# Patient Record
Sex: MALE | Race: BLACK OR AFRICAN AMERICAN | Employment: OTHER | URBAN - METROPOLITAN AREA
[De-identification: names, ages, dates, MRNs, and addresses within clinical notes are randomized per-mention and may not be internally consistent; named-entity substitution may affect disease eponyms.]

---

## 2017-01-03 RX ORDER — TRIAMTERENE AND HYDROCHLOROTHIAZIDE 37.5; 25 MG/1; MG/1
TABLET ORAL
Qty: 90 TABLET | Refills: 1 | Status: SHIPPED | OUTPATIENT
Start: 2017-01-03 | End: 2017-07-08 | Stop reason: SDUPTHER

## 2017-01-09 RX ORDER — ENOXAPARIN SODIUM 300 MG/3ML
1 INJECTION INTRAVENOUS; SUBCUTANEOUS 2 TIMES DAILY
Status: SHIPPED | OUTPATIENT
Start: 2017-01-09 | End: 2017-01-15

## 2017-01-10 ENCOUNTER — TELEPHONE (OUTPATIENT)
Dept: PHARMACY | Facility: CLINIC | Age: 77
End: 2017-01-10

## 2017-01-10 ENCOUNTER — ANTI-COAG VISIT (OUTPATIENT)
Dept: PHARMACY | Facility: CLINIC | Age: 77
End: 2017-01-10

## 2017-01-10 DIAGNOSIS — I48.20 CHRONIC ATRIAL FIBRILLATION (HCC): ICD-10-CM

## 2017-01-10 LAB — INTERNATIONAL NORMALIZATION RATIO, POC: 2.3

## 2017-01-16 ENCOUNTER — TELEPHONE (OUTPATIENT)
Dept: CARDIOLOGY CLINIC | Age: 77
End: 2017-01-16

## 2017-02-09 ENCOUNTER — OFFICE VISIT (OUTPATIENT)
Dept: INTERNAL MEDICINE | Age: 77
End: 2017-02-09

## 2017-02-09 ENCOUNTER — ANTI-COAG VISIT (OUTPATIENT)
Dept: PHARMACY | Facility: CLINIC | Age: 77
End: 2017-02-09

## 2017-02-09 VITALS
SYSTOLIC BLOOD PRESSURE: 158 MMHG | WEIGHT: 226 LBS | BODY MASS INDEX: 33.37 KG/M2 | HEART RATE: 79 BPM | DIASTOLIC BLOOD PRESSURE: 88 MMHG | OXYGEN SATURATION: 97 %

## 2017-02-09 DIAGNOSIS — I48.19 PERSISTENT ATRIAL FIBRILLATION (HCC): Chronic | ICD-10-CM

## 2017-02-09 DIAGNOSIS — Z00.00 ROUTINE GENERAL MEDICAL EXAMINATION AT A HEALTH CARE FACILITY: Primary | ICD-10-CM

## 2017-02-09 DIAGNOSIS — I48.20 CHRONIC ATRIAL FIBRILLATION (HCC): ICD-10-CM

## 2017-02-09 DIAGNOSIS — Z12.5 SPECIAL SCREENING FOR MALIGNANT NEOPLASM OF PROSTATE: ICD-10-CM

## 2017-02-09 DIAGNOSIS — D64.9 ANEMIA, UNSPECIFIED TYPE: Chronic | ICD-10-CM

## 2017-02-09 DIAGNOSIS — E55.9 VITAMIN D DEFICIENCY: ICD-10-CM

## 2017-02-09 DIAGNOSIS — I10 ESSENTIAL HYPERTENSION: Chronic | ICD-10-CM

## 2017-02-09 DIAGNOSIS — Z12.11 SPECIAL SCREENING FOR MALIGNANT NEOPLASMS, COLON: ICD-10-CM

## 2017-02-09 DIAGNOSIS — N40.1 BENIGN NON-NODULAR PROSTATIC HYPERPLASIA WITH LOWER URINARY TRACT SYMPTOMS: Chronic | ICD-10-CM

## 2017-02-09 DIAGNOSIS — K21.9 GASTROESOPHAGEAL REFLUX DISEASE WITHOUT ESOPHAGITIS: Chronic | ICD-10-CM

## 2017-02-09 DIAGNOSIS — R97.20 ELEVATED PSA: Chronic | ICD-10-CM

## 2017-02-09 DIAGNOSIS — E78.5 DYSLIPIDEMIA: Chronic | ICD-10-CM

## 2017-02-09 LAB
ALBUMIN SERPL-MCNC: 4.1 G/DL (ref 3.4–5)
ALP BLD-CCNC: 90 U/L (ref 40–129)
ALT SERPL-CCNC: 18 U/L (ref 10–40)
ANION GAP SERPL CALCULATED.3IONS-SCNC: 15 MMOL/L (ref 3–16)
AST SERPL-CCNC: 18 U/L (ref 15–37)
BASOPHILS ABSOLUTE: 0 K/UL (ref 0–0.2)
BASOPHILS RELATIVE PERCENT: 0.3 %
BILIRUB SERPL-MCNC: 0.7 MG/DL (ref 0–1)
BILIRUBIN DIRECT: <0.2 MG/DL (ref 0–0.3)
BILIRUBIN URINE: NEGATIVE
BILIRUBIN, INDIRECT: NORMAL MG/DL (ref 0–1)
BLOOD, URINE: NEGATIVE
BUN BLDV-MCNC: 14 MG/DL (ref 7–20)
CALCIUM SERPL-MCNC: 9.9 MG/DL (ref 8.3–10.6)
CHLORIDE BLD-SCNC: 99 MMOL/L (ref 99–110)
CHOLESTEROL, TOTAL: 155 MG/DL (ref 0–199)
CLARITY: CLEAR
CO2: 26 MMOL/L (ref 21–32)
COLOR: YELLOW
CREAT SERPL-MCNC: 0.9 MG/DL (ref 0.8–1.3)
EOSINOPHILS ABSOLUTE: 0 K/UL (ref 0–0.6)
EOSINOPHILS RELATIVE PERCENT: 0.7 %
GFR AFRICAN AMERICAN: >60
GFR NON-AFRICAN AMERICAN: >60
GLUCOSE BLD-MCNC: 90 MG/DL (ref 70–99)
GLUCOSE URINE: NEGATIVE MG/DL
HCT VFR BLD CALC: 43.5 % (ref 40.5–52.5)
HDLC SERPL-MCNC: 44 MG/DL (ref 40–60)
HEMOGLOBIN: 14.7 G/DL (ref 13.5–17.5)
INTERNATIONAL NORMALIZATION RATIO, POC: 3.2
KETONES, URINE: 15 MG/DL
LDL CHOLESTEROL CALCULATED: 101 MG/DL
LEUKOCYTE ESTERASE, URINE: NEGATIVE
LYMPHOCYTES ABSOLUTE: 1.9 K/UL (ref 1–5.1)
LYMPHOCYTES RELATIVE PERCENT: 31.7 %
MCH RBC QN AUTO: 35.5 PG (ref 26–34)
MCHC RBC AUTO-ENTMCNC: 33.8 G/DL (ref 31–36)
MCV RBC AUTO: 105 FL (ref 80–100)
MICROSCOPIC EXAMINATION: ABNORMAL
MONOCYTES ABSOLUTE: 0.5 K/UL (ref 0–1.3)
MONOCYTES RELATIVE PERCENT: 7.9 %
NEUTROPHILS ABSOLUTE: 3.5 K/UL (ref 1.7–7.7)
NEUTROPHILS RELATIVE PERCENT: 59.4 %
NITRITE, URINE: NEGATIVE
PDW BLD-RTO: 13 % (ref 12.4–15.4)
PH UA: 6.5
PHOSPHORUS: 2.7 MG/DL (ref 2.5–4.9)
PLATELET # BLD: 210 K/UL (ref 135–450)
PMV BLD AUTO: 8.5 FL (ref 5–10.5)
POTASSIUM SERPL-SCNC: 4.2 MMOL/L (ref 3.5–5.1)
PROSTATE SPECIFIC ANTIGEN: 19.05 NG/ML (ref 0–4)
PROTEIN UA: NEGATIVE MG/DL
RBC # BLD: 4.15 M/UL (ref 4.2–5.9)
SODIUM BLD-SCNC: 140 MMOL/L (ref 136–145)
SPECIFIC GRAVITY UA: 1.02
TOTAL PROTEIN: 7.1 G/DL (ref 6.4–8.2)
TRIGL SERPL-MCNC: 50 MG/DL (ref 0–150)
TSH SERPL DL<=0.05 MIU/L-ACNC: 1.48 UIU/ML (ref 0.27–4.2)
URINE TYPE: ABNORMAL
UROBILINOGEN, URINE: 0.2 E.U./DL
VITAMIN D 25-HYDROXY: 39.7 NG/ML
VLDLC SERPL CALC-MCNC: 10 MG/DL
WBC # BLD: 5.9 K/UL (ref 4–11)

## 2017-02-09 PROCEDURE — 1036F TOBACCO NON-USER: CPT | Performed by: INTERNAL MEDICINE

## 2017-02-09 PROCEDURE — 4040F PNEUMOC VAC/ADMIN/RCVD: CPT | Performed by: INTERNAL MEDICINE

## 2017-02-09 PROCEDURE — G8484 FLU IMMUNIZE NO ADMIN: HCPCS | Performed by: INTERNAL MEDICINE

## 2017-02-09 PROCEDURE — 1123F ACP DISCUSS/DSCN MKR DOCD: CPT | Performed by: INTERNAL MEDICINE

## 2017-02-09 PROCEDURE — G8427 DOCREV CUR MEDS BY ELIG CLIN: HCPCS | Performed by: INTERNAL MEDICINE

## 2017-02-09 PROCEDURE — G8417 CALC BMI ABV UP PARAM F/U: HCPCS | Performed by: INTERNAL MEDICINE

## 2017-02-09 PROCEDURE — G0439 PPPS, SUBSEQ VISIT: HCPCS | Performed by: INTERNAL MEDICINE

## 2017-02-09 PROCEDURE — 99214 OFFICE O/P EST MOD 30 MIN: CPT | Performed by: INTERNAL MEDICINE

## 2017-02-09 RX ORDER — WARFARIN SODIUM 5 MG/1
TABLET ORAL
Status: ON HOLD | COMMUNITY
Start: 2017-02-09 | End: 2017-08-16 | Stop reason: CLARIF

## 2017-02-09 ASSESSMENT — LIFESTYLE VARIABLES
HOW MANY STANDARD DRINKS CONTAINING ALCOHOL DO YOU HAVE ON A TYPICAL DAY: 0
HOW OFTEN DURING THE LAST YEAR HAVE YOU HAD A FEELING OF GUILT OR REMORSE AFTER DRINKING: 0
HOW OFTEN DO YOU HAVE SIX OR MORE DRINKS ON ONE OCCASION: 0
HOW OFTEN DURING THE LAST YEAR HAVE YOU FAILED TO DO WHAT WAS NORMALLY EXPECTED FROM YOU BECAUSE OF DRINKING: 0
HAS A RELATIVE, FRIEND, DOCTOR, OR ANOTHER HEALTH PROFESSIONAL EXPRESSED CONCERN ABOUT YOUR DRINKING OR SUGGESTED YOU CUT DOWN: 0
AUDIT TOTAL SCORE: 3
HOW OFTEN DO YOU HAVE A DRINK CONTAINING ALCOHOL: 3
HOW OFTEN DURING THE LAST YEAR HAVE YOU FOUND THAT YOU WERE NOT ABLE TO STOP DRINKING ONCE YOU HAD STARTED: 0
AUDIT-C TOTAL SCORE: 3
HOW OFTEN DURING THE LAST YEAR HAVE YOU NEEDED AN ALCOHOLIC DRINK FIRST THING IN THE MORNING TO GET YOURSELF GOING AFTER A NIGHT OF HEAVY DRINKING: 0
HAVE YOU OR SOMEONE ELSE BEEN INJURED AS A RESULT OF YOUR DRINKING: 0
HOW OFTEN DURING THE LAST YEAR HAVE YOU BEEN UNABLE TO REMEMBER WHAT HAPPENED THE NIGHT BEFORE BECAUSE YOU HAD BEEN DRINKING: 0

## 2017-02-09 ASSESSMENT — ENCOUNTER SYMPTOMS
EYES NEGATIVE: 1
GASTROINTESTINAL NEGATIVE: 1
RESPIRATORY NEGATIVE: 1

## 2017-02-09 ASSESSMENT — ANXIETY QUESTIONNAIRES: GAD7 TOTAL SCORE: 0

## 2017-02-09 ASSESSMENT — PATIENT HEALTH QUESTIONNAIRE - PHQ9: SUM OF ALL RESPONSES TO PHQ QUESTIONS 1-9: 1

## 2017-02-17 ENCOUNTER — HOSPITAL ENCOUNTER (OUTPATIENT)
Dept: ENDOSCOPY | Age: 77
Discharge: HOME | End: 2017-02-17
Attending: INTERNAL MEDICINE | Admitting: INTERNAL MEDICINE

## 2017-02-17 VITALS
SYSTOLIC BLOOD PRESSURE: 137 MMHG | DIASTOLIC BLOOD PRESSURE: 108 MMHG | HEIGHT: 69 IN | WEIGHT: 220 LBS | HEART RATE: 84 BPM | RESPIRATION RATE: 10 BRPM | TEMPERATURE: 97.5 F | BODY MASS INDEX: 32.58 KG/M2 | OXYGEN SATURATION: 100 %

## 2017-02-17 RX ORDER — PROMETHAZINE HYDROCHLORIDE 25 MG/ML
6.25 INJECTION, SOLUTION INTRAMUSCULAR; INTRAVENOUS
Status: ACTIVE | OUTPATIENT
Start: 2017-02-17 | End: 2017-02-17

## 2017-02-17 RX ORDER — FENTANYL CITRATE 50 UG/ML
25 INJECTION, SOLUTION INTRAMUSCULAR; INTRAVENOUS EVERY 5 MIN PRN
Status: DISCONTINUED | OUTPATIENT
Start: 2017-02-17 | End: 2017-02-18 | Stop reason: HOSPADM

## 2017-02-17 RX ORDER — LABETALOL HYDROCHLORIDE 5 MG/ML
5 INJECTION, SOLUTION INTRAVENOUS EVERY 10 MIN PRN
Status: DISCONTINUED | OUTPATIENT
Start: 2017-02-17 | End: 2017-02-18 | Stop reason: HOSPADM

## 2017-02-17 RX ORDER — DIPHENHYDRAMINE HYDROCHLORIDE 50 MG/ML
12.5 INJECTION INTRAMUSCULAR; INTRAVENOUS
Status: ACTIVE | OUTPATIENT
Start: 2017-02-17 | End: 2017-02-17

## 2017-02-17 RX ORDER — ONDANSETRON 2 MG/ML
4 INJECTION INTRAMUSCULAR; INTRAVENOUS
Status: ACTIVE | OUTPATIENT
Start: 2017-02-17 | End: 2017-02-17

## 2017-02-17 RX ORDER — HYDRALAZINE HYDROCHLORIDE 20 MG/ML
5 INJECTION INTRAMUSCULAR; INTRAVENOUS EVERY 10 MIN PRN
Status: DISCONTINUED | OUTPATIENT
Start: 2017-02-17 | End: 2017-02-18 | Stop reason: HOSPADM

## 2017-02-17 RX ORDER — MEPERIDINE HYDROCHLORIDE 25 MG/ML
12.5 INJECTION INTRAMUSCULAR; INTRAVENOUS; SUBCUTANEOUS EVERY 5 MIN PRN
Status: DISCONTINUED | OUTPATIENT
Start: 2017-02-17 | End: 2017-02-18 | Stop reason: HOSPADM

## 2017-02-24 ENCOUNTER — ANTI-COAG VISIT (OUTPATIENT)
Dept: PHARMACY | Facility: CLINIC | Age: 77
End: 2017-02-24

## 2017-02-24 DIAGNOSIS — I48.20 CHRONIC ATRIAL FIBRILLATION (HCC): ICD-10-CM

## 2017-02-24 LAB — INR BLD: 1.9

## 2017-03-10 ENCOUNTER — OFFICE VISIT (OUTPATIENT)
Dept: CARDIOLOGY CLINIC | Age: 77
End: 2017-03-10

## 2017-03-10 VITALS
DIASTOLIC BLOOD PRESSURE: 80 MMHG | SYSTOLIC BLOOD PRESSURE: 130 MMHG | WEIGHT: 227 LBS | BODY MASS INDEX: 33.52 KG/M2 | HEART RATE: 72 BPM

## 2017-03-10 DIAGNOSIS — I10 ESSENTIAL HYPERTENSION: ICD-10-CM

## 2017-03-10 DIAGNOSIS — I48.20 CHRONIC A-FIB (HCC): Primary | ICD-10-CM

## 2017-03-10 PROCEDURE — 99214 OFFICE O/P EST MOD 30 MIN: CPT | Performed by: INTERNAL MEDICINE

## 2017-03-10 PROCEDURE — 1123F ACP DISCUSS/DSCN MKR DOCD: CPT | Performed by: INTERNAL MEDICINE

## 2017-03-10 PROCEDURE — 1036F TOBACCO NON-USER: CPT | Performed by: INTERNAL MEDICINE

## 2017-03-10 PROCEDURE — G8417 CALC BMI ABV UP PARAM F/U: HCPCS | Performed by: INTERNAL MEDICINE

## 2017-03-10 PROCEDURE — G8427 DOCREV CUR MEDS BY ELIG CLIN: HCPCS | Performed by: INTERNAL MEDICINE

## 2017-03-10 PROCEDURE — 4040F PNEUMOC VAC/ADMIN/RCVD: CPT | Performed by: INTERNAL MEDICINE

## 2017-03-10 PROCEDURE — G8484 FLU IMMUNIZE NO ADMIN: HCPCS | Performed by: INTERNAL MEDICINE

## 2017-03-17 ENCOUNTER — ANTI-COAG VISIT (OUTPATIENT)
Dept: PHARMACY | Facility: CLINIC | Age: 77
End: 2017-03-17

## 2017-03-17 DIAGNOSIS — I48.20 CHRONIC ATRIAL FIBRILLATION (HCC): ICD-10-CM

## 2017-03-17 LAB — INR BLD: 1.9

## 2017-03-31 ENCOUNTER — ANTI-COAG VISIT (OUTPATIENT)
Dept: PHARMACY | Facility: CLINIC | Age: 77
End: 2017-03-31

## 2017-03-31 DIAGNOSIS — I48.20 CHRONIC ATRIAL FIBRILLATION (HCC): ICD-10-CM

## 2017-03-31 LAB — INR BLD: 2.2

## 2017-04-21 ENCOUNTER — ANTI-COAG VISIT (OUTPATIENT)
Dept: PHARMACY | Facility: CLINIC | Age: 77
End: 2017-04-21

## 2017-04-21 DIAGNOSIS — I48.20 CHRONIC ATRIAL FIBRILLATION (HCC): ICD-10-CM

## 2017-04-21 LAB — INR BLD: 2.6

## 2017-05-19 ENCOUNTER — ANTI-COAG VISIT (OUTPATIENT)
Dept: PHARMACY | Facility: CLINIC | Age: 77
End: 2017-05-19

## 2017-05-19 DIAGNOSIS — I48.20 CHRONIC ATRIAL FIBRILLATION (HCC): ICD-10-CM

## 2017-05-19 LAB — INTERNATIONAL NORMALIZATION RATIO, POC: 2.8

## 2017-06-09 ENCOUNTER — OFFICE VISIT (OUTPATIENT)
Dept: INTERNAL MEDICINE | Age: 77
End: 2017-06-09

## 2017-06-09 VITALS
HEIGHT: 69 IN | OXYGEN SATURATION: 98 % | DIASTOLIC BLOOD PRESSURE: 80 MMHG | HEART RATE: 76 BPM | WEIGHT: 228 LBS | BODY MASS INDEX: 33.77 KG/M2 | SYSTOLIC BLOOD PRESSURE: 138 MMHG

## 2017-06-09 DIAGNOSIS — I48.20 CHRONIC ATRIAL FIBRILLATION (HCC): Chronic | ICD-10-CM

## 2017-06-09 DIAGNOSIS — I10 ESSENTIAL HYPERTENSION: Primary | Chronic | ICD-10-CM

## 2017-06-09 DIAGNOSIS — R80.9 PROTEINURIA: Chronic | ICD-10-CM

## 2017-06-09 DIAGNOSIS — E55.9 VITAMIN D DEFICIENCY: ICD-10-CM

## 2017-06-09 DIAGNOSIS — E78.5 DYSLIPIDEMIA: Chronic | ICD-10-CM

## 2017-06-09 PROCEDURE — G8427 DOCREV CUR MEDS BY ELIG CLIN: HCPCS | Performed by: INTERNAL MEDICINE

## 2017-06-09 PROCEDURE — G8417 CALC BMI ABV UP PARAM F/U: HCPCS | Performed by: INTERNAL MEDICINE

## 2017-06-09 PROCEDURE — 4040F PNEUMOC VAC/ADMIN/RCVD: CPT | Performed by: INTERNAL MEDICINE

## 2017-06-09 PROCEDURE — 1036F TOBACCO NON-USER: CPT | Performed by: INTERNAL MEDICINE

## 2017-06-09 PROCEDURE — 1123F ACP DISCUSS/DSCN MKR DOCD: CPT | Performed by: INTERNAL MEDICINE

## 2017-06-09 PROCEDURE — 99214 OFFICE O/P EST MOD 30 MIN: CPT | Performed by: INTERNAL MEDICINE

## 2017-06-09 RX ORDER — MULTIVIT-MIN/IRON/FOLIC ACID/K 18-600-40
1 CAPSULE ORAL 2 TIMES DAILY
COMMUNITY
Start: 2017-06-09

## 2017-06-09 ASSESSMENT — ENCOUNTER SYMPTOMS
ORTHOPNEA: 0
EYES NEGATIVE: 1
GASTROINTESTINAL NEGATIVE: 1
SHORTNESS OF BREATH: 0
RESPIRATORY NEGATIVE: 1
BLURRED VISION: 0

## 2017-06-16 ENCOUNTER — ANTI-COAG VISIT (OUTPATIENT)
Dept: PHARMACY | Facility: CLINIC | Age: 77
End: 2017-06-16

## 2017-06-16 DIAGNOSIS — I48.20 CHRONIC ATRIAL FIBRILLATION (HCC): ICD-10-CM

## 2017-06-16 LAB — INTERNATIONAL NORMALIZATION RATIO, POC: 4.4

## 2017-06-23 ENCOUNTER — ANTI-COAG VISIT (OUTPATIENT)
Dept: PHARMACY | Facility: CLINIC | Age: 77
End: 2017-06-23

## 2017-06-23 LAB — INTERNATIONAL NORMALIZATION RATIO, POC: 2

## 2017-06-30 ENCOUNTER — ANTI-COAG VISIT (OUTPATIENT)
Dept: PHARMACY | Facility: CLINIC | Age: 77
End: 2017-06-30

## 2017-06-30 LAB — INTERNATIONAL NORMALIZATION RATIO, POC: 2.4

## 2017-08-01 ENCOUNTER — TELEPHONE (OUTPATIENT)
Dept: INTERNAL MEDICINE | Age: 77
End: 2017-08-01

## 2017-08-01 ENCOUNTER — ANTI-COAG VISIT (OUTPATIENT)
Dept: PHARMACY | Facility: CLINIC | Age: 77
End: 2017-08-01

## 2017-08-01 ENCOUNTER — TELEPHONE (OUTPATIENT)
Dept: PHARMACY | Facility: CLINIC | Age: 77
End: 2017-08-01

## 2017-08-01 DIAGNOSIS — I48.91 ATRIAL FIBRILLATION, UNSPECIFIED TYPE (HCC): ICD-10-CM

## 2017-08-01 LAB — INTERNATIONAL NORMALIZATION RATIO, POC: 2.7

## 2017-08-04 ENCOUNTER — OFFICE VISIT (OUTPATIENT)
Dept: INTERNAL MEDICINE | Age: 77
End: 2017-08-04

## 2017-08-04 ENCOUNTER — TELEPHONE (OUTPATIENT)
Dept: CARDIOLOGY CLINIC | Age: 77
End: 2017-08-04

## 2017-08-04 VITALS
WEIGHT: 227 LBS | HEART RATE: 88 BPM | BODY MASS INDEX: 33.62 KG/M2 | SYSTOLIC BLOOD PRESSURE: 132 MMHG | DIASTOLIC BLOOD PRESSURE: 86 MMHG | HEIGHT: 69 IN

## 2017-08-04 DIAGNOSIS — R31.9 HEMATURIA: ICD-10-CM

## 2017-08-04 DIAGNOSIS — I48.20 CHRONIC ATRIAL FIBRILLATION (HCC): ICD-10-CM

## 2017-08-04 DIAGNOSIS — I10 ESSENTIAL HYPERTENSION: ICD-10-CM

## 2017-08-04 DIAGNOSIS — Z01.818 PRE-OP EVALUATION: ICD-10-CM

## 2017-08-04 DIAGNOSIS — Z01.818 PRE-OP EVALUATION: Primary | ICD-10-CM

## 2017-08-04 LAB
ALBUMIN SERPL-MCNC: 4.2 G/DL (ref 3.4–5)
ANION GAP SERPL CALCULATED.3IONS-SCNC: 12 MMOL/L (ref 3–16)
BASOPHILS ABSOLUTE: 0 K/UL (ref 0–0.2)
BASOPHILS RELATIVE PERCENT: 0.3 %
BUN BLDV-MCNC: 19 MG/DL (ref 7–20)
CALCIUM SERPL-MCNC: 9.6 MG/DL (ref 8.3–10.6)
CHLORIDE BLD-SCNC: 101 MMOL/L (ref 99–110)
CO2: 29 MMOL/L (ref 21–32)
CREAT SERPL-MCNC: 0.9 MG/DL (ref 0.8–1.3)
EOSINOPHILS ABSOLUTE: 0.1 K/UL (ref 0–0.6)
EOSINOPHILS RELATIVE PERCENT: 1 %
GFR AFRICAN AMERICAN: >60
GFR NON-AFRICAN AMERICAN: >60
GLUCOSE BLD-MCNC: 99 MG/DL (ref 70–99)
HCT VFR BLD CALC: 42.6 % (ref 40.5–52.5)
HEMOGLOBIN: 14.5 G/DL (ref 13.5–17.5)
LYMPHOCYTES ABSOLUTE: 1.9 K/UL (ref 1–5.1)
LYMPHOCYTES RELATIVE PERCENT: 29.1 %
MCH RBC QN AUTO: 36.4 PG (ref 26–34)
MCHC RBC AUTO-ENTMCNC: 34.2 G/DL (ref 31–36)
MCV RBC AUTO: 106.4 FL (ref 80–100)
MONOCYTES ABSOLUTE: 0.5 K/UL (ref 0–1.3)
MONOCYTES RELATIVE PERCENT: 7.4 %
NEUTROPHILS ABSOLUTE: 4 K/UL (ref 1.7–7.7)
NEUTROPHILS RELATIVE PERCENT: 62.2 %
PDW BLD-RTO: 13.3 % (ref 12.4–15.4)
PHOSPHORUS: 2 MG/DL (ref 2.5–4.9)
PLATELET # BLD: 188 K/UL (ref 135–450)
PMV BLD AUTO: 8.3 FL (ref 5–10.5)
POTASSIUM SERPL-SCNC: 4.1 MMOL/L (ref 3.5–5.1)
RBC # BLD: 4 M/UL (ref 4.2–5.9)
SODIUM BLD-SCNC: 142 MMOL/L (ref 136–145)
WBC # BLD: 6.5 K/UL (ref 4–11)

## 2017-08-04 PROCEDURE — 99214 OFFICE O/P EST MOD 30 MIN: CPT | Performed by: INTERNAL MEDICINE

## 2017-08-04 PROCEDURE — G8417 CALC BMI ABV UP PARAM F/U: HCPCS | Performed by: INTERNAL MEDICINE

## 2017-08-04 PROCEDURE — 1036F TOBACCO NON-USER: CPT | Performed by: INTERNAL MEDICINE

## 2017-08-04 PROCEDURE — 93000 ELECTROCARDIOGRAM COMPLETE: CPT | Performed by: INTERNAL MEDICINE

## 2017-08-04 PROCEDURE — 4040F PNEUMOC VAC/ADMIN/RCVD: CPT | Performed by: INTERNAL MEDICINE

## 2017-08-04 PROCEDURE — G8427 DOCREV CUR MEDS BY ELIG CLIN: HCPCS | Performed by: INTERNAL MEDICINE

## 2017-08-10 ENCOUNTER — TELEPHONE (OUTPATIENT)
Dept: INTERNAL MEDICINE | Age: 77
End: 2017-08-10

## 2017-08-10 DIAGNOSIS — I48.20 CHRONIC ATRIAL FIBRILLATION (HCC): Primary | ICD-10-CM

## 2017-08-10 NOTE — TELEPHONE ENCOUNTER
I spoe with Mr Surinder Lamas. He will stop his Coumadin 5 days before the procedure, He will do INR test 1 days before. Pt says that from Dr. Kathie Bence office he was told to be on Lovenox. I left a message to Dr Santhosh Kumar to discuss that as the patient will be on Lovenox only for 2 days.

## 2017-08-11 ENCOUNTER — TELEPHONE (OUTPATIENT)
Dept: INTERNAL MEDICINE | Age: 77
End: 2017-08-11

## 2017-08-15 ENCOUNTER — ANTI-COAG VISIT (OUTPATIENT)
Dept: PHARMACY | Facility: CLINIC | Age: 77
End: 2017-08-15

## 2017-08-15 ENCOUNTER — TELEPHONE (OUTPATIENT)
Dept: CARDIOLOGY CLINIC | Age: 77
End: 2017-08-15

## 2017-08-15 DIAGNOSIS — I48.20 CHRONIC ATRIAL FIBRILLATION (HCC): ICD-10-CM

## 2017-08-15 LAB — INTERNATIONAL NORMALIZATION RATIO, POC: 1.2

## 2017-08-18 ENCOUNTER — TELEPHONE (OUTPATIENT)
Dept: PHARMACY | Facility: CLINIC | Age: 77
End: 2017-08-18

## 2017-08-28 ENCOUNTER — HOSPITAL ENCOUNTER (OUTPATIENT)
Dept: CT IMAGING | Age: 77
Discharge: HOME | End: 2017-08-28
Attending: INTERNAL MEDICINE | Admitting: INTERNAL MEDICINE

## 2017-08-28 DIAGNOSIS — Z08 ENCOUNTER FOR FOLLOW-UP SURVEILLANCE OF COLON CANCER: ICD-10-CM

## 2017-08-28 DIAGNOSIS — Z85.038 ENCOUNTER FOR FOLLOW-UP SURVEILLANCE OF COLON CANCER: ICD-10-CM

## 2017-08-28 DIAGNOSIS — C18.7 MALIGNANT NEOPLASM OF SIGMOID COLON (HCC): ICD-10-CM

## 2017-08-31 ENCOUNTER — ANTI-COAG VISIT (OUTPATIENT)
Dept: PHARMACY | Facility: CLINIC | Age: 77
End: 2017-08-31

## 2017-08-31 DIAGNOSIS — I48.20 CHRONIC ATRIAL FIBRILLATION (HCC): ICD-10-CM

## 2017-08-31 LAB — INTERNATIONAL NORMALIZATION RATIO, POC: 1

## 2017-09-05 ENCOUNTER — ANTI-COAG VISIT (OUTPATIENT)
Dept: PHARMACY | Facility: CLINIC | Age: 77
End: 2017-09-05

## 2017-09-05 DIAGNOSIS — I48.20 CHRONIC ATRIAL FIBRILLATION (HCC): ICD-10-CM

## 2017-09-05 LAB — INTERNATIONAL NORMALIZATION RATIO, POC: 1

## 2017-09-10 DIAGNOSIS — E78.5 DYSLIPIDEMIA: Chronic | ICD-10-CM

## 2017-09-11 RX ORDER — NIACIN 1000 MG/1
TABLET, EXTENDED RELEASE ORAL
Qty: 180 TABLET | Refills: 2 | Status: ON HOLD | OUTPATIENT
Start: 2017-09-11 | End: 2017-11-01 | Stop reason: HOSPADM

## 2017-09-19 ENCOUNTER — ANTI-COAG VISIT (OUTPATIENT)
Dept: PHARMACY | Facility: CLINIC | Age: 77
End: 2017-09-19

## 2017-09-19 DIAGNOSIS — I48.20 CHRONIC ATRIAL FIBRILLATION (HCC): ICD-10-CM

## 2017-09-19 LAB — INTERNATIONAL NORMALIZATION RATIO, POC: 1.4

## 2017-09-20 ENCOUNTER — TELEPHONE (OUTPATIENT)
Dept: PHARMACY | Facility: CLINIC | Age: 77
End: 2017-09-20

## 2017-09-28 ENCOUNTER — ANTI-COAG VISIT (OUTPATIENT)
Dept: PHARMACY | Facility: CLINIC | Age: 77
End: 2017-09-28

## 2017-09-28 DIAGNOSIS — I48.20 CHRONIC ATRIAL FIBRILLATION (HCC): ICD-10-CM

## 2017-09-28 LAB — INTERNATIONAL NORMALIZATION RATIO, POC: 2.2

## 2017-09-28 RX ORDER — FINASTERIDE 5 MG/1
5 TABLET, FILM COATED ORAL DAILY
COMMUNITY

## 2017-10-03 ENCOUNTER — TELEPHONE (OUTPATIENT)
Dept: INTERNAL MEDICINE | Age: 77
End: 2017-10-03

## 2017-10-03 DIAGNOSIS — M54.12 CERVICAL RADICULOPATHY: ICD-10-CM

## 2017-10-03 DIAGNOSIS — M50.90 CERVICAL DISC DISEASE: ICD-10-CM

## 2017-10-03 NOTE — TELEPHONE ENCOUNTER
Former [facility] patient. Will you do a refill for a 30day supply of his diclofenac until his appt with you on 10/17/17?  Please advise

## 2017-10-04 RX ORDER — DICLOFENAC SODIUM 75 MG/1
75 TABLET, DELAYED RELEASE ORAL 2 TIMES DAILY PRN
Qty: 60 TABLET | Refills: 0 | Status: ON HOLD | OUTPATIENT
Start: 2017-10-04 | End: 2017-11-01 | Stop reason: HOSPADM

## 2017-10-17 ENCOUNTER — ANTI-COAG VISIT (OUTPATIENT)
Dept: PHARMACY | Facility: CLINIC | Age: 77
End: 2017-10-17

## 2017-10-17 ENCOUNTER — OFFICE VISIT (OUTPATIENT)
Dept: INTERNAL MEDICINE | Age: 77
End: 2017-10-17

## 2017-10-17 VITALS
BODY MASS INDEX: 33.95 KG/M2 | HEIGHT: 68 IN | SYSTOLIC BLOOD PRESSURE: 130 MMHG | DIASTOLIC BLOOD PRESSURE: 82 MMHG | WEIGHT: 224 LBS

## 2017-10-17 DIAGNOSIS — I48.20 CHRONIC ATRIAL FIBRILLATION (HCC): ICD-10-CM

## 2017-10-17 DIAGNOSIS — Z23 NEED FOR INFLUENZA VACCINATION: Primary | ICD-10-CM

## 2017-10-17 DIAGNOSIS — I10 ESSENTIAL HYPERTENSION: Chronic | ICD-10-CM

## 2017-10-17 DIAGNOSIS — I48.20 CHRONIC ATRIAL FIBRILLATION (HCC): Chronic | ICD-10-CM

## 2017-10-17 DIAGNOSIS — E78.5 DYSLIPIDEMIA: Chronic | ICD-10-CM

## 2017-10-17 LAB — INTERNATIONAL NORMALIZATION RATIO, POC: 2.3

## 2017-10-17 PROCEDURE — 4040F PNEUMOC VAC/ADMIN/RCVD: CPT | Performed by: INTERNAL MEDICINE

## 2017-10-17 PROCEDURE — G8417 CALC BMI ABV UP PARAM F/U: HCPCS | Performed by: INTERNAL MEDICINE

## 2017-10-17 PROCEDURE — 1123F ACP DISCUSS/DSCN MKR DOCD: CPT | Performed by: INTERNAL MEDICINE

## 2017-10-17 PROCEDURE — G8484 FLU IMMUNIZE NO ADMIN: HCPCS | Performed by: INTERNAL MEDICINE

## 2017-10-17 PROCEDURE — G0008 ADMIN INFLUENZA VIRUS VAC: HCPCS | Performed by: INTERNAL MEDICINE

## 2017-10-17 PROCEDURE — 99214 OFFICE O/P EST MOD 30 MIN: CPT | Performed by: INTERNAL MEDICINE

## 2017-10-17 PROCEDURE — 1036F TOBACCO NON-USER: CPT | Performed by: INTERNAL MEDICINE

## 2017-10-17 PROCEDURE — G8427 DOCREV CUR MEDS BY ELIG CLIN: HCPCS | Performed by: INTERNAL MEDICINE

## 2017-10-17 PROCEDURE — 90662 IIV NO PRSV INCREASED AG IM: CPT | Performed by: INTERNAL MEDICINE

## 2017-10-17 ASSESSMENT — ENCOUNTER SYMPTOMS
SHORTNESS OF BREATH: 0
VOMITING: 0
CHEST TIGHTNESS: 0
NAUSEA: 0
ABDOMINAL PAIN: 0

## 2017-10-17 NOTE — PROGRESS NOTES
Outpatient Note for established Patient - regular Visit    History Obtained From:  patient, electronic medical record    CHIEF COMPLAINT:    Chief Complaint   Patient presents with    Hypertension    Hyperlipidemia    Atrial Fibrillation       HISTORY OF PRESENT ILLNESS:   The patient is a 65 y.o. male who is here today for f/u with HTN , hypercholesterolemia. He had cystoscopy , after which he had bleeding. Pt denies CP/SOB/palpitations/abdominal pain/N/V. No urinary symptoms. No headache / vision changes. Past Medical History:        Diagnosis Date    Allergic rhinitis     Anemia     Atrial fibrillation ([facility] [address]     Atrial fibrillation (HCC)     Benign non-nodular prostatic hyperplasia with lower urinary tract symptoms 2/9/2017    Benign paroxysmal positional vertigo     BPH (benign prostatic hyperplasia)     BPH (benign prostatic hypertrophy)     Cataract 10/3/2014    Cholelithiasis     Colon cancer ([facility] [address] 4/9/2012    Diverticulosis     Dyslipidemia 7/21/2010    Elevated PSA     Erectile dysfunction     Essential hypertension 11/24/2015    Gastroesophageal reflux disease without esophagitis 11/9/2016    GERD (gastroesophageal reflux disease)     Glaucoma 4/5/2013    Hiatal hernia     Hypertension     Lumbar radiculopathy     Osteoarthritis     Peripheral neuropathy ([facility] [address] 12/7/2012    Proteinuria 7/22/2010    S/P laparoscopic-assisted sigmoidectomy 4/17/2012    Urinary frequency        Social History:   TOBACCO:   reports that he has quit smoking. His smoking use included Cigarettes. He has never used smokeless tobacco.  ETOH:   reports that he drinks alcohol. Current Medications:    Prior to Admission medications    Medication Sig Start Date End Date Taking?  Authorizing Provider   diclofenac (VOLTAREN) 75 MG EC tablet Take 1 tablet by mouth 2 times daily as needed for Pain 10/4/17  Yes Deja Nova MD   finasteride (PROSCAR) 5 MG tablet Take 5 mg by mouth daily Yes Historical Provider, MD   niacin (NIASPAN) 1000 MG extended release tablet TAKE TWO TABLETS BY MOUTH ONCE NIGHTLY 9/11/17  Yes Breanne Jesus MD   warfarin (COUMADIN) 5 MG tablet Take 5 mg by mouth Indications: 5mg 4 days a week and 2.5mg for 3 days   Yes Historical Provider, MD   triamterene-hydrochlorothiazide (DYYNYJY-83) 37.5-25 MG per tablet Take 1 tablet by mouth daily 7/10/17  Yes Breanne Jesus MD   aspirin 81 MG tablet Take 81 mg by mouth daily   Yes Historical Provider, MD   Cholecalciferol (VITAMIN D) 2000 UNITS CAPS capsule Take 1 capsule by mouth 2 times daily 6/9/17  Yes Breanne Jesus MD   irbesartan (AVAPRO) 150 MG tablet Take 1 tablet by mouth daily 5/4/17  Yes Breanne Jesus MD   metoprolol succinate (TOPROL XL) 200 MG extended release tablet Take 1 tablet by mouth daily 11/9/16  Yes Breanne Jesus MD   lansoprazole (PREVACID) 30 MG delayed release capsule Take 1 capsule by mouth daily 11/9/16  Yes Breanne Jesus MD   tamsulosin (FLOMAX) 0.4 MG capsule Take 0.4 mg by mouth daily   Yes Historical Provider, MD   tadalafil (CIALIS) 20 MG tablet Take 1 tablet by mouth as needed for Erectile Dysfunction. 12/29/14  Yes Breanne Jesus MD   Multiple Vitamin (MULTIVITAMIN) capsule Take 1 capsule by mouth daily. 2/6/14  Yes Breanne Jesus MD       REVIEW OF SYSTEMS:  Review of Systems   Constitutional: Negative for chills and fever. Respiratory: Negative for chest tightness and shortness of breath. Cardiovascular: Negative for chest pain. Gastrointestinal: Negative for abdominal pain, nausea and vomiting. Genitourinary: Negative for difficulty urinating and dysuria. Neurological: Negative for weakness and headaches. Physical Exam:      Vitals: /82   Ht 5' 8\" (1.727 m)   Wt 224 lb (101.6 kg)   BMI 34.06 kg/m²     Body mass index is 34.06 kg/m².   Wt Readings from Last 3 Encounters:   10/17/17 224 lb (101.6 kg)   08/23/17 226 lb 9.6 oz (102.8 kg)   08/16/17 227 lb (103 kg)     Physical Exam   Constitutional: He is oriented to person, place, and time. He appears well-developed and well-nourished. No distress. HENT:   Head: Normocephalic and atraumatic. Mouth/Throat: Oropharynx is clear and moist. No oropharyngeal exudate. Eyes: Conjunctivae and EOM are normal. Right eye exhibits no discharge. Left eye exhibits no discharge. No scleral icterus. Neck: Normal range of motion. Neck supple. Cardiovascular: Normal rate and normal heart sounds. No murmur heard. Pulmonary/Chest: Effort normal and breath sounds normal. No respiratory distress. He has no wheezes. He has no rales. Abdominal: Soft. Lymphadenopathy:     He has no cervical adenopathy. Neurological: He is alert and oriented to person, place, and time. He has normal reflexes. He exhibits normal muscle tone. Skin: No rash noted. He is not diaphoretic. Assessment/Plan:   Danny Gallegos was seen today for hypertension, hyperlipidemia and atrial fibrillation. Diagnoses and all orders for this visit:    Need for influenza vaccination  -     INFLUENZA, HIGH DOSE, 65 YRS +, IM, PF, PREFILL SYR, 0.5ML (FLUZONE HD)    Essential hypertension  Well controlled no change in meds    Dyslipidemia  Minimal- no meds    Chronic atrial fibrillation (HCC)  Keep Coumadin , good INR result       Patient was encouraged to call the office or be seen with MD for any worsening or lack of improvement.      Katerina Hall M.D.   10/17/2017, 9:01 AM

## 2017-10-17 NOTE — PROGRESS NOTES
Vaccine Information Sheet, \"Influenza - Inactivated\"  given to Patricia Evans, or parent/legal guardian of  Patricia Evans and verbalized understanding. Patient responses:    Have you ever had a reaction to a flu vaccine? No  Are you able to eat eggs without adverse effects? Yes  Do you have any current illness? No  Have you ever had Guillian [place] Syndrome? No    Flu vaccine given per order. Please see immunization tab.

## 2017-10-17 NOTE — PROGRESS NOTES
ANTICOAGULATION SERVICE    Yannick Lu" is a 65 y.o. male with PMHx significant for chronic AF ([id number] of 3), HTN who presents to clinic 10/17/2017 for anticoagulation monitoring and adjustment.     Anticoagulation Indication(s):  Afib    Referring Physician:  Dr. Padmini Vidales    Goal INR Range:  2-3  Duration of Anticoagulation Therapy:  Indefinite  Time of day dose taken:  PM  Product patient has at home:  warfarin 5 mg (PEACH color)    INR Summary                           Warfarin regimen (mg)  Date INR   A/P   Sun Mon Tue Wed Thu Fri Sat Mg/wk  10/17 2.3 At goal, no change 5 5 2.5 5 2.5 5 2.5 27.5  9/28 2.2 At goal, no change 5 5 2.5 5 2.5 5 2.5 27.5  9/19 1.4 Below goal, 5 mg x 1 5 5 5/2.5 5 2.5 5 2.5 27.5  9/5 1.0 Below goal, resume 5 5 2.5 5 2.5 5 2.5 27.5  8/31 1.0 Below goal, 5 mg/d 5 5 INR  5 5 5   8/15 1.2 Resume 8/16  5 5 INR 5 5 5 5  8/1 2.7 At goal, no change 5 5 2.5 5 2.5 5 2.5 27.5  6/30 2.4 At goal, no change 5 5 2.5 5 2.5 5 2.5 27.5  6/23 2.0 At goal, continue 5 5 2.5 5 2.5 5 2.5 27.5  6/16 4.4 Above goal, hold x 2 5 5 2.5 5 2.5 5 2.5 27.5  5/19 2.8 At goal, continue 5 5 2.5 5 2.5 5 2.5 27.5  4/21 2.6 At goal, continue 5 5 2.5 5 2.5 5 2.5 27.5  3/31 2.2 At goal, continue 5 5 2.5 5 2.5 5 2.5 27.5  3/17 1.9 Below goal, 5x1 5 5 2.5 5 2.5 5 5/2.5 27.5  2/24 1.9 Below goal, continue 5 5 2.5 5 2.5 5 2.5 27.5  2/9 3.2 Above goal, continue 5 5 2.5 5 2.5 5 2.5 27.5  1/10 2.3 At goal, no change 5 5 2.5 5 2.5 5 2.5 27.5  12/13 2.7 At goal, no change 5 5 2.5 5 2.5 5 2.5 27.5  11/15 2.6 At goal, no change 5 5 2.5 5 2.5 5 2.5 27.5   10/18 2.5 At goal, no change 5 5 2.5 5 2.5 5 2.5 27.5    Patient History:  Recent hospitalizations/HC visits -10/17: OV PCP Dr Colin Lala  -8/28: CT chest/abd/pelvis showed prostate enlargement and bladder wall thickening, but no evidence of metastatic disease  -8/16: admit [facility] for post-op gross hematuria requiring CBI  -8/16: cystoscopy and TURBT at [facility]   Recent medication changes -started finasteride 5 mg daily per urologist (added to med list)  -ASA 81 mg and diclofenac held for urologic procedures  -plans to d/c Prevacid   Medications taken regularly that may interact with warfarin or alter INR MVI (may contain vit K)  Diclofenac 75 mg - takes only on occasion when absolutely necessary. Pt educated on increased bleeding associated with NSAIDs and instructed to use as minimally as possible. Tylenol is preferred for pain, but this does not help his pain. Warfarin dose taken as prescribed Held warfarin 3 days for planned colonoscopy, but was r/s. Has been on regular dose since 10/4, no missed doses  Uses pillbox   Signs/symptoms of bleeding Hematuria improved - none reported today. H/o epistaxis, occasional hemorrhoids, anemia. Vitamin K intake Normally has broccoli, asparagus, kale/gay salads almost daily   Recent vomiting/diarrhea/fever, changes in weight or activity level None reported   Tobacco or alcohol use Patient denies tobacco use  Will have 1 glass of wine per day max   Upcoming surgeries or procedures Colonoscopy r/s for 10/30: patient holding warfarin x 5 days PTP (no Lovenox bridge)     Assessment/Plan:  Patient's INR was therapeutic today (2.3). Patient was instructed to continue warfarin 2.5 mg on Tue/Thu/Sat and 5 mg all other days. Patient will be holding warfarin 10/25-10/29 for colonoscopy on 10/30. Repeat INR ~1 week after procedure. Patient was reminded to maintain consistent vitamin K intake and call with any bleeding, medication changes, or fever/vomiting/diarrhea. He was advised to seek emergency medical attention with stephanie or persistent bleeding. Next Clinic Appointment:  11/7    Please call [facility] at [phone] with any questions. Thanks!   Jalen Fowler, PharmD, BCACP  10/17/2017 10:35 AM

## 2017-10-18 ENCOUNTER — ANTI-COAG VISIT (OUTPATIENT)
Dept: INTERNAL MEDICINE | Age: 77
End: 2017-10-18

## 2017-10-18 DIAGNOSIS — I48.20 CHRONIC ATRIAL FIBRILLATION (HCC): Chronic | ICD-10-CM

## 2017-10-18 DIAGNOSIS — I48.20 CHRONIC ATRIAL FIBRILLATION (HCC): ICD-10-CM

## 2017-10-18 DIAGNOSIS — E78.5 DYSLIPIDEMIA: Chronic | ICD-10-CM

## 2017-10-18 DIAGNOSIS — I48.19 PERSISTENT ATRIAL FIBRILLATION (HCC): Chronic | ICD-10-CM

## 2017-10-18 LAB
CHOLESTEROL, TOTAL: 156 MG/DL (ref 0–199)
HDLC SERPL-MCNC: 46 MG/DL (ref 40–60)
INR BLD: 2.94 (ref 0.85–1.15)
LDL CHOLESTEROL CALCULATED: 98 MG/DL
PROTHROMBIN TIME: 33.2 SEC (ref 9.6–13)
TRIGL SERPL-MCNC: 58 MG/DL (ref 0–150)
VLDLC SERPL CALC-MCNC: 12 MG/DL

## 2017-10-18 RX ORDER — WARFARIN SODIUM 5 MG/1
TABLET ORAL
Qty: 90 TABLET | Refills: 3 | Status: ON HOLD | OUTPATIENT
Start: 2017-10-18 | End: 2017-11-01 | Stop reason: DRUGHIGH

## 2017-10-30 ENCOUNTER — HOSPITAL ENCOUNTER (OUTPATIENT)
Dept: ENDOSCOPY | Age: 77
Discharge: HOME | End: 2017-10-30
Attending: THORACIC SURGERY (CARDIOTHORACIC VASCULAR SURGERY) | Admitting: THORACIC SURGERY (CARDIOTHORACIC VASCULAR SURGERY)

## 2017-10-30 PROBLEM — R77.8 ELEVATED TROPONIN: Status: ACTIVE | Noted: 2017-10-30

## 2017-10-30 PROBLEM — R77.8 ELEVATED TROPONIN: Status: RESOLVED | Noted: 2017-10-30 | Resolved: 2017-10-30

## 2017-10-30 PROBLEM — I21.4 NON-ST ELEVATED MYOCARDIAL INFARCTION (HCC): Status: ACTIVE | Noted: 2017-10-30

## 2017-10-30 PROBLEM — R79.89 ELEVATED TROPONIN: Status: ACTIVE | Noted: 2017-10-30

## 2017-10-30 PROBLEM — R79.89 ELEVATED TROPONIN: Status: RESOLVED | Noted: 2017-10-30 | Resolved: 2017-10-30

## 2017-10-30 LAB
EKG ATRIAL RATE: 72 BPM
EKG DIAGNOSIS: NORMAL
EKG Q-T INTERVAL: 360 MS
EKG QRS DURATION: 88 MS
EKG QTC CALCULATION (BAZETT): 412 MS
EKG R AXIS: 29 DEGREES
EKG T AXIS: -7 DEGREES
EKG VENTRICULAR RATE: 79 BPM

## 2017-10-30 PROCEDURE — 93010 ELECTROCARDIOGRAM REPORT: CPT | Performed by: INTERNAL MEDICINE

## 2017-11-01 ENCOUNTER — TELEPHONE (OUTPATIENT)
Dept: PHARMACY | Facility: CLINIC | Age: 77
End: 2017-11-01

## 2017-11-01 ENCOUNTER — HOSPITAL ENCOUNTER (OUTPATIENT)
Dept: OTHER | Age: 77
Discharge: HOME | End: 2017-11-30
Attending: INTERNAL MEDICINE | Admitting: INTERNAL MEDICINE

## 2017-11-01 PROBLEM — I50.9: Status: ACTIVE | Noted: 2017-11-01

## 2017-11-01 NOTE — TELEPHONE ENCOUNTER
Left message with patient asking to return call to ensure he understands dose adjustment made. He is already scheduled for an INR on 11/7, which is fine considering his warfarin dose was empirically reduced and his INR was subtherapeutic today. Thanks!     Ladi Cross, PharmD, [facility]  Medication Management Clinic   [facility] Ph: [phone]  [facility] Ph: [phone]  11/1/2017 1:11 PM

## 2017-11-01 NOTE — TELEPHONE ENCOUNTER
Pt called back to verbalize understanding of warfarin dose change- explained to patient that this was due to drug interaction with Bactrim. He will keep his appointment on 11/7 for an INR check. Pt also mentioned that his colonoscopy was r/s for 11/22, but will likely be postponed for another 4-5 months, as he should not hold plavix + ASA following stent placement. Also discussed patient's increased risk for bleeding while on Plavix+ ASA in addition to warfarin. Patient instructed to monitor for s/sxs bleeding.      Denisa Wood, PharmD, [facility]  Medication Management Clinic   [facility] [place] [address] Ph: [phone]  [facility] Ph: [phone]  11/1/2017 5:25 PM

## 2017-11-02 ENCOUNTER — CARE COORDINATION (OUTPATIENT)
Dept: CASE MANAGEMENT | Age: 77
End: 2017-11-02

## 2017-11-02 ENCOUNTER — TELEPHONE (OUTPATIENT)
Dept: PHARMACY | Facility: CLINIC | Age: 77
End: 2017-11-02

## 2017-11-02 NOTE — TELEPHONE ENCOUNTER
CLINICAL PHARMACY NOTE  Post-Discharge Transitions of Care (MANUELA)    Non-face-to-face services provided:  Assessment and support for treatment adherence and medication management-med rec completed. Subjective/Objective:  Sofie Coles is a 65 y.o. male. Patient was discharged from [address] [place] [address] on 11/1/17 with a diagnosis of NSTEMI. Patient outreach to review discharge medications and provide medication review and management. Spoke with patient, reviewed home medication list.    Allergies   Allergen Reactions    Naproxen Other (See Comments)     Patient gets nose bleeds. Patient should not take. Patient lost a lot of blood in November due to Naproxen.  Ciprofloxacin      Mouth sores and sore throad     Discharge Medications (as per current medication list/AVS):  There are NEW medications for you. START taking them after you leave the hospital:  Current Outpatient Prescriptions   Medication Sig Dispense Refill    nitroGLYCERIN (NITROSTAT) 0.4 MG SL tablet up to max of 3 total doses.  If no relief after 1 dose, call 911. 25 tablet 3    atorvastatin (LIPITOR) 40 MG tablet Take 1 tablet by mouth nightly 30 tablet 3    losartan (COZAAR) 50 MG tablet Take 1 tablet by mouth daily 30 tablet 3    clopidogrel (PLAVIX) 75 MG tablet    *has printed rx and will get today Take 1 tablet by mouth daily 30 tablet 3    sulfamethoxazole-trimethoprim (BACTRIM DS) 800-160 MG per tablet Take 1 tablet by mouth 2 times daily for 8 days 16 tablet 0    pantoprazole (PROTONIX) 40 MG tablet Take 1 tablet by mouth daily 30 tablet 3     You told us you were taking these medications at home, but the amount or how often you take this medication has CHANGED:  Current Outpatient Prescriptions   Medication Sig Dispense Refill    warfarin (COUMADIN) 5 MG tablet Take 1 tablet by mouth See Admin Instructions Warfarin 5 mg on Zbicvs-Sqktrlxuv-Tehslf 30 tablet 3    warfarin (COUMADIN) 5 MG tablet    *confirms reduced warfarin dose and

## 2017-11-02 NOTE — CARE COORDINATION
[facility] Transitions Initial Follow Up Call    Call within 2 business days of discharge: Yes    Patient: Val Carrillo Patient : 1940   MRN: [id number]  Reason for Admission: NSTEMI   Discharge Date: 17 RARS: [facility] Risk Score: 19.5     Spoke with: spouse. Pt went to  his Rx's. Facility: [facility]    Non-face-to-face services provided:  Scheduled appointment with PCP-Dr Carter Ballard  Scheduled appointment with Specialist-Cardiology and urology  Obtained and reviewed discharge summary and/or continuity of care documents  Education of patient/family/caregiver/guardian to support self-management-review care. Care Transitions 24 Hour Call    Do you have any ongoing symptoms?:  No  Do you have a copy of your discharge instructions?:  Yes  Do you have all of your prescriptions and are they filled?:  Yes  Have you been contacted by a [employer]?:  Yes  Have you scheduled your follow up appointment?:  Yes  How are you going to get to your appointment?:  Car - drive self  Were you discharged with any Home Care or Post Acute Services:  No  Do you have support at home?:  Partner/Spouse/SO  Do you feel like you have everything you need to keep you well at home?:  Yes  Are you an active caregiver in your home?:  No  Care Transitions Interventions     Care Transition f/u call to pt home. Spouse answered the phone. She said pt went to get his new Rx's from the pharmacy. Noted pharmacy did med rec. Pt scheduled for f/u INR next . Pt has f/u appts with PCP,Cardiology, and urology next week. Reviewed with spouse and she has them as noted below. She said pt is doing well. No chest pain that she is aware of. Care Transitions will f/u next week.      Follow Up  Future Appointments  Date Time Provider Isael Patrick   2017 8:45 AM [address] [facility] [address] None   2017 11:20 AM CINDY Lea   2017 8:40 AM Stanton Hernandez MD

## 2017-11-06 ENCOUNTER — CARE COORDINATION (OUTPATIENT)
Dept: CASE MANAGEMENT | Age: 77
End: 2017-11-06

## 2017-11-06 NOTE — CARE COORDINATION
[facility] Transitions Follow Up Call    2017    Patient: Patricia Evans  Patient : 1940   MRN: [id number]  Reason for Admission: NSTEMI  Discharge Date: 17 RARS: Risk Score: 19.5    Spoke with: [facility] Transitions Subsequent and Final Call    Subsequent and Final Calls  Do you have any ongoing symptoms?:  No  Have your medications changed?:  No  Do you have any questions related to your medications?:  No  Do you currently have any active services?:  No  Do you have any needs or concerns that I can assist you with?:  No  Identified Barriers:  None  Care Transitions Interventions  Other Interventions:        Care Transition f/u call to pt. He reports he is doing okay. No chest pain since home and has NTG to take if needed but hasn't needed to take it. Pt had several med changes on hospital d/c which were reviewed by pharmacy. Pt has no questions about his meds. He has 3 f/u appts this week as noted. Follow Up  Future Appointments  Date Time Provider Isael Patrick   2017 8:45 AM [facility] None   2017 11:20 AM Amy Hayes NP [place] [facility]   2017 8:40 AM MD YOHAN Prescott [address] [facility]   2018 8:40 AM MD YOHAN Prescott [address] [facility]     [address] Transition Coordinator  [phone]  [email or web address]

## 2017-11-07 ENCOUNTER — ANTI-COAG VISIT (OUTPATIENT)
Dept: PHARMACY | Facility: CLINIC | Age: 77
End: 2017-11-07

## 2017-11-07 ENCOUNTER — OFFICE VISIT (OUTPATIENT)
Dept: CARDIOLOGY CLINIC | Age: 77
End: 2017-11-07

## 2017-11-07 VITALS
HEART RATE: 68 BPM | WEIGHT: 221.2 LBS | BODY MASS INDEX: 33.63 KG/M2 | DIASTOLIC BLOOD PRESSURE: 70 MMHG | SYSTOLIC BLOOD PRESSURE: 116 MMHG

## 2017-11-07 DIAGNOSIS — I48.20 CHRONIC ATRIAL FIBRILLATION (HCC): ICD-10-CM

## 2017-11-07 DIAGNOSIS — I25.10 CORONARY ARTERY DISEASE INVOLVING NATIVE CORONARY ARTERY OF NATIVE HEART WITHOUT ANGINA PECTORIS: Primary | ICD-10-CM

## 2017-11-07 DIAGNOSIS — I10 ESSENTIAL HYPERTENSION: ICD-10-CM

## 2017-11-07 DIAGNOSIS — Z98.61 S/P CORONARY ANGIOPLASTY: ICD-10-CM

## 2017-11-07 DIAGNOSIS — I48.20 CHRONIC A-FIB (HCC): ICD-10-CM

## 2017-11-07 DIAGNOSIS — E78.00 PURE HYPERCHOLESTEROLEMIA: ICD-10-CM

## 2017-11-07 LAB — INTERNATIONAL NORMALIZATION RATIO, POC: 2.1

## 2017-11-07 PROCEDURE — 4040F PNEUMOC VAC/ADMIN/RCVD: CPT | Performed by: NURSE PRACTITIONER

## 2017-11-07 PROCEDURE — G8598 ASA/ANTIPLAT THER USED: HCPCS | Performed by: NURSE PRACTITIONER

## 2017-11-07 PROCEDURE — G8484 FLU IMMUNIZE NO ADMIN: HCPCS | Performed by: NURSE PRACTITIONER

## 2017-11-07 PROCEDURE — 99214 OFFICE O/P EST MOD 30 MIN: CPT | Performed by: NURSE PRACTITIONER

## 2017-11-07 PROCEDURE — 1036F TOBACCO NON-USER: CPT | Performed by: NURSE PRACTITIONER

## 2017-11-07 PROCEDURE — 1123F ACP DISCUSS/DSCN MKR DOCD: CPT | Performed by: NURSE PRACTITIONER

## 2017-11-07 PROCEDURE — G8417 CALC BMI ABV UP PARAM F/U: HCPCS | Performed by: NURSE PRACTITIONER

## 2017-11-07 PROCEDURE — G8427 DOCREV CUR MEDS BY ELIG CLIN: HCPCS | Performed by: NURSE PRACTITIONER

## 2017-11-07 PROCEDURE — 1111F DSCHRG MED/CURRENT MED MERGE: CPT | Performed by: NURSE PRACTITIONER

## 2017-11-07 NOTE — PROGRESS NOTES
ANTICOAGULATION SERVICE    Nanda Backers Donald Najjar" is a 65 y.o. male with PMHx significant for chronic AF ([id number] of 3), HTN who presents to clinic 11/7/2017 for anticoagulation monitoring and adjustment.     Anticoagulation Indication(s):  Afib    Referring Physician:  Dr. Treasure Gracia    Goal INR Range:  2-3  Duration of Anticoagulation Therapy:  Indefinite  Time of day dose taken:  PM  Product patient has at home:  warfarin 5 mg (PEACH color)    INR Summary                           Warfarin regimen (mg)  Date INR   A/P   Sun Mon Tue Wed Thu Fri Sat Mg/wk  11/7 2.1 At goal, resume 5 5 2.5 5 2.5 5 2.5 27.5  10/17 2.3 At goal, no change 5 5 2.5 5 2.5 5 2.5 27.5  9/28 2.2 At goal, no change 5 5 2.5 5 2.5 5 2.5 27.5  9/19 1.4 Below goal, 5 mg x 1 5 5 5/2.5 5 2.5 5 2.5 27.5  9/5 1.0 Below goal, resume 5 5 2.5 5 2.5 5 2.5 27.5  8/31 1.0 Below goal, 5 mg/d 5 5 INR  5 5 5   8/15 1.2 Resume 8/16  5 5 INR 5 5 5 5  8/1 2.7 At goal, no change 5 5 2.5 5 2.5 5 2.5 27.5  6/30 2.4 At goal, no change 5 5 2.5 5 2.5 5 2.5 27.5  6/23 2.0 At goal, continue 5 5 2.5 5 2.5 5 2.5 27.5  6/16 4.4 Above goal, hold x 2 5 5 2.5 5 2.5 5 2.5 27.5  5/19 2.8 At goal, continue 5 5 2.5 5 2.5 5 2.5 27.5  4/21 2.6 At goal, continue 5 5 2.5 5 2.5 5 2.5 27.5  3/31 2.2 At goal, continue 5 5 2.5 5 2.5 5 2.5 27.5  3/17 1.9 Below goal, 5x1 5 5 2.5 5 2.5 5 5/2.5 27.5  2/24 1.9 Below goal, continue 5 5 2.5 5 2.5 5 2.5 27.5  2/9 3.2 Above goal, continue 5 5 2.5 5 2.5 5 2.5 27.5  1/10 2.3 At goal, no change 5 5 2.5 5 2.5 5 2.5 27.5  12/13 2.7 At goal, no change 5 5 2.5 5 2.5 5 2.5 27.5  11/15 2.6 At goal, no change 5 5 2.5 5 2.5 5 2.5 27.5   10/18 2.5 At goal, no change 5 5 2.5 5 2.5 5 2.5 27.5    Patient History:  Recent hospitalizations/HC visits -10/29-11/1/17: [facility] for STEMI, s/p [facility] 10/30 with primary stenting to proximal RCA.   -10/17: [facility] PCP Dr Denisa Heck  -8/28: CT chest/abd/pelvis showed prostate enlargement and bladder wall thickening, but no evidence of

## 2017-11-07 NOTE — PROGRESS NOTES
Dawna Morales SIGMOIDECTOMY  April 13, 2012    Dr. Rosemary Pearce sigmoid colectomy           TUNNELED VENOUS PORT PLACEMENT      TURP  April 5, 2004    Dr. Zelalem Brooks     Family History   Problem Relation Age of Onset    Mult Sclerosis Son     Asthma Daughter     Arthritis Brother     High Blood Pressure Brother     Hypertension Brother     Cancer Father      ? ?? colon cancer    Heart Disease Mother     High Blood Pressure Mother     Coronary Art Dis Mother     Hypertension Mother     Cancer Brother      pancreatic cancer    High Blood Pressure Brother     Heart Disease Brother     Kidney Disease Brother     Hypertension Brother     Heart Surgery Brother     Coronary Art Dis Brother     Cancer Brother      small intestinal cancer    High Blood Pressure Brother     Hypertension Brother     Cancer Brother      colon cancer    High Blood Pressure Brother     Colon Cancer Brother     Hypertension Brother     High Blood Pressure Sister     Hypertension Sister     Hypertension Son      Social History   Substance Use Topics    Smoking status: Former Smoker     Types: Cigarettes    Smokeless tobacco: Never Used      Comment: quit in the 1960s -- former cigar smoker    Alcohol use Yes      Comment: social     Allergies:Naproxen and Ciprofloxacin    Review of Systems  General: No changes in weight, fatigue, or night sweats. HEENT: No blurry or decreased vision. No changes in hearing, nasal discharge or sore throat. Cardiovascular:  See HPI. Respiratory: No cough, hemoptysis, or wheezing. No history of asthma. Gastrointestinal:  No abdominal pain, hematochezia, melana, constipation, diarrhea, or history of GI ulcers. Genito-Urinary: No dysuria or hematuria. No urgency or polyuria.   Musculoskeletal:  No complaints of joint pain, joint swelling or muscular weakness/soreness. Neurological:  No dizziness, headaches, numbness/tingling, speech problems or weakness. No history of a stroke or TIA. Psychological:  No anxiety or depression. Hematological and Lymphatic: No abnormal bleeding or bruising, blood clots, jaundice or swollen lymph nodes. Endocrine:   No malaise/lethargy, palpitations, polydipsia/polyuria, temperature intolerance or unexpected weight changes  Skin:  No rashes or non-healing ulcers. Physical Exam:  /70   Pulse 68   Wt 221 lb 3.2 oz (100.3 kg)   BMI 33.63 kg/m²    General:  Alert and oriented. No acute distress. Appears comfortable. HEENT:  Normocephalic. No trauma. EOMI. Neck:  Supple, no JVD  Cardiovascular: RRR   Pulses: 2+ radial   Lungs:  Clear to auscultation. No rales, wheezes, or rhonchi. Abd:  Soft, non-tender, non-distended. No peritoneal signs. Ext:  No clubbing, cyanosis, or edema. Right groin soft, no hematoma   Neuro:  CN's 2-12 grossly in tact. Gait normal.  Motor and sensory exams grossly normal.  Skin:  No rashes or skin breakdown.     CBC:   Lab Results   Component Value Date    WBC 5.8 11/01/2017    HGB 13.4 (L) 11/01/2017    HCT 38.2 (L) 11/01/2017    .3 (H) 11/01/2017     11/01/2017     BMP:  Lab Results   Component Value Date    CREATININE 0.8 11/01/2017    BUN 11 11/01/2017     11/01/2017    K 3.8 11/01/2017     11/01/2017    CO2 28 11/01/2017     Mag:   Lab Results   Component Value Date    MG 1.80 11/01/2017     LIVER PROFILE:   Lab Results   Component Value Date    ALT 18 02/09/2017    AST 18 02/09/2017    ALKPHOS 90 02/09/2017    BILITOT 0.7 02/09/2017     PT/INR:   Lab Results   Component Value Date    INR 2.1 11/07/2017    INR 1.19 (H) 11/01/2017    INR 1.15 10/29/2017    PROTIME 13.4 (H) 11/01/2017    PROTIME 13.0 10/29/2017    PROTIME 33.2 (H) 10/18/2017     BNP:  No results found for: BNP  LIPIDS:  No components found for: CHLPL  Lab Results Component Value Date    TRIG 58 10/18/2017    TRIG 50 2017    TRIG 53 2016     Lab Results   Component Value Date    HDL 46 10/18/2017    HDL 44 2017    HDL 53 2016     Lab Results   Component Value Date    LDLCALC 98 10/18/2017    LDLCALC 101 (H) 2017    LDLCALC 103 (H) 2016     Lab Results   Component Value Date    LABVLDL 12 10/18/2017    LABVLDL 10 2017    LABVLDL 11 2016     TSH:  Lab Results   Component Value Date    TSH 1.48 2017       IMAGIN Coronary angiogram  HEMODYNAMICS:  Left ventricular end-diastolic pressure equals 14.     There is no significant gradient across the aortic valve by pullback  post cineangiography.     LEFT VENTRICULOGRAM:  Left ventriculogram demonstrates hypokinesis of  the inferior wall. The estimated ejection fraction is 40%.     LEFT MAIN:  Left main is free of significant obstructive disease.     LEFT ANTERIOR DESCENDING:  The LAD courses and wraps around the apex. It gives off 2 diagonal branches. The first one is moderate in size. It does have an ostial 60% stenosis. The LAD proper has a 40 to 50%  mid stenosis, otherwise the vessel is free of significant obstructive  disease.     LEFT CIRCUMFLEX:  Circumflex consists of high-rising first marginal  versus intermediate ramus branch. A moderate-sized second marginal  then courses on the AV groove. There is mild diffuse disease, but no  significant focal obstructive lesions.     RIGHT CORONARY ARTERY:  Right coronary artery is a large dominant  vessel. It gives off a large PDA and two large posterolateral  branches. The vessel is ectatic throughout. The proximal vessel has  a 90% stenosis, just difficulty to bend. There was filling defect  after the stenosis, suggestive of thrombus.     RIGHT CORONARY ARTERY POST INTERVENTION:  After primary stenting,  there was minimal, if any, residual stenosis in the proximal right  coronary artery.   There is normal flow and no evidence of clot or  dissection, no stain noted. Medications:   Current Outpatient Prescriptions   Medication Sig Dispense Refill    nitroGLYCERIN (NITROSTAT) 0.4 MG SL tablet up to max of 3 total doses. If no relief after 1 dose, call 911. 25 tablet 3    atorvastatin (LIPITOR) 40 MG tablet Take 1 tablet by mouth nightly 30 tablet 3    losartan (COZAAR) 50 MG tablet Take 1 tablet by mouth daily 30 tablet 3    clopidogrel (PLAVIX) 75 MG tablet Take 1 tablet by mouth daily 30 tablet 3    sulfamethoxazole-trimethoprim (BACTRIM DS) 800-160 MG per tablet Take 1 tablet by mouth 2 times daily for 8 days 16 tablet 0    pantoprazole (PROTONIX) 40 MG tablet Take 1 tablet by mouth daily 30 tablet 3    warfarin (COUMADIN) 5 MG tablet Take 1 tablet by mouth See Admin Instructions Warfarin 5 mg on Jkpkza-Tajveutbr-Exygij 30 tablet 3    warfarin (COUMADIN) 5 MG tablet Take 0.5 tablets by mouth See Admin Instructions Warfarin 2.5 mg on Sun-Tues-Thurs-Sat 30 tablet 3    finasteride (PROSCAR) 5 MG tablet Take 5 mg by mouth daily      triamterene-hydrochlorothiazide (MAXZIDE-25) 37.5-25 MG per tablet Take 1 tablet by mouth daily 90 tablet 3    aspirin 81 MG tablet Take 81 mg by mouth daily      Cholecalciferol (VITAMIN D) 2000 UNITS CAPS capsule Take 1 capsule by mouth 2 times daily      metoprolol succinate (TOPROL XL) 200 MG extended release tablet Take 1 tablet by mouth daily 90 tablet 3    tamsulosin (FLOMAX) 0.4 MG capsule Take 0.4 mg by mouth daily      Multiple Vitamin (MULTIVITAMIN) capsule Take 1 capsule by mouth daily. No current facility-administered medications for this visit. Assessment:  1. Coronary artery disease involving native coronary artery of native heart without angina pectoris    2. S/P coronary angioplasty    3. Essential hypertension    4. Pure hypercholesterolemia    5.  Chronic a-fib (HCC)        Plan:  -Cont asa, plavix and warfarin x 1 month than stop plavix and cont asa and warfarin  -Follow up with Dr. Real Goff in 1 month  -Cardiac rehab after visit with Dr. Real Goff

## 2017-11-08 ENCOUNTER — OFFICE VISIT (OUTPATIENT)
Dept: INTERNAL MEDICINE | Age: 77
End: 2017-11-08

## 2017-11-08 VITALS
DIASTOLIC BLOOD PRESSURE: 72 MMHG | OXYGEN SATURATION: 98 % | HEIGHT: 69 IN | SYSTOLIC BLOOD PRESSURE: 116 MMHG | BODY MASS INDEX: 32.44 KG/M2 | WEIGHT: 219 LBS | HEART RATE: 79 BPM

## 2017-11-08 DIAGNOSIS — I48.20 CHRONIC ATRIAL FIBRILLATION (HCC): Chronic | ICD-10-CM

## 2017-11-08 DIAGNOSIS — Z95.5 S/P INSERTION OF NON-DRUG ELUTING CORONARY ARTERY STENT: ICD-10-CM

## 2017-11-08 DIAGNOSIS — I50.9 HEART FAILURE, ACC/AHA STAGE B (HCC): ICD-10-CM

## 2017-11-08 DIAGNOSIS — I10 ESSENTIAL HYPERTENSION: Primary | Chronic | ICD-10-CM

## 2017-11-08 PROBLEM — R77.8 ELEVATED TROPONIN: Status: RESOLVED | Noted: 2017-10-30 | Resolved: 2017-11-08

## 2017-11-08 PROBLEM — R79.89 ELEVATED TROPONIN: Status: RESOLVED | Noted: 2017-10-30 | Resolved: 2017-11-08

## 2017-11-08 LAB
ANION GAP SERPL CALCULATED.3IONS-SCNC: 9 MMOL/L (ref 3–16)
BUN BLDV-MCNC: 16 MG/DL (ref 7–20)
CALCIUM SERPL-MCNC: 9.5 MG/DL (ref 8.3–10.6)
CHLORIDE BLD-SCNC: 101 MMOL/L (ref 99–110)
CO2: 29 MMOL/L (ref 21–32)
CREAT SERPL-MCNC: 1.1 MG/DL (ref 0.8–1.3)
GFR AFRICAN AMERICAN: >60
GFR NON-AFRICAN AMERICAN: >60
GLUCOSE BLD-MCNC: 89 MG/DL (ref 70–99)
POTASSIUM SERPL-SCNC: 4.5 MMOL/L (ref 3.5–5.1)
SODIUM BLD-SCNC: 139 MMOL/L (ref 136–145)

## 2017-11-08 PROCEDURE — 1111F DSCHRG MED/CURRENT MED MERGE: CPT | Performed by: INTERNAL MEDICINE

## 2017-11-08 PROCEDURE — G8427 DOCREV CUR MEDS BY ELIG CLIN: HCPCS | Performed by: INTERNAL MEDICINE

## 2017-11-08 PROCEDURE — G8598 ASA/ANTIPLAT THER USED: HCPCS | Performed by: INTERNAL MEDICINE

## 2017-11-08 PROCEDURE — 1036F TOBACCO NON-USER: CPT | Performed by: INTERNAL MEDICINE

## 2017-11-08 PROCEDURE — 4040F PNEUMOC VAC/ADMIN/RCVD: CPT | Performed by: INTERNAL MEDICINE

## 2017-11-08 PROCEDURE — 1123F ACP DISCUSS/DSCN MKR DOCD: CPT | Performed by: INTERNAL MEDICINE

## 2017-11-08 PROCEDURE — G8484 FLU IMMUNIZE NO ADMIN: HCPCS | Performed by: INTERNAL MEDICINE

## 2017-11-08 PROCEDURE — G8417 CALC BMI ABV UP PARAM F/U: HCPCS | Performed by: INTERNAL MEDICINE

## 2017-11-08 PROCEDURE — 99214 OFFICE O/P EST MOD 30 MIN: CPT | Performed by: INTERNAL MEDICINE

## 2017-11-08 ASSESSMENT — ENCOUNTER SYMPTOMS
VOMITING: 0
NAUSEA: 0
ABDOMINAL PAIN: 0
SHORTNESS OF BREATH: 0
CHEST TIGHTNESS: 0

## 2017-11-08 NOTE — PROGRESS NOTES
Outpatient Note for established Patient - regular Visit    History Obtained From:  patient, electronic medical record    CHIEF COMPLAINT:    Chief Complaint   Patient presents with    Follow-Up from [address] [place] [address]:   The patient is a 65 y.o. male who is here today for :hospital follow up. He feels some tiredness which he related to wakening a few times at night to urinate. Pt denies CP/SOB/palpitations/abdominal pain/N/V. He is able to climb 2 flight of stairs without difficulties - no exertional dyspnea/ CP. Some hematuria. Some burning - he will see his urologist tomorrow. He also saw the cardiologist NP. He will satrt cardiac rehab after he will see Dr Sophie Juarez. Past Medical History:        Diagnosis Date    Allergic rhinitis     Anemia     [facility] ([facility]     Atrial fibrillation (HCC)     Benign non-nodular prostatic hyperplasia with lower urinary tract symptoms 2/9/2017    Benign paroxysmal positional vertigo     BPH (benign prostatic hyperplasia)     BPH (benign prostatic hypertrophy)     Cataract 10/3/2014    Cholelithiasis     Colon cancer ([facility] 4/9/2012    Diverticulosis     Dyslipidemia 7/21/2010    Elevated PSA     Erectile dysfunction     Essential hypertension 11/24/2015    Gastroesophageal reflux disease without esophagitis 11/9/2016    GERD (gastroesophageal reflux disease)     Glaucoma 4/5/2013    Hiatal hernia     Hypertension     Lumbar radiculopathy     Osteoarthritis     Peripheral neuropathy ([facility] 12/7/2012    Proteinuria 7/22/2010    S/P laparoscopic-assisted sigmoidectomy 4/17/2012    Urinary frequency        Social History:   TOBACCO:   reports that he has quit smoking. His smoking use included Cigarettes. He has never used smokeless tobacco.  ETOH:   reports that he drinks alcohol. Current Medications:    Prior to Admission medications    Medication Sig Start Date End Date Taking?  Authorizing Provider   nitroGLYCERIN (NITROSTAT) 0.4 MG SL tablet up to max of 3 total doses. If no relief after 1 dose, call 911. 11/1/17  Yes Fransico Breen MD   atorvastatin (LIPITOR) 40 MG tablet Take 1 tablet by mouth nightly 11/1/17  Yes Fransico Breen MD   losartan (COZAAR) 50 MG tablet Take 1 tablet by mouth daily 11/1/17  Yes Fransico Breen MD   clopidogrel (PLAVIX) 75 MG tablet Take 1 tablet by mouth daily 11/1/17  Yes Fransico Breen MD   sulfamethoxazole-trimethoprim (BACTRIM DS) 800-160 MG per tablet Take 1 tablet by mouth 2 times daily for 8 days 11/2/17 11/10/17 Yes Fransico Breen MD   pantoprazole (PROTONIX) 40 MG tablet Take 1 tablet by mouth daily 11/1/17  Yes Fransico Breen MD   warfarin (COUMADIN) 5 MG tablet Take 1 tablet by mouth See Admin Instructions Warfarin 5 mg on Fbhmsy-Zzsfdwbgc-Gatgsn 11/1/17  Yes Fransico Breen MD   warfarin (COUMADIN) 5 MG tablet Take 0.5 tablets by mouth See Admin Instructions Warfarin 2.5 mg on Sun-Tues-Thurs-Sat 11/1/17  Yes Fransico Breen MD   finasteride (PROSCAR) 5 MG tablet Take 5 mg by mouth daily   Yes Historical Provider, MD   triamterene-hydrochlorothiazide (MAXZIDE-25) 37.5-25 MG per tablet Take 1 tablet by mouth daily 7/10/17  Yes Zaida Reinoso MD   aspirin 81 MG tablet Take 81 mg by mouth daily   Yes Historical Provider, MD   Cholecalciferol (VITAMIN D) 2000 UNITS CAPS capsule Take 1 capsule by mouth 2 times daily 6/9/17  Yes Zaida Reinoso MD   metoprolol succinate (TOPROL XL) 200 MG extended release tablet Take 1 tablet by mouth daily 11/9/16  Yes Zaida Reinoso MD   tamsulosin (FLOMAX) 0.4 MG capsule Take 0.4 mg by mouth daily   Yes Historical Provider, MD   Multiple Vitamin (MULTIVITAMIN) capsule Take 1 capsule by mouth daily. 2/6/14  Yes Zaida Reinoso MD       REVIEW OF SYSTEMS:  Review of Systems   Constitutional: Negative for chills and fever. Respiratory: Negative for chest tightness and shortness of breath. Cardiovascular: Negative for chest pain.

## 2017-11-14 ENCOUNTER — ANTI-COAG VISIT (OUTPATIENT)
Dept: PHARMACY | Facility: CLINIC | Age: 77
End: 2017-11-14

## 2017-11-14 DIAGNOSIS — I48.20 CHRONIC ATRIAL FIBRILLATION (HCC): ICD-10-CM

## 2017-11-14 LAB — INTERNATIONAL NORMALIZATION RATIO, POC: 2.3

## 2017-11-30 ENCOUNTER — ANTI-COAG VISIT (OUTPATIENT)
Dept: PHARMACY | Facility: CLINIC | Age: 77
End: 2017-11-30

## 2017-11-30 DIAGNOSIS — I48.20 CHRONIC ATRIAL FIBRILLATION (HCC): ICD-10-CM

## 2017-11-30 LAB — INTERNATIONAL NORMALIZATION RATIO, POC: 2.8

## 2017-11-30 NOTE — PROGRESS NOTES
ANTICOAGULATION SERVICE    Nanda Backers Donald Najjar" is a 65 y.o. male with PMHx significant for chronic AF ([id number] of 3), HTN who presents to clinic 11/30/2017 for anticoagulation monitoring and adjustment. Anticoagulation Indication(s):  Afib    Referring Physician:  Dr. Treasure Gracia    Goal INR Range:  2-3  Duration of Anticoagulation Therapy:  Indefinite  Time of day dose taken:  PM  Product patient has at home:  warfarin 5 mg (PEACH color)    INR Summary                           Warfarin regimen (mg)  Date INR   A/P   Sun Mon Tue Wed Thu Fri Sat Mg/wk  11/30 2.8 At goal, no change 5 5 2.5 5 2.5 5 2.5 27.5  11/14 2.3 At goal, no change 5 5 2.5 5 2.5 5 2.5 27.5  11/7 2.1 At goal, resume 5 5 2.5 5 2.5 5 2.5 27.5  10/17 2.3 At goal, no change 5 5 2.5 5 2.5 5 2.5 27.5  9/28 2.2 At goal, no change 5 5 2.5 5 2.5 5 2.5 27.5  9/19 1.4 Below goal, 5 mg x 1 5 5 5/2.5 5 2.5 5 2.5 27.5  9/5 1.0 Below goal, resume 5 5 2.5 5 2.5 5 2.5 27.5  8/31 1.0 Below goal, 5 mg/d 5 5 INR  5 5 5   8/15 1.2 Resume 8/16  5 5 INR 5 5 5 5  8/1 2.7 At goal, no change 5 5 2.5 5 2.5 5 2.5 27.5  6/30 2.4 At goal, no change 5 5 2.5 5 2.5 5 2.5 27.5  6/23 2.0 At goal, continue 5 5 2.5 5 2.5 5 2.5 27.5  6/16 4.4 Above goal, hold x 2 5 5 2.5 5 2.5 5 2.5 27.5  5/19 2.8 At goal, continue 5 5 2.5 5 2.5 5 2.5 27.5  4/21 2.6 At goal, continue 5 5 2.5 5 2.5 5 2.5 27.5  3/31 2.2 At goal, continue 5 5 2.5 5 2.5 5 2.5 27.5  3/17 1.9 Below goal, 5x1 5 5 2.5 5 2.5 5 5/2.5 27.5  2/24 1.9 Below goal, continue 5 5 2.5 5 2.5 5 2.5 27.5  2/9 3.2 Above goal, continue 5 5 2.5 5 2.5 5 2.5 27.5  1/10 2.3 At goal, no change 5 5 2.5 5 2.5 5 2.5 27.5  12/13 2.7 At goal, no change 5 5 2.5 5 2.5 5 2.5 27.5  11/15 2.6 At goal, no change 5 5 2.5 5 2.5 5 2.5 27.5     Patient History:  Recent hospitalizations/HC visits -10/29-11/1/17: [facility] for STEMI, s/p [facility] 10/30 with primary stenting to proximal RCA.    -8/28: CT chest/abd/pelvis showed prostate enlargement and bladder wall

## 2017-12-01 ENCOUNTER — HOSPITAL ENCOUNTER (OUTPATIENT)
Dept: OTHER | Age: 77
Discharge: HOME | End: 2017-12-31
Attending: INTERNAL MEDICINE | Admitting: INTERNAL MEDICINE

## 2017-12-18 DIAGNOSIS — I10 ESSENTIAL HYPERTENSION: Chronic | ICD-10-CM

## 2017-12-18 RX ORDER — METOPROLOL SUCCINATE 200 MG/1
200 TABLET, EXTENDED RELEASE ORAL DAILY
Qty: 90 TABLET | Refills: 3 | Status: SHIPPED | OUTPATIENT
Start: 2017-12-18 | End: 2018-12-27 | Stop reason: SDUPTHER

## 2017-12-21 ENCOUNTER — OFFICE VISIT (OUTPATIENT)
Dept: CARDIOLOGY CLINIC | Age: 77
End: 2017-12-21

## 2017-12-21 VITALS
SYSTOLIC BLOOD PRESSURE: 110 MMHG | HEART RATE: 70 BPM | BODY MASS INDEX: 31.9 KG/M2 | WEIGHT: 216 LBS | DIASTOLIC BLOOD PRESSURE: 76 MMHG

## 2017-12-21 DIAGNOSIS — I25.10 CAD IN NATIVE ARTERY: ICD-10-CM

## 2017-12-21 DIAGNOSIS — I48.20 CHRONIC ATRIAL FIBRILLATION (HCC): ICD-10-CM

## 2017-12-21 DIAGNOSIS — I21.4 NON-ST ELEVATED MYOCARDIAL INFARCTION (HCC): Primary | ICD-10-CM

## 2017-12-21 DIAGNOSIS — Z98.61 POST PTCA: ICD-10-CM

## 2017-12-21 DIAGNOSIS — I10 ESSENTIAL HYPERTENSION: ICD-10-CM

## 2017-12-21 PROCEDURE — G8427 DOCREV CUR MEDS BY ELIG CLIN: HCPCS | Performed by: INTERNAL MEDICINE

## 2017-12-21 PROCEDURE — G8598 ASA/ANTIPLAT THER USED: HCPCS | Performed by: INTERNAL MEDICINE

## 2017-12-21 PROCEDURE — 1123F ACP DISCUSS/DSCN MKR DOCD: CPT | Performed by: INTERNAL MEDICINE

## 2017-12-21 PROCEDURE — 4040F PNEUMOC VAC/ADMIN/RCVD: CPT | Performed by: INTERNAL MEDICINE

## 2017-12-21 PROCEDURE — G8484 FLU IMMUNIZE NO ADMIN: HCPCS | Performed by: INTERNAL MEDICINE

## 2017-12-21 PROCEDURE — 1036F TOBACCO NON-USER: CPT | Performed by: INTERNAL MEDICINE

## 2017-12-21 PROCEDURE — G8417 CALC BMI ABV UP PARAM F/U: HCPCS | Performed by: INTERNAL MEDICINE

## 2017-12-21 PROCEDURE — 99214 OFFICE O/P EST MOD 30 MIN: CPT | Performed by: INTERNAL MEDICINE

## 2017-12-21 RX ORDER — METHENAMINE, SODIUM PHOSPHATE, MONOBASIC, MONOHYDRATE, PHENYL SALICYLATE, METHYLENE BLUE, AND HYOSCYAMINE SULFATE 120; 40.8; 36; 10; .12 MG/1; MG/1; MG/1; MG/1; MG/1
1 CAPSULE ORAL 3 TIMES DAILY
Status: ON HOLD | COMMUNITY
End: 2018-04-25 | Stop reason: ALTCHOICE

## 2017-12-21 NOTE — PROGRESS NOTES
Subjective:      Patient ID: Isabela Wang is a 65 y.o. male. HPI:  Here for follow up afib/HTN/CAD/PTCA/Non st.   No further chest pain. Feeling better. No complaints. No palp/tachycardia. No syncope. No exertional chest pain/sob.     Past Medical History:   Diagnosis Date    Allergic rhinitis     Anemia     Atrial fibrillation (HCC)     Atrial fibrillation (HCC)     Benign non-nodular prostatic hyperplasia with lower urinary tract symptoms 2/9/2017    Benign paroxysmal positional vertigo     BPH (benign prostatic hyperplasia)     BPH (benign prostatic hypertrophy)     Cataract 10/3/2014    Cholelithiasis     [email or web address] 4/9/2012    Diverticulosis     Dyslipidemia 7/21/2010    Elevated PSA     Erectile dysfunction     Essential hypertension 11/24/2015    Gastroesophageal reflux disease without esophagitis 11/9/2016    GERD (gastroesophageal reflux disease)     Glaucoma 4/5/2013    Hiatal hernia     Hypertension     Lumbar radiculopathy     Osteoarthritis     Peripheral neuropathy ([email or web address] 12/7/2012    Proteinuria 7/22/2010    S/P laparoscopic-assisted sigmoidectomy 4/17/2012    Urinary frequency      Past Surgical History:   Procedure Laterality Date    CATARACT REMOVAL WITH IMPLANT Right October 7, 2014    Dr. Sandhya Strauss Left October 24, 2014    Dr. Arleth Valdes  April 4, 2012    Dr. Villegas Clymer  May 15, 2013    Dr. Villegas Clymer     COLONOSCOPY  08/09/2016    Dr. Villegas Clymer    COLONOSCOPY N/A 02/17/2017    DILATATION, ESOPHAGUS      GLAUCOMA SURGERY Right October 7, 2014    Dr. Charis Rhodes Left October 24, 2014    Dr. Luiza Obregon HISTORY  June 28, 2013    port-a-cath removal     PROSTATE BIOPSY  April 1997    PROSTATE BIOPSY  May 10, 2010    Dr. An Westfall Cesar Bains    SIGMOIDECTOMY  April 13, 2012    Dr. Sherman [place] sigmoid colectomy           TUNNELED VENOUS PORT PLACEMENT      TURP  April 5, 2004    Dr. Urszula Brewer         Allergies   Allergen Reactions    Naproxen Other (See Comments)     Patient gets nose bleeds. Patient should not take. Patient lost a lot of blood in November due to Naproxen.     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    [address] [place] [address] in [facility]     retired - 2002      as of April 5, 2013     Social History Main Topics    Smoking status: Former Smoker     Types: Cigarettes    Smokeless tobacco: Never Used      Comment: quit in the 1960s -- former cigar smoker    Alcohol use Yes      Comment: social    Drug use: No    Sexual activity: Yes     Partners: Female      Comment:  - Tiffani Leone - [id number]     Other Topics Concern    Not on file     Social History Narrative    Past Surgical History     TURP: April 5, 2004    prostate biopsy - April 1997                                                    Last updated by Traci Lilly MD on 10/29/2008                      Social History     Marital Status:  - August 1967     Spouse: Susan    Children: 3      Children's Names: Dickey Oppenheim    Employment Status: retired - 1999    Employer: [employer]    Occupation:  and [employer]    Alcohol Use: socially    Drug Use: none    Tobacco Usage: prior smoker    Cigars/Pipes - Years Smoked: 1950's & 63's                                                 Last updated by Katie Olea warfarin (COUMADIN) 5 MG tablet Take 1 tablet by mouth See Admin Instructions Warfarin 5 mg on Tnplhm-Qrguhacrw-Mcxnfy 30 tablet 3    warfarin (COUMADIN) 5 MG tablet Take 0.5 tablets by mouth See Admin Instructions Warfarin 2.5 mg on Sun-Tues-Thurs-Sat 30 tablet 3    finasteride (PROSCAR) 5 MG tablet Take 5 mg by mouth daily      triamterene-hydrochlorothiazide (MAXZIDE-25) 37.5-25 MG per tablet Take 1 tablet by mouth daily 90 tablet 3    aspirin 81 MG tablet Take 81 mg by mouth daily      Cholecalciferol (VITAMIN D) 2000 UNITS CAPS capsule Take 1 capsule by mouth 2 times daily      tamsulosin (FLOMAX) 0.4 MG capsule Take 0.4 mg by mouth daily      Multiple Vitamin (MULTIVITAMIN) capsule Take 1 capsule by mouth daily. No current facility-administered medications for this visit. Vitals  Weight:  216  Vitals:    12/21/17 0935   BP: 110/76   Pulse: 70                Review of Systems   Constitutional: Negative for activity change and fatigue. Respiratory: Negative for apnea, cough, choking, chest tightness and shortness of breath. Cardiovascular: Negative for chest pain, palpitations and leg swelling. No PND or orthopnea. No tachycardia. Gastrointestinal: Negative for abdominal distention. Musculoskeletal: Negative for myalgias. Neurological: Negative for dizziness, syncope and light-headedness. Psychiatric/Behavioral: Negative for behavioral problems, confusion and agitation. Other systems reviewed negative as done    Objective:   Physical Exam   Constitutional: He is oriented to person, place, and time. He appears well-developed and well-nourished. No distress. HENT:   Head: Normocephalic and atraumatic. Eyes: Conjunctivae and EOM are normal. Right eye exhibits no discharge. Left eye exhibits no discharge. Neck: Normal range of motion. Neck supple. No JVD present. Cardiovascular: Normal rate, S1 normal, S2 normal and normal heart sounds.   An irregularly irregular

## 2017-12-27 DIAGNOSIS — I48.20 CHRONIC ATRIAL FIBRILLATION (HCC): Primary | ICD-10-CM

## 2017-12-28 ENCOUNTER — ANTI-COAG VISIT (OUTPATIENT)
Dept: PHARMACY | Facility: CLINIC | Age: 77
End: 2017-12-28

## 2017-12-28 DIAGNOSIS — I48.20 CHRONIC ATRIAL FIBRILLATION (HCC): ICD-10-CM

## 2017-12-28 LAB — INTERNATIONAL NORMALIZATION RATIO, POC: 2.3

## 2017-12-28 RX ORDER — WARFARIN SODIUM 5 MG/1
5 TABLET ORAL
Status: ON HOLD | COMMUNITY
End: 2018-04-25 | Stop reason: HOSPADM

## 2017-12-28 NOTE — PROGRESS NOTES
ANTICOAGULATION SERVICE    Arnaldo Cox" is a 65 y.o. male with PMHx significant for chronic AF ([id number] of 3), HTN who presents to clinic 12/28/2017 for anticoagulation monitoring and adjustment. Anticoagulation Indication(s):  Afib    Referring Physician:  Dr. Holley Chaparro    Goal INR Range:  2-3  Duration of Anticoagulation Therapy:  Indefinite  Time of day dose taken:  PM  Product patient has at home:  warfarin 5 mg (PEACH color)    INR Summary                           Warfarin regimen (mg)  Date INR   A/P   Sun Mon Tue Wed Thu Fri Sat Mg/wk  12/28 2.3 At goal, no change 5 5 2.5 5 2.5 5 2.5 27.5  11/30 2.8 At goal, no change 5 5 2.5 5 2.5 5 2.5 27.5  11/14 2.3 At goal, no change 5 5 2.5 5 2.5 5 2.5 27.5  11/7 2.1 At goal, resume 5 5 2.5 5 2.5 5 2.5 27.5  10/17 2.3 At goal, no change 5 5 2.5 5 2.5 5 2.5 27.5  9/28 2.2 At goal, no change 5 5 2.5 5 2.5 5 2.5 27.5  9/19 1.4 Below goal, 5 mg x 1 5 5 5/2.5 5 2.5 5 2.5 27.5  9/5 1.0 Below goal, resume 5 5 2.5 5 2.5 5 2.5 27.5  8/31 1.0 Below goal, 5 mg/d 5 5 INR  5 5 5   8/15 1.2 Resume 8/16  5 5 INR 5 5 5 5  8/1 2.7 At goal, no change 5 5 2.5 5 2.5 5 2.5 27.5  6/30 2.4 At goal, no change 5 5 2.5 5 2.5 5 2.5 27.5  6/23 2.0 At goal, continue 5 5 2.5 5 2.5 5 2.5 27.5  6/16 4.4 Above goal, hold x 2 5 5 2.5 5 2.5 5 2.5 27.5  5/19 2.8 At goal, continue 5 5 2.5 5 2.5 5 2.5 27.5  4/21 2.6 At goal, continue 5 5 2.5 5 2.5 5 2.5 27.5  3/31 2.2 At goal, continue 5 5 2.5 5 2.5 5 2.5 27.5  3/17 1.9 Below goal, 5x1 5 5 2.5 5 2.5 5 5/2.5 27.5  2/24 1.9 Below goal, continue 5 5 2.5 5 2.5 5 2.5 27.5  2/9 3.2 Above goal, continue 5 5 2.5 5 2.5 5 2.5 27.5  1/10 2.3 At goal, no change 5 5 2.5 5 2.5 5 2.5 27.5  12/13 2.7 At goal, no change 5 5 2.5 5 2.5 5 2.5 27.5    Patient History:  Recent hospitalizations/HC visits -12/21 Dr. Babb Breed: no med changes  -10/29-11/1/17: [facility] for STEMI, s/p [facility] 10/30 with primary stenting to proximal RCA.    -8/28: CT chest/abd/pelvis showed prostate

## 2018-01-01 ENCOUNTER — HOSPITAL ENCOUNTER (OUTPATIENT)
Dept: OTHER | Age: 78
Discharge: HOME | End: 2018-01-31
Attending: INTERNAL MEDICINE | Admitting: INTERNAL MEDICINE

## 2018-01-05 ENCOUNTER — TELEPHONE (OUTPATIENT)
Dept: CARDIOLOGY CLINIC | Age: 78
End: 2018-01-05

## 2018-01-18 ENCOUNTER — OFFICE VISIT (OUTPATIENT)
Dept: INTERNAL MEDICINE | Age: 78
End: 2018-01-18

## 2018-01-18 VITALS
HEIGHT: 69 IN | BODY MASS INDEX: 32.44 KG/M2 | WEIGHT: 219 LBS | HEART RATE: 71 BPM | DIASTOLIC BLOOD PRESSURE: 76 MMHG | OXYGEN SATURATION: 93 % | SYSTOLIC BLOOD PRESSURE: 120 MMHG

## 2018-01-18 DIAGNOSIS — I21.4 NON-ST ELEVATED MYOCARDIAL INFARCTION (HCC): ICD-10-CM

## 2018-01-18 DIAGNOSIS — Z23 NEED FOR TDAP VACCINATION: ICD-10-CM

## 2018-01-18 DIAGNOSIS — I50.9 HEART FAILURE, ACC/AHA STAGE B (HCC): ICD-10-CM

## 2018-01-18 DIAGNOSIS — I48.20 CHRONIC ATRIAL FIBRILLATION (HCC): Chronic | ICD-10-CM

## 2018-01-18 DIAGNOSIS — Z00.00 MEDICARE ANNUAL WELLNESS VISIT, SUBSEQUENT: Primary | ICD-10-CM

## 2018-01-18 DIAGNOSIS — J06.9 VIRAL UPPER RESPIRATORY TRACT INFECTION: ICD-10-CM

## 2018-01-18 DIAGNOSIS — R68.89 FLU-LIKE SYMPTOMS: ICD-10-CM

## 2018-01-18 DIAGNOSIS — I10 ESSENTIAL HYPERTENSION: Chronic | ICD-10-CM

## 2018-01-18 LAB
INFLUENZA A ANTIBODY: NORMAL
INFLUENZA B ANTIBODY: NORMAL

## 2018-01-18 PROCEDURE — 90715 TDAP VACCINE 7 YRS/> IM: CPT | Performed by: INTERNAL MEDICINE

## 2018-01-18 PROCEDURE — 87804 INFLUENZA ASSAY W/OPTIC: CPT | Performed by: INTERNAL MEDICINE

## 2018-01-18 PROCEDURE — 90471 IMMUNIZATION ADMIN: CPT | Performed by: INTERNAL MEDICINE

## 2018-01-18 PROCEDURE — 99173 VISUAL ACUITY SCREEN: CPT | Performed by: INTERNAL MEDICINE

## 2018-01-18 PROCEDURE — G0439 PPPS, SUBSEQ VISIT: HCPCS | Performed by: INTERNAL MEDICINE

## 2018-01-18 PROCEDURE — G8598 ASA/ANTIPLAT THER USED: HCPCS | Performed by: INTERNAL MEDICINE

## 2018-01-18 RX ORDER — AMOXICILLIN 500 MG/1
500 CAPSULE ORAL 3 TIMES DAILY
Qty: 15 CAPSULE | Refills: 0 | Status: SHIPPED | OUTPATIENT
Start: 2018-01-18 | End: 2018-01-23

## 2018-01-18 RX ORDER — AZITHROMYCIN 250 MG/1
TABLET, FILM COATED ORAL
Qty: 1 PACKET | Refills: 0 | Status: SHIPPED | OUTPATIENT
Start: 2018-01-19 | End: 2018-01-18 | Stop reason: ALTCHOICE

## 2018-01-18 ASSESSMENT — ENCOUNTER SYMPTOMS
DIARRHEA: 0
VOMITING: 0
BLOOD IN STOOL: 0
SHORTNESS OF BREATH: 0
SORE THROAT: 0
COUGH: 0
SINUS PRESSURE: 0
ABDOMINAL PAIN: 0
CHEST TIGHTNESS: 0
NAUSEA: 0

## 2018-01-18 ASSESSMENT — LIFESTYLE VARIABLES
HOW OFTEN DURING THE LAST YEAR HAVE YOU FAILED TO DO WHAT WAS NORMALLY EXPECTED FROM YOU BECAUSE OF DRINKING: 0
HOW OFTEN DURING THE LAST YEAR HAVE YOU NEEDED AN ALCOHOLIC DRINK FIRST THING IN THE MORNING TO GET YOURSELF GOING AFTER A NIGHT OF HEAVY DRINKING: 0
HAVE YOU OR SOMEONE ELSE BEEN INJURED AS A RESULT OF YOUR DRINKING: 0
HOW OFTEN DURING THE LAST YEAR HAVE YOU HAD A FEELING OF GUILT OR REMORSE AFTER DRINKING: 0
HOW OFTEN DURING THE LAST YEAR HAVE YOU BEEN UNABLE TO REMEMBER WHAT HAPPENED THE NIGHT BEFORE BECAUSE YOU HAD BEEN DRINKING: 0
HOW OFTEN DURING THE LAST YEAR HAVE YOU FOUND THAT YOU WERE NOT ABLE TO STOP DRINKING ONCE YOU HAD STARTED: 0
HOW OFTEN DO YOU HAVE A DRINK CONTAINING ALCOHOL: 3
HOW MANY STANDARD DRINKS CONTAINING ALCOHOL DO YOU HAVE ON A TYPICAL DAY: 0
HAS A RELATIVE, FRIEND, DOCTOR, OR ANOTHER HEALTH PROFESSIONAL EXPRESSED CONCERN ABOUT YOUR DRINKING OR SUGGESTED YOU CUT DOWN: 0
HOW OFTEN DO YOU HAVE SIX OR MORE DRINKS ON ONE OCCASION: 0

## 2018-01-18 ASSESSMENT — PATIENT HEALTH QUESTIONNAIRE - PHQ9: SUM OF ALL RESPONSES TO PHQ QUESTIONS 1-9: 0

## 2018-01-18 ASSESSMENT — ANXIETY QUESTIONNAIRES: GAD7 TOTAL SCORE: 0

## 2018-01-18 NOTE — PROGRESS NOTES
Outpatient Note for established Patient - regular Visit    History Obtained From:  patient, electronic medical record  Is the patient new to me ? - No    HISTORY OF PRESENT ILLNESS:   The patient is a 77 y.o. male who is here today for :  1) nasal congestion, chest congestion , cough for the past week. mucinex- some improvement, no fever/ chills/ SOB. Pt denies CP/SOB/palpitations/abdominal pain/N/V. Appetite is good. No muscle aches. 2) s/p MSTEMI. He as not been  Getting CP. Occasional palpitations. He feels stronger with the cardiac rehab. He is on BB ACE Aspirin, Plavix high dos Lipitor. 3) he has known AF   No hematuria anymore. 4) BPH he is followed by Dr Kristyn Pires.   cancer was ruled out. He will see Dr Kristyn Pires again in April. 5) AWV:  He had colonoscopy a year ago and he was supposed it by now again for polyps. He will see Dr Russ Castle and discuss [facility] interruption 2/8/18. No hearing/ vision difficulties. He did not fall- he does have safety precautions to prevent falls. No depression/ Anxiety. Diet: he eats good does, with enough vegetables and fruits. He is doing a physical activity through the cardiac reha.     Past Medical History:        Diagnosis Date    Allergic rhinitis     Anemia     Atrial fibrillation ([address]     Atrial fibrillation (HCC)     Benign non-nodular prostatic hyperplasia with lower urinary tract symptoms 2/9/2017    Benign paroxysmal positional vertigo     BPH (benign prostatic hyperplasia)     BPH (benign prostatic hypertrophy)     Cataract 10/3/2014    Cholelithiasis     Colon cancer ([address] 4/9/2012    Diverticulosis     Dyslipidemia 7/21/2010    Elevated PSA     Erectile dysfunction     Essential hypertension 11/24/2015    Gastroesophageal reflux disease without esophagitis 11/9/2016    GERD (gastroesophageal reflux disease)     Glaucoma 4/5/2013    Hiatal hernia     Hypertension     Lumbar radiculopathy     Osteoarthritis     Peripheral tamsulosin (FLOMAX) 0.4 MG capsule Take 0.4 mg by mouth daily   Yes Historical Provider, MD   Multiple Vitamin (MULTIVITAMIN) capsule Take 1 capsule by mouth daily. 2/6/14  Yes Kitty Gonzales MD       REVIEW OF SYSTEMS:  Review of Systems   Constitutional: Negative for activity change, appetite change, chills and fever. HENT: Positive for congestion. Negative for hearing loss, sinus pressure, sore throat and tinnitus. Eyes: Negative for visual disturbance. Respiratory: Negative for cough, chest tightness and shortness of breath. No exertional dyspnea. No orthopnea    Cardiovascular: Negative for chest pain and palpitations. Gastrointestinal: Negative for abdominal pain, blood in stool, diarrhea, nausea and vomiting. Endocrine: Negative for polyuria. Genitourinary: Negative for difficulty urinating, dysuria and hematuria. Musculoskeletal: Negative for arthralgias. Skin: Negative for rash. Neurological: Negative for weakness, numbness and headaches. Psychiatric/Behavioral: Negative for dysphoric mood, self-injury and suicidal ideas. The patient is not nervous/anxious. Physical Exam:      Vitals: /76 (Site: Left Arm, Position: Sitting, Cuff Size: Medium Adult)   Pulse 71   Ht 5' 9\" (1.753 m)   Wt 219 lb (99.3 kg)   SpO2 93%   BMI 32.34 kg/m²     Body mass index is 32.34 kg/m². Wt Readings from Last 3 Encounters:   01/18/18 219 lb (99.3 kg)   12/21/17 216 lb (98 kg)   11/08/17 219 lb (99.3 kg)     Physical Exam   Constitutional: He is oriented to person, place, and time. He appears well-developed and well-nourished. No distress. HENT:   Head: Normocephalic and atraumatic. Right Ear: External ear normal.   Left Ear: External ear normal.   Mouth/Throat: Oropharynx is clear and moist. No oropharyngeal exudate. Eyes: Conjunctivae and EOM are normal. Right eye exhibits no discharge. Left eye exhibits no discharge. No scleral icterus. Neck: Normal range of motion. one occasion?: Never  During the past year, how often have you found that you were not able to stop drinking once you had started?: Never  During the past year, how often have you failed to do what was normally expected of you because of drinking?: Never  During the past year, how often have you needed a drink in the morning to get yourself going after a heavy drinking session?: Never  During the past year, how often have you had a feeling of guilt or remorse after drinking?: Never  During the past year, have you been unable to remember what happened the night before because you had been drinking?: Never  Have you or someone else been injured as a result of your drinking?: No  Has a relative or friend, doctor or health worker been concerned about your drinking or suggested you cut down?: No  Substance Abuse Interventions:  · None indicated    Health Risk Assessment:     General  In general, how would you say your health is?: Fair  In the past 7 days, have you experienced any of the following?: None of These  Do you get the social and emotional support that you need?: Yes  Do you have a Living Will?: Yes  General Health Risk Interventions:  · None indicated    Health Habits/Nutrition  Do you exercise for at least 20 minutes 2-3 times per week?: Yes  Have you lost any weight without trying in the past 3 months?: No  Do you eat fewer than 2 meals per day?: No  Have you seen a dentist within the past year?: Yes  Body mass index is 32.34 kg/m².   Health Habits/Nutrition Interventions:  · None indicated    Hearing/Vision  Do you or your family notice any trouble with your hearing?: No  Do you have difficulty driving, watching TV, or doing any of your daily activities because of your eyesight?: No  Have you had an eye exam within the past year?: Yes  Hearing/Vision Interventions:  · None indicated    Safety  Do you have working smoke detectors?: Yes  Have all throw rugs been removed or fastened?: Yes  Do you have non-slip mats in all bathtubs?: Yes  Do all of your stairways have a railing or banister?: Yes  Are your doorways, halls and stairs free of clutter?: Yes  Do you always fasten your seatbelt when you are in a car?: Yes  Safety Interventions:  · None indicated    ADLs  In the past 7 days, did you need help from others to perform any of the following everyday activities?: None  In the past 7 days, did you need help from others to take care of any of the following?: None  ADL Interventions:  · None indicated    Personalized Preventive Plan   Current Health Maintenance Status  Immunization History   Administered Date(s) Administered    Influenza Virus Vaccine 11/07/2013    Influenza, High Dose 12/07/2012, 10/03/2014, 11/24/2015, 11/09/2016, 10/17/2017    Pneumococcal 13-valent Conjugate ([id number]) 01/04/2016    Pneumococcal Polysaccharide ([id number]) 03/01/2012    Tdap (Boostrix, Adacel) 01/18/2018    Zoster 02/10/2014        Health Maintenance   Topic Date Due    Potassium monitoring  11/08/2018    Creatinine monitoring  11/08/2018    Lipid screen  10/18/2022    DTaP/Tdap/Td vaccine (2 - Td) 01/18/2028    Zostavax vaccine  Completed    Flu vaccine  Completed    Pneumococcal low/med risk  Completed       Recommendations for Preventive Services Due: see orders.   Recommended screening schedule for the next 5-10 years is provided to the patient in written form: see Patient Goldie Mckeon M.D.   1/18/2018, 9:39 AM

## 2018-01-24 DIAGNOSIS — I48.20 CHRONIC ATRIAL FIBRILLATION (HCC): Primary | ICD-10-CM

## 2018-01-25 ENCOUNTER — ANTI-COAG VISIT (OUTPATIENT)
Dept: PHARMACY | Facility: CLINIC | Age: 78
End: 2018-01-25

## 2018-01-25 DIAGNOSIS — I48.20 CHRONIC ATRIAL FIBRILLATION (HCC): ICD-10-CM

## 2018-01-25 LAB — INTERNATIONAL NORMALIZATION RATIO, POC: 2.4

## 2018-01-25 NOTE — PROGRESS NOTES
ANTICOAGULATION SERVICE    Ronnie Pearce" is a 77 y.o. male with PMHx significant for chronic AF ([id number] of 3), HTN who presents to clinic 1/25/2018 for anticoagulation monitoring and adjustment. Anticoagulation Indication(s):  Afib    Referring Physician:  Dr. Bassam Carreon    Goal INR Range:  2-3  Duration of Anticoagulation Therapy:  Indefinite  Time of day dose taken:  PM  Product patient has at home:  warfarin 5 mg (PEACH color)    INR Summary                           Warfarin regimen (mg)  Date INR   A/P   Sun Mon Tue Wed Thu Fri Sat Mg/wk  1/25 2.4 At goal, no change 5 5 2.5 5 2.5 5 2.5 27.5  12/28 2.3 At goal, no change 5 5 2.5 5 2.5 5 2.5 27.5  11/30 2.8 At goal, no change 5 5 2.5 5 2.5 5 2.5 27.5  11/14 2.3 At goal, no change 5 5 2.5 5 2.5 5 2.5 27.5  11/7 2.1 At goal, resume 5 5 2.5 5 2.5 5 2.5 27.5  10/17 2.3 At goal, no change 5 5 2.5 5 2.5 5 2.5 27.5  9/28 2.2 At goal, no change 5 5 2.5 5 2.5 5 2.5 27.5  9/19 1.4 Below goal, 5 mg x 1 5 5 5/2.5 5 2.5 5 2.5 27.5  9/5 1.0 Below goal, resume 5 5 2.5 5 2.5 5 2.5 27.5  8/31 1.0 Below goal, 5 mg/d 5 5 INR  5 5 5   8/15 1.2 Resume 8/16  5 5 INR 5 5 5 5  8/1 2.7 At goal, no change 5 5 2.5 5 2.5 5 2.5 27.5  6/30 2.4 At goal, no change 5 5 2.5 5 2.5 5 2.5 27.5  6/23 2.0 At goal, continue 5 5 2.5 5 2.5 5 2.5 27.5  6/16 4.4 Above goal, hold x 2 5 5 2.5 5 2.5 5 2.5 27.5  5/19 2.8 At goal, continue 5 5 2.5 5 2.5 5 2.5 27.5  4/21 2.6 At goal, continue 5 5 2.5 5 2.5 5 2.5 27.5  3/31 2.2 At goal, continue 5 5 2.5 5 2.5 5 2.5 27.5  3/17 1.9 Below goal, 5x1 5 5 2.5 5 2.5 5 5/2.5 27.5  2/24 1.9 Below goal, continue 5 5 2.5 5 2.5 5 2.5 27.5  2/9 3.2 Above goal, continue 5 5 2.5 5 2.5 5 2.5 27.5  1/10 2.3 At goal, no change 5 5 2.5 5 2.5 5 2.5 27.5  12/13 2.7 At goal, no change 5 5 2.5 5 2.5 5 2.5 27.5    Patient History:  Recent hospitalizations/HC visits -1/18: Dr Sharri Mcfarlane   -10/29-11/1/17: [facility] for STEMI, s/p [facility] 10/30 with primary stenting to proximal RCA.

## 2018-02-01 ENCOUNTER — HOSPITAL ENCOUNTER (OUTPATIENT)
Dept: OTHER | Age: 78
Discharge: HOME | End: 2018-02-28
Attending: INTERNAL MEDICINE | Admitting: INTERNAL MEDICINE

## 2018-02-07 ENCOUNTER — OFFICE VISIT (OUTPATIENT)
Dept: CARDIOLOGY CLINIC | Age: 78
End: 2018-02-07

## 2018-02-07 VITALS
RESPIRATION RATE: 14 BRPM | WEIGHT: 218.8 LBS | SYSTOLIC BLOOD PRESSURE: 132 MMHG | HEIGHT: 69 IN | OXYGEN SATURATION: 94 % | HEART RATE: 60 BPM | BODY MASS INDEX: 32.41 KG/M2 | DIASTOLIC BLOOD PRESSURE: 64 MMHG

## 2018-02-07 DIAGNOSIS — Z98.61 POST PTCA: ICD-10-CM

## 2018-02-07 DIAGNOSIS — I48.20 CHRONIC ATRIAL FIBRILLATION (HCC): ICD-10-CM

## 2018-02-07 DIAGNOSIS — I25.10 CAD IN NATIVE ARTERY: Primary | ICD-10-CM

## 2018-02-07 DIAGNOSIS — I10 ESSENTIAL HYPERTENSION: ICD-10-CM

## 2018-02-07 DIAGNOSIS — I21.4 NON-ST ELEVATED MYOCARDIAL INFARCTION (HCC): ICD-10-CM

## 2018-02-07 PROCEDURE — 1036F TOBACCO NON-USER: CPT | Performed by: INTERNAL MEDICINE

## 2018-02-07 PROCEDURE — 1123F ACP DISCUSS/DSCN MKR DOCD: CPT | Performed by: INTERNAL MEDICINE

## 2018-02-07 PROCEDURE — 99214 OFFICE O/P EST MOD 30 MIN: CPT | Performed by: INTERNAL MEDICINE

## 2018-02-07 PROCEDURE — 4040F PNEUMOC VAC/ADMIN/RCVD: CPT | Performed by: INTERNAL MEDICINE

## 2018-02-07 PROCEDURE — G8427 DOCREV CUR MEDS BY ELIG CLIN: HCPCS | Performed by: INTERNAL MEDICINE

## 2018-02-07 PROCEDURE — G8598 ASA/ANTIPLAT THER USED: HCPCS | Performed by: INTERNAL MEDICINE

## 2018-02-07 PROCEDURE — G8417 CALC BMI ABV UP PARAM F/U: HCPCS | Performed by: INTERNAL MEDICINE

## 2018-02-07 PROCEDURE — G8484 FLU IMMUNIZE NO ADMIN: HCPCS | Performed by: INTERNAL MEDICINE

## 2018-02-15 ENCOUNTER — TELEPHONE (OUTPATIENT)
Dept: INTERNAL MEDICINE | Age: 78
End: 2018-02-15

## 2018-02-15 NOTE — TELEPHONE ENCOUNTER
Patient pulled something in lower back while trying to pull down his garage door. Pt stated this happened Sunday 02/11/2018. Pt stated that the area is just \"twinging\". Patient said he can feel muscle tightness and a little knot, he can feel it with every step he takes. Pt would like Dr. Lacy Sheth to call an antiinflammatory to his pharmacy. Pt stated Dr. Lacy Sheth has never given him this before. Pharmacy info above correct. Please call patient back.

## 2018-02-16 RX ORDER — CYCLOBENZAPRINE HCL 5 MG
5 TABLET ORAL NIGHTLY PRN
Qty: 20 TABLET | Refills: 0 | Status: SHIPPED | OUTPATIENT
Start: 2018-02-16 | End: 2018-02-26

## 2018-02-22 ENCOUNTER — ANTI-COAG VISIT (OUTPATIENT)
Dept: PHARMACY | Facility: CLINIC | Age: 78
End: 2018-02-22

## 2018-02-22 DIAGNOSIS — I48.20 CHRONIC ATRIAL FIBRILLATION (HCC): ICD-10-CM

## 2018-02-22 LAB — INTERNATIONAL NORMALIZATION RATIO, POC: 3.6

## 2018-02-22 NOTE — PROGRESS NOTES
-10/29-11/1/17: [facility] for STEMI, s/p C 10/30 with primary stenting to proximal RCA. -8/28: CT chest/abd/pelvis showed prostate enlargement and bladder wall thickening, but no evidence of metastatic disease  -8/16: admit [facility] for post-op gross hematuria requiring CBI  -8/16: cystoscopy and TURBT at [facility]   Recent medication changes 2/22: reports taking occasional cyclobenzaprine prn muscle spasm 2/2 back injury and 0-4 tylenol per day (chronic, high doses of tylenol may elevate INR)   1/25: completed course of amoxicillin on 1/23 for sinus infection  11/1: started atorvastatin, losartan, NTG, pantoprazole, ASA, Plavix, Bactrim x 8 days (completed 11/8); d/c diclofenac, irbesartan, niacin, tadalafil   Medications taken regularly that may interact with warfarin or alter INR MVI (may contain vit K)  Plavix, ASA (stent placement 10/30/17)   Warfarin dose taken as prescribed Yes - uses pillbox   Signs/symptoms of bleeding Hematuria resolved in December - patient states he was told by his urologist that as long as he is taking warfarin, hematuria may continue. H/o epistaxis, occasional hemorrhoids, anemia. Vitamin K intake Normally has broccoli, asparagus, kale/gay salads almost daily, but reports only having 1 serving in the past week. He plans to resume his normal diet going forward. Recent vomiting/diarrhea/fever, changes in weight or activity level Started cardiac rehab on 1/5/18   Tobacco or alcohol use Patient denies tobacco use  Will have 1 glass of wine per day max   Upcoming surgeries or procedures Colonoscopy will need to r/s for May-June- patient will discuss with Dr Gulshan Denise and notify the clinic when scheduled. Pt will be holding warfarin x 5 days PTP. He will also need to hold Plavix and ASA for procedure. Assessment/Plan:  Patient's INR was supratherapeutic today (3.6) due to decreased vit K intake.  Because he plans to resume normal diet, patient was instructed to hold warfarin tonight only, then continue warfarin 2.5 mg on Tue/Thu/Sat and 5 mg all other days. Repeat INR in 2 weeks. Patient was reminded to maintain consistent vitamin K intake and call with any bleeding, medication changes, or fever/vomiting/diarrhea. Patient understands dosing directions and information discussed. Dosing schedule and follow up appointment given to patient. Progress note routed to referring physician's office. Patient acknowledges working in consult agreement with pharmacist as referred by his/her physician. Next Clinic Appointment:  3/8    Please call [facility] at [phone] with any questions. Thanks!   Aline Norman, PharmD, BCACP  2/22/2018 9:37 AM

## 2018-02-28 ENCOUNTER — TELEPHONE (OUTPATIENT)
Dept: INTERNAL MEDICINE | Age: 78
End: 2018-02-28

## 2018-02-28 RX ORDER — ATORVASTATIN CALCIUM 40 MG/1
40 TABLET, FILM COATED ORAL NIGHTLY
Qty: 90 TABLET | Refills: 3 | Status: SHIPPED | OUTPATIENT
Start: 2018-02-28 | End: 2019-02-26 | Stop reason: SDUPTHER

## 2018-02-28 RX ORDER — CLOPIDOGREL BISULFATE 75 MG/1
75 TABLET ORAL DAILY
Qty: 90 TABLET | Refills: 3 | Status: ON HOLD | OUTPATIENT
Start: 2018-02-28 | End: 2018-04-25 | Stop reason: HOSPADM

## 2018-02-28 RX ORDER — LOSARTAN POTASSIUM 50 MG/1
50 TABLET ORAL DAILY
Qty: 90 TABLET | Refills: 3 | Status: SHIPPED | OUTPATIENT
Start: 2018-02-28 | End: 2018-03-14 | Stop reason: SDUPTHER

## 2018-03-01 ENCOUNTER — HOSPITAL ENCOUNTER (OUTPATIENT)
Dept: OTHER | Age: 78
Discharge: HOME | End: 2018-03-31
Attending: INTERNAL MEDICINE | Admitting: INTERNAL MEDICINE

## 2018-03-08 ENCOUNTER — ANTI-COAG VISIT (OUTPATIENT)
Dept: PHARMACY | Facility: CLINIC | Age: 78
End: 2018-03-08

## 2018-03-08 DIAGNOSIS — I48.20 CHRONIC ATRIAL FIBRILLATION (HCC): ICD-10-CM

## 2018-03-08 LAB — INTERNATIONAL NORMALIZATION RATIO, POC: 2.9

## 2018-03-08 NOTE — PROGRESS NOTES
ANTICOAGULATION SERVICE    Shiloh Lao" is a 77 y.o. male with PMHx significant for chronic AF ([id number] of 3), HTN who presents to clinic 3/8/2018 for anticoagulation monitoring and adjustment.     Anticoagulation Indication(s):  Afib    Referring Physician:  Dr. Edis Frost    Goal INR Range:  2-3  Duration of Anticoagulation Therapy:  Indefinite  Time of day dose taken:  PM  Product patient has at home:  warfarin 5 mg (PEACH color)    INR Summary                           Warfarin regimen (mg)  Date INR   A/P   Sun Mon Tue Wed Thu Fri Sat Mg/wk  3/8 2.9 At goal, no change 5 5 2.5 5 2.5 5 2.5 27.5  2/22 3.6 Above goal, hold x 1 5 5 2.5 5 2.5 5 2.5 27.5  1/25 2.4 At goal, no change 5 5 2.5 5 2.5 5 2.5 27.5  12/28 2.3 At goal, no change 5 5 2.5 5 2.5 5 2.5 27.5  11/30 2.8 At goal, no change 5 5 2.5 5 2.5 5 2.5 27.5  11/14 2.3 At goal, no change 5 5 2.5 5 2.5 5 2.5 27.5  11/7 2.1 At goal, resume 5 5 2.5 5 2.5 5 2.5 27.5  10/17 2.3 At goal, no change 5 5 2.5 5 2.5 5 2.5 27.5  9/28 2.2 At goal, no change 5 5 2.5 5 2.5 5 2.5 27.5  9/19 1.4 Below goal, 5 mg x 1 5 5 5/2.5 5 2.5 5 2.5 27.5  9/5 1.0 Below goal, resume 5 5 2.5 5 2.5 5 2.5 27.5  8/31 1.0 Below goal, 5 mg/d 5 5 INR  5 5 5   8/15 1.2 Resume 8/16  5 5 INR 5 5 5 5  8/1 2.7 At goal, no change 5 5 2.5 5 2.5 5 2.5 27.5  6/30 2.4 At goal, no change 5 5 2.5 5 2.5 5 2.5 27.5  6/23 2.0 At goal, continue 5 5 2.5 5 2.5 5 2.5 27.5  6/16 4.4 Above goal, hold x 2 5 5 2.5 5 2.5 5 2.5 27.5  5/19 2.8 At goal, continue 5 5 2.5 5 2.5 5 2.5 27.5  4/21 2.6 At goal, continue 5 5 2.5 5 2.5 5 2.5 27.5  3/31 2.2 At goal, continue 5 5 2.5 5 2.5 5 2.5 27.5  3/17 1.9 Below goal, 5x1 5 5 2.5 5 2.5 5 5/2.5 27.5  2/24 1.9 Below goal, continue 5 5 2.5 5 2.5 5 2.5 27.5  2/9 3.2 Above goal, continue 5 5 2.5 5 2.5 5 2.5 27.5  1/10 2.3 At goal, no change 5 5 2.5 5 2.5 5 2.5 27.5  12/13 2.7 At goal, no change 5 5 2.5 5 2.5 5 2.5 27.5    Patient History:  Recent hospitalizations/HC visits -2/7: Dr Sommer Olmstead- no changes  -10/29-11/1/17: [facility] for STEMI, s/p [facility] 10/30 with primary stenting to proximal RCA. -8/28: CT chest/abd/pelvis showed prostate enlargement and bladder wall thickening, but no evidence of metastatic disease  -8/16: admit [facility] for post-op gross hematuria requiring CBI  -8/16: cystoscopy and TURBT at [facility]   Recent medication changes 3/8: started vit B 12 supplement- SL   Medications taken regularly that may interact with warfarin or alter INR MVI (may contain vit K)  Plavix, ASA (stent placement 10/30/17)   Warfarin dose taken as prescribed Yes - uses pillbox   Signs/symptoms of bleeding Hematuria resolved in December - patient states he was told by his urologist that as long as he is taking warfarin, hematuria may continue. H/o epistaxis, occasional hemorrhoids, anemia. None reported today   Vitamin K intake Normally has broccoli, asparagus, kale/gay salads almost daily   Recent vomiting/diarrhea/fever, changes in weight or activity level Started cardiac rehab on 1/5/18   Tobacco or alcohol use Patient denies tobacco use  Will have 1 glass of wine per day max   Upcoming surgeries or procedures Colonoscopy will need to r/s for May-June- patient will discuss with Dr Sommer Olmstead and notify the clinic when scheduled. Pt will be holding warfarin x 5 days PTP. He will also need to hold Plavix and ASA for procedure. Assessment/Plan:  Patient's INR was therapeutic today (2.9). Previous INR elevated due to decreased vit K intake. Patient was instructed to continue warfarin 2.5 mg on Tue/Thu/Sat and 5 mg all other days. Repeat INR in 4 weeks. Patient was reminded to maintain consistent vitamin K intake and call with any bleeding, medication changes, or fever/vomiting/diarrhea. Patient understands dosing directions and information discussed. Dosing schedule and follow up appointment given to patient. Progress note routed to referring physician's office.  Patient acknowledges

## 2018-03-14 RX ORDER — LOSARTAN POTASSIUM 50 MG/1
50 TABLET ORAL DAILY
Qty: 30 TABLET | Refills: 5 | Status: SHIPPED | OUTPATIENT
Start: 2018-03-14 | End: 2019-01-29 | Stop reason: SINTOL

## 2018-03-14 RX ORDER — PANTOPRAZOLE SODIUM 40 MG/1
40 TABLET, DELAYED RELEASE ORAL DAILY
Qty: 30 TABLET | Refills: 5 | Status: SHIPPED | OUTPATIENT
Start: 2018-03-14 | End: 2018-04-24 | Stop reason: ALTCHOICE

## 2018-04-01 ENCOUNTER — HOSPITAL ENCOUNTER (OUTPATIENT)
Dept: OTHER | Age: 78
Discharge: HOME | End: 2018-04-02
Attending: INTERNAL MEDICINE | Admitting: INTERNAL MEDICINE

## 2018-04-01 ENCOUNTER — HOSPITAL ENCOUNTER (OUTPATIENT)
Dept: OTHER | Age: 78
Discharge: HOME | End: 2018-04-30
Attending: INTERNAL MEDICINE | Admitting: INTERNAL MEDICINE

## 2018-04-05 ENCOUNTER — ANTI-COAG VISIT (OUTPATIENT)
Dept: PHARMACY | Facility: CLINIC | Age: 78
End: 2018-04-05

## 2018-04-05 DIAGNOSIS — I48.20 CHRONIC ATRIAL FIBRILLATION (HCC): ICD-10-CM

## 2018-04-05 LAB — INTERNATIONAL NORMALIZATION RATIO, POC: 3

## 2018-04-24 ENCOUNTER — OFFICE VISIT (OUTPATIENT)
Dept: INTERNAL MEDICINE | Age: 78
End: 2018-04-24

## 2018-04-24 VITALS
SYSTOLIC BLOOD PRESSURE: 142 MMHG | OXYGEN SATURATION: 95 % | BODY MASS INDEX: 32.19 KG/M2 | DIASTOLIC BLOOD PRESSURE: 80 MMHG | WEIGHT: 218 LBS | HEART RATE: 67 BPM

## 2018-04-24 DIAGNOSIS — E78.5 DYSLIPIDEMIA: Chronic | ICD-10-CM

## 2018-04-24 DIAGNOSIS — I50.9 HEART FAILURE, ACC/AHA STAGE B (HCC): ICD-10-CM

## 2018-04-24 DIAGNOSIS — I25.119 CORONARY ARTERY DISEASE INVOLVING NATIVE CORONARY ARTERY OF NATIVE HEART WITH ANGINA PECTORIS (HCC): ICD-10-CM

## 2018-04-24 DIAGNOSIS — I10 ESSENTIAL HYPERTENSION: Chronic | ICD-10-CM

## 2018-04-24 DIAGNOSIS — R97.20 ELEVATED PSA: Chronic | ICD-10-CM

## 2018-04-24 DIAGNOSIS — Z95.5 S/P INSERTION OF NON-DRUG ELUTING CORONARY ARTERY STENT: ICD-10-CM

## 2018-04-24 DIAGNOSIS — I48.20 CHRONIC ATRIAL FIBRILLATION (HCC): Chronic | ICD-10-CM

## 2018-04-24 DIAGNOSIS — I25.119 CORONARY ARTERY DISEASE INVOLVING NATIVE CORONARY ARTERY OF NATIVE HEART WITH ANGINA PECTORIS (HCC): Primary | ICD-10-CM

## 2018-04-24 PROBLEM — K92.2 GIB (GASTROINTESTINAL BLEEDING): Status: ACTIVE | Noted: 2018-04-24

## 2018-04-24 LAB
A/G RATIO: 1.4 (ref 1.1–2.2)
ALBUMIN SERPL-MCNC: 4 G/DL (ref 3.4–5)
ALP BLD-CCNC: 68 U/L (ref 40–129)
ALT SERPL-CCNC: 11 U/L (ref 10–40)
ANION GAP SERPL CALCULATED.3IONS-SCNC: 14 MMOL/L (ref 3–16)
AST SERPL-CCNC: 13 U/L (ref 15–37)
BASOPHILS ABSOLUTE: 0 K/UL (ref 0–0.2)
BASOPHILS RELATIVE PERCENT: 0.3 %
BILIRUB SERPL-MCNC: 0.5 MG/DL (ref 0–1)
BUN BLDV-MCNC: 15 MG/DL (ref 7–20)
CALCIUM SERPL-MCNC: 9.3 MG/DL (ref 8.3–10.6)
CHLORIDE BLD-SCNC: 101 MMOL/L (ref 99–110)
CHOLESTEROL, TOTAL: 96 MG/DL (ref 0–199)
CO2: 27 MMOL/L (ref 21–32)
CREAT SERPL-MCNC: 0.8 MG/DL (ref 0.8–1.3)
EOSINOPHILS ABSOLUTE: 0 K/UL (ref 0–0.6)
EOSINOPHILS RELATIVE PERCENT: 0.6 %
GFR AFRICAN AMERICAN: >60
GFR NON-AFRICAN AMERICAN: >60
GLOBULIN: 2.9 G/DL
GLUCOSE BLD-MCNC: 99 MG/DL (ref 70–99)
HCT VFR BLD CALC: 27 % (ref 40.5–52.5)
HDLC SERPL-MCNC: 40 MG/DL (ref 40–60)
HEMOGLOBIN: 8.6 G/DL (ref 13.5–17.5)
LDL CHOLESTEROL CALCULATED: 43 MG/DL
LYMPHOCYTES ABSOLUTE: 1.3 K/UL (ref 1–5.1)
LYMPHOCYTES RELATIVE PERCENT: 25.8 %
MCH RBC QN AUTO: 26.3 PG (ref 26–34)
MCHC RBC AUTO-ENTMCNC: 31.7 G/DL (ref 31–36)
MCV RBC AUTO: 83.1 FL (ref 80–100)
MONOCYTES ABSOLUTE: 0.4 K/UL (ref 0–1.3)
MONOCYTES RELATIVE PERCENT: 7.1 %
NEUTROPHILS ABSOLUTE: 3.4 K/UL (ref 1.7–7.7)
NEUTROPHILS RELATIVE PERCENT: 66.2 %
PDW BLD-RTO: 20.1 % (ref 12.4–15.4)
PLATELET # BLD: 234 K/UL (ref 135–450)
PMV BLD AUTO: 8.6 FL (ref 5–10.5)
POTASSIUM SERPL-SCNC: 4.2 MMOL/L (ref 3.5–5.1)
RBC # BLD: 3.25 M/UL (ref 4.2–5.9)
SODIUM BLD-SCNC: 142 MMOL/L (ref 136–145)
TOTAL PROTEIN: 6.9 G/DL (ref 6.4–8.2)
TRIGL SERPL-MCNC: 65 MG/DL (ref 0–150)
VLDLC SERPL CALC-MCNC: 13 MG/DL
WBC # BLD: 5.1 K/UL (ref 4–11)

## 2018-04-24 PROCEDURE — G8427 DOCREV CUR MEDS BY ELIG CLIN: HCPCS | Performed by: INTERNAL MEDICINE

## 2018-04-24 PROCEDURE — 4040F PNEUMOC VAC/ADMIN/RCVD: CPT | Performed by: INTERNAL MEDICINE

## 2018-04-24 PROCEDURE — 1123F ACP DISCUSS/DSCN MKR DOCD: CPT | Performed by: INTERNAL MEDICINE

## 2018-04-24 PROCEDURE — G8598 ASA/ANTIPLAT THER USED: HCPCS | Performed by: INTERNAL MEDICINE

## 2018-04-24 PROCEDURE — 99214 OFFICE O/P EST MOD 30 MIN: CPT | Performed by: INTERNAL MEDICINE

## 2018-04-24 PROCEDURE — G8417 CALC BMI ABV UP PARAM F/U: HCPCS | Performed by: INTERNAL MEDICINE

## 2018-04-24 PROCEDURE — 1036F TOBACCO NON-USER: CPT | Performed by: INTERNAL MEDICINE

## 2018-04-24 ASSESSMENT — ENCOUNTER SYMPTOMS
ABDOMINAL PAIN: 0
NAUSEA: 0
VOMITING: 0
SHORTNESS OF BREATH: 0
CHEST TIGHTNESS: 0

## 2018-04-25 PROBLEM — Z98.61 HISTORY OF PTCA: Status: ACTIVE | Noted: 2018-04-25

## 2018-04-26 ENCOUNTER — TELEPHONE (OUTPATIENT)
Dept: CARDIOLOGY CLINIC | Age: 78
End: 2018-04-26

## 2018-04-26 ENCOUNTER — TELEPHONE (OUTPATIENT)
Dept: PHARMACY | Facility: CLINIC | Age: 78
End: 2018-04-26

## 2018-04-26 ENCOUNTER — CARE COORDINATION (OUTPATIENT)
Dept: CASE MANAGEMENT | Age: 78
End: 2018-04-26

## 2018-04-26 DIAGNOSIS — I25.119 CORONARY ARTERY DISEASE INVOLVING NATIVE CORONARY ARTERY OF NATIVE HEART WITH ANGINA PECTORIS (HCC): Primary | ICD-10-CM

## 2018-04-26 PROCEDURE — 1111F DSCHRG MED/CURRENT MED MERGE: CPT | Performed by: HOSPITALIST

## 2018-04-27 ENCOUNTER — CARE COORDINATION (OUTPATIENT)
Dept: CASE MANAGEMENT | Age: 78
End: 2018-04-27

## 2018-05-01 ENCOUNTER — HOSPITAL ENCOUNTER (OUTPATIENT)
Dept: OTHER | Age: 78
Discharge: HOME | End: 2018-05-31
Attending: INTERNAL MEDICINE | Admitting: INTERNAL MEDICINE

## 2018-05-02 ENCOUNTER — HOSPITAL ENCOUNTER (OUTPATIENT)
Dept: ENDOSCOPY | Age: 78
Discharge: HOME | End: 2018-05-02
Attending: INTERNAL MEDICINE | Admitting: INTERNAL MEDICINE

## 2018-05-02 VITALS
RESPIRATION RATE: 16 BRPM | WEIGHT: 218 LBS | OXYGEN SATURATION: 100 % | TEMPERATURE: 97.6 F | HEIGHT: 69 IN | HEART RATE: 80 BPM | SYSTOLIC BLOOD PRESSURE: 144 MMHG | DIASTOLIC BLOOD PRESSURE: 73 MMHG | BODY MASS INDEX: 32.29 KG/M2

## 2018-05-02 ASSESSMENT — PAIN - FUNCTIONAL ASSESSMENT: PAIN_FUNCTIONAL_ASSESSMENT: 0-10

## 2018-05-07 ENCOUNTER — CARE COORDINATION (OUTPATIENT)
Dept: CASE MANAGEMENT | Age: 78
End: 2018-05-07

## 2018-05-08 ENCOUNTER — ANTI-COAG VISIT (OUTPATIENT)
Dept: PHARMACY | Facility: CLINIC | Age: 78
End: 2018-05-08

## 2018-05-08 DIAGNOSIS — I48.20 CHRONIC ATRIAL FIBRILLATION (HCC): ICD-10-CM

## 2018-05-08 LAB — INTERNATIONAL NORMALIZATION RATIO, POC: 1.5

## 2018-05-15 ENCOUNTER — OFFICE VISIT (OUTPATIENT)
Dept: CARDIOLOGY CLINIC | Age: 78
End: 2018-05-15

## 2018-05-15 VITALS
HEART RATE: 60 BPM | BODY MASS INDEX: 31.6 KG/M2 | DIASTOLIC BLOOD PRESSURE: 60 MMHG | SYSTOLIC BLOOD PRESSURE: 120 MMHG | WEIGHT: 214 LBS

## 2018-05-15 DIAGNOSIS — I48.20 CHRONIC ATRIAL FIBRILLATION (HCC): ICD-10-CM

## 2018-05-15 DIAGNOSIS — I10 ESSENTIAL HYPERTENSION: ICD-10-CM

## 2018-05-15 DIAGNOSIS — I25.10 CAD IN NATIVE ARTERY: Primary | ICD-10-CM

## 2018-05-15 DIAGNOSIS — I25.2 MI, OLD: ICD-10-CM

## 2018-05-15 DIAGNOSIS — Z98.61 HISTORY OF PTCA: ICD-10-CM

## 2018-05-15 PROCEDURE — G8417 CALC BMI ABV UP PARAM F/U: HCPCS | Performed by: INTERNAL MEDICINE

## 2018-05-15 PROCEDURE — 1036F TOBACCO NON-USER: CPT | Performed by: INTERNAL MEDICINE

## 2018-05-15 PROCEDURE — 4040F PNEUMOC VAC/ADMIN/RCVD: CPT | Performed by: INTERNAL MEDICINE

## 2018-05-15 PROCEDURE — G8427 DOCREV CUR MEDS BY ELIG CLIN: HCPCS | Performed by: INTERNAL MEDICINE

## 2018-05-15 PROCEDURE — 1123F ACP DISCUSS/DSCN MKR DOCD: CPT | Performed by: INTERNAL MEDICINE

## 2018-05-15 PROCEDURE — 1111F DSCHRG MED/CURRENT MED MERGE: CPT | Performed by: INTERNAL MEDICINE

## 2018-05-15 PROCEDURE — 99214 OFFICE O/P EST MOD 30 MIN: CPT | Performed by: INTERNAL MEDICINE

## 2018-05-15 PROCEDURE — G8598 ASA/ANTIPLAT THER USED: HCPCS | Performed by: INTERNAL MEDICINE

## 2018-05-22 ENCOUNTER — TELEPHONE (OUTPATIENT)
Dept: PHARMACY | Facility: CLINIC | Age: 78
End: 2018-05-22

## 2018-05-22 ENCOUNTER — ANTI-COAG VISIT (OUTPATIENT)
Dept: PHARMACY | Facility: CLINIC | Age: 78
End: 2018-05-22

## 2018-05-22 DIAGNOSIS — I48.20 CHRONIC ATRIAL FIBRILLATION (HCC): Primary | ICD-10-CM

## 2018-05-22 DIAGNOSIS — I48.20 CHRONIC ATRIAL FIBRILLATION (HCC): ICD-10-CM

## 2018-05-22 LAB
HCT VFR BLD CALC: 28.1 % (ref 40.5–52.5)
HEMOGLOBIN: 8.8 G/DL (ref 13.5–17.5)
INTERNATIONAL NORMALIZATION RATIO, POC: 2.4
MCH RBC QN AUTO: 26.6 PG (ref 26–34)
MCHC RBC AUTO-ENTMCNC: 31.2 G/DL (ref 31–36)
MCV RBC AUTO: 85.3 FL (ref 80–100)
PDW BLD-RTO: 18.8 % (ref 12.4–15.4)
PLATELET # BLD: 238 K/UL (ref 135–450)
PMV BLD AUTO: 8.8 FL (ref 5–10.5)
RBC # BLD: 3.29 M/UL (ref 4.2–5.9)
WBC # BLD: 5 K/UL (ref 4–11)

## 2018-05-23 RX ORDER — LANOLIN ALCOHOL/MO/W.PET/CERES
325 CREAM (GRAM) TOPICAL EVERY OTHER DAY
COMMUNITY
Start: 2018-05-23 | End: 2018-08-23

## 2018-05-23 RX ORDER — WARFARIN SODIUM 5 MG/1
TABLET ORAL
COMMUNITY
End: 2019-01-28 | Stop reason: SDUPTHER

## 2018-05-23 RX ORDER — FERROUS SULFATE 325(65) MG
325 TABLET ORAL
COMMUNITY
End: 2018-05-23 | Stop reason: CLARIF

## 2018-06-01 ENCOUNTER — HOSPITAL ENCOUNTER (OUTPATIENT)
Dept: OTHER | Age: 78
Discharge: HOME | End: 2018-06-30
Attending: INTERNAL MEDICINE | Admitting: INTERNAL MEDICINE

## 2018-06-12 ENCOUNTER — ANTI-COAG VISIT (OUTPATIENT)
Dept: PHARMACY | Facility: CLINIC | Age: 78
End: 2018-06-12

## 2018-06-12 LAB — INTERNATIONAL NORMALIZATION RATIO, POC: 2.1

## 2018-07-01 ENCOUNTER — HOSPITAL ENCOUNTER (OUTPATIENT)
Dept: OTHER | Age: 78
Discharge: HOME | End: 2018-07-01
Attending: INTERNAL MEDICINE | Admitting: INTERNAL MEDICINE

## 2018-07-12 ENCOUNTER — ANTI-COAG VISIT (OUTPATIENT)
Dept: PHARMACY | Facility: CLINIC | Age: 78
End: 2018-07-12

## 2018-07-12 DIAGNOSIS — I48.20 CHRONIC ATRIAL FIBRILLATION (HCC): ICD-10-CM

## 2018-07-12 LAB — INTERNATIONAL NORMALIZATION RATIO, POC: 4.4

## 2018-07-12 NOTE — PROGRESS NOTES
ANTICOAGULATION SERVICE    Wenceslao Banda" is a 77 y.o. male with PMHx significant for chronic AF ([id number] of 3), HTN who presents to clinic 7/12/2018 for anticoagulation monitoring and adjustment. Anticoagulation Indication(s):  Afib    Referring Physician:  Dr. Henna Magallanes    Goal INR Range:  2-3  Duration of Anticoagulation Therapy:  Indefinite  Time of day dose taken:  PM  Product patient has at home:  warfarin 5 mg (PEACH color)    INR Summary                           Warfarin regimen (mg)  Date INR   A/P   Sun Mon Tue Wed Thu Fri Sat Mg/wk  7/12 4.4 Above goal, hold x 1 5 5 2.5 5 0/2.5 5 2.5 27.5  6/12 2.1 At goal, no change 5 5 2.5 5 2.5 5 2.5 27.5  5/22 2.4 At goal, no change 5 5 2.5 5 2.5 5 2.5 27.5  5/8 1.5 Below goal, 5mg x 1 5 5 5/2.5 5 2.5 5 2.5 27.5  4/5 3.0 At goal, no change 5 5 2.5 5 2.5 5 2.5 27.5  3/8 2.9 At goal, no change 5 5 2.5 5 2.5 5 2.5 27.5  2/22 3.6 Above goal, hold x 1 5 5 2.5 5 2.5 5 2.5 27.5  1/25 2.4 At goal, no change 5 5 2.5 5 2.5 5 2.5 27.5  12/28 2.3 At goal, no change 5 5 2.5 5 2.5 5 2.5 27.5  11/30 2.8 At goal, no change 5 5 2.5 5 2.5 5 2.5 27.5  11/14 2.3 At goal, no change 5 5 2.5 5 2.5 5 2.5 27.5  11/7 2.1 At goal, resume 5 5 2.5 5 2.5 5 2.5 27.5  10/17 2.3 At goal, no change 5 5 2.5 5 2.5 5 2.5 27.5  9/28 2.2 At goal, no change 5 5 2.5 5 2.5 5 2.5 27.5    Lab Results   Component Value Date    RBC 3.29 (L) 05/22/2018    HGB 8.8 (L) 05/22/2018    HCT 28.1 (L) 05/22/2018    MCV 85.3 05/22/2018    MCH 26.6 05/22/2018    MPV 8.8 05/22/2018    RDW 18.8 (H) 05/22/2018     05/22/2018     Patient History:  Recent hospitalizations/HC visits - 5/15: [address] Henna Magallanes and also had f/u Dr Kayode Raphael last week; patient states they are still unsure of reason for Hgb drop. - 4/24 to 4/25[facility] for suspected GIB (drop in Hg to 8.5 from 13.4 in November). EGD 4/25 significant for: 2 small antral erosions, moderate hiatal hernia, slightly irregular Z-line, small distal duodenal polyp. Tu/Th/Sa and 5 mg all other days. Patient will resume usual vitamin K intake also. Repeat INR in 1 week. Patient was reminded to maintain consistent vitamin K intake and call with any bleeding, medication changes, or fever/vomiting/diarrhea. Patient understands dosing directions and information discussed. Dosing schedule and follow up appointment given to patient. Progress note routed to referring physician's office. Patient acknowledges working in consult agreement with pharmacist as referred by his/her physician. Next Clinic Appointment:  7/19    Please call [facility] at [phone] with any questions. Thanks! Andrew Garcia.  Wade Crawford, PharmD  [facility]  Ph: [phone]  7/12/2018 9:36 AM

## 2018-07-19 ENCOUNTER — ANTI-COAG VISIT (OUTPATIENT)
Dept: PHARMACY | Age: 78
End: 2018-07-19
Payer: MEDICARE

## 2018-07-19 ENCOUNTER — TELEPHONE (OUTPATIENT)
Dept: INTERNAL MEDICINE | Age: 78
End: 2018-07-19

## 2018-07-19 DIAGNOSIS — I48.20 CHRONIC ATRIAL FIBRILLATION (HCC): ICD-10-CM

## 2018-07-19 LAB — INTERNATIONAL NORMALIZATION RATIO, POC: 2.5

## 2018-07-19 PROCEDURE — 99211 OFF/OP EST MAY X REQ PHY/QHP: CPT

## 2018-07-19 PROCEDURE — 85610 PROTHROMBIN TIME: CPT

## 2018-07-19 RX ORDER — TRIAMTERENE AND HYDROCHLOROTHIAZIDE 37.5; 25 MG/1; MG/1
1 TABLET ORAL DAILY
Qty: 12 TABLET | Refills: 0 | Status: SHIPPED | OUTPATIENT
Start: 2018-07-19 | End: 2018-07-24 | Stop reason: SDUPTHER

## 2018-07-19 RX ORDER — TRIAMTERENE AND HYDROCHLOROTHIAZIDE 37.5; 25 MG/1; MG/1
1 TABLET ORAL DAILY
Qty: 90 TABLET | Refills: 3 | OUTPATIENT
Start: 2018-07-19

## 2018-07-19 NOTE — PROGRESS NOTES
ANTICOAGULATION SERVICE    Renato Nascimento" is a 77 y.o. male with PMHx significant for chronic AF ([id number]-ISVt of 3), HTN who presents to clinic 7/19/2018 for anticoagulation monitoring and adjustment. Anticoagulation Indication(s):  Afib    Referring Physician:  Dr. Azam Gómez    Goal INR Range:  2-3  Duration of Anticoagulation Therapy:  Indefinite  Time of day dose taken:  PM  Product patient has at home:  warfarin 5 mg (PEACH color)    INR Summary                           Warfarin regimen (mg)  Date INR   A/P   Sun Mon Tue Wed Thu Fri Sat Mg/wk  7/19 2.5 At goal, no change 5 5 2.5 5 2.5 5 2.5 27.5  7/12 4.4 Above goal, hold x 1 5 5 2.5 5 0/2.5 5 2.5 27.5  6/12 2.1 At goal, no change 5 5 2.5 5 2.5 5 2.5 27.5  5/22 2.4 At goal, no change 5 5 2.5 5 2.5 5 2.5 27.5  5/8 1.5 Below goal, 5mg x 1 5 5 5/2.5 5 2.5 5 2.5 27.5  4/5 3.0 At goal, no change 5 5 2.5 5 2.5 5 2.5 27.5  3/8 2.9 At goal, no change 5 5 2.5 5 2.5 5 2.5 27.5  2/22 3.6 Above goal, hold x 1 5 5 2.5 5 2.5 5 2.5 27.5  1/25 2.4 At goal, no change 5 5 2.5 5 2.5 5 2.5 27.5  12/28 2.3 At goal, no change 5 5 2.5 5 2.5 5 2.5 27.5  11/30 2.8 At goal, no change 5 5 2.5 5 2.5 5 2.5 27.5  11/14 2.3 At goal, no change 5 5 2.5 5 2.5 5 2.5 27.5  11/7 2.1 At goal, resume 5 5 2.5 5 2.5 5 2.5 27.5  10/17 2.3 At goal, no change 5 5 2.5 5 2.5 5 2.5 27.5  9/28 2.2 At goal, no change 5 5 2.5 5 2.5 5 2.5 27.5    Lab Results   Component Value Date    RBC 3.29 (L) 05/22/2018    HGB 8.8 (L) 05/22/2018    HCT 28.1 (L) 05/22/2018    MCV 85.3 05/22/2018    MCH 26.6 05/22/2018    MPV 8.8 05/22/2018    RDW 18.8 (H) 05/22/2018     05/22/2018     Patient History:  Recent hospitalizations/HC visits - 5/15: [facility] and also had f/u Dr Jenna Negrete last week; patient states they are still unsure of reason for Hgb drop. - 4/24 to 4/[facility] for suspected GIB (drop in Hg to 8.5 from 13.4 in November).  EGD 4/25 significant for: 2 small antral erosions, moderate hiatal hernia,

## 2018-07-24 ENCOUNTER — OFFICE VISIT (OUTPATIENT)
Dept: INTERNAL MEDICINE | Age: 78
End: 2018-07-24

## 2018-07-24 VITALS
OXYGEN SATURATION: 94 % | HEART RATE: 74 BPM | DIASTOLIC BLOOD PRESSURE: 82 MMHG | WEIGHT: 224 LBS | SYSTOLIC BLOOD PRESSURE: 128 MMHG | BODY MASS INDEX: 33.08 KG/M2

## 2018-07-24 DIAGNOSIS — I10 ESSENTIAL HYPERTENSION: Primary | Chronic | ICD-10-CM

## 2018-07-24 DIAGNOSIS — Z95.5 S/P INSERTION OF NON-DRUG ELUTING CORONARY ARTERY STENT: ICD-10-CM

## 2018-07-24 DIAGNOSIS — E78.5 DYSLIPIDEMIA: Chronic | ICD-10-CM

## 2018-07-24 DIAGNOSIS — D50.0 IRON DEFICIENCY ANEMIA DUE TO CHRONIC BLOOD LOSS: ICD-10-CM

## 2018-07-24 DIAGNOSIS — I21.4 NON-ST ELEVATED MYOCARDIAL INFARCTION (HCC): ICD-10-CM

## 2018-07-24 DIAGNOSIS — I50.9 HEART FAILURE, ACC/AHA STAGE B (HCC): ICD-10-CM

## 2018-07-24 DIAGNOSIS — I10 ESSENTIAL HYPERTENSION: Chronic | ICD-10-CM

## 2018-07-24 DIAGNOSIS — I48.20 CHRONIC ATRIAL FIBRILLATION (HCC): Chronic | ICD-10-CM

## 2018-07-24 LAB
A/G RATIO: 1.4 (ref 1.1–2.2)
ALBUMIN SERPL-MCNC: 3.9 G/DL (ref 3.4–5)
ALP BLD-CCNC: 83 U/L (ref 40–129)
ALT SERPL-CCNC: 14 U/L (ref 10–40)
ANION GAP SERPL CALCULATED.3IONS-SCNC: 8 MMOL/L (ref 3–16)
AST SERPL-CCNC: 16 U/L (ref 15–37)
BASOPHILS ABSOLUTE: 0 K/UL (ref 0–0.2)
BASOPHILS RELATIVE PERCENT: 0.5 %
BILIRUB SERPL-MCNC: 0.6 MG/DL (ref 0–1)
BUN BLDV-MCNC: 15 MG/DL (ref 7–20)
CALCIUM SERPL-MCNC: 9.5 MG/DL (ref 8.3–10.6)
CHLORIDE BLD-SCNC: 103 MMOL/L (ref 99–110)
CHOLESTEROL, TOTAL: 98 MG/DL (ref 0–199)
CO2: 29 MMOL/L (ref 21–32)
CREAT SERPL-MCNC: 1 MG/DL (ref 0.8–1.3)
EOSINOPHILS ABSOLUTE: 0 K/UL (ref 0–0.6)
EOSINOPHILS RELATIVE PERCENT: 0.8 %
GFR AFRICAN AMERICAN: >60
GFR NON-AFRICAN AMERICAN: >60
GLOBULIN: 2.7 G/DL
GLUCOSE BLD-MCNC: 96 MG/DL (ref 70–99)
HCT VFR BLD CALC: 34.6 % (ref 40.5–52.5)
HDLC SERPL-MCNC: 45 MG/DL (ref 40–60)
HEMOGLOBIN: 11.2 G/DL (ref 13.5–17.5)
LDL CHOLESTEROL CALCULATED: 37 MG/DL
LYMPHOCYTES ABSOLUTE: 1.2 K/UL (ref 1–5.1)
LYMPHOCYTES RELATIVE PERCENT: 23 %
MCH RBC QN AUTO: 31.4 PG (ref 26–34)
MCHC RBC AUTO-ENTMCNC: 32.4 G/DL (ref 31–36)
MCV RBC AUTO: 96.9 FL (ref 80–100)
MONOCYTES ABSOLUTE: 0.4 K/UL (ref 0–1.3)
MONOCYTES RELATIVE PERCENT: 6.8 %
NEUTROPHILS ABSOLUTE: 3.7 K/UL (ref 1.7–7.7)
NEUTROPHILS RELATIVE PERCENT: 68.9 %
PDW BLD-RTO: 19.4 % (ref 12.4–15.4)
PLATELET # BLD: 211 K/UL (ref 135–450)
PMV BLD AUTO: 8.7 FL (ref 5–10.5)
POTASSIUM SERPL-SCNC: 4.1 MMOL/L (ref 3.5–5.1)
RBC # BLD: 3.57 M/UL (ref 4.2–5.9)
SODIUM BLD-SCNC: 140 MMOL/L (ref 136–145)
TOTAL PROTEIN: 6.6 G/DL (ref 6.4–8.2)
TRIGL SERPL-MCNC: 78 MG/DL (ref 0–150)
VLDLC SERPL CALC-MCNC: 16 MG/DL
WBC # BLD: 5.4 K/UL (ref 4–11)

## 2018-07-24 PROCEDURE — G8598 ASA/ANTIPLAT THER USED: HCPCS | Performed by: INTERNAL MEDICINE

## 2018-07-24 PROCEDURE — 99214 OFFICE O/P EST MOD 30 MIN: CPT | Performed by: INTERNAL MEDICINE

## 2018-07-24 PROCEDURE — 1101F PT FALLS ASSESS-DOCD LE1/YR: CPT | Performed by: INTERNAL MEDICINE

## 2018-07-24 PROCEDURE — 4040F PNEUMOC VAC/ADMIN/RCVD: CPT | Performed by: INTERNAL MEDICINE

## 2018-07-24 PROCEDURE — 1123F ACP DISCUSS/DSCN MKR DOCD: CPT | Performed by: INTERNAL MEDICINE

## 2018-07-24 PROCEDURE — G8427 DOCREV CUR MEDS BY ELIG CLIN: HCPCS | Performed by: INTERNAL MEDICINE

## 2018-07-24 PROCEDURE — 1036F TOBACCO NON-USER: CPT | Performed by: INTERNAL MEDICINE

## 2018-07-24 PROCEDURE — G8417 CALC BMI ABV UP PARAM F/U: HCPCS | Performed by: INTERNAL MEDICINE

## 2018-07-24 RX ORDER — TRIAMTERENE AND HYDROCHLOROTHIAZIDE 37.5; 25 MG/1; MG/1
1 TABLET ORAL DAILY
Qty: 90 TABLET | Refills: 3 | Status: SHIPPED | OUTPATIENT
Start: 2018-07-24 | End: 2019-02-26 | Stop reason: SDUPTHER

## 2018-07-24 ASSESSMENT — ENCOUNTER SYMPTOMS
NAUSEA: 0
ABDOMINAL PAIN: 0
SHORTNESS OF BREATH: 0
CHEST TIGHTNESS: 0
BLOOD IN STOOL: 0
VOMITING: 0

## 2018-07-24 NOTE — PROGRESS NOTES
tobacco.    ETOH:   reports that he drinks alcohol. Current Medications:    Prior to Admission medications    Medication Sig Start Date End Date Taking? Authorizing Provider   triamterene-hydrochlorothiazide (MAXZIDE-25) 37.5-25 MG per tablet Take 1 tablet by mouth daily 7/24/18 10/22/18 Yes Daisy Fox MD   ferrous sulfate 325 (65 Fe) MG EC tablet Take 325 mg by mouth every other day 5/23/18 8/23/18 Yes Historical Provider, MD   warfarin (COUMADIN) 5 MG tablet 2.5 mg on Tu/Th/Sa; 5 mg all other days or as directed by clinic   Yes Historical Provider, MD   pantoprazole (PROTONIX) 20 MG tablet Take 2 tablets by mouth 2 times daily 4/25/18  Yes Nadir Bernal MD   losartan (COZAAR) 50 MG tablet Take 1 tablet by mouth daily 3/14/18  Yes Daisy Fox MD   Cyanocobalamin (VITAMIN B-12) 500 MCG LOZG Take 500 mcg by mouth daily   Yes Historical Provider, MD   atorvastatin (LIPITOR) 40 MG tablet Take 1 tablet by mouth nightly 2/28/18  Yes Daisy Fox MD   metoprolol succinate (TOPROL XL) 200 MG extended release tablet Take 1 tablet by mouth daily 12/18/17  Yes Daisy Fox MD   nitroGLYCERIN (NITROSTAT) 0.4 MG SL tablet up to max of 3 total doses. If no relief after 1 dose, call 911. 11/1/17  Yes Shruti Madera MD   finasteride (PROSCAR) 5 MG tablet Take 5 mg by mouth daily   Yes Historical Provider, MD   aspirin 81 MG tablet Take 81 mg by mouth daily   Yes Historical Provider, MD   Cholecalciferol (VITAMIN D) 2000 UNITS CAPS capsule Take 1 capsule by mouth 2 times daily 6/9/17  Yes Treasure Wu MD   tamsulosin (FLOMAX) 0.4 MG capsule Take 0.4 mg by mouth daily   Yes Historical Provider, MD   Multiple Vitamin (MULTIVITAMIN) capsule Take 1 capsule by mouth daily. 2/6/14  Yes Treasure Wu MD       REVIEW OF SYSTEMS:  Review of Systems   Constitutional: Negative for activity change, appetite change, chills, fever and unexpected weight change. HENT: Negative for hearing loss. Eyes: Negative for visual disturbance. Respiratory: Negative for chest tightness and shortness of breath. Cardiovascular: Negative for chest pain. Gastrointestinal: Negative for abdominal pain, blood in stool, nausea and vomiting. Genitourinary: Negative for hematuria. Musculoskeletal: Positive for arthralgias (L knee pain ). Skin: Negative for rash. Allergic/Immunologic: Negative for immunocompromised state. Neurological: Negative for dizziness and headaches. Psychiatric/Behavioral: Negative for dysphoric mood and suicidal ideas. Physical Exam:      Vitals: /82 (Site: Left Arm, Position: Sitting, Cuff Size: Large Adult)   Pulse 74   Wt 224 lb (101.6 kg)   SpO2 94%   BMI 33.08 kg/m²     Body mass index is 33.08 kg/m². Wt Readings from Last 3 Encounters:   07/24/18 224 lb (101.6 kg)   05/15/18 214 lb (97.1 kg)   05/02/18 218 lb (98.9 kg)     Physical Exam   Constitutional: He is oriented to person, place, and time. He appears well-developed and well-nourished. No distress. HENT:   Head: Normocephalic and atraumatic. Mouth/Throat: Oropharynx is clear and moist. No oropharyngeal exudate. Eyes: Conjunctivae and EOM are normal. Right eye exhibits no discharge. Left eye exhibits no discharge. No scleral icterus. Neck: Normal range of motion. Neck supple. No thyromegaly present. Cardiovascular: Normal rate and normal heart sounds. No murmur heard. Pulmonary/Chest: Effort normal and breath sounds normal. No respiratory distress. He has no wheezes. He has no rales. Abdominal: Soft. He exhibits no distension. There is no tenderness. There is no rebound. Musculoskeletal: He exhibits edema (+2 b/l. per pt- much better ). He exhibits no deformity. Lymphadenopathy:        Head (right side): No submental and no submandibular adenopathy present. Head (left side): No submental and no submandibular adenopathy present. He has no cervical adenopathy.         Right automatic or manual pressure monitor. Automatic blood pressure monitors  · Press the on/off button on the automatic monitor and wait until the ready-to-measure \"heart\" symbol appears next to zero in the display window. · Press the start button. The cuff will inflate and deflate by itself. · Your blood pressure numbers will appear on the screen. · Write your numbers in your log book, along with the date and time. Manual blood pressure monitors  · Place the earpieces of a stethoscope in your ears, and place the bell of the stethoscope over the artery, just below the cuff. · Close the valve on the rubber inflating bulb. · Squeeze the bulb rapidly with your opposite hand to inflate the cuff until the dial or column of mercury reads about 30 mm Hg higher than your usual systolic pressure. If you do not know your usual pressure, inflate the cuff to 210 mm Hg or until the pulse at your wrist disappears. · Open the pressure valve just slightly by twisting or pressing the valve on the bulb. · As you watch the pressure slowly fall, note the level on the dial at which you first start to hear a pulsing or tapping sound through the stethoscope. This is your systolic blood pressure. · Continue letting the air out slowly. The sounds will become muffled and will finally disappear. Note the pressure when the sounds completely disappear. This is your diastolic blood pressure. Let out all the remaining air. · Write your numbers in your log book, along with the date and time. What else should you know about the test?  Here are the categories of blood pressure for adults:  Ideal blood pressure. Systolic is less than 690, and diastolic is less than 80. Elevated blood pressure. Systolic is 795 to 636, and diastolic is less than 80. High blood pressure (hypertension). Systolic is 816 or above. Diastolic is 80 or above. One or both numbers may be high.   It is more accurate to take the average of several readings made

## 2018-07-25 ENCOUNTER — TELEPHONE (OUTPATIENT)
Dept: INTERNAL MEDICINE | Age: 78
End: 2018-07-25

## 2018-08-02 ENCOUNTER — ANTI-COAG VISIT (OUTPATIENT)
Dept: PHARMACY | Age: 78
End: 2018-08-02
Payer: MEDICARE

## 2018-08-02 DIAGNOSIS — I48.20 CHRONIC ATRIAL FIBRILLATION (HCC): ICD-10-CM

## 2018-08-02 LAB — INTERNATIONAL NORMALIZATION RATIO, POC: 2.3

## 2018-08-02 PROCEDURE — 85610 PROTHROMBIN TIME: CPT

## 2018-08-02 PROCEDURE — 99211 OFF/OP EST MAY X REQ PHY/QHP: CPT

## 2018-08-02 NOTE — PROGRESS NOTES
drop.  - 4/24 to 4/25[facility] for suspected GIB (drop in Hg to 8.5 from 13.4 in November). EGD 4/25 significant for: 2 small antral erosions, moderate hiatal hernia, slightly irregular Z-line, small distal duodenal polyp. Patient was discharged off of plavix and warfarin in preparation for outpt EGD with biopsies and colonoscopy. EGD/Colonoscopy was done on 5/2; no bleeding identified, per patient. He was instructed to resume plavix and warfarin on 5/3.  -10/29-11/1/17: [facility] for STEMI, s/p C 10/30 with primary stenting to proximal RCA. -8/28: CT chest/abd/pelvis showed prostate enlargement and bladder wall thickening, but no evidence of metastatic disease  -8/16: admit [facility] for post-op gross hematuria requiring CBI  -8/16: cystoscopy and TURBT at [facility]   Recent medication changes -Ferrous sulfate QOD x 3 months  D/c Plavix but continues asa per Dr Sierra Adams   Medications taken regularly that may interact with warfarin or alter INR MVI (may contain vit K)  Plavix, ASA (stent placement 10/30/17)   Warfarin dose taken as prescribed Yes - uses pillbox   Signs/symptoms of bleeding Hematuria resolved in December - patient states he was told by his urologist that as long as he is taking warfarin, hematuria may continue. H/o epistaxis, occasional hemorrhoids, anemia. None reported today- patient denies any s/sxs of anemia or blood loss   Vitamin K intake Normally has broccoli, asparagus, kale/gay salads almost daily. Recent vomiting/diarrhea/fever, changes in weight or activity level Trying to improve diet/exercise and lose weight gradually. Tobacco or alcohol use Patient denies tobacco use  Will have 1 glass of wine per day max   Upcoming surgeries or procedures None reported     Assessment/Plan:  Patient's INR was therapeutic today (2.3). Patient denies any extra warfarin doses or medication/diet changes. Patient denies any s/sxs of bleeding or anemia.   As INR has been historically quite stable on

## 2018-08-07 ENCOUNTER — TELEPHONE (OUTPATIENT)
Dept: INTERNAL MEDICINE | Age: 78
End: 2018-08-07

## 2018-08-22 ENCOUNTER — ANTI-COAG VISIT (OUTPATIENT)
Dept: PHARMACY | Age: 78
End: 2018-08-22
Payer: MEDICARE

## 2018-08-22 ENCOUNTER — OFFICE VISIT (OUTPATIENT)
Dept: CARDIOLOGY CLINIC | Age: 78
End: 2018-08-22

## 2018-08-22 VITALS
SYSTOLIC BLOOD PRESSURE: 132 MMHG | WEIGHT: 223 LBS | HEART RATE: 76 BPM | BODY MASS INDEX: 32.93 KG/M2 | DIASTOLIC BLOOD PRESSURE: 78 MMHG

## 2018-08-22 DIAGNOSIS — I48.20 CHRONIC ATRIAL FIBRILLATION (HCC): Primary | ICD-10-CM

## 2018-08-22 DIAGNOSIS — I25.10 CAD IN NATIVE ARTERY: ICD-10-CM

## 2018-08-22 DIAGNOSIS — Z98.61 HISTORY OF PTCA: ICD-10-CM

## 2018-08-22 DIAGNOSIS — I10 ESSENTIAL HYPERTENSION: ICD-10-CM

## 2018-08-22 DIAGNOSIS — I25.2 MI, OLD: ICD-10-CM

## 2018-08-22 DIAGNOSIS — I48.20 CHRONIC ATRIAL FIBRILLATION (HCC): ICD-10-CM

## 2018-08-22 LAB — INTERNATIONAL NORMALIZATION RATIO, POC: 2.2

## 2018-08-22 PROCEDURE — 4040F PNEUMOC VAC/ADMIN/RCVD: CPT | Performed by: INTERNAL MEDICINE

## 2018-08-22 PROCEDURE — 99214 OFFICE O/P EST MOD 30 MIN: CPT | Performed by: INTERNAL MEDICINE

## 2018-08-22 PROCEDURE — G8598 ASA/ANTIPLAT THER USED: HCPCS | Performed by: INTERNAL MEDICINE

## 2018-08-22 PROCEDURE — 1123F ACP DISCUSS/DSCN MKR DOCD: CPT | Performed by: INTERNAL MEDICINE

## 2018-08-22 PROCEDURE — 1101F PT FALLS ASSESS-DOCD LE1/YR: CPT | Performed by: INTERNAL MEDICINE

## 2018-08-22 PROCEDURE — 99211 OFF/OP EST MAY X REQ PHY/QHP: CPT

## 2018-08-22 PROCEDURE — 85610 PROTHROMBIN TIME: CPT

## 2018-08-22 PROCEDURE — G8417 CALC BMI ABV UP PARAM F/U: HCPCS | Performed by: INTERNAL MEDICINE

## 2018-08-22 PROCEDURE — 1036F TOBACCO NON-USER: CPT | Performed by: INTERNAL MEDICINE

## 2018-08-22 PROCEDURE — G8428 CUR MEDS NOT DOCUMENT: HCPCS | Performed by: INTERNAL MEDICINE

## 2018-08-22 NOTE — PROGRESS NOTES
Subjective:      Patient ID: Kian Zaragoza is a 77 y.o. male. HPI:  Here for follow up afib/HTN/CAD/PTCA/Non st.   Exercising. Edema but ran out of diuretic. No complaints. No palp/tachycardia. No syncope. No exertional chest pain/sob. BP good at home.      Past Medical History:   Diagnosis Date    Allergic rhinitis     Anemia     Atrial fibrillation ([facility]     Atrial fibrillation (HCC)     Benign non-nodular prostatic hyperplasia with lower urinary tract symptoms 2/9/2017    Benign paroxysmal positional vertigo     BPH (benign prostatic hyperplasia)     BPH (benign prostatic hypertrophy)     Cataract 10/3/2014    Cholelithiasis     Colon cancer ([facility] 4/9/2012    Diverticulosis     Dyslipidemia 7/21/2010    Elevated PSA     Erectile dysfunction     Essential hypertension 11/24/2015    Gastroesophageal reflux disease without esophagitis 11/9/2016    GERD (gastroesophageal reflux disease)     Glaucoma 4/5/2013    Hiatal hernia     Hypertension     Lumbar radiculopathy     Osteoarthritis     Peripheral neuropathy 12/7/2012    Proteinuria 7/22/2010    S/P laparoscopic-assisted sigmoidectomy 4/17/2012    Urinary frequency      Past Surgical History:   Procedure Laterality Date    CATARACT REMOVAL WITH IMPLANT Right October 7, 2014    Dr. Zulema Cody Left October 24, 2014    Dr. Megan Rodriges  April 4, 2012    Dr. Billy Martinez  May 15, 2013    Dr. Billy Martinez     COLONOSCOPY  08/09/2016    Dr. Billy Martinez    COLONOSCOPY N/A 02/17/2017    DILATATION, ESOPHAGUS      GLAUCOMA SURGERY Right October 7, 2014    Dr. Marty Sarkar Left October 24, 2014    Dr. Jeremiah Jackson HISTORY  June 28, 2013    port-a-cath removal     PROSTATE BIOPSY  April 1997    PROSTATE BIOPSY  May 10, tablet 5    Cyanocobalamin (VITAMIN B-12) 500 MCG LOZG Take 500 mcg by mouth daily      atorvastatin (LIPITOR) 40 MG tablet Take 1 tablet by mouth nightly 90 tablet 3    metoprolol succinate (TOPROL XL) 200 MG extended release tablet Take 1 tablet by mouth daily 90 tablet 3    nitroGLYCERIN (NITROSTAT) 0.4 MG SL tablet up to max of 3 total doses. If no relief after 1 dose, call 911. 25 tablet 3    finasteride (PROSCAR) 5 MG tablet Take 5 mg by mouth daily      aspirin 81 MG tablet Take 81 mg by mouth daily      Cholecalciferol (VITAMIN D) 2000 UNITS CAPS capsule Take 1 capsule by mouth 2 times daily      tamsulosin (FLOMAX) 0.4 MG capsule Take 0.4 mg by mouth daily      Multiple Vitamin (MULTIVITAMIN) capsule Take 1 capsule by mouth daily. No current facility-administered medications for this visit. Vitals  Weight: 223 lb (101.2 kg)  Vitals:    08/22/18 1443   BP: 132/78   Pulse: 76         Review of Systems   Constitutional: Negative for activity change and fatigue. Respiratory: Negative for apnea, cough, choking, chest tightness and shortness of breath. Cardiovascular: Negative for chest pain, palpitations and leg swelling. No PND or orthopnea. No tachycardia. Gastrointestinal: Negative for abdominal distention. Musculoskeletal: Negative for myalgias. Neurological: Negative for dizziness, syncope and light-headedness. Psychiatric/Behavioral: Negative for behavioral problems, confusion and agitation. Other systems reviewed negative as done    Objective:   Physical Exam   Constitutional: He is oriented to person, place, and time. He appears well-developed and well-nourished. No distress. HENT:   Head: Normocephalic and atraumatic. Eyes: Conjunctivae and EOM are normal. Right eye exhibits no discharge. Left eye exhibits no discharge. Neck: Normal range of motion. Neck supple. No JVD present.    Cardiovascular: Normal rate, S1 normal, S2 normal and normal heart

## 2018-08-22 NOTE — PROGRESS NOTES
ANTICOAGULATION SERVICE    Kathryn Eid" is a 77 y.o. male with PMHx significant for chronic AF ([id number] of 3), HTN who presents to clinic 8/22/2018 for anticoagulation monitoring and adjustment. Anticoagulation Indication(s):  Afib    Referring Physician:  Dr. Jane Monreal    Goal INR Range:  2-3  Duration of Anticoagulation Therapy:  Indefinite  Time of day dose taken:  PM  Product patient has at home:  warfarin 5 mg (PEACH color)    INR Summary                           Warfarin regimen (mg)  Date INR   A/P   Sun Mon Tue Wed Thu Fri Sat Mg/wk  8/22 2.2 At goal, no change 5 5 2.5 5 2.5 5 2.5 27.5   8/2 2.3 At goal, no change 5 5 2.5 5 2.5 5 2.5 27.5  7/19 2.5 At goal, no change 5 5 2.5 5 2.5 5 2.5 27.5  7/12 4.4 Above goal, hold x 1 5 5 2.5 5 0/2.5 5 2.5 27.5  6/12 2.1 At goal, no change 5 5 2.5 5 2.5 5 2.5 27.5  5/22 2.4 At goal, no change 5 5 2.5 5 2.5 5 2.5 27.5  5/8 1.5 Below goal, 5mg x 1 5 5 5/2.5 5 2.5 5 2.5 27.5  4/5 3.0 At goal, no change 5 5 2.5 5 2.5 5 2.5 27.5  3/8 2.9 At goal, no change 5 5 2.5 5 2.5 5 2.5 27.5  2/22 3.6 Above goal, hold x 1 5 5 2.5 5 2.5 5 2.5 27.5  1/25 2.4 At goal, no change 5 5 2.5 5 2.5 5 2.5 27.5  12/28 2.3 At goal, no change 5 5 2.5 5 2.5 5 2.5 27.5  11/30 2.8 At goal, no change 5 5 2.5 5 2.5 5 2.5 27.5  11/14 2.3 At goal, no change 5 5 2.5 5 2.5 5 2.5 27.5  11/7 2.1 At goal, resume 5 5 2.5 5 2.5 5 2.5 27.5  10/17 2.3 At goal, no change 5 5 2.5 5 2.5 5 2.5 27.5  9/28 2.2 At goal, no change 5 5 2.5 5 2.5 5 2.5 27.5    Lab Results   Component Value Date    RBC 3.57 (L) 07/24/2018    HGB 11.2 (L) 07/24/2018    HCT 34.6 (L) 07/24/2018    MCV 96.9 07/24/2018    MCH 31.4 07/24/2018    MPV 8.7 07/24/2018    RDW 19.4 (H) 07/24/2018     07/24/2018     Patient History:  Recent hospitalizations/HC visits - 7/24: [address] Cesilia Aragon - pt instructed to monitor BP daily and fax rdgs to Cesilia Aragon. All BPs <140/90 since then.    - 5/15: OV Dr Jane Monreal and also had f/u Dr Jorge Erwin last week;

## 2018-09-05 DIAGNOSIS — K21.9 GASTROESOPHAGEAL REFLUX DISEASE WITHOUT ESOPHAGITIS: Primary | Chronic | ICD-10-CM

## 2018-09-05 RX ORDER — PANTOPRAZOLE SODIUM 20 MG/1
20 TABLET, DELAYED RELEASE ORAL 2 TIMES DAILY
Qty: 60 TABLET | Refills: 3 | Status: SHIPPED | OUTPATIENT
Start: 2018-09-05 | End: 2019-02-26 | Stop reason: SDUPTHER

## 2018-09-18 ENCOUNTER — ANTI-COAG VISIT (OUTPATIENT)
Dept: PHARMACY | Age: 78
End: 2018-09-18
Payer: MEDICARE

## 2018-09-18 DIAGNOSIS — I48.20 CHRONIC ATRIAL FIBRILLATION (HCC): ICD-10-CM

## 2018-09-18 LAB — INTERNATIONAL NORMALIZATION RATIO, POC: 2.2

## 2018-09-18 PROCEDURE — 99211 OFF/OP EST MAY X REQ PHY/QHP: CPT

## 2018-09-18 PROCEDURE — 85610 PROTHROMBIN TIME: CPT

## 2018-09-18 RX ORDER — FERROUS SULFATE 325(65) MG
325 TABLET ORAL EVERY OTHER DAY
COMMUNITY
End: 2018-11-27

## 2018-09-18 NOTE — PROGRESS NOTES
BPs <140/90 since then. - 5/15: [facility], Dr Kayode Raphael last week; patient states they are still unsure of reason for Hgb drop. - 4/24 to 4/25[facility] for suspected GIB (drop in Hg to 8.5 from 13.4 in November). EGD 4/25 significant for: 2 small antral erosions, moderate hiatal hernia, slightly irregular Z-line, small distal duodenal polyp. Patient was discharged off of plavix and warfarin in preparation for outpt EGD with biopsies and colonoscopy. EGD/Colonoscopy was done on 5/2; no bleeding identified, per patient. He was instructed to resume plavix and warfarin on 5/3.  -10/29-11/1/17: [facility] for STEMI, s/p C 10/30 with primary stenting to proximal RCA. -8/28: CT chest/abd/pelvis showed prostate enlargement and bladder wall thickening, but no evidence of metastatic disease  -8/16: admit [facility] for post-op gross hematuria requiring CBI  -8/16: cystoscopy and TURBT at [facility]   Recent medication changes None reported   Medications taken regularly that may interact with warfarin or alter INR MVI (may contain vit K)  ASA (stent placement 10/30/17)   Warfarin dose taken as prescribed Yes - uses pillbox   Signs/symptoms of bleeding Hematuria resolved in December - patient states he was told by his urologist that as long as he is taking warfarin, hematuria may continue. H/o epistaxis, occasional hemorrhoids, anemia. None reported today   Vitamin K intake Normally has broccoli, asparagus, kale/gay salads almost daily. No recent changes   Recent vomiting/diarrhea/fever, changes in weight or activity level Trying to improve diet/exercise and lose weight gradually. Tobacco or alcohol use Patient denies tobacco use  Will have 1 glass of wine per day max   Upcoming surgeries or procedures None reported     Assessment/Plan:  Patient's INR was therapeutic today (2.2). Patient denies any extra warfarin doses or medication/diet changes. Patient denies any s/sxs of bleeding or anemia.   Patient was instructed to continue taking 2.5 mg on Tu/Th/Sa and 5 mg all other days. Patient will continue usual vitamin K intake also. Repeat INR in 4 weeks. Patient was reminded to maintain consistent vitamin K intake and call with any bleeding, medication changes, or fever/vomiting/diarrhea. Patient understands dosing directions and information discussed. Dosing schedule and follow up appointment given to patient. Progress note routed to referring physician's office. Patient acknowledges working in consult agreement with pharmacist as referred by his/her physician. Next Clinic Appointment:  10/16     Please call [facility] [address] at [phone] with any questions. Thanks!   Samantha Ribera, PharmD, [facility]  [facility]   [facility] Ph: [phone]  [facility] Ph: [phone]  9/18/2018 9:44 AM

## 2018-10-16 ENCOUNTER — ANTI-COAG VISIT (OUTPATIENT)
Dept: PHARMACY | Age: 78
End: 2018-10-16
Payer: MEDICARE

## 2018-10-16 DIAGNOSIS — I48.20 CHRONIC ATRIAL FIBRILLATION (HCC): ICD-10-CM

## 2018-10-16 LAB — INR BLD: 2.9

## 2018-10-16 PROCEDURE — 85610 PROTHROMBIN TIME: CPT

## 2018-10-16 PROCEDURE — 99211 OFF/OP EST MAY X REQ PHY/QHP: CPT

## 2018-11-13 ENCOUNTER — ANTI-COAG VISIT (OUTPATIENT)
Dept: PHARMACY | Age: 78
End: 2018-11-13
Payer: MEDICARE

## 2018-11-13 DIAGNOSIS — I48.20 CHRONIC ATRIAL FIBRILLATION (HCC): ICD-10-CM

## 2018-11-13 LAB — INTERNATIONAL NORMALIZATION RATIO, POC: 2.3

## 2018-11-13 PROCEDURE — 99211 OFF/OP EST MAY X REQ PHY/QHP: CPT

## 2018-11-13 PROCEDURE — 85610 PROTHROMBIN TIME: CPT

## 2018-11-13 NOTE — PROGRESS NOTES
patient may have echo in the future will discuss with cardiologist   - 8/22: Dr Simin Bautista- no changes  - 7/24: [address] - pt instructed to monitor BP daily and fax rdgs to Samantha Drake. All BPs <140/90 since then. - 5/15: [address], Dr Wilder Regalado last week; patient states they are still unsure of reason for Hgb drop. - 4/24 to 4/25[facility] for suspected GIB (drop in Hg to 8.5 from 13.4 in November). EGD 4/25 significant for: 2 small antral erosions, moderate hiatal hernia, slightly irregular Z-line, small distal duodenal polyp. Patient was discharged off of plavix and warfarin in preparation for outpt EGD with biopsies and colonoscopy. EGD/Colonoscopy was done on 5/2; no bleeding identified, per patient. He was instructed to resume plavix and warfarin on 5/3.  -10/29-11/1/17: [facility] for STEMI, s/p [facility] 10/30 with primary stenting to proximal RCA. Recent medication changes None reported  Per patient request, all medications reviewed for dosing, indications, possible side effects. Medications taken regularly that may interact with warfarin or alter INR MVI (may contain vit K)  ASA (stent placement 10/30/17)   Warfarin dose taken as prescribed Yes - uses pillbox   Signs/symptoms of bleeding Hematuria resolved in December - patient states he was told by his urologist that as long as he is taking warfarin, hematuria may continue. H/o epistaxis, occasional hemorrhoids, anemia. None reported today   Vitamin K intake Normally has broccoli, asparagus, kale/gay salads almost daily. No recent changes   Recent vomiting/diarrhea/fever, changes in weight or activity level Trying to improve diet/exercise and lose weight gradually. Tobacco or alcohol use Patient denies tobacco use  Will have 1 glass of wine per day max   Upcoming surgeries or procedures None reported     Assessment/Plan:  Patient's INR was therapeutic today (2.3). Patient denies any missed/extra warfarin doses, medication or diet changes.  Patient denies any s/sxs of

## 2018-11-26 ENCOUNTER — OFFICE VISIT (OUTPATIENT)
Dept: INTERNAL MEDICINE CLINIC | Age: 78
End: 2018-11-26
Payer: MEDICARE

## 2018-11-26 VITALS
DIASTOLIC BLOOD PRESSURE: 72 MMHG | SYSTOLIC BLOOD PRESSURE: 138 MMHG | OXYGEN SATURATION: 98 % | WEIGHT: 267 LBS | BODY MASS INDEX: 39.43 KG/M2 | HEART RATE: 74 BPM

## 2018-11-26 DIAGNOSIS — E78.5 DYSLIPIDEMIA: Chronic | ICD-10-CM

## 2018-11-26 DIAGNOSIS — I21.4 NON-ST ELEVATED MYOCARDIAL INFARCTION (HCC): Primary | ICD-10-CM

## 2018-11-26 DIAGNOSIS — I21.4 NON-ST ELEVATED MYOCARDIAL INFARCTION (HCC): ICD-10-CM

## 2018-11-26 DIAGNOSIS — I48.20 CHRONIC ATRIAL FIBRILLATION (HCC): Chronic | ICD-10-CM

## 2018-11-26 DIAGNOSIS — I50.9 HEART FAILURE, ACC/AHA STAGE B (HCC): ICD-10-CM

## 2018-11-26 DIAGNOSIS — D64.9 ANEMIA, UNSPECIFIED TYPE: Chronic | ICD-10-CM

## 2018-11-26 DIAGNOSIS — I10 ESSENTIAL HYPERTENSION: Chronic | ICD-10-CM

## 2018-11-26 PROBLEM — K92.2 GIB (GASTROINTESTINAL BLEEDING): Status: RESOLVED | Noted: 2018-04-24 | Resolved: 2018-11-26

## 2018-11-26 LAB
A/G RATIO: 1.6 (ref 1.1–2.2)
ALBUMIN SERPL-MCNC: 4.2 G/DL (ref 3.4–5)
ALP BLD-CCNC: 76 U/L (ref 40–129)
ALT SERPL-CCNC: 15 U/L (ref 10–40)
ANION GAP SERPL CALCULATED.3IONS-SCNC: 13 MMOL/L (ref 3–16)
AST SERPL-CCNC: 17 U/L (ref 15–37)
BASOPHILS ABSOLUTE: 0.3 K/UL (ref 0–0.2)
BASOPHILS RELATIVE PERCENT: 4.5 %
BILIRUB SERPL-MCNC: 0.7 MG/DL (ref 0–1)
BUN BLDV-MCNC: 16 MG/DL (ref 7–20)
CALCIUM SERPL-MCNC: 9.3 MG/DL (ref 8.3–10.6)
CHLORIDE BLD-SCNC: 104 MMOL/L (ref 99–110)
CHOLESTEROL, TOTAL: 102 MG/DL (ref 0–199)
CO2: 25 MMOL/L (ref 21–32)
CREAT SERPL-MCNC: 1 MG/DL (ref 0.8–1.3)
EOSINOPHILS ABSOLUTE: 0 K/UL (ref 0–0.6)
EOSINOPHILS RELATIVE PERCENT: 0.7 %
GFR AFRICAN AMERICAN: >60
GFR NON-AFRICAN AMERICAN: >60
GLOBULIN: 2.7 G/DL
GLUCOSE BLD-MCNC: 104 MG/DL (ref 70–99)
HCT VFR BLD CALC: 41 % (ref 40.5–52.5)
HDLC SERPL-MCNC: 41 MG/DL (ref 40–60)
HEMOGLOBIN: 14 G/DL (ref 13.5–17.5)
LDL CHOLESTEROL CALCULATED: 49 MG/DL
LYMPHOCYTES ABSOLUTE: 1.2 K/UL (ref 1–5.1)
LYMPHOCYTES RELATIVE PERCENT: 17.8 %
MCH RBC QN AUTO: 35.6 PG (ref 26–34)
MCHC RBC AUTO-ENTMCNC: 34 G/DL (ref 31–36)
MCV RBC AUTO: 104.6 FL (ref 80–100)
MONOCYTES ABSOLUTE: 0.3 K/UL (ref 0–1.3)
MONOCYTES RELATIVE PERCENT: 4.5 %
NEUTROPHILS ABSOLUTE: 4.8 K/UL (ref 1.7–7.7)
NEUTROPHILS RELATIVE PERCENT: 72.5 %
PDW BLD-RTO: 12.8 % (ref 12.4–15.4)
PLATELET # BLD: 217 K/UL (ref 135–450)
PMV BLD AUTO: 8.9 FL (ref 5–10.5)
POTASSIUM SERPL-SCNC: 4.3 MMOL/L (ref 3.5–5.1)
RBC # BLD: 3.92 M/UL (ref 4.2–5.9)
SODIUM BLD-SCNC: 142 MMOL/L (ref 136–145)
TOTAL PROTEIN: 6.9 G/DL (ref 6.4–8.2)
TRIGL SERPL-MCNC: 62 MG/DL (ref 0–150)
VLDLC SERPL CALC-MCNC: 12 MG/DL
WBC # BLD: 6.6 K/UL (ref 4–11)

## 2018-11-26 PROCEDURE — G8427 DOCREV CUR MEDS BY ELIG CLIN: HCPCS | Performed by: INTERNAL MEDICINE

## 2018-11-26 PROCEDURE — 1123F ACP DISCUSS/DSCN MKR DOCD: CPT | Performed by: INTERNAL MEDICINE

## 2018-11-26 PROCEDURE — G8598 ASA/ANTIPLAT THER USED: HCPCS | Performed by: INTERNAL MEDICINE

## 2018-11-26 PROCEDURE — G8417 CALC BMI ABV UP PARAM F/U: HCPCS | Performed by: INTERNAL MEDICINE

## 2018-11-26 PROCEDURE — G8482 FLU IMMUNIZE ORDER/ADMIN: HCPCS | Performed by: INTERNAL MEDICINE

## 2018-11-26 PROCEDURE — 1036F TOBACCO NON-USER: CPT | Performed by: INTERNAL MEDICINE

## 2018-11-26 PROCEDURE — 4040F PNEUMOC VAC/ADMIN/RCVD: CPT | Performed by: INTERNAL MEDICINE

## 2018-11-26 PROCEDURE — 99214 OFFICE O/P EST MOD 30 MIN: CPT | Performed by: INTERNAL MEDICINE

## 2018-11-26 PROCEDURE — 1101F PT FALLS ASSESS-DOCD LE1/YR: CPT | Performed by: INTERNAL MEDICINE

## 2018-11-26 ASSESSMENT — ENCOUNTER SYMPTOMS
BLOOD IN STOOL: 0
NAUSEA: 0
CHEST TIGHTNESS: 0
ABDOMINAL PAIN: 0
VOMITING: 0
SHORTNESS OF BREATH: 0

## 2018-11-28 ENCOUNTER — OFFICE VISIT (OUTPATIENT)
Dept: CARDIOLOGY CLINIC | Age: 78
End: 2018-11-28
Payer: MEDICARE

## 2018-11-28 VITALS
HEIGHT: 69 IN | RESPIRATION RATE: 16 BRPM | HEART RATE: 81 BPM | WEIGHT: 264 LBS | SYSTOLIC BLOOD PRESSURE: 120 MMHG | DIASTOLIC BLOOD PRESSURE: 78 MMHG | OXYGEN SATURATION: 97 % | BODY MASS INDEX: 39.1 KG/M2

## 2018-11-28 DIAGNOSIS — I25.2 MI, OLD: ICD-10-CM

## 2018-11-28 DIAGNOSIS — I48.20 CHRONIC ATRIAL FIBRILLATION (HCC): ICD-10-CM

## 2018-11-28 DIAGNOSIS — I25.10 CAD IN NATIVE ARTERY: Primary | ICD-10-CM

## 2018-11-28 DIAGNOSIS — I25.5 ISCHEMIC CARDIOMYOPATHY: ICD-10-CM

## 2018-11-28 DIAGNOSIS — Z98.61 HISTORY OF PTCA: ICD-10-CM

## 2018-11-28 DIAGNOSIS — I10 ESSENTIAL HYPERTENSION: ICD-10-CM

## 2018-11-28 PROCEDURE — 1036F TOBACCO NON-USER: CPT | Performed by: INTERNAL MEDICINE

## 2018-11-28 PROCEDURE — G8482 FLU IMMUNIZE ORDER/ADMIN: HCPCS | Performed by: INTERNAL MEDICINE

## 2018-11-28 PROCEDURE — G8417 CALC BMI ABV UP PARAM F/U: HCPCS | Performed by: INTERNAL MEDICINE

## 2018-11-28 PROCEDURE — 4040F PNEUMOC VAC/ADMIN/RCVD: CPT | Performed by: INTERNAL MEDICINE

## 2018-11-28 PROCEDURE — G8598 ASA/ANTIPLAT THER USED: HCPCS | Performed by: INTERNAL MEDICINE

## 2018-11-28 PROCEDURE — 99214 OFFICE O/P EST MOD 30 MIN: CPT | Performed by: INTERNAL MEDICINE

## 2018-11-28 PROCEDURE — G8427 DOCREV CUR MEDS BY ELIG CLIN: HCPCS | Performed by: INTERNAL MEDICINE

## 2018-11-28 PROCEDURE — 1123F ACP DISCUSS/DSCN MKR DOCD: CPT | Performed by: INTERNAL MEDICINE

## 2018-11-28 PROCEDURE — 1101F PT FALLS ASSESS-DOCD LE1/YR: CPT | Performed by: INTERNAL MEDICINE

## 2018-11-28 NOTE — PROGRESS NOTES
heard.  Pulses:       Radial pulses are 2+ on the right side, and 2+ on the left side. Pulmonary/Chest: Effort normal and breath sounds normal. No respiratory distress. He has no wheezes. He has no rales. Abdominal: Soft. Bowel sounds are normal. No tenderness. Musculoskeletal: Normal range of motion. He exhibits no  tenderness. Tr edema  Neurological: He is alert and oriented to person, place, and time. Skin: Skin is warm and dry. Psychiatric: He has a normal mood and affect. His behavior is normal. Thought content normal.       Assessment:      Vitals:    11/28/18 1406   BP: 120/78   Pulse: 81   Resp: 16   SpO2: 97%           Plan:      CV stable. HR controlled. No angina  Wt stable. BP good. On AC/ASA. No bleeding issues. Echo ordered per PCP. Encouraged diet, and wt loss. No changes. Reviewed previous records and testing including cath 10/17 Follow up 3 months.

## 2018-12-06 ENCOUNTER — HOSPITAL ENCOUNTER (OUTPATIENT)
Dept: NON INVASIVE DIAGNOSTICS | Age: 78
Discharge: HOME | End: 2018-12-06
Payer: MEDICARE

## 2018-12-06 DIAGNOSIS — I21.4 NON-ST ELEVATED MYOCARDIAL INFARCTION (HCC): ICD-10-CM

## 2018-12-06 DIAGNOSIS — I50.9 HEART FAILURE, ACC/AHA STAGE B (HCC): ICD-10-CM

## 2018-12-06 LAB
LV EF: 53 %
LVEF MODALITY: NORMAL

## 2018-12-06 PROCEDURE — 93306 TTE W/DOPPLER COMPLETE: CPT

## 2018-12-11 ENCOUNTER — ANTI-COAG VISIT (OUTPATIENT)
Dept: PHARMACY | Age: 78
End: 2018-12-11
Payer: MEDICARE

## 2018-12-11 DIAGNOSIS — I48.20 CHRONIC ATRIAL FIBRILLATION (HCC): ICD-10-CM

## 2018-12-11 LAB — INTERNATIONAL NORMALIZATION RATIO, POC: 2.5

## 2018-12-11 PROCEDURE — 99211 OFF/OP EST MAY X REQ PHY/QHP: CPT

## 2018-12-11 PROCEDURE — 85610 PROTHROMBIN TIME: CPT

## 2018-12-27 ENCOUNTER — TELEPHONE (OUTPATIENT)
Dept: INTERNAL MEDICINE CLINIC | Age: 78
End: 2018-12-27

## 2018-12-27 DIAGNOSIS — I10 ESSENTIAL HYPERTENSION: Chronic | ICD-10-CM

## 2018-12-27 RX ORDER — METOPROLOL SUCCINATE 200 MG/1
200 TABLET, EXTENDED RELEASE ORAL DAILY
Qty: 90 TABLET | Refills: 3 | Status: SHIPPED | OUTPATIENT
Start: 2018-12-27 | End: 2019-12-23

## 2018-12-27 NOTE — TELEPHONE ENCOUNTER
Patient needs refill of Toprol  mg ER tab 1 daily #90.     [address] [facility] -  [phone] [Patient Preferred]   [phone]  [facility]ate[facility]idersCurrent Interactions

## 2019-01-08 ENCOUNTER — ANTI-COAG VISIT (OUTPATIENT)
Dept: PHARMACY | Age: 79
End: 2019-01-08
Payer: MEDICARE

## 2019-01-08 DIAGNOSIS — I48.20 CHRONIC ATRIAL FIBRILLATION (HCC): ICD-10-CM

## 2019-01-08 LAB — INTERNATIONAL NORMALIZATION RATIO, POC: 2.2

## 2019-01-08 PROCEDURE — 85610 PROTHROMBIN TIME: CPT

## 2019-01-08 PROCEDURE — 99211 OFF/OP EST MAY X REQ PHY/QHP: CPT

## 2019-01-28 RX ORDER — WARFARIN SODIUM 5 MG/1
TABLET ORAL
Qty: 90 TABLET | Refills: 3 | Status: SHIPPED | OUTPATIENT
Start: 2019-01-28 | End: 2019-06-10 | Stop reason: SDUPTHER

## 2019-01-29 DIAGNOSIS — I10 ESSENTIAL HYPERTENSION: Primary | Chronic | ICD-10-CM

## 2019-01-29 RX ORDER — IRBESARTAN 75 MG/1
75 TABLET ORAL DAILY
Qty: 90 TABLET | Refills: 1 | Status: SHIPPED | OUTPATIENT
Start: 2019-01-29 | End: 2019-07-25 | Stop reason: SDUPTHER

## 2019-02-05 ENCOUNTER — ANTI-COAG VISIT (OUTPATIENT)
Dept: PHARMACY | Age: 79
End: 2019-02-05
Payer: MEDICARE

## 2019-02-05 DIAGNOSIS — I48.20 CHRONIC ATRIAL FIBRILLATION (HCC): ICD-10-CM

## 2019-02-05 LAB — INTERNATIONAL NORMALIZATION RATIO, POC: 3

## 2019-02-05 PROCEDURE — 85610 PROTHROMBIN TIME: CPT | Performed by: PHARMACIST

## 2019-02-05 PROCEDURE — 99211 OFF/OP EST MAY X REQ PHY/QHP: CPT | Performed by: PHARMACIST

## 2019-02-26 ENCOUNTER — OFFICE VISIT (OUTPATIENT)
Dept: INTERNAL MEDICINE CLINIC | Age: 79
End: 2019-02-26
Payer: MEDICARE

## 2019-02-26 VITALS
OXYGEN SATURATION: 95 % | DIASTOLIC BLOOD PRESSURE: 82 MMHG | WEIGHT: 273 LBS | SYSTOLIC BLOOD PRESSURE: 142 MMHG | HEART RATE: 79 BPM | BODY MASS INDEX: 40.32 KG/M2

## 2019-02-26 DIAGNOSIS — E66.01 MORBID OBESITY WITH BMI OF 40.0-44.9, ADULT (HCC): ICD-10-CM

## 2019-02-26 DIAGNOSIS — I50.9 HEART FAILURE, ACC/AHA STAGE B (HCC): ICD-10-CM

## 2019-02-26 DIAGNOSIS — J06.9 UPPER RESPIRATORY TRACT INFECTION, UNSPECIFIED TYPE: Primary | ICD-10-CM

## 2019-02-26 DIAGNOSIS — I10 ESSENTIAL HYPERTENSION: Chronic | ICD-10-CM

## 2019-02-26 DIAGNOSIS — K21.9 GASTROESOPHAGEAL REFLUX DISEASE WITHOUT ESOPHAGITIS: Chronic | ICD-10-CM

## 2019-02-26 DIAGNOSIS — D64.9 NORMOCYTIC ANEMIA: Primary | ICD-10-CM

## 2019-02-26 DIAGNOSIS — Z98.61 HISTORY OF PTCA: ICD-10-CM

## 2019-02-26 DIAGNOSIS — I48.20 CHRONIC ATRIAL FIBRILLATION (HCC): Chronic | ICD-10-CM

## 2019-02-26 DIAGNOSIS — I25.119 CORONARY ARTERY DISEASE INVOLVING NATIVE CORONARY ARTERY OF NATIVE HEART WITH ANGINA PECTORIS (HCC): ICD-10-CM

## 2019-02-26 LAB
A/G RATIO: 1.3 (ref 1.1–2.2)
ALBUMIN SERPL-MCNC: 3.9 G/DL (ref 3.4–5)
ALP BLD-CCNC: 80 U/L (ref 40–129)
ALT SERPL-CCNC: 20 U/L (ref 10–40)
ANION GAP SERPL CALCULATED.3IONS-SCNC: 13 MMOL/L (ref 3–16)
AST SERPL-CCNC: 16 U/L (ref 15–37)
BASOPHILS ABSOLUTE: 0 K/UL (ref 0–0.2)
BASOPHILS RELATIVE PERCENT: 0.3 %
BILIRUB SERPL-MCNC: 0.8 MG/DL (ref 0–1)
BUN BLDV-MCNC: 15 MG/DL (ref 7–20)
CALCIUM SERPL-MCNC: 9.7 MG/DL (ref 8.3–10.6)
CHLORIDE BLD-SCNC: 104 MMOL/L (ref 99–110)
CO2: 27 MMOL/L (ref 21–32)
CREAT SERPL-MCNC: 0.9 MG/DL (ref 0.8–1.3)
EOSINOPHILS ABSOLUTE: 0.1 K/UL (ref 0–0.6)
EOSINOPHILS RELATIVE PERCENT: 1 %
GFR AFRICAN AMERICAN: >60
GFR NON-AFRICAN AMERICAN: >60
GLOBULIN: 2.9 G/DL
GLUCOSE BLD-MCNC: 101 MG/DL (ref 70–99)
HCT VFR BLD CALC: 38.7 % (ref 40.5–52.5)
HEMOGLOBIN: 13.2 G/DL (ref 13.5–17.5)
LYMPHOCYTES ABSOLUTE: 1.1 K/UL (ref 1–5.1)
LYMPHOCYTES RELATIVE PERCENT: 19.9 %
MCH RBC QN AUTO: 36.1 PG (ref 26–34)
MCHC RBC AUTO-ENTMCNC: 34.2 G/DL (ref 31–36)
MCV RBC AUTO: 105.6 FL (ref 80–100)
MONOCYTES ABSOLUTE: 0.4 K/UL (ref 0–1.3)
MONOCYTES RELATIVE PERCENT: 7 %
NEUTROPHILS ABSOLUTE: 3.8 K/UL (ref 1.7–7.7)
NEUTROPHILS RELATIVE PERCENT: 71.8 %
PDW BLD-RTO: 12.8 % (ref 12.4–15.4)
PLATELET # BLD: 191 K/UL (ref 135–450)
PMV BLD AUTO: 8.8 FL (ref 5–10.5)
POTASSIUM SERPL-SCNC: 4.5 MMOL/L (ref 3.5–5.1)
RBC # BLD: 3.66 M/UL (ref 4.2–5.9)
SODIUM BLD-SCNC: 144 MMOL/L (ref 136–145)
TOTAL PROTEIN: 6.8 G/DL (ref 6.4–8.2)
WBC # BLD: 5.3 K/UL (ref 4–11)

## 2019-02-26 PROCEDURE — 99214 OFFICE O/P EST MOD 30 MIN: CPT | Performed by: INTERNAL MEDICINE

## 2019-02-26 PROCEDURE — 1101F PT FALLS ASSESS-DOCD LE1/YR: CPT | Performed by: INTERNAL MEDICINE

## 2019-02-26 PROCEDURE — G8598 ASA/ANTIPLAT THER USED: HCPCS | Performed by: INTERNAL MEDICINE

## 2019-02-26 PROCEDURE — G8417 CALC BMI ABV UP PARAM F/U: HCPCS | Performed by: INTERNAL MEDICINE

## 2019-02-26 PROCEDURE — G8482 FLU IMMUNIZE ORDER/ADMIN: HCPCS | Performed by: INTERNAL MEDICINE

## 2019-02-26 PROCEDURE — G8427 DOCREV CUR MEDS BY ELIG CLIN: HCPCS | Performed by: INTERNAL MEDICINE

## 2019-02-26 PROCEDURE — 1123F ACP DISCUSS/DSCN MKR DOCD: CPT | Performed by: INTERNAL MEDICINE

## 2019-02-26 PROCEDURE — 1036F TOBACCO NON-USER: CPT | Performed by: INTERNAL MEDICINE

## 2019-02-26 PROCEDURE — 4040F PNEUMOC VAC/ADMIN/RCVD: CPT | Performed by: INTERNAL MEDICINE

## 2019-02-26 RX ORDER — PANTOPRAZOLE SODIUM 20 MG/1
20 TABLET, DELAYED RELEASE ORAL DAILY
Qty: 90 TABLET | Refills: 3 | Status: SHIPPED | OUTPATIENT
Start: 2019-02-26

## 2019-02-26 RX ORDER — FUROSEMIDE 20 MG/1
20 TABLET ORAL EVERY OTHER DAY
Qty: 30 TABLET | Refills: 3 | Status: SHIPPED | OUTPATIENT
Start: 2019-02-26 | End: 2020-07-27 | Stop reason: SDUPTHER

## 2019-02-26 RX ORDER — TRIAMTERENE AND HYDROCHLOROTHIAZIDE 37.5; 25 MG/1; MG/1
1 TABLET ORAL DAILY
Qty: 90 TABLET | Refills: 3 | Status: SHIPPED | OUTPATIENT
Start: 2019-02-26 | End: 2022-04-28

## 2019-02-26 RX ORDER — ATORVASTATIN CALCIUM 40 MG/1
40 TABLET, FILM COATED ORAL NIGHTLY
Qty: 90 TABLET | Refills: 3 | Status: SHIPPED | OUTPATIENT
Start: 2019-02-26

## 2019-02-26 ASSESSMENT — ENCOUNTER SYMPTOMS
SHORTNESS OF BREATH: 0
CHEST TIGHTNESS: 0
COUGH: 1
NAUSEA: 0
ABDOMINAL PAIN: 0
VOMITING: 0

## 2019-02-26 ASSESSMENT — PATIENT HEALTH QUESTIONNAIRE - PHQ9
SUM OF ALL RESPONSES TO PHQ9 QUESTIONS 1 & 2: 0
1. LITTLE INTEREST OR PLEASURE IN DOING THINGS: 0
SUM OF ALL RESPONSES TO PHQ QUESTIONS 1-9: 0
2. FEELING DOWN, DEPRESSED OR HOPELESS: 0
SUM OF ALL RESPONSES TO PHQ QUESTIONS 1-9: 0

## 2019-03-05 ENCOUNTER — ANTI-COAG VISIT (OUTPATIENT)
Dept: PHARMACY | Age: 79
End: 2019-03-05
Payer: MEDICARE

## 2019-03-05 DIAGNOSIS — I48.20 CHRONIC ATRIAL FIBRILLATION (HCC): ICD-10-CM

## 2019-03-05 LAB — INTERNATIONAL NORMALIZATION RATIO, POC: 2.6

## 2019-03-05 PROCEDURE — 99211 OFF/OP EST MAY X REQ PHY/QHP: CPT

## 2019-03-05 PROCEDURE — 85610 PROTHROMBIN TIME: CPT

## 2019-03-08 ENCOUNTER — OFFICE VISIT (OUTPATIENT)
Dept: CARDIOLOGY CLINIC | Age: 79
End: 2019-03-08
Payer: MEDICARE

## 2019-03-08 VITALS
SYSTOLIC BLOOD PRESSURE: 110 MMHG | WEIGHT: 228 LBS | HEART RATE: 60 BPM | BODY MASS INDEX: 33.67 KG/M2 | DIASTOLIC BLOOD PRESSURE: 80 MMHG

## 2019-03-08 DIAGNOSIS — Z98.61 HISTORY OF PTCA: ICD-10-CM

## 2019-03-08 DIAGNOSIS — I10 ESSENTIAL HYPERTENSION: ICD-10-CM

## 2019-03-08 DIAGNOSIS — I48.20 CHRONIC ATRIAL FIBRILLATION (HCC): Primary | ICD-10-CM

## 2019-03-08 DIAGNOSIS — I25.2 MI, OLD: ICD-10-CM

## 2019-03-08 DIAGNOSIS — I25.10 CAD IN NATIVE ARTERY: ICD-10-CM

## 2019-03-08 PROCEDURE — G8417 CALC BMI ABV UP PARAM F/U: HCPCS | Performed by: INTERNAL MEDICINE

## 2019-03-08 PROCEDURE — G8598 ASA/ANTIPLAT THER USED: HCPCS | Performed by: INTERNAL MEDICINE

## 2019-03-08 PROCEDURE — 99214 OFFICE O/P EST MOD 30 MIN: CPT | Performed by: INTERNAL MEDICINE

## 2019-03-08 PROCEDURE — 1036F TOBACCO NON-USER: CPT | Performed by: INTERNAL MEDICINE

## 2019-03-08 PROCEDURE — G8427 DOCREV CUR MEDS BY ELIG CLIN: HCPCS | Performed by: INTERNAL MEDICINE

## 2019-03-08 PROCEDURE — 1123F ACP DISCUSS/DSCN MKR DOCD: CPT | Performed by: INTERNAL MEDICINE

## 2019-03-08 PROCEDURE — 4040F PNEUMOC VAC/ADMIN/RCVD: CPT | Performed by: INTERNAL MEDICINE

## 2019-03-08 PROCEDURE — G8482 FLU IMMUNIZE ORDER/ADMIN: HCPCS | Performed by: INTERNAL MEDICINE

## 2019-03-08 PROCEDURE — 1101F PT FALLS ASSESS-DOCD LE1/YR: CPT | Performed by: INTERNAL MEDICINE

## 2019-04-02 ENCOUNTER — ANTI-COAG VISIT (OUTPATIENT)
Dept: PHARMACY | Age: 79
End: 2019-04-02
Payer: MEDICARE

## 2019-04-02 DIAGNOSIS — I48.20 CHRONIC ATRIAL FIBRILLATION (HCC): ICD-10-CM

## 2019-04-02 LAB — INTERNATIONAL NORMALIZATION RATIO, POC: 2.2

## 2019-04-02 PROCEDURE — 99211 OFF/OP EST MAY X REQ PHY/QHP: CPT

## 2019-04-02 PROCEDURE — 85610 PROTHROMBIN TIME: CPT

## 2019-04-02 NOTE — PROGRESS NOTES
ANTICOAGULATION SERVICE    Radha Francis" is a [de-identified] y.o. male with PMHx significant for chronic AF ([id number] of 3), HTN who presents to clinic 4/2/2019 for anticoagulation monitoring and adjustment.     Anticoagulation Indication(s):  Afib    Referring Physician:  Dr. Velvet Nageotte    Goal INR Range:  2-3  Duration of Anticoagulation Therapy:  Indefinite  Time of day dose taken:  PM  Product patient has at home:  warfarin 5 mg (PEACH color)    INR Summary                           Warfarin regimen (mg)  Date INR   A/P   Sun Mon Tue Wed Thu Fri Sat Mg/wk  4/2 2.2 At goal, no change 5 5 2.5 5 2.5 5 2.5 27.5  3/5 2.6 At goal, no change  5 5 2.5 5 2.5 5 2.5 27.5  2/5 3.0 At goal, no change 5 5 2.5 5 2.5 5 2.5 27.5   1/8 2.2 At goal, no change 5 5 2.5 5 2.5 5 2.5 27.5   12/11 2.5 At goal, no change 5 5 2.5 5 2.5 5 2.5 27.5   11/13 2.3 At goal, no change 5 5 2.5 5 2.5 5 2.5 27.5   10/16 2.9 At goal, no change 5 5 2.5 5 2.5 5 2.5 27.5   9/18 2.2 At goal, no change 5 5 2.5 5 2.5 5 2.5 27.5   8/22 2.2 At goal, no change 5 5 2.5 5 2.5 5 2.5 27.5   8/2 2.3 At goal, no change 5 5 2.5 5 2.5 5 2.5 27.5  7/19 2.5 At goal, no change 5 5 2.5 5 2.5 5 2.5 27.5  7/12 4.4 Above goal, hold x 1 5 5 2.5 5 0/2.5 5 2.5 27.5  6/12 2.1 At goal, no change 5 5 2.5 5 2.5 5 2.5 27.5  5/22 2.4 At goal, no change 5 5 2.5 5 2.5 5 2.5 27.5  5/8 1.5 Below goal, 5mg x 1 5 5 5/2.5 5 2.5 5 2.5 27.5  4/5 3.0 At goal, no change 5 5 2.5 5 2.5 5 2.5 27.5  3/8 2.9 At goal, no change 5 5 2.5 5 2.5 5 2.5 27.5  2/22 3.6 Above goal, hold x 1 5 5 2.5 5 2.5 5 2.5 27.5  1/25 2.4 At goal, no change 5 5 2.5 5 2.5 5 2.5 27.5  12/28 2.3 At goal, no change 5 5 2.5 5 2.5 5 2.5 27.5  11/30 2.8 At goal, no change 5 5 2.5 5 2.5 5 2.5 27.5  11/14 2.3 At goal, no change 5 5 2.5 5 2.5 5 2.5 27.5  11/7 2.1 At goal, resume 5 5 2.5 5 2.5 5 2.5 27.5  10/17 2.3 At goal, no change 5 5 2.5 5 2.5 5 2.5 27.5  9/28 2.2 At goal, no change 5 5 2.5 5 2.5 5 2.5 27.5    Lab Results changes, and medication changes. Patient was instructed to continue taking 2.5 mg on Tu/Th/Sa and 5 mg all other days. Repeat INR in 4 weeks. Patient was reminded to maintain consistent vitamin K intake and call with any bleeding, medication changes, or fever/vomiting/diarrhea. Patient understands dosing directions and information discussed. Dosing schedule and follow up appointment given to patient. Progress note routed to referring physician's office. Patient acknowledges working in consult agreement with pharmacist as referred by his/her physician.      Next Clinic Appointment:  4/30    ThanksUrbano, PharmD, [facility]  Medication Management Clinic   [address] Ph: [phone]  [facility] Ph: [phone]  4/2/2019 9:33 AM

## 2019-04-30 ENCOUNTER — ANTI-COAG VISIT (OUTPATIENT)
Dept: PHARMACY | Age: 79
End: 2019-04-30
Payer: MEDICARE

## 2019-04-30 DIAGNOSIS — I48.20 CHRONIC ATRIAL FIBRILLATION (HCC): ICD-10-CM

## 2019-04-30 LAB — INTERNATIONAL NORMALIZATION RATIO, POC: 1.8

## 2019-04-30 PROCEDURE — 85610 PROTHROMBIN TIME: CPT

## 2019-04-30 PROCEDURE — 99211 OFF/OP EST MAY X REQ PHY/QHP: CPT

## 2019-04-30 NOTE — PROGRESS NOTES
ANTICOAGULATION SERVICE    Nancy Rojas" is a [de-identified] y.o. male with PMHx significant for chronic AF ([id number] of 3), HTN who presents to clinic 4/30/2019 for anticoagulation monitoring and adjustment.     Anticoagulation Indication(s):  Afib    Referring Physician:  Dr. Meagan Watson    Goal INR Range:  2-3  Duration of Anticoagulation Therapy:  Indefinite  Time of day dose taken:  PM  Product patient has at home:  warfarin 5 mg (PEACH color)    INR Summary                           Warfarin regimen (mg)  Date INR   A/P   Sun Mon Tue Wed Thu Fri Sat Mg/wk  4/30 1.8 Below goal, continue 5 5 2.5 5 2.5 5 2.5 27.5  4/2 2.2 At goal, no change 5 5 2.5 5 2.5 5 2.5 27.5  3/5 2.6 At goal, no change  5 5 2.5 5 2.5 5 2.5 27.5  2/5 3.0 At goal, no change 5 5 2.5 5 2.5 5 2.5 27.5   1/8 2.2 At goal, no change 5 5 2.5 5 2.5 5 2.5 27.5   12/11 2.5 At goal, no change 5 5 2.5 5 2.5 5 2.5 27.5   11/13 2.3 At goal, no change 5 5 2.5 5 2.5 5 2.5 27.5   10/16 2.9 At goal, no change 5 5 2.5 5 2.5 5 2.5 27.5   9/18 2.2 At goal, no change 5 5 2.5 5 2.5 5 2.5 27.5   8/22 2.2 At goal, no change 5 5 2.5 5 2.5 5 2.5 27.5   8/2 2.3 At goal, no change 5 5 2.5 5 2.5 5 2.5 27.5  7/19 2.5 At goal, no change 5 5 2.5 5 2.5 5 2.5 27.5  7/12 4.4 Above goal, hold x 1 5 5 2.5 5 0/2.5 5 2.5 27.5  6/12 2.1 At goal, no change 5 5 2.5 5 2.5 5 2.5 27.5  5/22 2.4 At goal, no change 5 5 2.5 5 2.5 5 2.5 27.5  5/8 1.5 Below goal, 5mg x 1 5 5 5/2.5 5 2.5 5 2.5 27.5  4/5 3.0 At goal, no change 5 5 2.5 5 2.5 5 2.5 27.5  3/8 2.9 At goal, no change 5 5 2.5 5 2.5 5 2.5 27.5  2/22 3.6 Above goal, hold x 1 5 5 2.5 5 2.5 5 2.5 27.5  1/25 2.4 At goal, no change 5 5 2.5 5 2.5 5 2.5 27.5  12/28 2.3 At goal, no change 5 5 2.5 5 2.5 5 2.5 27.5  11/30 2.8 At goal, no change 5 5 2.5 5 2.5 5 2.5 27.5  11/14 2.3 At goal, no change 5 5 2.5 5 2.5 5 2.5 27.5  11/7 2.1 At goal, resume 5 5 2.5 5 2.5 5 2.5 27.5  10/17 2.3 At goal, no change 5 5 2.5 5 2.5 5 2.5 27.5  9/28 2.2 At goal, no change 5 5 2.5 5 2.5 5 2.5 27.5    Lab Results   Component Value Date    RBC 3.66 (L) 02/26/2019    HGB 13.2 (L) 02/26/2019    HCT 38.7 (L) 02/26/2019    .6 (H) 02/26/2019    MCH 36.1 (H) 02/26/2019    MPV 8.8 02/26/2019    RDW 12.8 02/26/2019     02/26/2019     Patient History:  Recent hospitalizations/HC visits - 3/8 Dr Juan Francisco Sheehan  - 2/26 OV Dr. Kaitlyn Grossman  -12/6 echo    - 4/24-4/25/18- [facility] for suspected GIB (drop in Hg to 8.5 from 13.4 in November). EGD 4/25 significant for: 2 small antral erosions, moderate hiatal hernia, slightly irregular Z-line, small distal duodenal polyp. Patient d/c off of plavix and warfarin in preparation for outpt EGD with biopsies and colonoscopy. EGD/Colonoscopy 5/2; no bleeding source, resumed plavix and warfarin on 5/3.  -10/29-11/1/17: [facility] for STEMI, s/p LHC 10/30 with primary stenting to proximal RCA. Recent medication changes None reported   Medications taken regularly that may interact with warfarin or alter INR MVI (may contain vit K)  ASA (stent placement 10/30/17)   Warfarin dose taken as prescribed Yes - uses pillbox   Signs/symptoms of bleeding Hematuria resolved in December - patient states he was told by his urologist that as long as he is taking warfarin, hematuria may continue. H/o epistaxis, occasional hemorrhoids, anemia. None reported today   Vitamin K intake Normally has broccoli, asparagus, kale/gay salads almost daily. Recent vomiting/diarrhea/fever, changes in weight or activity level Trying to improve diet/exercise and lose weight gradually. Tobacco or alcohol use Patient denies tobacco use  Will have 1 glass of wine per day max   Upcoming surgeries or procedures None reported      Assessment/Plan:  Patient's INR was slightly subtherapeutic today (1.8). Patient denies missed/incorrect warfarin doses, diet changes, and medication changes.  Because he has been consistently therapeutic on current dose for >1 year, patient was instructed to continue taking 2.5 mg on Tu/Th/Sa and 5 mg all other days. Repeat INR in 4 weeks. Will consider maintenance dose increase if INR remains below goal. Patient was reminded to maintain consistent vitamin K intake and call with any bleeding, medication changes, or fever/vomiting/diarrhea. Patient understands dosing directions and information discussed. Dosing schedule and follow up appointment given to patient. Progress note routed to referring physician's office. Patient acknowledges working in consult agreement with pharmacist as referred by his/her physician.      Next Clinic Appointment:  5/28    Thanks,   Bing Woodard, PharmD, [facility]  [facility]   [address] Ph: [phone]  [address] Ph: [phone]  4/30/2019 9:25 AM

## 2019-05-13 ENCOUNTER — TELEPHONE (OUTPATIENT)
Dept: INTERNAL MEDICINE CLINIC | Age: 79
End: 2019-05-13

## 2019-05-13 NOTE — TELEPHONE ENCOUNTER
Patient called stating he has an sinus infection, coughing green/yellow stuff, sore throat, etc. He wanted t know if you could call in something in.       Please call to advise     [address], [address] [place] [address] -  [phone]

## 2019-05-28 ENCOUNTER — ANTI-COAG VISIT (OUTPATIENT)
Dept: PHARMACY | Age: 79
End: 2019-05-28
Payer: MEDICARE

## 2019-05-28 DIAGNOSIS — I48.20 CHRONIC ATRIAL FIBRILLATION (HCC): ICD-10-CM

## 2019-05-28 LAB — INTERNATIONAL NORMALIZATION RATIO, POC: 3.8

## 2019-05-28 PROCEDURE — 85610 PROTHROMBIN TIME: CPT

## 2019-05-28 PROCEDURE — 99212 OFFICE O/P EST SF 10 MIN: CPT

## 2019-05-28 NOTE — PROGRESS NOTES
ANTICOAGULATION SERVICE    Harley Mcgovern" is a [de-identified] y.o. male with PMHx significant for chronic AF ([id number] of 3), HTN who presents to clinic 5/28/2019 for anticoagulation monitoring and adjustment.     Anticoagulation Indication(s):  Afib    Referring Physician:  Dr. Rufino Salguero    Goal INR Range:  2-3  Duration of Anticoagulation Therapy:  Indefinite  Time of day dose taken:  PM  Product patient has at home:  warfarin 5 mg (PEACH color)    INR Summary                           Warfarin regimen (mg)  Date INR   A/P   Sun Mon Tue Wed Thu Fri Sat Mg/wk  5/28 3.8 Above goal, holdx1 5 5 2.5 5 2.5 5 2.5 27.5  4/30 1.8 Below goal, continue 5 5 2.5 5 2.5 5 2.5 27.5  4/2 2.2 At goal, no change 5 5 2.5 5 2.5 5 2.5 27.5  3/5 2.6 At goal, no change  5 5 2.5 5 2.5 5 2.5 27.5  2/5 3.0 At goal, no change 5 5 2.5 5 2.5 5 2.5 27.5   1/8 2.2 At goal, no change 5 5 2.5 5 2.5 5 2.5 27.5   12/11 2.5 At goal, no change 5 5 2.5 5 2.5 5 2.5 27.5   11/13 2.3 At goal, no change 5 5 2.5 5 2.5 5 2.5 27.5   10/16 2.9 At goal, no change 5 5 2.5 5 2.5 5 2.5 27.5   9/18 2.2 At goal, no change 5 5 2.5 5 2.5 5 2.5 27.5   8/22 2.2 At goal, no change 5 5 2.5 5 2.5 5 2.5 27.5   8/2 2.3 At goal, no change 5 5 2.5 5 2.5 5 2.5 27.5  7/19 2.5 At goal, no change 5 5 2.5 5 2.5 5 2.5 27.5  7/12 4.4 Above goal, hold x 1 5 5 2.5 5 0/2.5 5 2.5 27.5  6/12 2.1 At goal, no change 5 5 2.5 5 2.5 5 2.5 27.5  5/22 2.4 At goal, no change 5 5 2.5 5 2.5 5 2.5 27.5  5/8 1.5 Below goal, 5mg x 1 5 5 5/2.5 5 2.5 5 2.5 27.5  4/5 3.0 At goal, no change 5 5 2.5 5 2.5 5 2.5 27.5  3/8 2.9 At goal, no change 5 5 2.5 5 2.5 5 2.5 27.5  2/22 3.6 Above goal, hold x 1 5 5 2.5 5 2.5 5 2.5 27.5  1/25 2.4 At goal, no change 5 5 2.5 5 2.5 5 2.5 27.5  12/28 2.3 At goal, no change 5 5 2.5 5 2.5 5 2.5 27.5  11/30 2.8 At goal, no change 5 5 2.5 5 2.5 5 2.5 27.5  11/14 2.3 At goal, no change 5 5 2.5 5 2.5 5 2.5 27.5  11/7 2.1 At goal, resume 5 5 2.5 5 2.5 5 2.5 27.5  10/17 2.3 At goal, no change 5 5 2.5 5 2.5 5 2.5 27.5  9/28 2.2 At goal, no change 5 5 2.5 5 2.5 5 2.5 27.5    Lab Results   Component Value Date    RBC 3.66 (L) 02/26/2019    HGB 13.2 (L) 02/26/2019    HCT 38.7 (L) 02/26/2019    .6 (H) 02/26/2019    MCH 36.1 (H) 02/26/2019    MPV 8.8 02/26/2019    RDW 12.8 02/26/2019     02/26/2019     Patient History:  Recent hospitalizations/HC visits -switching back to Dr Martin Agee for PCP (OV 5/20/19)  -seeing ortho q2wks for orthovisc shots in knees  -12/6 echo    - 4/24-4/25/18[facility] for suspected GIB (drop in Hg to 8.5 from 13.4 in November). EGD 4/25 significant for: 2 small antral erosions, moderate hiatal hernia, slightly irregular Z-line, small distal duodenal polyp. Patient d/c off of plavix and warfarin in preparation for outpt EGD with biopsies and colonoscopy. EGD/Colonoscopy 5/2; no bleeding source, resumed plavix and warfarin on 5/3.  -10/29-11/1/17: [facility] for STEMI, s/p [facility] 10/30 with primary stenting to proximal RCA. Recent medication changes None reported   Medications taken regularly that may interact with warfarin or alter INR MVI (may contain vit K)  ASA (stent placement 10/30/17)   Warfarin dose taken as prescribed Yes - uses pillbox   Signs/symptoms of bleeding Hematuria resolved in December - patient states he was told by his urologist that as long as he is taking warfarin, hematuria may continue. H/o epistaxis, occasional hemorrhoids, anemia. None reported today   Vitamin K intake Normally has broccoli, asparagus, kale/gay salads almost daily, none in the past week due to holidays, but plans to return to normal diet today   Recent vomiting/diarrhea/fever, changes in weight or activity level Trying to improve diet/exercise and lose weight gradually.    Tobacco or alcohol use Patient denies tobacco use  Will have 1 glass of wine per day max   Upcoming surgeries or procedures None reported      Assessment/Plan:  Patient's INR was supratherapeutic today (3.8) due to decreased vit K intake. Patient denies missed/incorrect warfarin doses, health changes, and medication changes. Because he has been consistently therapeutic on current dose for >1 year and he plans to return to normal vit K intake, patient was instructed to hold warfarin tonight only, then continue taking 2.5 mg on Tu/Th/Sa and 5 mg all other days. Repeat INR in 2 weeks. Patient was reminded to maintain consistent vitamin K intake and call with any bleeding, medication changes, or fever/vomiting/diarrhea. Patient understands dosing directions and information discussed. Dosing schedule and follow up appointment given to patient. Progress note routed to referring physician's office. Patient acknowledges working in consult agreement with pharmacist as referred by his/her physician.      Next Clinic Appointment:  6/11    Thanks,   Katie Kaur, PharmD, [address] [facility]  [facility]   [address] Ph: [phone]  [address] Ph: [phone]  5/28/2019 9:31 AM

## 2019-06-10 ENCOUNTER — OFFICE VISIT (OUTPATIENT)
Dept: CARDIOLOGY CLINIC | Age: 79
End: 2019-06-10
Payer: MEDICARE

## 2019-06-10 VITALS
WEIGHT: 236.4 LBS | BODY MASS INDEX: 35.01 KG/M2 | HEIGHT: 69 IN | SYSTOLIC BLOOD PRESSURE: 130 MMHG | DIASTOLIC BLOOD PRESSURE: 80 MMHG | HEART RATE: 68 BPM

## 2019-06-10 DIAGNOSIS — I25.2 MI, OLD: ICD-10-CM

## 2019-06-10 DIAGNOSIS — I48.20 CHRONIC ATRIAL FIBRILLATION (HCC): Primary | ICD-10-CM

## 2019-06-10 DIAGNOSIS — I25.10 CAD IN NATIVE ARTERY: ICD-10-CM

## 2019-06-10 DIAGNOSIS — Z98.61 HISTORY OF PTCA: ICD-10-CM

## 2019-06-10 DIAGNOSIS — I10 ESSENTIAL HYPERTENSION: ICD-10-CM

## 2019-06-10 PROCEDURE — G8428 CUR MEDS NOT DOCUMENT: HCPCS | Performed by: INTERNAL MEDICINE

## 2019-06-10 PROCEDURE — 1123F ACP DISCUSS/DSCN MKR DOCD: CPT | Performed by: INTERNAL MEDICINE

## 2019-06-10 PROCEDURE — 1036F TOBACCO NON-USER: CPT | Performed by: INTERNAL MEDICINE

## 2019-06-10 PROCEDURE — G8598 ASA/ANTIPLAT THER USED: HCPCS | Performed by: INTERNAL MEDICINE

## 2019-06-10 PROCEDURE — 4040F PNEUMOC VAC/ADMIN/RCVD: CPT | Performed by: INTERNAL MEDICINE

## 2019-06-10 PROCEDURE — G8417 CALC BMI ABV UP PARAM F/U: HCPCS | Performed by: INTERNAL MEDICINE

## 2019-06-10 PROCEDURE — 99214 OFFICE O/P EST MOD 30 MIN: CPT | Performed by: INTERNAL MEDICINE

## 2019-06-10 RX ORDER — WARFARIN SODIUM 5 MG/1
TABLET ORAL
Qty: 90 TABLET | Refills: 3 | Status: SHIPPED | OUTPATIENT
Start: 2019-06-10 | End: 2020-06-18

## 2019-06-10 NOTE — PROGRESS NOTES
Subjective:      Patient ID: Sidney Standing is a [de-identified] y.o. male. HPI:  Here for follow up afib/HTN/CAD/PTCA/Non st.   Exercising. Edema stable. Improved. Wt stable. No sob. No orthopnea/PND. No complaints. No palp/tachycardia. No syncope. No exertional chest pain/sob. BP good at home.      Past Medical History:   Diagnosis Date    Allergic rhinitis     Anemia     Atrial fibrillation ([facility]     Atrial fibrillation (HCC)     Benign non-nodular prostatic hyperplasia with lower urinary tract symptoms 2/9/2017    Benign paroxysmal positional vertigo     BPH (benign prostatic hyperplasia)     BPH (benign prostatic hypertrophy)     Cataract 10/3/2014    Cholelithiasis     Colon cancer ([facility] 4/9/2012    Diverticulosis     Dyslipidemia 7/21/2010    Elevated PSA     Erectile dysfunction     Essential hypertension 11/24/2015    Gastroesophageal reflux disease without esophagitis 11/9/2016    GERD (gastroesophageal reflux disease)     Glaucoma 4/5/2013    Hiatal hernia     Hypertension     Lumbar radiculopathy     Osteoarthritis     Peripheral neuropathy 12/7/2012    Proteinuria 7/22/2010    S/P laparoscopic-assisted sigmoidectomy 4/17/2012    Urinary frequency      Past Surgical History:   Procedure Laterality Date    CATARACT REMOVAL WITH IMPLANT Right October 7, 2014    Dr. Jez Peng Left October 24, 2014    Dr. Kaylyn Lobato  April 4, 2012    Dr. Yina Orozco  May 15, 2013    Dr. Yina Orozco     COLONOSCOPY  08/09/2016    Dr. Yina Orozco    COLONOSCOPY N/A 02/17/2017    DILATATION, ESOPHAGUS      GLAUCOMA SURGERY Right October 7, 2014    Dr. Morena Dowling Left October 24, 2014    Dr. Jun Soto HISTORY  June 28, 2013    port-a-cath removal     [facility]  May 10, 2010    Dr. Lauryn Vazquez    SIGMOIDECTOMY  2012    Dr. Marivel Webber sigmoid colectomy           TUNNELED VENOUS PORT PLACEMENT      TURP  2004    Dr. Deb Morgan         Allergies   Allergen Reactions    Naproxen Other (See Comments)     Patient gets nose bleeds. Patient should not take. Patient lost a lot of blood in November due to Naproxen.     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    Occupation: retired -      Comment: as of 2013   Social Needs    Financial resource strain: Not on file    Food insecurity:     Worry: Not on file     Inability: Not on file   [employer] needs:     Medical: Not on file     Non-medical: Not on file   Tobacco Use    Smoking status: Former Smoker     Packs/day: 0.10     Years: 7.00     Pack years: 0.70     Types: Cigars     Start date:      Last attempt to quit:      Years since quittin.4    Smokeless tobacco: Never Used    Tobacco comment: quit in the 1960s -- former cigar smoker   Substance and Sexual Activity    Alcohol use: Yes     Comment: social    Drug use: No    Sexual activity: Yes     Partners: Female     Comment:  - Susan - 1967   Lifestyle    Physical activity:     Days per week: Not on file     Minutes per session: Not on file    Stress: Not on file   Relationships    Social connections:     Talks on phone: Not on file     Gets together: Not on file     Attends Sabianist service: Not on file     Active member of club or organization: Not on file     Attends meetings of clubs or organizations: Not on file     Relationship status: Not on file    Intimate partner violence:     Fear of current or ex partner: Not on file     Emotionally abused: Not on file     Physically abused: Not on file     Forced sexual activity: Not on file   Other Topics Concern    Not on file   Social History Narrative    Past Surgical History     TURP: 2004    prostate biopsy - 1997                                                    Last updated by Esteban Canseco MD on 10/29/2008                      Social History     Marital Status:  - 1967     Spouse: Susan    Children: 3      Children's Names: Eden Orozco    Employment Status: retired -     Employer: [employer]    Occupation:  and [employer]    Alcohol Use: socially    Drug Use: none    Tobacco Usage: prior smoker    Cigars/Pipes - Years Smoked: 65's & 62's                                                 Last updated by Esteban Canseco MD on 2009                      Family History     mother -  - age 73-73 - coronary artery disease, hypertension, sudden death    father -  - age 80 - ? colon cancer        brother - Alexis Jackson -  - age 68 - 2009 - pancreatic cancer, hypertension, CABG, kidney problem    brother - Kay Schumacher - small intestinal cancer, hypertension (Ezequiel Hilliard)    brother - Rodolfo Pool - hypertension    brother - Christine Bowens -  - age 77 - 2008 - hypertension, colon cancer    brother -  - age 15 -  in a fire, smoke inhalation injury        sister - Giulia Cody - hypertension        son - Kay Schumacher - multiple sclerosis (age 37)    son - Roya Montaño -        daughter - Glorious Picket - asthma                                  Last updated by Esteban Canseco MD on 2010 Patient has a family history includes Arthritis in his brother; Asthma in his daughter; Cancer in his brother, brother, brother, and father; Drtiaa Dwayne in his brother; Coronary Art Dis in his brother and mother; Heart Disease in his brother and mother; Heart Surgery in his brother; High Blood Pressure in his brother, brother, brother, brother, mother, and sister; Hypertension in his brother, brother, brother, brother, mother, sister, and son; Kidney Disease in his brother; Mult Sclerosis in his son. Current Outpatient Medications   Medication Sig Dispense Refill    atorvastatin (LIPITOR) 40 MG tablet Take 1 tablet by mouth nightly 90 tablet 3    pantoprazole (PROTONIX) 20 MG tablet Take 1 tablet by mouth daily (Patient taking differently: Take 20 mg by mouth every other day ) 90 tablet 3    triamterene-hydrochlorothiazide (MAXZIDE-25) 37.5-25 MG per tablet Take 1 tablet by mouth daily 90 tablet 3    furosemide (LASIX) 20 MG tablet Take 1 tablet by mouth every other day Take before dinner 30 tablet 3    irbesartan (AVAPRO) 75 MG tablet Take 1 tablet by mouth daily 90 tablet 1    warfarin (COUMADIN) 5 MG tablet 2.5 mg on Tu/Th/Sa; 5 mg all other days or as directed by clinic 90 tablet 3    metoprolol succinate (TOPROL XL) 200 MG extended release tablet Take 1 tablet by mouth daily 90 tablet 3    Cyanocobalamin (VITAMIN B-12) 500 MCG LOZG Take 500 mcg by mouth daily      nitroGLYCERIN (NITROSTAT) 0.4 MG SL tablet up to max of 3 total doses. If no relief after 1 dose, call 911. 25 tablet 3    finasteride (PROSCAR) 5 MG tablet Take 5 mg by mouth daily      aspirin 81 MG tablet Take 81 mg by mouth daily      Cholecalciferol (VITAMIN D) 2000 UNITS CAPS capsule Take 1 capsule by mouth 2 times daily      tamsulosin (FLOMAX) 0.4 MG capsule Take 0.4 mg by mouth daily      Multiple Vitamin (MULTIVITAMIN) capsule Take 1 capsule by mouth daily.        No current facility-administered medications for this visit.       Leopoldo Aase:    06/10/19 1354   BP: 130/80   Pulse: 68       Weight: 236 lb 6.4 oz (107.2 kg)      Review of Systems   Constitutional: Negative for activity change and fatigue. Respiratory: Negative for apnea, cough, choking, chest tightness and shortness of breath. Cardiovascular: Negative for chest pain, palpitations and leg swelling. No PND or orthopnea. No tachycardia. Gastrointestinal: Negative for abdominal distention. Musculoskeletal: Negative for myalgias. Neurological: Negative for dizziness, syncope and light-headedness. Psychiatric/Behavioral: Negative for behavioral problems, confusion and agitation. Other systems reviewed negative as done    Objective:   Physical Exam   Constitutional: He is oriented to person, place, and time. He appears well-developed and well-nourished. No distress. HENT:   Head: Normocephalic and atraumatic. Eyes: Conjunctivae and EOM are normal. Right eye exhibits no discharge. Left eye exhibits no discharge. Neck: Normal range of motion. Neck supple. No JVD present. Cardiovascular: Normal rate, S1 normal, S2 normal and normal heart sounds. An irregularly irregular rhythm present. Exam reveals no gallop. No murmur heard. Pulses:       Radial pulses are 2+ on the right side, and 2+ on the left side. Pulmonary/Chest: Effort normal and breath sounds normal. No respiratory distress. He has no wheezes. He has no rales. Abdominal: Soft. Bowel sounds are normal. No tenderness. Musculoskeletal: Normal range of motion. He exhibits no  tenderness. Tr-+ edema  Neurological: He is alert and oriented to person, place, and time. Skin: Skin is warm and dry. Psychiatric: He has a normal mood and affect. His behavior is normal. Thought content normal.       Assessment:       Diagnosis Orders   1. Chronic atrial fibrillation ([address]     2. CAD in native artery     3. History of PTCA     4.  Essential hypertension     5. MI, old           Plan: CV stable. Edema controlled with lasix qod. HR controlled. No angina  Wt stable. BP good. On AC/ASA. No bleeding issues. Watch salt. Encouraged diet, and wt loss. No changes. Reviewed previous records and testing including cath 10/17 and echo 12/18. Follow up 3 months.

## 2019-06-11 ENCOUNTER — ANTI-COAG VISIT (OUTPATIENT)
Dept: PHARMACY | Age: 79
End: 2019-06-11
Payer: MEDICARE

## 2019-06-11 DIAGNOSIS — I48.20 CHRONIC ATRIAL FIBRILLATION (HCC): ICD-10-CM

## 2019-06-11 LAB — INTERNATIONAL NORMALIZATION RATIO, POC: 2.3

## 2019-06-11 PROCEDURE — 85610 PROTHROMBIN TIME: CPT

## 2019-06-11 PROCEDURE — 99211 OFF/OP EST MAY X REQ PHY/QHP: CPT

## 2019-06-11 NOTE — PROGRESS NOTES
ANTICOAGULATION SERVICE    Jamilah Christianson" is a [de-identified] y.o. male with PMHx significant for chronic AF ([id number] of 3), HTN who presents to clinic 6/11/2019 for anticoagulation monitoring and adjustment. Anticoagulation Indication(s):  Afib    Referring Physician:  Dr. Verito Vargas    Goal INR Range:  2-3  Duration of Anticoagulation Therapy:  Indefinite  Time of day dose taken:  PM  Product patient has at home:  warfarin 5 mg (PEACH color)    INR Summary                           Warfarin regimen (mg)  Date INR   A/P   Sun Mon Tue Wed Thu Fri Sat Mg/wk  6/11 2.3 At goal, no change 5 5 2.5 5 2.5 5 2.5 27.5  5/28 3.8 Above goal, holdx1 5 5 2.5 5 2.5 5 2.5 27.5  4/30 1.8 Below goal, continue 5 5 2.5 5 2.5 5 2.5 27.5  4/2 2.2 At goal, no change 5 5 2.5 5 2.5 5 2.5 27.5  3/5 2.6 At goal, no change  5 5 2.5 5 2.5 5 2.5 27.5  2/5 3.0 At goal, no change 5 5 2.5 5 2.5 5 2.5 27.5   1/8 2.2 At goal, no change 5 5 2.5 5 2.5 5 2.5 27.5   12/11 2.5 At goal, no change 5 5 2.5 5 2.5 5 2.5 27.5   11/13 2.3 At goal, no change 5 5 2.5 5 2.5 5 2.5 27.5   10/16 2.9 At goal, no change 5 5 2.5 5 2.5 5 2.5 27.5   9/18 2.2 At goal, no change 5 5 2.5 5 2.5 5 2.5 27.5   8/22 2.2 At goal, no change 5 5 2.5 5 2.5 5 2.5 27.5   8/2 2.3 At goal, no change 5 5 2.5 5 2.5 5 2.5 27.5  7/19 2.5 At goal, no change 5 5 2.5 5 2.5 5 2.5 27.5  7/12 4.4 Above goal, hold x 1 5 5 2.5 5 0/2.5 5 2.5 27.5  6/12 2.1 At goal, no change 5 5 2.5 5 2.5 5 2.5 27.5    Lab Results   Component Value Date    RBC 3.66 (L) 02/26/2019    HGB 13.2 (L) 02/26/2019    HCT 38.7 (L) 02/26/2019    .6 (H) 02/26/2019    MCH 36.1 (H) 02/26/2019    MPV 8.8 02/26/2019    RDW 12.8 02/26/2019     02/26/2019     Patient History:  Recent hospitalizations/HC visits -OV Dr Verito Vargas 6/10/19- no changes  -OV Dr Lonna Jeans, PCP 5/20/19  -12/6 echo    - 4/24-4/25/18- [facility] for suspected GIB (drop in Hg to 8.5 from 13.4 in November).  EGD 4/25 significant for: 2 small antral erosions, moderate hiatal hernia, slightly irregular Z-line, small distal duodenal polyp. Patient d/c off of plavix and warfarin in preparation for outpt EGD with biopsies and colonoscopy. EGD/Colonoscopy 5/2; no bleeding source, resumed plavix and warfarin on 5/3.  -10/29-11/1/17: [facility] for STEMI, s/p C 10/30 with primary stenting to proximal RCA. Recent medication changes Started Glucosamine last week (has the potential to incr INR)   Medications taken regularly that may interact with warfarin or alter INR MVI (may contain vit K)  ASA (stent placement 10/30/17)   Warfarin dose taken as prescribed Yes - uses pillbox   Signs/symptoms of bleeding Hematuria resolved in December - patient states he was told by his urologist that as long as he is taking warfarin, hematuria may continue. H/o epistaxis, occasional hemorrhoids, anemia. None reported today   Vitamin K intake Normally has broccoli, asparagus, kale/gay salads 3-4 per week, resumed normal diet after last visit   Recent vomiting/diarrhea/fever, changes in weight or activity level Trying to improve diet/exercise and lose weight gradually. Decreased exercise due to knee pain   Tobacco or alcohol use Patient denies tobacco use  Will have 1 glass of wine per day max   Upcoming surgeries or procedures None reported      Assessment/Plan:  Patient's INR was therapeutic today (2.3). Previous INR elevated due to decreased vit K intake; he has since resumed normal intake. Patient denies missed/incorrect warfarin doses, health changes, and bleeding. Patient was instructed to continue taking 2.5 mg on Tu/Th/Sa and 5 mg all other days. Repeat INR in 2 weeks to ensure glucosamine does not significantly affect INR. Patient was reminded to maintain consistent vitamin K intake and call with any bleeding, medication changes, or fever/vomiting/diarrhea. Patient understands dosing directions and information discussed. Dosing schedule and follow up appointment given to patient.

## 2019-06-25 ENCOUNTER — ANTI-COAG VISIT (OUTPATIENT)
Dept: PHARMACY | Age: 79
End: 2019-06-25
Payer: MEDICARE

## 2019-06-25 DIAGNOSIS — I48.20 CHRONIC ATRIAL FIBRILLATION (HCC): ICD-10-CM

## 2019-06-25 LAB — INR BLD: 2

## 2019-06-25 PROCEDURE — 85610 PROTHROMBIN TIME: CPT

## 2019-06-25 PROCEDURE — 99211 OFF/OP EST MAY X REQ PHY/QHP: CPT

## 2019-06-25 NOTE — PROGRESS NOTES
ANTICOAGULATION SERVICE    Jamilah Christianson" is a [de-identified] y.o. male with PMHx significant for chronic AF ([id number] of 3), HTN who presents to clinic 6/25/2019 for anticoagulation monitoring and adjustment. Anticoagulation Indication(s):  Afib    Referring Physician:  Dr. Verito Vargas    Goal INR Range:  2-3  Duration of Anticoagulation Therapy:  Indefinite  Time of day dose taken:  PM  Product patient has at home:  warfarin 5 mg (PEACH color)    INR Summary                           Warfarin regimen (mg)  Date INR   A/P   Sun Mon Tue Wed Thu Fri Sat Mg/wk  6/25 2.0 At goal, no change 5 5 2.5 5 2.5 5 2.5 27.5   6/11 2.3 At goal, no change 5 5 2.5 5 2.5 5 2.5 27.5  5/28 3.8 Above goal, holdx1 5 5 2.5 5 2.5 5 2.5 27.5  4/30 1.8 Below goal, continue 5 5 2.5 5 2.5 5 2.5 27.5  4/2 2.2 At goal, no change 5 5 2.5 5 2.5 5 2.5 27.5  3/5 2.6 At goal, no change  5 5 2.5 5 2.5 5 2.5 27.5  2/5 3.0 At goal, no change 5 5 2.5 5 2.5 5 2.5 27.5   1/8 2.2 At goal, no change 5 5 2.5 5 2.5 5 2.5 27.5   12/11 2.5 At goal, no change 5 5 2.5 5 2.5 5 2.5 27.5   11/13 2.3 At goal, no change 5 5 2.5 5 2.5 5 2.5 27.5   10/16 2.9 At goal, no change 5 5 2.5 5 2.5 5 2.5 27.5   9/18 2.2 At goal, no change 5 5 2.5 5 2.5 5 2.5 27.5   8/22 2.2 At goal, no change 5 5 2.5 5 2.5 5 2.5 27.5   8/2 2.3 At goal, no change 5 5 2.5 5 2.5 5 2.5 27.5  7/19 2.5 At goal, no change 5 5 2.5 5 2.5 5 2.5 27.5  7/12 4.4 Above goal, hold x 1 5 5 2.5 5 0/2.5 5 2.5 27.5  6/12 2.1 At goal, no change 5 5 2.5 5 2.5 5 2.5 27.5    Lab Results   Component Value Date    RBC 3.66 (L) 02/26/2019    HGB 13.2 (L) 02/26/2019    HCT 38.7 (L) 02/26/2019    .6 (H) 02/26/2019    MCH 36.1 (H) 02/26/2019    MPV 8.8 02/26/2019    RDW 12.8 02/26/2019     02/26/2019     Patient History:  Recent hospitalizations/HC visits -OV Dr Verito Vargas 6/10/19- no changes  -OV Dr Lonna Jeans, PCP 5/20/19  -12/6 echo    - 4/24-4/25/18- [facility] for suspected GIB (drop in Hg to 8.5 from 13.4 in November). EGD 4/25 significant for: 2 small antral erosions, moderate hiatal hernia, slightly irregular Z-line, small distal duodenal polyp. Patient d/c off of plavix and warfarin in preparation for outpt EGD with biopsies and colonoscopy. EGD/Colonoscopy 5/2; no bleeding source, resumed plavix and warfarin on 5/3.  -10/29-11/1/17: [facility] for STEMI, s/p [facility] 10/30 with primary stenting to proximal RCA. Recent medication changes Started Glucosamine 3 weeks ago (has the potential to incr INR)   Medications taken regularly that may interact with warfarin or alter INR MVI (may contain vit K)  ASA (stent placement 10/30/17)   Warfarin dose taken as prescribed Yes - uses pillbox   Signs/symptoms of bleeding Hematuria resolved in December - patient states he was told by his urologist that as long as he is taking warfarin, hematuria may continue. H/o epistaxis, occasional hemorrhoids, anemia. None reported today   Vitamin K intake Normally has broccoli, asparagus, kale/gay salads 3-4 per week, resumed normal diet after 5/28 visit   Recent vomiting/diarrhea/fever, changes in weight or activity level Trying to improve diet/exercise and lose weight gradually. Decreased activity due to knee pain (> 1 month ago)   Tobacco or alcohol use Patient denies tobacco use  Will have 1 glass of wine per day max   Upcoming surgeries or procedures None reported      Assessment/Plan:  Patient's INR was therapeutic today (2.0) for second consecutive visit. Previous INR elevated on 5/28 due to decreased vit K intake; he has since resumed normal intake. Patient denies missed/incorrect warfarin doses, health changes, and bleeding. Patient admits having a decreased activity level from baseline over the past ~1 month due to the pain in his knees. He started taking glucosamine for knee pain three weeks ago, which is a level C interaction on Lexicomp (glucosamine may increase the anticoagulant effects of warfarin).  However,  Muna Al INR actually decreased slightly since last visit, and thus the glucosamine is not likely having a large impact on his INR. Jeannine Ca was instructed to continue taking 2.5 mg on Tu/Th/Sa and 5 mg all other days. Repeat INR in 4 weeks. Patient was reminded to maintain consistent vitamin K intake and call with any bleeding, medication changes, or fever/vomiting/diarrhea. Patient understands dosing directions and information discussed. Dosing schedule and follow up appointment given to patient. Progress note routed to referring physician's office. Patient acknowledges working in consult agreement with pharmacist as referred by his/her physician.      Next Clinic Appointment:  7/23    Thanks,   Florecita Hull, PharmD Candidate 2020  [facility]   [place] [address] Ph: [phone]  [facility] Ph: [phone]  6/25/2019 9:21 AM

## 2019-07-23 ENCOUNTER — ANTI-COAG VISIT (OUTPATIENT)
Dept: PHARMACY | Age: 79
End: 2019-07-23
Payer: MEDICARE

## 2019-07-23 DIAGNOSIS — I48.20 CHRONIC ATRIAL FIBRILLATION (HCC): ICD-10-CM

## 2019-07-23 LAB — INTERNATIONAL NORMALIZATION RATIO, POC: 1.9

## 2019-07-23 PROCEDURE — 85610 PROTHROMBIN TIME: CPT

## 2019-07-23 PROCEDURE — 99211 OFF/OP EST MAY X REQ PHY/QHP: CPT

## 2019-07-23 NOTE — PROGRESS NOTES
ANTICOAGULATION SERVICE    Faustina Keller" is a [de-identified] y.o. male with PMHx significant for chronic AF ([id number]-CMCu of 3), HTN who presents to clinic 7/23/2019 for anticoagulation monitoring and adjustment.     Anticoagulation Indication(s):  Afib    Referring Physician:  Dr. Krystin Pineda    Goal INR Range:  2-3  Duration of Anticoagulation Therapy:  Indefinite  Time of day dose taken:  PM  Product patient has at home:  warfarin 5 mg (PEACH color)    INR Summary                           Warfarin regimen (mg)  Date INR   A/P   Sun Mon Tue Wed Thu Fri Sat Mg/wk  7/23 1.9 Below goal, continue 5 5 2.5 5 2.5 5 2.5 27.5  6/25 2.0 At goal, no change 5 5 2.5 5 2.5 5 2.5 27.5   6/11 2.3 At goal, no change 5 5 2.5 5 2.5 5 2.5 27.5  5/28 3.8 Above goal, holdx1 5 5 2.5 5 2.5 5 2.5 27.5  4/30 1.8 Below goal, continue 5 5 2.5 5 2.5 5 2.5 27.5  4/2 2.2 At goal, no change 5 5 2.5 5 2.5 5 2.5 27.5  3/5 2.6 At goal, no change  5 5 2.5 5 2.5 5 2.5 27.5  2/5 3.0 At goal, no change 5 5 2.5 5 2.5 5 2.5 27.5   1/8 2.2 At goal, no change 5 5 2.5 5 2.5 5 2.5 27.5   12/11 2.5 At goal, no change 5 5 2.5 5 2.5 5 2.5 27.5   11/13 2.3 At goal, no change 5 5 2.5 5 2.5 5 2.5 27.5   10/16 2.9 At goal, no change 5 5 2.5 5 2.5 5 2.5 27.5   9/18 2.2 At goal, no change 5 5 2.5 5 2.5 5 2.5 27.5   8/22 2.2 At goal, no change 5 5 2.5 5 2.5 5 2.5 27.5   8/2 2.3 At goal, no change 5 5 2.5 5 2.5 5 2.5 27.5  7/19 2.5 At goal, no change 5 5 2.5 5 2.5 5 2.5 27.5  7/12 4.4 Above goal, hold x 1 5 5 2.5 5 0/2.5 5 2.5 27.5  6/12 2.1 At goal, no change 5 5 2.5 5 2.5 5 2.5 27.5    Lab Results   Component Value Date    RBC 3.66 (L) 02/26/2019    HGB 13.2 (L) 02/26/2019    HCT 38.7 (L) 02/26/2019    .6 (H) 02/26/2019    MCH 36.1 (H) 02/26/2019    MPV 8.8 02/26/2019    RDW 12.8 02/26/2019     02/26/2019     Patient History:  Recent hospitalizations/HC visits -OV Dr Krystin Pineda 6/10/19- no changes  -OV Dr Karyn Ramos, PCP 5/20/19  -12/6 echo    - 4/24-4/25/18- [facility] suspected GIB (drop in Hg to 8.5 from 13.4 in November). EGD 4/25 significant for: 2 small antral erosions, moderate hiatal hernia, slightly irregular Z-line, small distal duodenal polyp. Patient d/c off of plavix and warfarin in preparation for outpt EGD with biopsies and colonoscopy. EGD/Colonoscopy 5/2; no bleeding source, resumed plavix and warfarin on 5/3.  -10/29-11/1/17: [facility] for STEMI, s/p [facility] 10/30 with primary stenting to proximal RCA. Recent medication changes None reported   Medications taken regularly that may interact with warfarin or alter INR Glucosamine (may increase INR)  MVI (may contain vit K)  ASA (stent placement 10/30/17)   Warfarin dose taken as prescribed Yes - uses pillbox   Signs/symptoms of bleeding Hematuria resolved in December - patient states he was told by his urologist that as long as he is taking warfarin, hematuria may continue. H/o epistaxis, occasional hemorrhoids, anemia. None reported today   Vitamin K intake Gay/spinach every day for the 7 days plus turnip greens a couple times this week; will resume normal intake. Normally has broccoli, asparagus, kale/gay salads 3-4 per week   Recent vomiting/diarrhea/fever, changes in weight or activity level Trying to improve diet/exercise and lose weight gradually. Decreased activity due to knee pain (> 1 month ago)   Tobacco or alcohol use Patient denies tobacco use  Will have 1 glass of wine per day max   Upcoming surgeries or procedures None reported      Assessment/Plan:  Patient's INR was slightly subtherapeutic today (1.9) likely due to increased Vit K intake . Patient normally consumes about 3-4 servings of greens a week, but reports gay/spinach salads daily for the last 7 days in addition to a couple servings of turnip greens. Patient plans to resume normal intake of 3-4 greens per week and was counseled on the importance of consistency.   Patient denies missed/incorrect warfarin doses, health changes, and

## 2019-07-25 DIAGNOSIS — I10 ESSENTIAL HYPERTENSION: Chronic | ICD-10-CM

## 2019-07-25 RX ORDER — IRBESARTAN 75 MG/1
TABLET ORAL
Qty: 90 TABLET | Refills: 2 | Status: SHIPPED | OUTPATIENT
Start: 2019-07-25

## 2019-07-29 ENCOUNTER — TELEPHONE (OUTPATIENT)
Dept: PHARMACY | Age: 79
End: 2019-07-29

## 2019-07-29 DIAGNOSIS — I48.20 CHRONIC ATRIAL FIBRILLATION (HCC): Primary | ICD-10-CM

## 2019-08-20 ENCOUNTER — ANTI-COAG VISIT (OUTPATIENT)
Dept: PHARMACY | Age: 79
End: 2019-08-20
Payer: MEDICARE

## 2019-08-20 LAB — INTERNATIONAL NORMALIZATION RATIO, POC: 2.2

## 2019-08-20 PROCEDURE — 85610 PROTHROMBIN TIME: CPT | Performed by: PHARMACIST

## 2019-08-20 PROCEDURE — 99211 OFF/OP EST MAY X REQ PHY/QHP: CPT | Performed by: PHARMACIST

## 2019-09-12 ENCOUNTER — OFFICE VISIT (OUTPATIENT)
Dept: CARDIOLOGY CLINIC | Age: 79
End: 2019-09-12
Payer: MEDICARE

## 2019-09-12 VITALS
SYSTOLIC BLOOD PRESSURE: 130 MMHG | WEIGHT: 234 LBS | DIASTOLIC BLOOD PRESSURE: 80 MMHG | HEART RATE: 60 BPM | BODY MASS INDEX: 34.56 KG/M2

## 2019-09-12 DIAGNOSIS — I48.20 CHRONIC ATRIAL FIBRILLATION (HCC): Primary | ICD-10-CM

## 2019-09-12 DIAGNOSIS — Z98.61 HISTORY OF PTCA: ICD-10-CM

## 2019-09-12 DIAGNOSIS — I25.10 CAD IN NATIVE ARTERY: ICD-10-CM

## 2019-09-12 DIAGNOSIS — I25.2 MI, OLD: ICD-10-CM

## 2019-09-12 DIAGNOSIS — I10 ESSENTIAL HYPERTENSION: ICD-10-CM

## 2019-09-12 PROCEDURE — G8427 DOCREV CUR MEDS BY ELIG CLIN: HCPCS | Performed by: INTERNAL MEDICINE

## 2019-09-12 PROCEDURE — G8598 ASA/ANTIPLAT THER USED: HCPCS | Performed by: INTERNAL MEDICINE

## 2019-09-12 PROCEDURE — 1123F ACP DISCUSS/DSCN MKR DOCD: CPT | Performed by: INTERNAL MEDICINE

## 2019-09-12 PROCEDURE — 99214 OFFICE O/P EST MOD 30 MIN: CPT | Performed by: INTERNAL MEDICINE

## 2019-09-12 PROCEDURE — 1036F TOBACCO NON-USER: CPT | Performed by: INTERNAL MEDICINE

## 2019-09-12 PROCEDURE — G8417 CALC BMI ABV UP PARAM F/U: HCPCS | Performed by: INTERNAL MEDICINE

## 2019-09-12 PROCEDURE — 4040F PNEUMOC VAC/ADMIN/RCVD: CPT | Performed by: INTERNAL MEDICINE

## 2019-09-12 NOTE — PROGRESS NOTES
on 01/07/2010         Patient has a family history includes Arthritis in his brother; Asthma in his daughter; Cancer in his brother, brother, brother, and father; Karlis Cynthia in his brother; Coronary Art Dis in his brother and mother; Heart Disease in his brother and mother; Heart Surgery in his brother; High Blood Pressure in his brother, brother, brother, brother, mother, and sister; Hypertension in his brother, brother, brother, brother, mother, sister, and son; Kidney Disease in his brother; Mult Sclerosis in his son. Current Outpatient Medications   Medication Sig Dispense Refill    irbesartan (AVAPRO) 75 MG tablet TAKE ONE TABLET BY MOUTH DAILY 90 tablet 2    warfarin (COUMADIN) 5 MG tablet 2.5 mg on Tu/Th/Sa; 5 mg all other days or as directed by clinic 90 tablet 3    atorvastatin (LIPITOR) 40 MG tablet Take 1 tablet by mouth nightly 90 tablet 3    pantoprazole (PROTONIX) 20 MG tablet Take 1 tablet by mouth daily (Patient taking differently: Take 20 mg by mouth every other day ) 90 tablet 3    triamterene-hydrochlorothiazide (MAXZIDE-25) 37.5-25 MG per tablet Take 1 tablet by mouth daily 90 tablet 3    furosemide (LASIX) 20 MG tablet Take 1 tablet by mouth every other day Take before dinner 30 tablet 3    metoprolol succinate (TOPROL XL) 200 MG extended release tablet Take 1 tablet by mouth daily 90 tablet 3    Cyanocobalamin (VITAMIN B-12) 500 MCG LOZG Take 500 mcg by mouth daily      nitroGLYCERIN (NITROSTAT) 0.4 MG SL tablet up to max of 3 total doses. If no relief after 1 dose, call 911. 25 tablet 3    finasteride (PROSCAR) 5 MG tablet Take 5 mg by mouth daily      aspirin 81 MG tablet Take 81 mg by mouth daily      Cholecalciferol (VITAMIN D) 2000 UNITS CAPS capsule Take 1 capsule by mouth 2 times daily      tamsulosin (FLOMAX) 0.4 MG capsule Take 0.4 mg by mouth daily      Multiple Vitamin (MULTIVITAMIN) capsule Take 1 capsule by mouth daily.        No current facility-administered medications for this visit. Vitals:    09/12/19 1035   BP: 130/80   Pulse: 60       Weight: 234        Review of Systems   Constitutional: Negative for activity change and fatigue. Respiratory: Negative for apnea, cough, choking, chest tightness and shortness of breath. Cardiovascular: Negative for chest pain, palpitations and leg swelling. No PND or orthopnea. No tachycardia. Gastrointestinal: Negative for abdominal distention. Musculoskeletal: Negative for myalgias. Neurological: Negative for dizziness, syncope and light-headedness. Psychiatric/Behavioral: Negative for behavioral problems, confusion and agitation. All other systems reviewed negative as done    Objective:   Physical Exam   Constitutional: He is oriented to person, place, and time. He appears well-developed and well-nourished. No distress. HENT:   Head: Normocephalic and atraumatic. Eyes: Conjunctivae and EOM are normal. Right eye exhibits no discharge. Left eye exhibits no discharge. Neck: Normal range of motion. Neck supple. No JVD present. Cardiovascular: Normal rate, S1 normal, S2 normal and normal heart sounds. An irregularly irregular rhythm present. Exam reveals no gallop. No murmur heard. Pulses:       Radial pulses are 2+ on the right side, and 2+ on the left side. Pulmonary/Chest: Effort normal and breath sounds normal. No respiratory distress. He has no wheezes. He has no rales. Abdominal: Soft. Bowel sounds are normal. No tenderness. Musculoskeletal: Normal range of motion. He exhibits no  tenderness. Tr edema  Neurological: He is alert and oriented to person, place, and time. Skin: Skin is warm and dry. Psychiatric: He has a normal mood and affect. His behavior is normal. Thought content normal.       Assessment:       Diagnosis Orders   1. Chronic atrial fibrillation ([address]     2. CAD in native artery     3. History of PTCA     4.  Essential hypertension     5. MI, old

## 2019-09-17 ENCOUNTER — ANTI-COAG VISIT (OUTPATIENT)
Dept: PHARMACY | Age: 79
End: 2019-09-17
Payer: MEDICARE

## 2019-09-17 LAB — INTERNATIONAL NORMALIZATION RATIO, POC: 1.9

## 2019-09-17 PROCEDURE — 85610 PROTHROMBIN TIME: CPT | Performed by: PHARMACIST

## 2019-09-17 PROCEDURE — 99211 OFF/OP EST MAY X REQ PHY/QHP: CPT | Performed by: PHARMACIST

## 2019-10-15 ENCOUNTER — ANTI-COAG VISIT (OUTPATIENT)
Dept: PHARMACY | Age: 79
End: 2019-10-15
Payer: MEDICARE

## 2019-10-15 LAB — INTERNATIONAL NORMALIZATION RATIO, POC: 2.7

## 2019-10-15 PROCEDURE — 99211 OFF/OP EST MAY X REQ PHY/QHP: CPT | Performed by: PHARMACIST

## 2019-10-15 PROCEDURE — 85610 PROTHROMBIN TIME: CPT | Performed by: PHARMACIST

## 2019-11-12 ENCOUNTER — ANTI-COAG VISIT (OUTPATIENT)
Dept: PHARMACY | Age: 79
End: 2019-11-12
Payer: MEDICARE

## 2019-11-12 DIAGNOSIS — I48.20 CHRONIC ATRIAL FIBRILLATION (HCC): ICD-10-CM

## 2019-11-12 LAB — INTERNATIONAL NORMALIZATION RATIO, POC: 2.1

## 2019-11-12 PROCEDURE — 99211 OFF/OP EST MAY X REQ PHY/QHP: CPT

## 2019-11-12 PROCEDURE — 85610 PROTHROMBIN TIME: CPT

## 2019-12-10 ENCOUNTER — ANTI-COAG VISIT (OUTPATIENT)
Dept: PHARMACY | Age: 79
End: 2019-12-10
Payer: MEDICARE

## 2019-12-10 DIAGNOSIS — I48.20 CHRONIC ATRIAL FIBRILLATION (HCC): ICD-10-CM

## 2019-12-10 LAB — INTERNATIONAL NORMALIZATION RATIO, POC: 1.9

## 2019-12-10 PROCEDURE — 99211 OFF/OP EST MAY X REQ PHY/QHP: CPT

## 2019-12-10 PROCEDURE — 85610 PROTHROMBIN TIME: CPT

## 2019-12-21 DIAGNOSIS — I10 ESSENTIAL HYPERTENSION: Chronic | ICD-10-CM

## 2019-12-23 RX ORDER — METOPROLOL SUCCINATE 200 MG/1
TABLET, EXTENDED RELEASE ORAL
Qty: 90 TABLET | Refills: 2 | Status: SHIPPED | OUTPATIENT
Start: 2019-12-23 | End: 2020-09-21

## 2019-12-31 ENCOUNTER — TELEPHONE (OUTPATIENT)
Dept: PHARMACY | Age: 79
End: 2019-12-31

## 2019-12-31 NOTE — TELEPHONE ENCOUNTER
Pt LVM to cxl INR check on 1/7. Pt states he is scheduled for bone marrow bx on 1/7 at [facility]. He was instructed to hold warfarin starting 1/3 and bridge with Lovenox.

## 2019-12-31 NOTE — TELEPHONE ENCOUNTER
Spoke with Aman Gregg, ROSLYN to confirm plan given to patient for bridging (Ph: [phone]). 5-d warfarin hold (starting 1/2) + lovenox bridge (hold 24 h PTP) was requested by IR. Plan reviewed with patient who verbalized understanding. He has already picked up lovenox from pharmacy. INR check r/s for 1/14.      Ghazala Canada, PharmD, [facility]  [facility]   [facility] Ph: [phone]  [facility] Lorenzana Ph: [phone]  12/31/2019 5:08 PM

## 2020-01-07 ENCOUNTER — HOSPITAL ENCOUNTER (OUTPATIENT)
Dept: CT IMAGING | Age: 80
Discharge: HOME | End: 2020-01-07
Payer: MEDICARE

## 2020-01-07 VITALS
RESPIRATION RATE: 16 BRPM | HEART RATE: 78 BPM | TEMPERATURE: 98 F | DIASTOLIC BLOOD PRESSURE: 79 MMHG | SYSTOLIC BLOOD PRESSURE: 121 MMHG | WEIGHT: 225 LBS | BODY MASS INDEX: 33.33 KG/M2 | OXYGEN SATURATION: 95 % | HEIGHT: 69 IN

## 2020-01-07 LAB
APTT: 28.7 SEC (ref 24.2–36.2)
BASOPHILS ABSOLUTE: 0 K/UL (ref 0–0.2)
BASOPHILS RELATIVE PERCENT: 0.3 %
EOSINOPHILS ABSOLUTE: 0 K/UL (ref 0–0.6)
EOSINOPHILS RELATIVE PERCENT: 1.2 %
HCT VFR BLD CALC: 39.7 % (ref 40.5–52.5)
HEMOGLOBIN: 13.1 G/DL (ref 13.5–17.5)
INR BLD: 1.13 (ref 0.86–1.14)
LYMPHOCYTES ABSOLUTE: 1.1 K/UL (ref 1–5.1)
LYMPHOCYTES RELATIVE PERCENT: 28.3 %
MCH RBC QN AUTO: 36.1 PG (ref 26–34)
MCHC RBC AUTO-ENTMCNC: 33.1 G/DL (ref 31–36)
MCV RBC AUTO: 109.1 FL (ref 80–100)
MONOCYTES ABSOLUTE: 0.3 K/UL (ref 0–1.3)
MONOCYTES RELATIVE PERCENT: 7.7 %
NEUTROPHILS ABSOLUTE: 2.5 K/UL (ref 1.7–7.7)
NEUTROPHILS RELATIVE PERCENT: 62.5 %
PDW BLD-RTO: 12.9 % (ref 12.4–15.4)
PLATELET # BLD: 189 K/UL (ref 135–450)
PMV BLD AUTO: 8.1 FL (ref 5–10.5)
PROTHROMBIN TIME: 13.1 SEC (ref 10–13.2)
RBC # BLD: 3.64 M/UL (ref 4.2–5.9)
WBC # BLD: 4 K/UL (ref 4–11)

## 2020-01-07 PROCEDURE — 88311 DECALCIFY TISSUE: CPT

## 2020-01-07 PROCEDURE — 88313 SPECIAL STAINS GROUP 2: CPT

## 2020-01-07 PROCEDURE — 85610 PROTHROMBIN TIME: CPT

## 2020-01-07 PROCEDURE — 2500000003 HC RX 250 WO HCPCS: Performed by: RADIOLOGY

## 2020-01-07 PROCEDURE — 36415 COLL VENOUS BLD VENIPUNCTURE: CPT

## 2020-01-07 PROCEDURE — 7100000010 HC PHASE II RECOVERY - FIRST 15 MIN

## 2020-01-07 PROCEDURE — 85730 THROMBOPLASTIN TIME PARTIAL: CPT

## 2020-01-07 PROCEDURE — 88341 IMHCHEM/IMCYTCHM EA ADD ANTB: CPT

## 2020-01-07 PROCEDURE — 7100000011 HC PHASE II RECOVERY - ADDTL 15 MIN

## 2020-01-07 PROCEDURE — 2709999900 CT BIOPSY BONE MARROW

## 2020-01-07 PROCEDURE — 88305 TISSUE EXAM BY PATHOLOGIST: CPT

## 2020-01-07 PROCEDURE — 85025 COMPLETE CBC W/AUTO DIFF WBC: CPT

## 2020-01-07 PROCEDURE — 6360000002 HC RX W HCPCS: Performed by: RADIOLOGY

## 2020-01-07 PROCEDURE — 88342 IMHCHEM/IMCYTCHM 1ST ANTB: CPT

## 2020-01-07 RX ORDER — MIDAZOLAM HYDROCHLORIDE 1 MG/ML
INJECTION INTRAMUSCULAR; INTRAVENOUS
Status: COMPLETED | OUTPATIENT
Start: 2020-01-07 | End: 2020-01-07

## 2020-01-07 RX ORDER — LIDOCAINE HYDROCHLORIDE 20 MG/ML
INJECTION, SOLUTION EPIDURAL; INFILTRATION; INTRACAUDAL; PERINEURAL
Status: COMPLETED | OUTPATIENT
Start: 2020-01-07 | End: 2020-01-07

## 2020-01-07 RX ORDER — BUPIVACAINE HYDROCHLORIDE 2.5 MG/ML
INJECTION, SOLUTION INFILTRATION; PERINEURAL
Status: COMPLETED | OUTPATIENT
Start: 2020-01-07 | End: 2020-01-07

## 2020-01-07 RX ORDER — FENTANYL CITRATE 50 UG/ML
INJECTION, SOLUTION INTRAMUSCULAR; INTRAVENOUS
Status: COMPLETED | OUTPATIENT
Start: 2020-01-07 | End: 2020-01-07

## 2020-01-07 RX ADMIN — FENTANYL CITRATE 50 MCG: 50 INJECTION, SOLUTION INTRAMUSCULAR; INTRAVENOUS at 09:00

## 2020-01-07 RX ADMIN — LIDOCAINE HYDROCHLORIDE 20 ML: 20 INJECTION, SOLUTION EPIDURAL; INFILTRATION; INTRACAUDAL; PERINEURAL at 09:06

## 2020-01-07 RX ADMIN — MIDAZOLAM HYDROCHLORIDE 1 MG: 2 INJECTION, SOLUTION INTRAMUSCULAR; INTRAVENOUS at 09:00

## 2020-01-07 RX ADMIN — BUPIVACAINE HYDROCHLORIDE 30 ML: 2.5 INJECTION, SOLUTION INFILTRATION; PERINEURAL at 09:06

## 2020-01-07 NOTE — PROGRESS NOTES
Pt here post biopsy. bandaid left lower back dry and intact, no redness or swelling. Instructions given with understanding, pt states was told per physician to restart coumadin tonight. Eating and taking fluids.

## 2020-01-14 ENCOUNTER — ANTI-COAG VISIT (OUTPATIENT)
Dept: PHARMACY | Age: 80
End: 2020-01-14
Payer: MEDICARE

## 2020-01-14 LAB — INTERNATIONAL NORMALIZATION RATIO, POC: 1.3

## 2020-01-14 PROCEDURE — 85610 PROTHROMBIN TIME: CPT

## 2020-01-14 PROCEDURE — 99212 OFFICE O/P EST SF 10 MIN: CPT

## 2020-01-14 NOTE — PROGRESS NOTES
ANTICOAGULATION SERVICE    Edilia Mccormick" is a 79 y.o. male with PMHx significant for chronic AF ([id number]-BOKm of 4), HTN who presents to clinic 1/14/2020 for anticoagulation monitoring and adjustment.     Anticoagulation Indication(s):  Afib    Referring Physician:  Dr. Brett Goodwin    Goal INR Range:  2-3  Duration of Anticoagulation Therapy:  Indefinite  Time of day dose taken:  PM  Product patient has at home:  warfarin 5 mg (PEACH color)    INR Summary                           Warfarin regimen (mg)  Date INR   A/P   Sun Mon Tue Wed Thu Fri Sat Mg/wk  1/14 1.3 Below goal, bolusx1 5 5 7.5/2.5 5 2.5 5 2.5 27.5  12/10 1.9 Below goal, continue 5 5 2.5 5 2.5 5 2.5 27.5  11/12 2.1 At goal, no change 5 5 2.5 5 2.5 5 2.5 27.5  10/15 2.7 At goal, continue 5 5 2.5 5 2.5 5 2.5 27.5  9/17 1.9 Below goal, continue 5 5 2.5 5 2.5 5 2.5 27.5  8/20 2.2 At goal, continue 5 5 2.5 5 2.5 5 2.5 27.5  7/23 1.9 Below goal, continue 5 5 2.5 5 2.5 5 2.5 27.5  6/25 2.0 At goal, no change 5 5 2.5 5 2.5 5 2.5 27.5   6/11 2.3 At goal, no change 5 5 2.5 5 2.5 5 2.5 27.5  5/28 3.8 Above goal, holdx1 5 5 2.5 5 2.5 5 2.5 27.5  4/30 1.8 Below goal, continue 5 5 2.5 5 2.5 5 2.5 27.5  4/2 2.2 At goal, no change 5 5 2.5 5 2.5 5 2.5 27.5  3/5 2.6 At goal, no change  5 5 2.5 5 2.5 5 2.5 27.5  2/5 3.0 At goal, no change 5 5 2.5 5 2.5 5 2.5 27.5   1/8 2.2 At goal, no change 5 5 2.5 5 2.5 5 2.5 27.5   12/11 2.5 At goal, no change 5 5 2.5 5 2.5 5 2.5 27.5   11/13 2.3 At goal, no change 5 5 2.5 5 2.5 5 2.5 27.5   10/16 2.9 At goal, no change 5 5 2.5 5 2.5 5 2.5 27.5   9/18 2.2 At goal, no change 5 5 2.5 5 2.5 5 2.5 27.5   8/22 2.2 At goal, no change 5 5 2.5 5 2.5 5 2.5 27.5   8/2 2.3 At goal, no change 5 5 2.5 5 2.5 5 2.5 27.5  7/19 2.5 At goal, no change 5 5 2.5 5 2.5 5 2.5 27.5  7/12 4.4 Above goal, hold x 1 5 5 2.5 5 0/2.5 5 2.5 27.5  6/12 2.1 At goal, no change 5 5 2.5 5 2.5 5 2.5 27.5    Lab Results   Component Value Date    RBC 3.64 (L) 01/07/2020 HGB 13.1 (L) 01/07/2020    HCT 39.7 (L) 01/07/2020    .1 (H) 01/07/2020    MCH 36.1 (H) 01/07/2020    MPV 8.1 01/07/2020    RDW 12.9 01/07/2020     01/07/2020     [id number] Score for Atrial Fibrillation Stroke Risk   Risk   Factors  Component Value   C CHF No 0   H HTN Yes 1   A2 Age >= 76 Yes,  (79 y.o.) 2   D DM No 0   S2 Prior Stroke/TIA No 0   V Vascular Disease Yes 1   A Age 74-69 No,  (79 y.o.) 0   Sc Sex male 0    [id number]  Score  4   Score last updated 75/27/67 71:79 AM    Click here for a link to the UpToDate guideline \"Atrial Fibrillation: Anticoagulation therapy to prevent embolization    Disclaimer: Risk Score calculation is dependent on accuracy of patient problem list and past encounter diagnosis. Patient History:  Recent hospitalizations/HC visits -OV Dr Norma Sanchez 6/10/19- no changes  -OV Dr Brianda Cooley, PCP 11/11/19  -12/6 echo    - 4/24-4/25/18- [facility] for suspected GIB (drop in Hg to 8.5 from 13.4 in November). EGD 4/25 significant for: 2 small antral erosions, moderate hiatal hernia, slightly irregular Z-line, small distal duodenal polyp. Patient d/c off of plavix and warfarin in preparation for outpt EGD with biopsies and colonoscopy. EGD/Colonoscopy 5/2; no bleeding source, resumed plavix and warfarin on 5/3.  -10/29-11/1/17: [facility] for STEMI, s/p [facility] 10/30 with primary stenting to proximal RCA. Recent medication changes Synvisc inj in knee   Medications taken regularly that may interact with warfarin or alter INR Glucosamine (may increase INR)  MVI (may contain vit K)  ASA (stent placement 10/30/17)   Warfarin dose taken as prescribed Yes - uses pillbox   Signs/symptoms of bleeding Hematuria resolved in December - patient states he was told by his urologist that as long as he is taking warfarin, hematuria may continue. H/o epistaxis, occasional hemorrhoids, anemia.   None reported today   Vitamin K intake Normally has broccoli, asparagus, kale/gay salads 3-4 per week  9/17: reports 7 servings in past week. 10/15: returned to baseline 3-4 servings per week. 12/10: \"a lot of greens\"- broccoli 3 days in a row+salads  1/14: 5 servings in past week including rory greens    Recent vomiting/diarrhea/fever, changes in weight or activity level Trying to improve diet/exercise and lose weight gradually. Decreased activity due to knee pain, going to knee specialist    Tobacco or alcohol use Patient denies tobacco use  Will have 1 glass of wine per day max   Upcoming surgeries or procedures Synvisc inj in knee TBD  Dr Ruchi Mcgovern 12/17/19 for bone marrow biopsy  Dr Ruchi Mcgovern 1/7/19 for bone marrow biopsy: held warfarin 1/2-1/6 and bridged with Lovenox through 1/11      Assessment/Plan:   Patient's INR was subtherapeutic today (1.3), likely due to holding warfarin for a recent bone marrow biopsy from 1/2 through 1/6 and bridged with Lovenox through 1/11. He also reports increased vit K intake over past 1-2 weeks including cooked greens which pt doesn't normally eat. He does not plan to continue high vitamin K intake and will return to baseline. Typically, his subtherapeutic INRs can be explained by increase in vitamin K. Patient denies missed/incorrect warfarin doses, health changes, or bleeding. Patient admits having a decreased activity level from baseline over the past ~3 months due to the pain in his knees. He saw an orthopedic specialist and had a synvisc injection. Patient was instructed to take a bolus dose of warfarin 7.5 mg and then continue taking warfarin 2.5 mg on Tu/Th/Sa and 5 mg all other days. Repeat INR in 1 week. Patient was reminded to maintain consistent vitamin K intake and call with any bleeding, medication changes, or fever/vomiting/diarrhea. Patient understands dosing directions and information discussed. Dosing schedule and follow up appointment given to patient. Progress note routed to referring physician's office.  Patient acknowledges working in consult agreement with

## 2020-01-21 ENCOUNTER — ANTI-COAG VISIT (OUTPATIENT)
Dept: PHARMACY | Age: 80
End: 2020-01-21
Payer: MEDICARE

## 2020-01-21 LAB — INTERNATIONAL NORMALIZATION RATIO, POC: 2.7

## 2020-01-21 PROCEDURE — 99211 OFF/OP EST MAY X REQ PHY/QHP: CPT

## 2020-01-21 PROCEDURE — 85610 PROTHROMBIN TIME: CPT

## 2020-01-21 NOTE — PROGRESS NOTES
Results   Component Value Date    RBC 3.64 (L) 01/07/2020    HGB 13.1 (L) 01/07/2020    HCT 39.7 (L) 01/07/2020    .1 (H) 01/07/2020    MCH 36.1 (H) 01/07/2020    MPV 8.1 01/07/2020    RDW 12.9 01/07/2020     01/07/2020     [id number] Score for Atrial Fibrillation Stroke Risk   Risk   Factors  Component Value   C CHF No 0   H HTN Yes 1   A2 Age >= 76 Yes,  (79 y.o.) 2   D DM No 0   S2 Prior Stroke/TIA No 0   V Vascular Disease Yes 1   A Age 74-69 No,  (79 y.o.) 0   Sc Sex male 0    [id number]  Score  4   Score last updated 25/07/58 48:35 AM    Click here for a link to the UpToDate guideline \"Atrial Fibrillation: Anticoagulation therapy to prevent embolization    Disclaimer: Risk Score calculation is dependent on accuracy of patient problem list and past encounter diagnosis. Patient History:  Recent hospitalizations/HC visits -OV Dr Lorelei Sanz 6/10/19- no changes  -OV Dr Franchesca Darby, PCP 11/11/19  -12/6 echo    - 4/24-4/25/18- [facility] Garibay for suspected GIB (drop in Hg to 8.5 from 13.4 in November). EGD 4/25 significant for: 2 small antral erosions, moderate hiatal hernia, slightly irregular Z-line, small distal duodenal polyp. Patient d/c off of plavix and warfarin in preparation for outpt EGD with biopsies and colonoscopy. EGD/Colonoscopy 5/2; no bleeding source, resumed plavix and warfarin on 5/3.  -10/29-11/1/17: [facility] 113 for STEMI, s/p LHC 10/30 with primary stenting to proximal RCA. Recent medication changes Synvisc inj in knee   Medications taken regularly that may interact with warfarin or alter INR Glucosamine (may increase INR)  MVI (may contain vit K)  ASA (stent placement 10/30/17)  1/21: Tylenol 500 mg 2 tablets BID   Warfarin dose taken as prescribed Yes - uses pillbox   Signs/symptoms of bleeding Hematuria resolved in December - patient states he was told by his urologist that as long as he is taking warfarin, hematuria may continue.   H/o epistaxis, occasional hemorrhoids, anemia.  1/21: bruising behind right knee    Vitamin K intake Normally has broccoli, asparagus, kale/gay salads 3-4 per week  9/17: reports 7 servings in past week. 10/15: returned to baseline 3-4 servings per week. 12/10: \"a lot of greens\"- broccoli 3 days in a row+salads  1/14: 5 servings in past week including rory greens    Recent vomiting/diarrhea/fever, changes in weight or activity level Trying to improve diet/exercise and lose weight gradually. Decreased activity due to knee pain, going to knee specialist    Tobacco or alcohol use Patient denies tobacco use  Will have 1 glass of wine per day max   Upcoming surgeries or procedures Synvisc inj in knee  Dr Heber Palomo 12/17/19 for bone marrow biopsy  Dr Heber Palomo 1/7/19 for bone marrow biopsy: held warfarin 1/2-1/6 and bridged with Lovenox through 1/11      Assessment/Plan:   Patient's INR was therapeutic today (2.7). His recent subtherapeutic INRs are due to holding warfarin from 1/2-1/6 for a bone marrow biopsy. He bridged with Lovenox through 1/11. He had also reported an increase in vit K intake over the past 1-2 weeks including cooked greens which he doesn't normally eat. Today, he reports that he is back to eating his usual amount of vitamin K. Typically, his subtherapeutic INRs can be explained by an increase in vitamin K. Patient denies missed/incorrect warfarin doses, health changes, or bleeding. Patient admits to decreased activity level from his baseline over the past ~3 months due to the pain in his knees. He is seeing an orthopedic specialist and has been getting synvisc injections in his knee. Patient was instructed to continue warfarin 2.5 mg on Tu/Th/Sa and 5 mg all other days. Repeat INR in 2 weeks. Patient was reminded to maintain consistent vitamin K intake and call with any bleeding, medication changes, or fever/vomiting/diarrhea. Of note, pt also reports unusual bruising behind his right knee.  He has been getting the synvisc injections in his right knee, but they are injected into the front of the knee, not the back. He was instructed to monitor the bruise for signs of a hematoma (formation of a knot, warmth, swelling). He has also started taking two tablets of acetaminophen 500 mg twice daily for his knee pain. Tylenol can increase INR so will monitor for this. Patient understands dosing directions and information discussed. Dosing schedule and follow up appointment given to patient. Progress note routed to referring physician's office. Patient acknowledges working in consult agreement with pharmacist as referred by his/her physician.      Next Clinic Appointment:  2/4    Ambrocio Fraga PharmD  PGY1 Pharmacy Resident   Wireless: [phone]  1/21/2020 9:19 AM

## 2020-02-04 ENCOUNTER — ANTI-COAG VISIT (OUTPATIENT)
Dept: PHARMACY | Age: 80
End: 2020-02-04
Payer: MEDICARE

## 2020-02-04 LAB — INTERNATIONAL NORMALIZATION RATIO, POC: 3

## 2020-02-04 PROCEDURE — 99211 OFF/OP EST MAY X REQ PHY/QHP: CPT

## 2020-02-04 PROCEDURE — 85610 PROTHROMBIN TIME: CPT

## 2020-02-04 NOTE — PROGRESS NOTES
ANTICOAGULATION SERVICE    James Mcgrath" is a 79 y.o. male with PMHx significant for chronic AF ([id number]-EYTy of 4), HTN who presents to clinic 2/4/2020 for anticoagulation monitoring and adjustment.     Anticoagulation Indication(s):  Afib    Referring Physician:  Dr. Kaur Every    Goal INR Range:  2-3  Duration of Anticoagulation Therapy:  Indefinite  Time of day dose taken:  PM  Product patient has at home:  warfarin 5 mg (PEACH color)    INR Summary                           Warfarin regimen (mg)  Date INR   A/P   Sun Mon Tue Wed Thu Fri Sat Mg/wk  2/4 3.0 At goal, no change 5 5 2.5 5 2.5 5 2.5 27.5  1/21 2.7 At goal, continue  5 5 2.5 5 2.5 5 2.5 27.5  1/14 1.3 Below goal, bolus x1 5 5 7.5/2.5 5 2.5 5 2.5 27.5  12/10 1.9 Below goal, continue 5 5 2.5 5 2.5 5 2.5 27.5  11/12 2.1 At goal, no change 5 5 2.5 5 2.5 5 2.5 27.5  10/15 2.7 At goal, continue 5 5 2.5 5 2.5 5 2.5 27.5  9/17 1.9 Below goal, continue 5 5 2.5 5 2.5 5 2.5 27.5  8/20 2.2 At goal, continue 5 5 2.5 5 2.5 5 2.5 27.5  7/23 1.9 Below goal, continue 5 5 2.5 5 2.5 5 2.5 27.5  6/25 2.0 At goal, no change 5 5 2.5 5 2.5 5 2.5 27.5   6/11 2.3 At goal, no change 5 5 2.5 5 2.5 5 2.5 27.5  5/28 3.8 Above goal, holdx1 5 5 2.5 5 2.5 5 2.5 27.5  4/30 1.8 Below goal, continue 5 5 2.5 5 2.5 5 2.5 27.5  4/2 2.2 At goal, no change 5 5 2.5 5 2.5 5 2.5 27.5  3/5 2.6 At goal, no change  5 5 2.5 5 2.5 5 2.5 27.5  2/5 3.0 At goal, no change 5 5 2.5 5 2.5 5 2.5 27.5   1/8 2.2 At goal, no change 5 5 2.5 5 2.5 5 2.5 27.5   12/11 2.5 At goal, no change 5 5 2.5 5 2.5 5 2.5 27.5   11/13 2.3 At goal, no change 5 5 2.5 5 2.5 5 2.5 27.5   10/16 2.9 At goal, no change 5 5 2.5 5 2.5 5 2.5 27.5   9/18 2.2 At goal, no change 5 5 2.5 5 2.5 5 2.5 27.5   8/22 2.2 At goal, no change 5 5 2.5 5 2.5 5 2.5 27.5   8/2 2.3 At goal, no change 5 5 2.5 5 2.5 5 2.5 27.5  7/19 2.5 At goal, no change 5 5 2.5 5 2.5 5 2.5 27.5  7/12 4.4 Above goal, hold x 1 5 5 2.5 5 0/2.5 5 2.5 27.5  6/12 2.1 At December - patient states he was told by his urologist that as long as he is taking warfarin, hematuria may continue. H/o epistaxis, occasional hemorrhoids, anemia. Vitamin K intake Normally has broccoli, asparagus, kale/gay salads 3-4 per week  9/17: reports 7 servings in past week. 10/15: returned to baseline 3-4 servings per week. 12/10: \"a lot of greens\"- broccoli 3 days in a row+salads  1/14: 5 servings in past week including rory greens    Recent vomiting/diarrhea/fever, changes in weight or activity level Trying to improve diet/exercise and lose weight gradually. Decreased activity due to knee pain, going to knee specialist    Tobacco or alcohol use Patient denies tobacco use  Will have 1 glass of wine per day max   Upcoming surgeries or procedures None reported     Assessment/Plan:   Patient's INR was therapeutic today (3). His recent subtherapeutic INRs are due to holding warfarin from 1/2-1/6 for a bone marrow biopsy. He bridged with Lovenox through 1/11. Patient denies missed/incorrect warfarin doses, health/diet changes, or bleeding. Patient was instructed to continue warfarin 2.5 mg on Tu/Th/Sa and 5 mg all other days. Repeat INR in 4 weeks. Patient was reminded to maintain consistent vitamin K intake and call with any bleeding, medication changes, or fever/vomiting/diarrhea. Patient understands dosing directions and information discussed. Dosing schedule and follow up appointment given to patient. Progress note routed to referring physician's office. Patient acknowledges working in consult agreement with pharmacist as referred by his/her physician.      Next Clinic Appointment:  3/3    Thanks,   Silvana Wells, PharmD, [facility]  [facility]   [address] Ph: [phone]  [facility] Ph: [phone]  2/4/2020 9:53 AM

## 2020-02-19 ENCOUNTER — OFFICE VISIT (OUTPATIENT)
Dept: CARDIOLOGY CLINIC | Age: 80
End: 2020-02-19
Payer: MEDICARE

## 2020-02-19 VITALS
SYSTOLIC BLOOD PRESSURE: 112 MMHG | WEIGHT: 231 LBS | DIASTOLIC BLOOD PRESSURE: 80 MMHG | BODY MASS INDEX: 34.11 KG/M2 | HEART RATE: 60 BPM

## 2020-02-19 PROCEDURE — 1036F TOBACCO NON-USER: CPT | Performed by: INTERNAL MEDICINE

## 2020-02-19 PROCEDURE — G8484 FLU IMMUNIZE NO ADMIN: HCPCS | Performed by: INTERNAL MEDICINE

## 2020-02-19 PROCEDURE — G8427 DOCREV CUR MEDS BY ELIG CLIN: HCPCS | Performed by: INTERNAL MEDICINE

## 2020-02-19 PROCEDURE — 4040F PNEUMOC VAC/ADMIN/RCVD: CPT | Performed by: INTERNAL MEDICINE

## 2020-02-19 PROCEDURE — G8417 CALC BMI ABV UP PARAM F/U: HCPCS | Performed by: INTERNAL MEDICINE

## 2020-02-19 PROCEDURE — 1123F ACP DISCUSS/DSCN MKR DOCD: CPT | Performed by: INTERNAL MEDICINE

## 2020-02-19 PROCEDURE — 99214 OFFICE O/P EST MOD 30 MIN: CPT | Performed by: INTERNAL MEDICINE

## 2020-02-19 RX ORDER — PANTOPRAZOLE SODIUM 20 MG/1
TABLET, DELAYED RELEASE ORAL
Qty: 90 TABLET | Refills: 2 | OUTPATIENT
Start: 2020-02-19

## 2020-02-19 RX ORDER — ATORVASTATIN CALCIUM 40 MG/1
TABLET, FILM COATED ORAL
Qty: 90 TABLET | Refills: 2 | OUTPATIENT
Start: 2020-02-19

## 2020-02-19 NOTE — PROGRESS NOTES
Talks on phone: Not on file     Gets together: Not on file     Attends Mosque service: Not on file     Active member of club or organization: Not on file     Attends meetings of clubs or organizations: Not on file     Relationship status: Not on file    Intimate partner violence:     Fear of current or ex partner: Not on file     Emotionally abused: Not on file     Physically abused: Not on file     Forced sexual activity: Not on file   Other Topics Concern    Not on file   Social History Narrative    Past Surgical History     TURP: 2004    prostate biopsy - 1997                                                    Last updated by Clive Mckinley MD on 10/29/2008                      Social History     Marital Status:  - 1967     Spouse: Susan    Children: 3      Children's Names: Ely Bell    Employment Status: retired -     Employer: [employer]    Occupation:  and [employer]    Alcohol Use: socially    Drug Use: none    Tobacco Usage: prior smoker    Cigars/Pipes - Years Smoked: 65's & 62's                                                 Last updated by Clive Mckinley MD on 2009                      Family History     mother -  - age 73-73 - coronary artery disease, hypertension, sudden death    father -  - age 80 - ? colon cancer        brother - Maine Davila -  - age 68 - 2009 - pancreatic cancer, hypertension, CABG, kidney problem    brother - Joseph Arana - small intestinal cancer, hypertension (Rudine Needles)    brother - Uche Melgar - hypertension    brother - Cosme Chi -  - age 77 - 2008 - hypertension, colon cancer    brother -  - age 15 -  in a fire, smoke inhalation injury        sister - Katty Hahn - hypertension        son - Joseph Arana - multiple sclerosis (age 37)    son - Kalin Agee -        daughter - Anyi Taylor - asthma                                  Last updated by facility-administered medications for this visit. Vitals:    02/19/20 1428   BP: 112/80   Pulse: 60       Weight:  231 lb (104.8 kg)      Review of Systems   Constitutional: Negative for activity change and fatigue. Respiratory: Negative for apnea, cough, choking, chest tightness and shortness of breath. Cardiovascular: Negative for chest pain, palpitations and leg swelling. No PND or orthopnea. No tachycardia. Gastrointestinal: Negative for abdominal distention. Musculoskeletal: Negative for myalgias. Neurological: Negative for dizziness, syncope and light-headedness. Psychiatric/Behavioral: Negative for behavioral problems, confusion and agitation. All other systems reviewed negative as done    Objective:   Physical Exam   Constitutional: He is oriented to person, place, and time. He appears well-developed and well-nourished. No distress. HENT:   Head: Normocephalic and atraumatic. Eyes: Conjunctivae and EOM are normal. Right eye exhibits no discharge. Left eye exhibits no discharge. Neck: Normal range of motion. Neck supple. No JVD present. Cardiovascular: Normal rate, S1 normal, S2 normal and normal heart sounds. An irregularly irregular rhythm present. Exam reveals no gallop. No murmur heard. Pulses:       Radial pulses are 2+ on the right side, and 2+ on the left side. Pulmonary/Chest: Effort normal and breath sounds normal. No respiratory distress. He has no wheezes. He has no rales. Abdominal: Soft. Bowel sounds are normal. No tenderness. Musculoskeletal: Normal range of motion. He exhibits no  tenderness. Tr edema  Neurological: He is alert and oriented to person, place, and time. Skin: Skin is warm and dry. Psychiatric: He has a normal mood and affect. His behavior is normal. Thought content normal.       Assessment:       Diagnosis Orders   1. Chronic atrial fibrillation     2. CAD in native artery     3. History of PTCA     4.  Essential hypertension     5. MI, old           Plan:      CV stable. Edema controlled with lasix qod. HR controlled. No angina  Wt stable. BP good. On AC/ASA. No bleeding issues. Watch salt. Encouraged diet, and wt loss. No changes. Reviewed previous records and testing including cath 10/17 and echo 12/18. Follow up 3 months.

## 2020-03-03 ENCOUNTER — ANTI-COAG VISIT (OUTPATIENT)
Dept: PHARMACY | Age: 80
End: 2020-03-03
Payer: MEDICARE

## 2020-03-03 LAB — INTERNATIONAL NORMALIZATION RATIO, POC: 1.8

## 2020-03-03 PROCEDURE — 85610 PROTHROMBIN TIME: CPT

## 2020-03-03 PROCEDURE — 99211 OFF/OP EST MAY X REQ PHY/QHP: CPT

## 2020-03-24 ENCOUNTER — ANTI-COAG VISIT (OUTPATIENT)
Dept: PHARMACY | Age: 80
End: 2020-03-24
Payer: MEDICARE

## 2020-03-24 LAB — INTERNATIONAL NORMALIZATION RATIO, POC: 2.6

## 2020-03-24 PROCEDURE — 99212 OFFICE O/P EST SF 10 MIN: CPT

## 2020-03-24 PROCEDURE — 85610 PROTHROMBIN TIME: CPT

## 2020-03-24 NOTE — PROGRESS NOTES
ANTICOAGULATION SERVICE    Erick Holman" is a 79 y.o. male with PMHx significant for chronic AF ([id number] of 4), HTN who presents to clinic 3/24/2020 for anticoagulation monitoring and adjustment.     Anticoagulation Indication(s):  Afib    Referring Physician:  Dr. Kenna Mosquera    Goal INR Range:  2-3  Duration of Anticoagulation Therapy:  Indefinite  Time of day dose taken:  PM  Product patient has at home:  warfarin 5 mg (PEACH color)    INR Summary                           Warfarin regimen (mg)  Date INR   A/P   Sun Mon Tue Wed Thu Fri Sat Mg/wk  3/24 2.6 At goal, no change 5 5 2.5 5 2.5 5 2.5 27.5  3/3 1.8 Below goal, continue   5 5 2.5 5 2.5 5 2.5 27.5  2/4 3.0 At goal, no change 5 5 2.5 5 2.5 5 2.5 27.5  1/21 2.7 At goal, continue  5 5 2.5 5 2.5 5 2.5 27.5  1/14 1.3 Below goal, bolus x1 5 5 7.5/2.5 5 2.5 5 2.5 27.5  12/10 1.9 Below goal, continue 5 5 2.5 5 2.5 5 2.5 27.5  11/12 2.1 At goal, no change 5 5 2.5 5 2.5 5 2.5 27.5  10/15 2.7 At goal, continue 5 5 2.5 5 2.5 5 2.5 27.5  9/17 1.9 Below goal, continue 5 5 2.5 5 2.5 5 2.5 27.5  8/20 2.2 At goal, continue 5 5 2.5 5 2.5 5 2.5 27.5  7/23 1.9 Below goal, continue 5 5 2.5 5 2.5 5 2.5 27.5  6/25 2.0 At goal, no change 5 5 2.5 5 2.5 5 2.5 27.5   6/11 2.3 At goal, no change 5 5 2.5 5 2.5 5 2.5 27.5  5/28 3.8 Above goal, holdx1 5 5 2.5 5 2.5 5 2.5 27.5  4/30 1.8 Below goal, continue 5 5 2.5 5 2.5 5 2.5 27.5  4/2 2.2 At goal, no change 5 5 2.5 5 2.5 5 2.5 27.5  3/5 2.6 At goal, no change  5 5 2.5 5 2.5 5 2.5 27.5  2/5 3.0 At goal, no change 5 5 2.5 5 2.5 5 2.5 27.5   1/8 2.2 At goal, no change 5 5 2.5 5 2.5 5 2.5 27.5   12/11 2.5 At goal, no change 5 5 2.5 5 2.5 5 2.5 27.5   11/13 2.3 At goal, no change 5 5 2.5 5 2.5 5 2.5 27.5   10/16 2.9 At goal, no change 5 5 2.5 5 2.5 5 2.5 27.5   9/18 2.2 At goal, no change 5 5 2.5 5 2.5 5 2.5 27.5   8/22 2.2 At goal, no change 5 5 2.5 5 2.5 5 2.5 27.5   8/2 2.3 At goal, no change 5 5 2.5 5 2.5 5 2.5 27.5  7/19 2.5 At pillbox  held warfarin 1/2-1/6 and bridged with Lovenox through 1/11   Signs/symptoms of bleeding Hematuria resolved in December - patient states he was told by his urologist that as long as he is taking warfarin, hematuria may continue. H/o epistaxis, occasional hemorrhoids, anemia. Vitamin K intake Normally has broccoli, asparagus, kale/gay salads 3-4 per week  9/17: reports 7 servings in past week. 10/15: returned to baseline 3-4 servings per week. 12/10: \"a lot of greens\"- broccoli 3 days in a row+salads  1/14: 5 servings in past week including rory greens  3/3: 2 servings of green day before. Recent vomiting/diarrhea/fever, changes in weight or activity level Trying to improve diet/exercise and lose weight gradually. Decreased activity due to knee pain, going to knee specialist    Tobacco or alcohol use Patient denies tobacco use  Will have 1 glass of wine per day max   Upcoming surgeries or procedures None reported     Assessment/Plan:   Patient's INR was therapeutic today (2.6). His recent subtherapeutic INRs are due to holding warfarin from 1/2-1/6 for a bone marrow biopsy. Suspect that subtherapeutic dose 3/3 is due to 2 large green leafy vegetable servings from yesterday. Patient denies missed/incorrect warfarin doses, health/diet changes, or bleeding. Patient was instructed to continue warfarin 2.5 mg on Tu/Th/Sa and 5 mg all other days. Repeat INR in 6 weeks to reduce spread/exposure to COVID19. Patient was reminded to maintain consistent vitamin K intake and call with any bleeding, medication changes, or fever/vomiting/diarrhea. Per pt request, reviewed patient's med list including indications and directions. Discussed need for PPI. Pt states he has cut back to pantoprazole QOD and has not had any further GERD sxs. Suggested he consider d/c of medication as he denies any hx of GI ulcer or any further GERD sxs. He may use an acid reducer (tums or H2RA) prn sxs.  Pt agreed and will discuss with PCP. Patient understands dosing directions and information discussed. Dosing schedule and follow up appointment given to patient. Progress note routed to referring physician's office. Patient acknowledges working in consult agreement with pharmacist as referred by his/her physician.      Next Clinic Appointment:  5/5    [employer], PharmD, [facility]  [facility]   [address] Ph: [phone]  Sophie Zoe Ph: [phone]  3/24/2020 9:31 AM      CLINICAL PHARMACY CONSULT: MED RECONCILIATION/REVIEW ADDENDUM    For Pharmacy Admin Tracking Only    PHSO: Yes  Total # of Interventions Recommended: 1 - d/c PPI  - Discontinued Medication #: 1 Discontinue Reason(s): Unnecessary Medication  - Maintenance Safety Lab Monitoring #: 1  Total Interventions Accepted: 1  Time Spent (min): 15    Ramandeep Boggs PharmD

## 2020-04-20 RX ORDER — IRBESARTAN 75 MG/1
TABLET ORAL
Qty: 90 TABLET | Refills: 1 | OUTPATIENT
Start: 2020-04-20

## 2020-05-04 NOTE — PROGRESS NOTES
Standing order for PT/INR has been placed to facilitate transition to remote INR monitoring given efforts to reduce the spread of COVID-19. Patient notified. Clementina Negron.  Figueroa Swan, PharmD, BCPS  [facility]  Ph: [phone]  5/4/2020 9:37 AM

## 2020-05-05 ENCOUNTER — ANTI-COAG VISIT (OUTPATIENT)
Dept: PHARMACY | Age: 80
End: 2020-05-05
Payer: MEDICARE

## 2020-05-05 DIAGNOSIS — I48.20 CHRONIC ATRIAL FIBRILLATION (HCC): ICD-10-CM

## 2020-05-05 LAB
INR BLD: 3.6 (ref 0.86–1.14)
PROTHROMBIN TIME: 42.3 SEC (ref 10–13.2)

## 2020-05-05 PROCEDURE — 99212 OFFICE O/P EST SF 10 MIN: CPT

## 2020-05-05 NOTE — PROGRESS NOTES
ANTICOAGULATION SERVICE    Aliya Avelar" is a 79 y.o. male with PMHx significant for chronic AF ([id number]-XGHg of 4), HTN who presents to clinic 5/5/2020 for anticoagulation monitoring and adjustment.     Anticoagulation Indication(s):  Afib    Referring Physician:  Dr. David Kamara    Goal INR Range:  2-3  Duration of Anticoagulation Therapy:  Indefinite  Time of day dose taken:  PM  Product patient has at home:  warfarin 5 mg (PEACH color)    INR Summary                           Warfarin regimen (mg)  Date INR   A/P   Sun Mon Tue Wed Thu Fri Sat Mg/wk  5/4 3.6 Above goal, holdx1 5 5 2.5 5 2.5 5 2.5 27.5  3/24 2.6 At goal, no change 5 5 2.5 5 2.5 5 2.5 27.5  3/3 1.8 Below goal, continue   5 5 2.5 5 2.5 5 2.5 27.5  2/4 3.0 At goal, no change 5 5 2.5 5 2.5 5 2.5 27.5  1/21 2.7 At goal, continue  5 5 2.5 5 2.5 5 2.5 27.5  1/14 1.3 Below goal, bolus x1 5 5 7.5/2.5 5 2.5 5 2.5 27.5  12/10 1.9 Below goal, continue 5 5 2.5 5 2.5 5 2.5 27.5  11/12 2.1 At goal, no change 5 5 2.5 5 2.5 5 2.5 27.5  10/15 2.7 At goal, continue 5 5 2.5 5 2.5 5 2.5 27.5  9/17 1.9 Below goal, continue 5 5 2.5 5 2.5 5 2.5 27.5  8/20 2.2 At goal, continue 5 5 2.5 5 2.5 5 2.5 27.5  7/23 1.9 Below goal, continue 5 5 2.5 5 2.5 5 2.5 27.5  6/25 2.0 At goal, no change 5 5 2.5 5 2.5 5 2.5 27.5   6/11 2.3 At goal, no change 5 5 2.5 5 2.5 5 2.5 27.5  5/28 3.8 Above goal, holdx1 5 5 2.5 5 2.5 5 2.5 27.5  4/30 1.8 Below goal, continue 5 5 2.5 5 2.5 5 2.5 27.5  4/2 2.2 At goal, no change 5 5 2.5 5 2.5 5 2.5 27.5  3/5 2.6 At goal, no change  5 5 2.5 5 2.5 5 2.5 27.5  2/5 3.0 At goal, no change 5 5 2.5 5 2.5 5 2.5 27.5   1/8 2.2 At goal, no change 5 5 2.5 5 2.5 5 2.5 27.5   12/11 2.5 At goal, no change 5 5 2.5 5 2.5 5 2.5 27.5   11/13 2.3 At goal, no change 5 5 2.5 5 2.5 5 2.5 27.5   10/16 2.9 At goal, no change 5 5 2.5 5 2.5 5 2.5 27.5   9/18 2.2 At goal, no change 5 5 2.5 5 2.5 5 2.5 27.5   8/22 2.2 At goal, no change 5 5 2.5 5 2.5 5 2.5 27.5   8/2 2.3 At (may increase INR)  MVI (may contain vit K)  ASA (stent placement 10/30/17)   Warfarin dose taken as prescribed Yes - uses pillbox  held warfarin 1/2-1/6 and bridged with Lovenox through 1/11   Signs/symptoms of bleeding Hematuria resolved in December - patient states he was told by his urologist that as long as he is taking warfarin, hematuria may continue. H/o epistaxis, occasional hemorrhoids, anemia. Vitamin K intake Normally has broccoli, asparagus, kale/gay salads 3-4 per week  9/17: 7 servings in past week. 10/15: returned to baseline 3-4 servings per week. 12/10: \"a lot of greens\"- broccoli 3 days in a row+salads  1/14: 5 servings in past week including rory greens  3/3: 2 servings of green day before. 5/5: 1 serving in past week   Recent vomiting/diarrhea/fever, changes in weight or activity level Trying to improve diet/exercise and lose weight gradually. Increased activity   Tobacco or alcohol use Patient denies tobacco use  Will have 1 glass of wine per day max   Upcoming surgeries or procedures None reported     Assessment/Plan:   Patient's INR was supratherapeutic today (3.6), likely due to decreased vit k intake and increased tylenol use. Patient's pain is subsiding, so he plans to cut back on tylenol use. He also plans to return to previous vit K intake. Patient denies missed/incorrect warfarin doses, recent illness, or bleeding. Because he has been stable on current dose for >1 year, patient was instructed to continue warfarin 2.5 mg on Tu/Th/Sa and 5 mg all other days. Repeat INR in 2 weeks. Patient was reminded to maintain consistent vitamin K intake and call with any bleeding, medication changes, or fever/vomiting/diarrhea. Per pt request, reviewed patient's med list including indications and directions. Discussed need for PPI. Pt states he has cut back to pantoprazole QOD and has not had any further GERD sxs.  Suggested he consider d/c of medication as he denies any hx of GI

## 2020-05-12 ENCOUNTER — TELEPHONE (OUTPATIENT)
Dept: PHARMACY | Age: 80
End: 2020-05-12

## 2020-05-18 ENCOUNTER — ANTI-COAG VISIT (OUTPATIENT)
Dept: PHARMACY | Age: 80
End: 2020-05-18
Payer: MEDICARE

## 2020-05-18 LAB — INTERNATIONAL NORMALIZATION RATIO, POC: 2

## 2020-05-18 PROCEDURE — 99211 OFF/OP EST MAY X REQ PHY/QHP: CPT

## 2020-05-18 PROCEDURE — 85610 PROTHROMBIN TIME: CPT

## 2020-05-18 NOTE — PROGRESS NOTES
change 5 5 2.5 5 2.5 5 2.5 27.5   8/22 2.2 At goal, no change 5 5 2.5 5 2.5 5 2.5 27.5   8/2 2.3 At goal, no change 5 5 2.5 5 2.5 5 2.5 27.5  7/19 2.5 At goal, no change 5 5 2.5 5 2.5 5 2.5 27.5  7/12 4.4 Above goal, hold x 1 5 5 2.5 5 0/2.5 5 2.5 27.5  6/12 2.1 At goal, no change 5 5 2.5 5 2.5 5 2.5 27.5    Lab Results   Component Value Date    RBC 3.64 (L) 01/07/2020    HGB 13.1 (L) 01/07/2020    HCT 39.7 (L) 01/07/2020    .1 (H) 01/07/2020    MCH 36.1 (H) 01/07/2020    MPV 8.1 01/07/2020    RDW 12.9 01/07/2020     01/07/2020     [id number] Score for Atrial Fibrillation Stroke Risk   Risk   Factors  Component Value   C CHF No 0   H HTN Yes 1   A2 Age >= 76 Yes,  (79 y.o.) 2   D DM No 0   S2 Prior Stroke/TIA No 0   V Vascular Disease Yes 1   A Age 74-69 No,  (79 y.o.) 0   Sc Sex male 0    [id number]  Score  4   Score last updated 56/65/94 95:65 AM    Click here for a link to the UpToDate guideline \"Atrial Fibrillation: Anticoagulation therapy to prevent embolization    Disclaimer: Risk Score calculation is dependent on accuracy of patient problem list and past encounter diagnosis. Patient History:  Recent hospitalizations/HC visits 3/12 OV Dr Cassi Christine PCP  2/19 OV with Dr. Sandy Rob - no change in meds  Dr Mortimer Lars 12/17/19 for bone marrow biopsy  Dr Mortimer Lars 1/7/19 for bone marrow biopsy  -12/6 echo    - 4/24-4/25/[facility] for suspected GIB (drop in Hg to 8.5 from 13.4 in November). EGD 4/25 significant for: 2 small antral erosions, moderate hiatal hernia, slightly irregular Z-line, small distal duodenal polyp. Patient d/c off of plavix and warfarin in preparation for outpt EGD with biopsies and colonoscopy. EGD/Colonoscopy 5/2; no bleeding source, resumed plavix and warfarin on 5/3.  -10/29-11/1/17: [facility] for STEMI, s/p [facility] 10/30 with primary stenting to proximal RCA.     Recent medication changes 5/18 patient has tapered off Tylenol    5/4 More tylenol than usual over past week for sciatica pain (500 mg 2 including indications and directions. Discussed need for PPI. Pt states he has cut back to pantoprazole QOD and has not had any further GERD sxs. Suggested he consider d/c of medication as he denies any hx of GI ulcer or any further GERD sxs. He may use an acid reducer (tums or H2RA) prn sxs. Pt agreed and will discuss with PCP. Patient understands dosing directions and information discussed. Dosing schedule and follow up appointment given to patient. Progress note routed to referring physician's office. Patient acknowledges working in consult agreement with pharmacist as referred by his/her physician. Next Clinic Appointment:  6/16    Thanks! Adrien Garirdo.  Bryan Dela Cruz, NahidD, BCPS  [facility]  Ph: [phone]  5/18/2020 11:22 AM    CLINICAL PHARMACY CONSULT: MED RECONCILIATION/REVIEW ADDENDUM    For Pharmacy Admin Tracking Only    PHSO: Yes  Total # of Interventions Recommended: 0  - Maintenance Safety Lab Monitoring #: 1  Total Interventions Accepted: 0  Time Spent (min): 15

## 2020-06-16 ENCOUNTER — ANTI-COAG VISIT (OUTPATIENT)
Dept: PHARMACY | Age: 80
End: 2020-06-16
Payer: MEDICARE

## 2020-06-16 VITALS — TEMPERATURE: 96.2 F

## 2020-06-16 LAB — INTERNATIONAL NORMALIZATION RATIO, POC: 2.7

## 2020-06-16 PROCEDURE — 85610 PROTHROMBIN TIME: CPT

## 2020-06-16 PROCEDURE — 99211 OFF/OP EST MAY X REQ PHY/QHP: CPT

## 2020-06-16 NOTE — PROGRESS NOTES
Per pt request, reviewed patient's med list including indications and directions. Discussed need for PPI. Pt states he has cut back to pantoprazole QOD and has not had any further GERD sxs. Suggested he consider d/c of medication as he denies any hx of GI ulcer or any further GERD sxs. He may use an acid reducer (tums or H2RA) prn sxs. Pt agreed and will discuss with PCP. Patient understands dosing directions and information discussed. Dosing schedule and follow up appointment given to patient. Progress note routed to referring physician's office. Patient acknowledges working in consult agreement with pharmacist as referred by his/her physician. Next Clinic Appointment:  7/14    Thanks!   Skinny Lynn, PharmD, [facility]  Medication Management Clinic   [address] Ph: [phone]  Angelene Holter Ph: [phone]  6/16/2020 8:48 AM    CLINICAL PHARMACY CONSULT: MED RECONCILIATION/REVIEW ADDENDUM    For Pharmacy Admin Tracking Only    PHSO: Yes  Total # of Interventions Recommended: 0  - Maintenance Safety Lab Monitoring #: 1  Total Interventions Accepted: 0  Time Spent (min): 15

## 2020-06-18 RX ORDER — WARFARIN SODIUM 5 MG/1
TABLET ORAL
Qty: 72 TABLET | Refills: 2 | Status: SHIPPED | OUTPATIENT
Start: 2020-06-18 | End: 2021-03-19

## 2020-07-14 ENCOUNTER — ANTI-COAG VISIT (OUTPATIENT)
Dept: PHARMACY | Age: 80
End: 2020-07-14
Payer: MEDICARE

## 2020-07-14 VITALS — TEMPERATURE: 96.8 F

## 2020-07-14 LAB — INTERNATIONAL NORMALIZATION RATIO, POC: 2.6

## 2020-07-14 PROCEDURE — 85610 PROTHROMBIN TIME: CPT

## 2020-07-14 PROCEDURE — 99211 OFF/OP EST MAY X REQ PHY/QHP: CPT

## 2020-07-14 NOTE — PROGRESS NOTES
ANTICOAGULATION SERVICE    Shivani Olmstead" is a 79 y.o. male with PMHx significant for chronic AF (PBI4MS0-EJZg of 4), HTN who presents to clinic 7/14/2020 for anticoagulation monitoring and adjustment.     Anticoagulation Indication(s):  Afib    Referring Physician:  Dr. Glez Apt    Goal INR Range:  2-3  Duration of Anticoagulation Therapy:  Indefinite  Time of day dose taken:  PM  Product patient has at home:  warfarin 5 mg (PEACH color)    INR Summary                           Warfarin regimen (mg)  Date INR   A/P   Sun Mon Tue Wed Thu Fri Sat Mg/wk  7/14 2.6 At goal, no change 5 5 2.5 5 2.5 5 2.5 27.5  6/16 2.7 At goal, no change 5 5 2.5 5 2.5 5 2.5 27.5  5/18 2.0 At goal, no change 5 5 2.5 5 2.5 5 2.5 27.5  5/4 3.6 Above goal, holdx1 5 5 2.5 5 2.5 5 2.5 27.5  3/24 2.6 At goal, no change 5 5 2.5 5 2.5 5 2.5 27.5  3/3 1.8 Below goal, continue   5 5 2.5 5 2.5 5 2.5 27.5  2/4 3.0 At goal, no change 5 5 2.5 5 2.5 5 2.5 27.5  1/21 2.7 At goal, continue  5 5 2.5 5 2.5 5 2.5 27.5  1/14 1.3 Below goal, bolus x1 5 5 7.5/2.5 5 2.5 5 2.5 27.5  12/10 1.9 Below goal, continue 5 5 2.5 5 2.5 5 2.5 27.5  11/12 2.1 At goal, no change 5 5 2.5 5 2.5 5 2.5 27.5  10/15 2.7 At goal, continue 5 5 2.5 5 2.5 5 2.5 27.5  9/17 1.9 Below goal, continue 5 5 2.5 5 2.5 5 2.5 27.5  8/20 2.2 At goal, continue 5 5 2.5 5 2.5 5 2.5 27.5  7/23 1.9 Below goal, continue 5 5 2.5 5 2.5 5 2.5 27.5  6/25 2.0 At goal, no change 5 5 2.5 5 2.5 5 2.5 27.5   6/11 2.3 At goal, no change 5 5 2.5 5 2.5 5 2.5 27.5  5/28 3.8 Above goal, holdx1 5 5 2.5 5 2.5 5 2.5 27.5  4/30 1.8 Below goal, continue 5 5 2.5 5 2.5 5 2.5 27.5  4/2 2.2 At goal, no change 5 5 2.5 5 2.5 5 2.5 27.5  3/5 2.6 At goal, no change  5 5 2.5 5 2.5 5 2.5 27.5  2/5 3.0 At goal, no change 5 5 2.5 5 2.5 5 2.5 27.5   1/8 2.2 At goal, no change 5 5 2.5 5 2.5 5 2.5 27.5   12/11 2.5 At goal, no change 5 5 2.5 5 2.5 5 2.5 27.5   11/13 2.3 At goal, no change 5 5 2.5 5 2.5 5 2.5 27.5   10/16 2.9 At goal, no change 5 5 2.5 5 2.5 5 2.5 27.5   9/18 2.2 At goal, no change 5 5 2.5 5 2.5 5 2.5 27.5   8/22 2.2 At goal, no change 5 5 2.5 5 2.5 5 2.5 27.5   8/2 2.3 At goal, no change 5 5 2.5 5 2.5 5 2.5 27.5  7/19 2.5 At goal, no change 5 5 2.5 5 2.5 5 2.5 27.5  7/12 4.4 Above goal, hold x 1 5 5 2.5 5 0/2.5 5 2.5 27.5  6/12 2.1 At goal, no change 5 5 2.5 5 2.5 5 2.5 27.5    Lab Results   Component Value Date    RBC 3.64 (L) 01/07/2020    HGB 13.1 (L) 01/07/2020    HCT 39.7 (L) 01/07/2020    .1 (H) 01/07/2020    MCH 36.1 (H) 01/07/2020    MPV 8.1 01/07/2020    RDW 12.9 01/07/2020     01/07/2020     [id number] Score for Atrial Fibrillation Stroke Risk   Risk   Factors  Component Value   C CHF No 0   H HTN Yes 1   A2 Age >= 76 Yes,  (79 y.o.) 2   D DM No 0   S2 Prior Stroke/TIA No 0   V Vascular Disease Yes 1   A Age 74-69 No,  (79 y.o.) 0   Sc Sex male 0    [id number]  Score  4   Score last updated 34/05/78 57:83 AM    Click here for a link to the UpToDate guideline \"Atrial Fibrillation: Anticoagulation therapy to prevent embolization    Disclaimer: Risk Score calculation is dependent on accuracy of patient problem list and past encounter diagnosis. 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 Luis Daniel Penaloza - no change in meds  Dr Valeria Morales 12/17/19 for bone marrow biopsy  Dr Valeria Morales 1/7/19 for bone marrow biopsy  -12/6 echo    - 4/24-4/25/[address] [place] [address] for suspected GIB (drop in Hg to 8.5 from 13.4 in November). EGD 4/25 significant for: 2 small antral erosions, moderate hiatal hernia, slightly irregular Z-line, small distal duodenal polyp. Patient d/c off of plavix and warfarin in preparation for outpt EGD with biopsies and colonoscopy. EGD/Colonoscopy 5/2; no bleeding source, resumed plavix and warfarin on 5/3.  -10/29-11/1/17: [facility] for STEMI, s/p [facility] 10/30 with primary stenting to proximal RCA.     Recent medication changes 6/19 Completed MDP   5/4 More tylenol than usual over past week for sciatica pain (500 mg 2 tablets BID); plans to cut back    Medications taken regularly that may interact with warfarin or alter INR MVI (may contain vit K)  ASA (stent placement 10/30/17)   Warfarin dose taken as prescribed Yes - uses pillbox  held warfarin 1/2-1/6 and bridged with Lovenox through 1/11   Signs/symptoms of bleeding Hematuria resolved in December - patient states he was told by his urologist that as long as he is taking warfarin, hematuria may continue. H/o epistaxis, occasional hemorrhoids, anemia. Vitamin K intake Normally has broccoli, asparagus, kale/gay salads 3-4 per week  9/17: 7 servings in past week. 10/15: returned to baseline 3-4 servings per week. 12/10: \"a lot of greens\"- broccoli 3 days in a row+salads  1/14: 5 servings in past week including rory greens  3/3: 2 servings of green day before. 5/5: 1 serving in past week   Recent vomiting/diarrhea/fever, changes in weight or activity level Trying to improve diet/exercise and lose weight gradually. Tobacco or alcohol use Patient denies tobacco use  Will have 1 glass of wine per day max   Upcoming surgeries or procedures Endoscopy 8/13/20- will need to hold warfarin and bridge with lovenox per Dr Denise Du     Assessment/Plan:   Patient's INR was therapeutic today (2.6). Patient denies missed/incorrect warfarin doses, recent med or diet changes, illness, or bleeding. As INR has been stable on current dose for >1 year, patient was instructed to continue warfarin 2.5 mg on Tu/Th/Sa and 5 mg all other days. Repeat INR in 3 weeks- will discuss lovenox bridge for colonoscopy on 8/13/20. Patient was reminded to maintain consistent vitamin K intake and call with any bleeding, medication changes, or fever/vomiting/diarrhea. Per pt request, reviewed patient's med list including indications and directions. Discussed need for PPI.  Pt states he has cut back to pantoprazole QOD and has not had any further GERD sxs. Suggested he consider d/c of medication as he denies any hx of GI ulcer or any further GERD sxs. He may use an acid reducer (tums or H2RA) prn sxs. Pt agreed and will discuss with PCP. Patient understands dosing directions and information discussed. Dosing schedule and follow up appointment given to patient. Progress note routed to referring physician's office. Patient acknowledges working in consult agreement with pharmacist as referred by his/her physician. Next Clinic Appointment:  8/6    Thanks!   Vicky Booker, PharmD, [facility]  Medication Management [facility]   [address] Ph: [phone]  Earnest Youngblood Ph: [phone]  7/14/2020 9:22 AM    CLINICAL PHARMACY CONSULT: MED RECONCILIATION/REVIEW ADDENDUM    For Pharmacy Admin Tracking Only    PHSO: Yes  Total # of Interventions Recommended: 0  - Maintenance Safety Lab Monitoring #: 1  Total Interventions Accepted: 0  Time Spent (min): 15

## 2020-07-27 ENCOUNTER — OFFICE VISIT (OUTPATIENT)
Dept: CARDIOLOGY CLINIC | Age: 80
End: 2020-07-27
Payer: MEDICARE

## 2020-07-27 VITALS
TEMPERATURE: 98 F | DIASTOLIC BLOOD PRESSURE: 80 MMHG | HEART RATE: 60 BPM | SYSTOLIC BLOOD PRESSURE: 130 MMHG | WEIGHT: 220 LBS | BODY MASS INDEX: 32.49 KG/M2

## 2020-07-27 PROCEDURE — 99214 OFFICE O/P EST MOD 30 MIN: CPT | Performed by: INTERNAL MEDICINE

## 2020-07-27 PROCEDURE — 1123F ACP DISCUSS/DSCN MKR DOCD: CPT | Performed by: INTERNAL MEDICINE

## 2020-07-27 PROCEDURE — G8417 CALC BMI ABV UP PARAM F/U: HCPCS | Performed by: INTERNAL MEDICINE

## 2020-07-27 PROCEDURE — 4040F PNEUMOC VAC/ADMIN/RCVD: CPT | Performed by: INTERNAL MEDICINE

## 2020-07-27 PROCEDURE — 1036F TOBACCO NON-USER: CPT | Performed by: INTERNAL MEDICINE

## 2020-07-27 PROCEDURE — G8428 CUR MEDS NOT DOCUMENT: HCPCS | Performed by: INTERNAL MEDICINE

## 2020-07-27 RX ORDER — FUROSEMIDE 20 MG/1
20 TABLET ORAL DAILY
Qty: 90 TABLET | Refills: 3 | Status: SHIPPED | OUTPATIENT
Start: 2020-07-27 | End: 2021-07-19

## 2020-07-27 NOTE — LETTER
[facility] [place] [address]  Phone: [phone]  Fax: [phone]    Gunjan Molina MD        July 27, 2020        Rhoda De La Cruz YOB: 1940 is cleared from a cardiac standpoint and is considered to be an acceptable risk for their planned surgical procedure. He will need to be bridged with Lovenox. Instructions given to pt. If there are any questions, please feel free to contact my office at [phone].       Sincerely,          Gunjan Molina MD

## 2020-07-27 NOTE — PROGRESS NOTES
Subjective:      Patient ID: Jerrod Alexander is a 79 y.o. male. HPI:  Here for follow up afib/HTN/CAD/PTCA/Non st.  Just problems with arthritis in knees. Still exercising 3x per wk. .   Edema over past month. Mild. Ran out of lasix. .  Wt stable. No sob. No orthopnea/PND. No complaints. No palp/tachycardia. No syncope. No exertional chest pain/sob. BP good at home.      Past Medical History:   Diagnosis Date    Allergic rhinitis     Anemia     Atrial fibrillation ([facility] [address]     Atrial fibrillation (HCC)     Benign non-nodular prostatic hyperplasia with lower urinary tract symptoms 2/9/2017    Benign paroxysmal positional vertigo     BPH (benign prostatic hyperplasia)     BPH (benign prostatic hypertrophy)     Cataract 10/3/2014    Cholelithiasis     [facility] 4/9/2012    Diverticulosis     Dyslipidemia 7/21/2010    Elevated PSA     Erectile dysfunction     Essential hypertension 11/24/2015    Gastroesophageal reflux disease without esophagitis 11/9/2016    GERD (gastroesophageal reflux disease)     Glaucoma 4/5/2013    Hiatal hernia     Hypertension     Lumbar radiculopathy     Osteoarthritis     Peripheral neuropathy 12/7/2012    Proteinuria 7/22/2010    S/P laparoscopic-assisted sigmoidectomy 4/17/2012    Urinary frequency      Past Surgical History:   Procedure Laterality Date    CATARACT REMOVAL WITH IMPLANT Right October 7, 2014    Dr. Williams Aguilar Left October 24, 2014    Dr. Joi Mcdonnell  April 4, 2012    Dr. Mariela Green  May 15, 2013    Dr. Mariela Green     COLONOSCOPY  08/09/2016    Dr. Mariela Green    COLONOSCOPY N/A 02/17/2017    DILATATION, ESOPHAGUS      GLAUCOMA SURGERY Right October 7, 2014    Dr. Camila Guzman Left October 24, 2014    Dr. Jey Lyon SURGICAL HISTORY  2013    port-a-cath removal     PROSTATE BIOPSY  1997    PROSTATE BIOPSY  May 10, 2010    Dr. Marlen Bernal    SIGMOIDECTOMY  2012    Dr. Chet Leo sigmoid colectomy           TUNNELED VENOUS PORT PLACEMENT      TURP  2004    Dr. Idris Gale         Allergies   Allergen Reactions    Naproxen Other (See Comments)     Patient gets nose bleeds. Patient should not take. Patient lost a lot of blood in November due to Naproxen.     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    Occupation: retired -      Comment: as of 2013   Social Needs    Financial resource strain: Not on file    Food insecurity     Worry: Not on file     Inability: Not on file   [employer] needs     Medical: Not on file     Non-medical: Not on file   Tobacco Use    Smoking status: Former Smoker     Packs/day: 0.10     Years: 7.00     Pack years: 0.70     Types: Cigars     Start date:      Last attempt to quit:      Years since quittin.6    Smokeless tobacco: Never Used    Tobacco comment: quit in the 1960s -- former cigar smoker   Substance and Sexual Activity    Alcohol use: Yes     Comment: social    Drug use: No    Sexual activity: Yes     Partners: Female     Comment:  - Susan - 1967   Lifestyle    Physical activity     Days per week: Not on file     Minutes per session: Not on file    Stress: Not on file Relationships    Social connections     Talks on phone: Not on file     Gets together: Not on file     Attends Pentecostalism service: Not on file     Active member of club or organization: Not on file     Attends meetings of clubs or organizations: Not on file     Relationship status: Not on file    Intimate partner violence     Fear of current or ex partner: Not on file     Emotionally abused: Not on file     Physically abused: Not on file     Forced sexual activity: Not on file   Other Topics Concern    Not on file   Social History Narrative    Past Surgical History     TURP: 2004    prostate biopsy - 1997                                                    Last updated by Remington Aguilar MD on 10/29/2008                      Social History     Marital Status:  - 1967     Spouse: Susan    Children: 3      Children's Names: Kristine Mills    Employment Status: retired -     Employer: [employer]    Occupation:  and Communications    Alcohol Use: socially    Drug Use: none    Tobacco Usage: prior smoker    Cigars/Pipes - Years Smoked: 65's & 62's                                                 Last updated by Remington Aguilar MD on 2009                      Family History     mother -  - age 73-73 - coronary artery disease, hypertension, sudden death    father -  - age 80 - ? colon cancer        brother - Otto Gaines -  - age 68 - 2009 - pancreatic cancer, hypertension, CABG, kidney problem    brother - Nunu Mcmillan - small intestinal cancer, hypertension (Belia Nims)    brother - Marisol Blind - hypertension    brother - Kayla Garcia -  - age 77 - 2008 - hypertension, colon cancer    brother -  - age 15 -  in a fire, smoke inhalation injury        sister - Edu Maya - hypertension        son - Nunu Mcmillan - multiple sclerosis (age 37)    son - Nehemiah Mitchell -        daughter - Jignesh Cool - asthma Last updated by Michaell Paget MD on 01/07/2010         Patient has a family history includes Arthritis in his brother; Asthma in his daughter; Cancer in his brother, brother, brother, and father; Rahel Barge in his brother; Coronary Art Dis in his brother and mother; Heart Disease in his brother and mother; Heart Surgery in his brother; High Blood Pressure in his brother, brother, brother, brother, mother, and sister; Hypertension in his brother, brother, brother, brother, mother, sister, and son; Kidney Disease in his brother; Mult Sclerosis in his son. Current Outpatient Medications   Medication Sig Dispense Refill    warfarin (COUMADIN) 5 MG tablet TAKE ONE-HALF TABLET BY MOUTH ON TUESDAYS, THURSDAYS, AND SATURDAYS, AND TAKE 1 TABLET BY MOUTH ALL OTHER DAYS OR AS DIRECTED BY CLINIC 72 tablet 2    metoprolol succinate (TOPROL XL) 200 MG extended release tablet TAKE ONE TABLET BY MOUTH DAILY 90 tablet 2    irbesartan (AVAPRO) 75 MG tablet TAKE ONE TABLET BY MOUTH DAILY 90 tablet 2    atorvastatin (LIPITOR) 40 MG tablet Take 1 tablet by mouth nightly 90 tablet 3    pantoprazole (PROTONIX) 20 MG tablet Take 1 tablet by mouth daily (Patient taking differently: Take 20 mg by mouth every other day ) 90 tablet 3    triamterene-hydrochlorothiazide (MAXZIDE-25) 37.5-25 MG per tablet Take 1 tablet by mouth daily 90 tablet 3    furosemide (LASIX) 20 MG tablet Take 1 tablet by mouth every other day Take before dinner 30 tablet 3    Cyanocobalamin (VITAMIN B-12) 500 MCG LOZG Take 500 mcg by mouth daily      nitroGLYCERIN (NITROSTAT) 0.4 MG SL tablet up to max of 3 total doses.  If no relief after 1 dose, call 911. 25 tablet 3    finasteride (PROSCAR) 5 MG tablet Take 5 mg by mouth daily      aspirin 81 MG tablet Take 81 mg by mouth daily      Cholecalciferol (VITAMIN D) 2000 UNITS CAPS capsule Take 1 capsule by mouth 2 times daily      tamsulosin (FLOMAX) 0.4 MG capsule Take 0.4 mg by mouth Diagnosis Orders   1. Chronic atrial fibrillation     2. CAD in native artery     3. History of PTCA     4. Essential hypertension     5. MI, old           Plan:      CV stable. Edema but ran out of lasix. Refill lasix. Lesvia Staggers HR controlled. No angina  Wt stable. BP good. On AC/ASA. No bleeding issues. Needs colonoscopy will stop coumadin and bridge. Watch salt. Encouraged diet, and wt loss. No changes. Reviewed previous records and testing including cath 10/17 and echo 12/18. Follow up 3 months.

## 2020-07-28 ENCOUNTER — TELEPHONE (OUTPATIENT)
Dept: PHARMACY | Age: 80
End: 2020-07-28

## 2020-07-28 NOTE — TELEPHONE ENCOUNTER
[address] \"AL\" is a 79 y.o. male with PMHx significant for chronic AF (CVQ1UA0-YTEo of 4), HTN. Patient is scheduled for an Endoscopy on 8/13/20 and was instructed to hold warfarin for 5 days prior to procedure. Patient was instructed to bridge with Lovenox per Dr Adali Perera. Will review the following plan with patient at upcoming visit. Anticoagulation Indication(s):  Afib    Goal INR Range:  2-3  Current warfarin dosage: 2.5 mg on Tu/Th/Sa, 5 mg all other days  3/13/20 Scr= 1.01, est crcl ~57.4 ml/min (using IBW)  Current weight (kg): 99.8 kg  Enoxaparin dosage:  100 mg SQ q12h  Date Days to procedure AM PM   Fri 8/7 -6 None LAST dose of warfarin   Sat 8/8 -5 None None   Sun 8/9 -4 None Lovenox 100 mg   Mon 8/10 -3 Lovenox 100 mg  Lovenox 100 mg    Tue 8/11 -2 Lovenox 100 mg Lovenox 100 mg   Wed 8/12 -1 Lovenox 100 mg  None   Th 8/13 Procedure None Warfarin 2.5 mg*    Fri 8/14 +1 Lovenox 100 mg*  Lovenox 100 mg   Warfarin 5 mg    Sat 8/15 +2 Lovenox 100 mg    Lovenox 100 mg   Warfarin 2.5 mg    Sun 8/16 +3 Lovenox 100 mg    Lovenox 100 mg   Warfarin 5 mg    Mon 8/17 +4 Lovenox 100 mg    Lovenox 100 mg   Warfarin 5 mg    Tue 8/18 +5 INR check               *Resume warfarin and Lovenox when safe at the discretion of surgeon. Rx for Lovenox will be sent to patient's preferred pharmacy.      Katleyn Martinez, PharmD, [facility]  Medication Management Clinic   [address] Ph: [phone]  Jonh Santoyo Ph: [phone]  7/28/2020 5:22 PM      CLINICAL PHARMACY CONSULT: MED RECONCILIATION/REVIEW ADDENDUM    For Pharmacy Admin Tracking Only    PHSO: Yes  Total # of Interventions Recommended: 0  Total Interventions Accepted: 0  Time Spent (min): 15    Leonides Chahal PharmD

## 2020-08-04 ENCOUNTER — TELEPHONE (OUTPATIENT)
Dept: CARDIOLOGY CLINIC | Age: 80
End: 2020-08-04

## 2020-08-04 NOTE — TELEPHONE ENCOUNTER
He is cleared from a cardiac standpoint and should be bridged with Lovenox. Instructions given to pt and Rx sent to pharmacy.

## 2020-08-06 ENCOUNTER — ANTI-COAG VISIT (OUTPATIENT)
Dept: PHARMACY | Age: 80
End: 2020-08-06
Payer: MEDICARE

## 2020-08-06 VITALS — TEMPERATURE: 96.8 F

## 2020-08-06 LAB — INTERNATIONAL NORMALIZATION RATIO, POC: 1.5

## 2020-08-06 PROCEDURE — 85610 PROTHROMBIN TIME: CPT

## 2020-08-06 PROCEDURE — 99213 OFFICE O/P EST LOW 20 MIN: CPT

## 2020-08-06 NOTE — PROGRESS NOTES
change 5 5 2.5 5 2.5 5 2.5 27.5   10/16 2.9 At goal, no change 5 5 2.5 5 2.5 5 2.5 27.5   9/18 2.2 At goal, no change 5 5 2.5 5 2.5 5 2.5 27.5   8/22 2.2 At goal, no change 5 5 2.5 5 2.5 5 2.5 27.5   8/2 2.3 At goal, no change 5 5 2.5 5 2.5 5 2.5 27.5  7/19 2.5 At goal, no change 5 5 2.5 5 2.5 5 2.5 27.5  7/12 4.4 Above goal, hold x 1 5 5 2.5 5 0/2.5 5 2.5 27.5  6/12 2.1 At goal, no change 5 5 2.5 5 2.5 5 2.5 27.5    Lab Results   Component Value Date    RBC 3.64 (L) 01/07/2020    HGB 13.1 (L) 01/07/2020    HCT 39.7 (L) 01/07/2020    .1 (H) 01/07/2020    MCH 36.1 (H) 01/07/2020    MPV 8.1 01/07/2020    RDW 12.9 01/07/2020     01/07/2020     [id number] Score for Atrial Fibrillation Stroke Risk   Risk   Factors  Component Value   C CHF No 0   H HTN Yes 1   A2 Age >= 76 Yes,  (79 y.o.) 2   D DM No 0   S2 Prior Stroke/TIA No 0   V Vascular Disease Yes 1   A Age 74-69 No,  (79 y.o.) 0   Sc Sex male 0    [id number]  Score  4   Score last updated 33/08/29 61:98 AM    Click here for a link to the UpToDate guideline \"Atrial Fibrillation: Anticoagulation therapy to prevent embolization    Disclaimer: Risk Score calculation is dependent on accuracy of patient problem list and past encounter diagnosis. 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 Russ Hansen - no change in meds  Dr Vandana Rivera 12/17/19 for bone marrow biopsy  Dr Vandana Rivrea 1/7/19 for bone marrow biopsy  -12/6 echo    - 4/24-4/25/18[facility] for suspected GIB (drop in Hg to 8.5 from 13.4 in November). EGD 4/25 significant for: 2 small antral erosions, moderate hiatal hernia, slightly irregular Z-line, small distal duodenal polyp. Patient d/c off of plavix and warfarin in preparation for outpt EGD with biopsies and colonoscopy.  EGD/Colonoscopy 5/2; no bleeding source, resumed plavix and warfarin on 5/3.  -10/29-11/1/17: [facility] for STEMI, s/p [facility] 10/30 with primary stenting to proximal additional lovenox sent to patient's pharmacy so patient has a total of 14 syringes. Repeat INR 8/18. Patient was reminded to maintain consistent vitamin K intake and call with any bleeding, medication changes, or fever/vomiting/diarrhea. Date Days to procedure AM PM   Fri 8/7 -6 None LAST dose of warfarin   Sat 8/8 -5 None None   Sun 8/9 -4 None Lovenox 100 mg   Mon 8/10 -3 Lovenox 100 mg  Lovenox 100 mg    Tue 8/11 -2 Lovenox 100 mg Lovenox 100 mg   Wed 8/12 -1 Lovenox 100 mg  None   Th 8/13 Procedure None Warfarin 2.5 mg*    Fri 8/14 +1 Lovenox 100 mg*  Lovenox 100 mg   Warfarin 5 mg    Sat 8/15 +2 Lovenox 100 mg    Lovenox 100 mg   Warfarin 5 mg    Sun 8/16 +3 Lovenox 100 mg    Lovenox 100 mg   Warfarin 5 mg    Mon 8/17 +4 Lovenox 100 mg    Lovenox 100 mg   Warfarin 5 mg    Tue 8/18 +5 INR check           *Resume warfarin and Lovenox when safe at the discretion of surgeon. Per pt request, reviewed patient's med list including indications and directions. Discussed need for PPI. Pt states he has cut back to pantoprazole QOD and has not had any further GERD sxs. Suggested he consider d/c of medication as he denies any hx of GI ulcer or any further GERD sxs. He may use an acid reducer (tums or H2RA) prn sxs. Pt agreed and will discuss with PCP. Patient understands dosing directions and information discussed. Dosing schedule and follow up appointment given to patient. Progress note routed to referring physician's office. Patient acknowledges working in consult agreement with pharmacist as referred by his/her physician. Next Clinic Appointment:  8/18    Thanks!   Elvira Portillo, PharmD, [facility]  [facility]   [address] Ph: [phone]  Roxanne Campbell Ph: [phone]  8/6/2020 8:56 AM    CLINICAL PHARMACY CONSULT: MED RECONCILIATION/REVIEW ADDENDUM    For Pharmacy Admin Tracking Only    PHSO: Yes  Total # of Interventions Recommended: 2  - Discontinued Medication #: 1 Discontinue Reason(s): Unsafe Therapy  - New Order #: 1 New Medication Order Reason(s): Needs Additional Medication Therapy  - Maintenance Safety Lab Monitoring #: 1  Total Interventions Accepted: 2  Time Spent (min): 30    Nahid CernaD

## 2020-08-07 ENCOUNTER — NURSE ONLY (OUTPATIENT)
Dept: PRIMARY CARE CLINIC | Age: 80
End: 2020-08-07
Payer: MEDICARE

## 2020-08-07 PROCEDURE — 99211 OFF/OP EST MAY X REQ PHY/QHP: CPT | Performed by: NURSE PRACTITIONER

## 2020-08-08 LAB — SARS-COV-2, NAA: NOT DETECTED

## 2020-08-09 ENCOUNTER — ANESTHESIA EVENT (OUTPATIENT)
Dept: ENDOSCOPY | Age: 80
End: 2020-08-09
Payer: MEDICARE

## 2020-08-13 ENCOUNTER — ANESTHESIA (OUTPATIENT)
Dept: ENDOSCOPY | Age: 80
End: 2020-08-13
Payer: MEDICARE

## 2020-08-13 ENCOUNTER — HOSPITAL ENCOUNTER (OUTPATIENT)
Age: 80
Setting detail: OUTPATIENT SURGERY
Discharge: HOME | End: 2020-08-13
Attending: INTERNAL MEDICINE | Admitting: INTERNAL MEDICINE
Payer: MEDICARE

## 2020-08-13 VITALS — OXYGEN SATURATION: 99 % | DIASTOLIC BLOOD PRESSURE: 77 MMHG | SYSTOLIC BLOOD PRESSURE: 114 MMHG

## 2020-08-13 VITALS
HEIGHT: 69 IN | RESPIRATION RATE: 15 BRPM | DIASTOLIC BLOOD PRESSURE: 93 MMHG | WEIGHT: 220 LBS | TEMPERATURE: 96.9 F | OXYGEN SATURATION: 95 % | BODY MASS INDEX: 32.58 KG/M2 | SYSTOLIC BLOOD PRESSURE: 147 MMHG | HEART RATE: 97 BPM

## 2020-08-13 PROCEDURE — 6360000002 HC RX W HCPCS: Performed by: NURSE ANESTHETIST, CERTIFIED REGISTERED

## 2020-08-13 PROCEDURE — 3700000001 HC ADD 15 MINUTES (ANESTHESIA): Performed by: INTERNAL MEDICINE

## 2020-08-13 PROCEDURE — 3609027000 HC COLONOSCOPY: Performed by: INTERNAL MEDICINE

## 2020-08-13 PROCEDURE — 2580000003 HC RX 258: Performed by: NURSE ANESTHETIST, CERTIFIED REGISTERED

## 2020-08-13 PROCEDURE — 2580000003 HC RX 258: Performed by: ANESTHESIOLOGY

## 2020-08-13 PROCEDURE — 7100000010 HC PHASE II RECOVERY - FIRST 15 MIN: Performed by: INTERNAL MEDICINE

## 2020-08-13 PROCEDURE — 7100000011 HC PHASE II RECOVERY - ADDTL 15 MIN: Performed by: INTERNAL MEDICINE

## 2020-08-13 PROCEDURE — 3700000000 HC ANESTHESIA ATTENDED CARE: Performed by: INTERNAL MEDICINE

## 2020-08-13 RX ORDER — LIDOCAINE HYDROCHLORIDE 20 MG/ML
INJECTION, SOLUTION INTRAVENOUS PRN
Status: DISCONTINUED | OUTPATIENT
Start: 2020-08-13 | End: 2020-08-13 | Stop reason: SDUPTHER

## 2020-08-13 RX ORDER — PROPOFOL 10 MG/ML
INJECTION, EMULSION INTRAVENOUS CONTINUOUS PRN
Status: DISCONTINUED | OUTPATIENT
Start: 2020-08-13 | End: 2020-08-13 | Stop reason: SDUPTHER

## 2020-08-13 RX ORDER — PROPOFOL 10 MG/ML
INJECTION, EMULSION INTRAVENOUS PRN
Status: DISCONTINUED | OUTPATIENT
Start: 2020-08-13 | End: 2020-08-13 | Stop reason: SDUPTHER

## 2020-08-13 RX ORDER — SODIUM CHLORIDE, SODIUM LACTATE, POTASSIUM CHLORIDE, CALCIUM CHLORIDE 600; 310; 30; 20 MG/100ML; MG/100ML; MG/100ML; MG/100ML
INJECTION, SOLUTION INTRAVENOUS CONTINUOUS PRN
Status: DISCONTINUED | OUTPATIENT
Start: 2020-08-13 | End: 2020-08-13 | Stop reason: SDUPTHER

## 2020-08-13 RX ORDER — SODIUM CHLORIDE, SODIUM LACTATE, POTASSIUM CHLORIDE, CALCIUM CHLORIDE 600; 310; 30; 20 MG/100ML; MG/100ML; MG/100ML; MG/100ML
INJECTION, SOLUTION INTRAVENOUS CONTINUOUS
Status: DISCONTINUED | OUTPATIENT
Start: 2020-08-13 | End: 2020-08-13 | Stop reason: HOSPADM

## 2020-08-13 RX ADMIN — LIDOCAINE HYDROCHLORIDE 50 MG: 20 INJECTION, SOLUTION INTRAVENOUS at 09:33

## 2020-08-13 RX ADMIN — PROPOFOL 20 MG: 10 INJECTION, EMULSION INTRAVENOUS at 09:35

## 2020-08-13 RX ADMIN — SODIUM CHLORIDE, SODIUM LACTATE, POTASSIUM CHLORIDE, AND CALCIUM CHLORIDE: 600; 310; 30; 20 INJECTION, SOLUTION INTRAVENOUS at 09:24

## 2020-08-13 RX ADMIN — PROPOFOL 50 MG: 10 INJECTION, EMULSION INTRAVENOUS at 09:33

## 2020-08-13 RX ADMIN — SODIUM CHLORIDE, SODIUM LACTATE, POTASSIUM CHLORIDE, AND CALCIUM CHLORIDE: 600; 310; 30; 20 INJECTION, SOLUTION INTRAVENOUS at 09:08

## 2020-08-13 RX ADMIN — SODIUM CHLORIDE, SODIUM LACTATE, POTASSIUM CHLORIDE, AND CALCIUM CHLORIDE: 600; 310; 30; 20 INJECTION, SOLUTION INTRAVENOUS at 09:32

## 2020-08-13 RX ADMIN — PROPOFOL 90 MCG/KG/MIN: 10 INJECTION, EMULSION INTRAVENOUS at 09:33

## 2020-08-13 ASSESSMENT — PULMONARY FUNCTION TESTS
PIF_VALUE: 0
PIF_VALUE: 2
PIF_VALUE: 0
PIF_VALUE: 2
PIF_VALUE: 0
PIF_VALUE: 0

## 2020-08-13 ASSESSMENT — PAIN SCALES - GENERAL
PAINLEVEL_OUTOF10: 0
PAINLEVEL_OUTOF10: 0

## 2020-08-13 ASSESSMENT — PAIN SCALES - WONG BAKER: WONGBAKER_NUMERICALRESPONSE: 0

## 2020-08-13 ASSESSMENT — PAIN - FUNCTIONAL ASSESSMENT: PAIN_FUNCTIONAL_ASSESSMENT: 0-10

## 2020-08-13 NOTE — H&P
History and Physical / Pre-Sedation Assessment      PMHx:   Past Medical History:   Diagnosis Date    Allergic rhinitis     Anemia     Atrial fibrillation ([facility]     Atrial fibrillation (HCC)     Benign non-nodular prostatic hyperplasia with lower urinary tract symptoms 2/9/2017    Benign paroxysmal positional vertigo     BPH (benign prostatic hyperplasia)     BPH (benign prostatic hypertrophy)     Cataract 10/3/2014    Cholelithiasis     Colon cancer ([facility] 4/9/2012    Diverticulosis     Dyslipidemia 7/21/2010    Elevated PSA     Erectile dysfunction     Essential hypertension 11/24/2015    Gastroesophageal reflux disease without esophagitis 11/9/2016    GERD (gastroesophageal reflux disease)     Glaucoma 4/5/2013    Hiatal hernia     Hypertension     Lumbar radiculopathy     Osteoarthritis     Peripheral neuropathy 12/7/2012    Proteinuria 7/22/2010    S/P laparoscopic-assisted sigmoidectomy 4/17/2012    Urinary frequency        Medications:   Prior to Admission medications    Medication Sig Start Date End Date Taking? Authorizing Provider   enoxaparin (LOVENOX) 100 MG/ML injection Inject 1 mL into the skin 2 times daily Administer one syring twice daily 12 hours apart.  8/6/20  Yes Chan Francis MD   furosemide (LASIX) 20 MG tablet Take 1 tablet by mouth daily Take before dinner 7/27/20  Yes Chan Francis MD   metoprolol succinate (TOPROL XL) 200 MG extended release tablet TAKE ONE TABLET BY MOUTH DAILY 12/23/19  Yes Mark Mccoy MD   irbesartan (AVAPRO) 75 MG tablet TAKE ONE TABLET BY MOUTH DAILY 7/25/19  Yes Mark Mccoy MD   atorvastatin (LIPITOR) 40 MG tablet Take 1 tablet by mouth nightly 2/26/19  Yes Mark Mccoy MD   pantoprazole (PROTONIX) 20 MG tablet Take 1 tablet by mouth daily  Patient taking differently: Take 20 mg by mouth every other day  2/26/19  Yes Mark Mccoy MD   Cyanocobalamin (VITAMIN B-12) 500 MCG LOZG Take 500 mcg by mouth daily   Yes Historical Provider, MD   finasteride (PROSCAR) 5 MG tablet Take 5 mg by mouth daily   Yes Historical Provider, MD   Cholecalciferol (VITAMIN D) 2000 UNITS CAPS capsule Take 1 capsule by mouth 2 times daily 6/9/17  Yes Eugene Tillman MD   tamsulosin (FLOMAX) 0.4 MG capsule Take 0.4 mg by mouth daily   Yes Historical Provider, MD   Multiple Vitamin (MULTIVITAMIN) capsule Take 1 capsule by mouth daily. 2/6/14  Yes Eugene Tillman MD   warfarin (COUMADIN) 5 MG tablet TAKE ONE-HALF TABLET BY MOUTH ON TUESDAYS, THURSDAYS, AND SATURDAYS, AND TAKE 1 TABLET BY MOUTH ALL OTHER DAYS OR AS DIRECTED BY CLINIC 6/18/20   Chan Francis MD   triamterene-hydrochlorothiazide [facility]) 37.5-25 MG per tablet Take 1 tablet by mouth daily 2/26/19 6/10/19  Mark Mccoy MD   nitroGLYCERIN (NITROSTAT) 0.4 MG SL tablet up to max of 3 total doses. If no relief after 1 dose, call 911. 11/1/17   Ricardo Dwyer MD   aspirin 81 MG tablet Take 81 mg by mouth daily    Historical Provider, MD       Allergies: Allergies   Allergen Reactions    Naproxen Other (See Comments)     Patient gets nose bleeds. Patient should not take. Patient lost a lot of blood in November due to Naproxen.      Ciprofloxacin      Mouth sores and sore throad       PSHx:   Past Surgical History:   Procedure Laterality Date    CATARACT REMOVAL WITH IMPLANT Right October 7, 2014    Dr. Dionicio Mackenzie Left October 24, 2014    Dr. Adam Mustafa  April 4, 2012    Dr. Kasie Hurtado  May 15, 2013    Dr. Kasie Hurtado     COLONOSCOPY  08/09/2016    Dr. Kasie Hurtado    COLONOSCOPY N/A 02/17/2017    DILATATION, ESOPHAGUS      GLAUCOMA SURGERY Right October 7, 2014    Dr. Ludmila Marin Left October 24, 2014    Dr. Waldemar Person  June 28, 2013 port-a-cath removal     PROSTATE BIOPSY  1997    PROSTATE BIOPSY  May 10, 2010    Dr. Latricia Hamm    SIGMOIDECTOMY  2012    Dr. Evelio Stevens sigmoid colectomy           TUNNELED VENOUS PORT PLACEMENT      TURP  2004    Dr. 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    Occupation: retired -      Comment: as of 2013   Social Needs    Financial resource strain: Not on file    Food insecurity     Worry: Not on file     Inability: Not on file   Villij needs     Medical: Not on file     Non-medical: Not on file   Tobacco Use    Smoking status: Former Smoker     Packs/day: 0.10     Years: 7.00     Pack years: 0.70     Types: Cigars     Start date:      Last attempt to quit:      Years since quittin.6    Smokeless tobacco: Never Used    Tobacco comment: quit in the  -- former cigar smoker   Substance and Sexual Activity    Alcohol use: Yes     Comment: social    Drug use: No    Sexual activity: Yes     Partners: Female     Comment:  - Susan - 1967   Lifestyle    Physical activity     Days per week: Not on file     Minutes per session: Not on file    Stress: Not on file   Relationships    Social connections     Talks on phone: Not on file     Gets together: Not on file     Attends Spiritism service: Not on file     Active member of club or organization: Not on file     Attends meetings of clubs or organizations: Not on file Relationship status: Not on file    Intimate partner violence     Fear of current or ex partner: Not on file     Emotionally abused: Not on file     Physically abused: Not on file     Forced sexual activity: Not on file   Other Topics Concern    Not on file   Social History Narrative    Past Surgical History     TURP: 2004    prostate biopsy - 1997                                                    Last updated by Maryam Escamilla MD on 10/29/2008                      Social History     Marital Status:  - 1967     Spouse: Susan    Children: 3      Children's Names: Kamron Ramirez    Employment Status: retired -     Employer: [employer]    Occupation:  and [employer]    Alcohol Use: socially    Drug Use: none    Tobacco Usage: prior smoker    Cigars/Pipes - Years Smoked: 64's & 62's                                                 Last updated by Maryam Escamilla MD on 2009                      Family History     mother -  - age 73-73 - coronary artery disease, hypertension, sudden death    father -  - age 80 - ? colon cancer        brother - [address] [place] [address] -  - age 68 - 2009 - pancreatic cancer, hypertension, CABG, kidney problem    brother - David Jaime - small intestinal cancer, hypertension (Germain Tracy)    brother - Elizabeth Kelle - hypertension    brother - Chiquis Pike -  - age 77 - 2008 - hypertension, colon cancer    brother -  - age 15 -  in a fire, smoke inhalation injury        sister - Chino Net - hypertension        son - David Jaime - multiple sclerosis (age 37)    son - Greg Lord -        daughter - Paty Sánchez - asthma                                  Last updated by Maryam Escamilla MD on 2010        Family Hx:   Family History   Problem Relation Age of Onset    Mult Sclerosis Son     Asthma Daughter     Arthritis Brother     High Blood Pressure Brother     Hypertension Brother    Lucero Valiente Cancer Father         ? ?? colon cancer    Heart Disease Mother     High Blood Pressure Mother     Coronary Art Dis Mother     Hypertension Mother     Cancer Brother         pancreatic cancer    High Blood Pressure Brother     Heart Disease Brother     Kidney Disease Brother     Hypertension Brother     Heart Surgery Brother     Coronary Art Dis Brother     Cancer Brother         small intestinal cancer    High Blood Pressure Brother     Hypertension Brother     Cancer Brother         colon cancer    High Blood Pressure Brother     Colon Cancer Brother     Hypertension Brother     High Blood Pressure Sister     Hypertension Sister     Hypertension Son        Physical Exam:  Vital Signs: /81   Pulse 91   Temp 96.5 °F (35.8 °C) (Temporal)   Resp 16   Ht 5' 9\" (1.753 m)   Wt 220 lb (99.8 kg)   SpO2 96%   BMI 32.49 kg/m²    Airway: Mallampati: II (soft palate, uvula, fauces visible)  Pulmonary:Normal  Cardiac:Normal  Abdomen:Normal    Pre-Procedure Assessment / Plan:  ASA: Class 3 - A patient with severe systemic disease that limits activity but is not incapacitating  Level of Sedation Plan:Deep sedation  Post Procedure plan: Return to same level of care    I assessed the patient and find that the patient is in satisfactory condition to proceed with the planned procedure and sedation plan. I have explained the risk, benefits, and alternatives to the procedure; the patient understands and agrees to proceed.        Oscar Archuleta  8/13/2020

## 2020-08-13 NOTE — OP NOTE
[address] [place] [address]  Colonoscopy Note    Patient: Robert Mario  : 1940     Procedure: Colonoscopy    Date:  2020    Surgeon:  Kitty Marshall MD    Anesthesia:  MAC    Indications:  H/o colon cancer    Procedure: An informed consent was obtained from the patient after explanation of indications, benefits, possible risks and complications of the procedure. The patient was then taken to the endoscopy suite, placed in the left lateral decubitus position, and the above IV anesthesia was administered. A digital rectal examination was performed and revealed negative without mass, lesions or tenderness. The Olympus CFQ-180-AL video colonoscope was placed in the patient's rectum under digital direction and advanced to the right colon ansatamosis. The prep was good. The scope was then withdrawn back through the transverse, descending and sigmoid colons. The scope was then withdrawn into the rectum and retroflexed. The scope was straightened, the colon was decompressed and the scope was withdrawn from the patient. The patient tolerated the procedure well and was taken to the PACU in good condition. Findings:     Anastamosis ws normal   Transverse Colon: nrmal   Descending Colon: mild diverticulosis   Sigmoid Colon: mild diverticulosis   Rectum: medium sized internal hemorrhoids    Estimated Blood Loss: None      Impression:     Follow up colonoscopy in 3 years    Recommendations:     Repeat Colonoscopy in 3 years.     Kitty Marshall, [address]  2020

## 2020-08-13 NOTE — PROGRESS NOTES
Ambulatory Surgery/Procedure Discharge Note    Vitals:    08/13/20 1010   BP: (!) 147/93   Pulse: 97   Resp: 15   Temp: 96.9 °F (36.1 °C)   SpO2: 95%       In: 200 [I.V.:200]  Out: -     Restroom use offered before discharge. Yes    Pain assessment:  none  Pain Level: 0  Patient denies headache and SOB. No pain and passing gas. Tolerating PO well and no nausea. History of Afib and denies chest pain and no signs of resp distress. Patient discharged to home/self care.  Patient discharged via wheel chair by transporter to waiting family/S.O.       8/13/2020 10:22 AM

## 2020-08-13 NOTE — ANESTHESIA PRE PROCEDURE
Department of Anesthesiology  Preprocedure Note       Name:  Glory Gonzalez   Age:  79 y.o.  :  1940                                          MRN:  [id number]         Date:  2020      Surgeon: Sophia Salazar):  Page Prescott MD    Procedure: Procedure(s):  COLONOSCOPY    Medications prior to admission:   Prior to Admission medications    Medication Sig Start Date End Date Taking? Authorizing Provider   enoxaparin (LOVENOX) 100 MG/ML injection Inject 1 mL into the skin 2 times daily Administer one syring twice daily 12 hours apart. 20   Diego Grewal MD   furosemide (LASIX) 20 MG tablet Take 1 tablet by mouth daily Take before dinner 20   Diego Grewal MD   warfarin (COUMADIN) 5 MG tablet TAKE ONE-HALF TABLET BY MOUTH ON , , AND , AND TAKE 1 TABLET BY MOUTH ALL OTHER DAYS OR AS DIRECTED BY CLINIC 20   Diego Grewal MD   metoprolol succinate (TOPROL XL) 200 MG extended release tablet TAKE ONE TABLET BY MOUTH DAILY 19   Cecy Kelly MD   irbesartan (AVAPRO) 75 MG tablet TAKE ONE TABLET BY MOUTH DAILY 19   Cecy Kelly MD   atorvastatin (LIPITOR) 40 MG tablet Take 1 tablet by mouth nightly 19   Cecy Kelly MD   pantoprazole (PROTONIX) 20 MG tablet Take 1 tablet by mouth daily  Patient taking differently: Take 20 mg by mouth every other day  19   Cecy Kelly MD   triamterene-hydrochlorothiazide (MAXZIDE-25) 37.5-25 MG per tablet Take 1 tablet by mouth daily 2/26/19 6/10/19  Cecy Kelly MD   Cyanocobalamin (VITAMIN B-12) 500 MCG LOZG Take 500 mcg by mouth daily    Historical Provider, MD   nitroGLYCERIN (NITROSTAT) 0.4 MG SL tablet up to max of 3 total doses.  If no relief after 1 dose, call 911. 17   Pritesh Solomon MD   finasteride (PROSCAR) 5 MG tablet Take 5 mg by mouth daily    Historical Provider, MD   aspirin 81 MG tablet Take 81 mg by mouth daily    Historical Provider, MD Hospital       Social History:    Social History     Tobacco Use    Smoking status: Former Smoker     Packs/day: 0.10     Years: 7.00     Pack years: 0.70     Types: Cigars     Start date:      Last attempt to quit:      Years since quittin.6    Smokeless tobacco: Never Used    Tobacco comment: quit in the  -- former cigar smoker   Substance Use Topics    Alcohol use: Yes     Comment: social                                Counseling given: Not Answered  Comment: quit in the  -- former cigar smoker      Vital Signs (Current):   Vitals:    20 0809   BP: 128/81   Pulse: 91   Resp: 16   Temp: 96.5 °F (35.8 °C)   TempSrc: Temporal   SpO2: 96%   Weight: 220 lb (99.8 kg)   Height: 5' 9\" (1.753 m)                                              BP Readings from Last 3 Encounters:   20 128/81   20 130/80   20 112/80       NPO Status:                                                                                 BMI:   Wt Readings from Last 3 Encounters:   20 220 lb (99.8 kg)   20 220 lb (99.8 kg)   20 231 lb (104.8 kg)     Body mass index is 32.49 kg/m².     CBC:   Lab Results   Component Value Date    WBC 4.0 2020    RBC 3.64 2020    RBC 4.25 2015    HGB 13.1 2020    HCT 39.7 2020    .1 2020    RDW 12.9 2020     2020       CMP:   Lab Results   Component Value Date     2019    K 4.5 2019     2019    CO2 27 2019    BUN 15 2019    CREATININE 0.9 2019    GFRAA >60 2019    GFRAA >60 2012    AGRATIO 1.3 2019    LABGLOM >60 2019    LABGLOM 68 2012    GLUCOSE 101 2019    GLUCOSE 84 2015    PROT 6.8 2019    PROT 7.0 2015    CALCIUM 9.7 2019    BILITOT 0.8 2019    ALKPHOS 80 2019    AST 16 2019    ALT 20 2019       POC Tests: No results for input(s): POCGLU, POCNA, POCK, POCCL,

## 2020-08-18 ENCOUNTER — ANTI-COAG VISIT (OUTPATIENT)
Dept: PHARMACY | Age: 80
End: 2020-08-18
Payer: MEDICARE

## 2020-08-18 LAB — INTERNATIONAL NORMALIZATION RATIO, POC: 1.2

## 2020-08-18 PROCEDURE — 85610 PROTHROMBIN TIME: CPT | Performed by: PHARMACIST

## 2020-08-18 PROCEDURE — 99212 OFFICE O/P EST SF 10 MIN: CPT | Performed by: PHARMACIST

## 2020-08-18 NOTE — PROGRESS NOTES
ANTICOAGULATION SERVICE    Imani King" is a 79 y.o. male with PMHx significant for chronic AF ([id number] of 4), HTN who presents to clinic 8/18/2020 for anticoagulation monitoring and adjustment.     Anticoagulation Indication(s):  Afib    Referring Physician:  Dr. Niurka Madsen    Goal INR Range:  2-3  Duration of Anticoagulation Therapy:  Indefinite  Time of day dose taken:  PM  Product patient has at home:  warfarin 5 mg (PEACH color)    INR Summary                           Warfarin regimen (mg)  Date INR   A/P   Sun Mon Tue Wed Thu Fri Sat Mg/wk  8/18 1.2 Below goal, bolus+ Lovenox  7.5   5 INR  8/6 1.5 Below goal, see below  7/14 2.6 At goal, no change 5 5 2.5 5 2.5 5 2.5 27.5  6/16 2.7 At goal, no change 5 5 2.5 5 2.5 5 2.5 27.5  5/18 2.0 At goal, no change 5 5 2.5 5 2.5 5 2.5 27.5  5/4 3.6 Above goal, holdx1 5 5 2.5 5 2.5 5 2.5 27.5  3/24 2.6 At goal, no change 5 5 2.5 5 2.5 5 2.5 27.5  3/3 1.8 Below goal, continue   5 5 2.5 5 2.5 5 2.5 27.5  2/4 3.0 At goal, no change 5 5 2.5 5 2.5 5 2.5 27.5  1/21 2.7 At goal, continue  5 5 2.5 5 2.5 5 2.5 27.5  1/14 1.3 Below goal, bolus x1 5 5 7.5/2.5 5 2.5 5 2.5 27.5  12/10 1.9 Below goal, continue 5 5 2.5 5 2.5 5 2.5 27.5  11/12 2.1 At goal, no change 5 5 2.5 5 2.5 5 2.5 27.5  10/15 2.7 At goal, continue 5 5 2.5 5 2.5 5 2.5 27.5  9/17 1.9 Below goal, continue 5 5 2.5 5 2.5 5 2.5 27.5  8/20 2.2 At goal, continue 5 5 2.5 5 2.5 5 2.5 27.5  7/23 1.9 Below goal, continue 5 5 2.5 5 2.5 5 2.5 27.5  6/25 2.0 At goal, no change 5 5 2.5 5 2.5 5 2.5 27.5   6/11 2.3 At goal, no change 5 5 2.5 5 2.5 5 2.5 27.5  5/28 3.8 Above goal, holdx1 5 5 2.5 5 2.5 5 2.5 27.5  4/30 1.8 Below goal, continue 5 5 2.5 5 2.5 5 2.5 27.5  4/2 2.2 At goal, no change 5 5 2.5 5 2.5 5 2.5 27.5  3/5 2.6 At goal, no change  5 5 2.5 5 2.5 5 2.5 27.5  2/5 3.0 At goal, no change 5 5 2.5 5 2.5 5 2.5 27.5   1/8 2.2 At goal, no change 5 5 2.5 5 2.5 5 2.5 27.5   12/11 2.5 At goal, no source, resumed plavix and warfarin on 5/3.  -10/29-11/1/17: [facility] for STEMI, s/p [facility] 10/30 with primary stenting to proximal RCA. Recent medication changes 6/19 Completed MDP   5/4 More tylenol than usual over past week for sciatica pain (500 mg 2 tablets BID); plans to cut back    Medications taken regularly that may interact with warfarin or alter INR MVI (may contain vit K)  ASA (stent placement 10/30/17)   Warfarin dose taken as prescribed Yes - uses pillbox  held warfarin 1/2-1/6 and bridged with Lovenox through 1/11   Signs/symptoms of bleeding Hematuria resolved in December - patient states he was told by his urologist that as long as he is taking warfarin, hematuria may continue. H/o epistaxis, occasional hemorrhoids, anemia. Vitamin K intake Normally has broccoli, asparagus, kale/gay salads 3-4 per week  9/17: 7 servings in past week. 10/15: returned to baseline 3-4 servings per week. 12/10: \"a lot of greens\"- broccoli 3 days in a row+salads  1/14: 5 servings in past week including rory greens  3/3: 2 servings of green day before. 5/5: 1 serving in past week  8/6: increased: brussels, asparagus, 2c cooked greens, broccoli   Recent vomiting/diarrhea/fever, changes in weight or activity level Trying to improve diet/exercise and lose weight gradually. Tobacco or alcohol use Patient denies tobacco use  Will have 1 glass of wine per day max   Upcoming surgeries or procedures None     Assessment/Plan:   Patient's INR was subtherapeutic today (1.2), after 5 day warfarin hold for colonoscopy. Patient denies missed/incorrect warfarin doses, recent med changes, illness, or bleeding. He has been taking warfarin as prescribed + Lovenox BID after procedure. Surprised INR is still 1.2 as patient has taken 5 doses of warfarin since hold. As INR still low, instructed patient to take bolus dose of 7.5mg tonight then continue warfarin 2.5 mg on Tu/Th/Sa and 5 mg all other days.  Also called in another Rx for 10 syringes of Lovenox and instructed patient to continue taking until next appointment in 2 days. May require increase in weekly warfarin dose given INR of 1.5 prior to procedure. Patient was reminded to maintain consistent vitamin K intake and call with any bleeding, medication changes, or fever/vomiting/diarrhea. Per pt request, reviewed patient's med list including indications and directions. Discussed need for PPI. Pt states he has cut back to pantoprazole QOD and has not had any further GERD sxs. Suggested he consider d/c of medication as he denies any hx of GI ulcer or any further GERD sxs. He may use an acid reducer (tums or H2RA) prn sxs. Pt agreed and will discuss with PCP. Patient understands dosing directions and information discussed. Dosing schedule and follow up appointment given to patient. Progress note routed to referring physician's office. Patient acknowledges working in consult agreement with pharmacist as referred by his/her physician. Next Clinic Appointment:  8/20    Prescription called to pharmacy:  [facility]     Rx Phone:  [phone]   Drug / strength: Lovenox 100mg Number of syringes: 10   Sig:  Inject 100mg into the skin twice daily  Refills:   0     Physician:  Kirill Salgado        Thanks!   Rosalinda Titus, PharmD, [facility]  [facility]  [place]: [phone]  [facility]: [phone]  8/18/2020 1:26 PM    CLINICAL PHARMACY CONSULT: MED RECONCILIATION/REVIEW ADDENDUM    For Pharmacy Admin Tracking Only    PHSO: Yes  Total # of Interventions Recommended: 2  - Increased Dose #: 1  - Maintenance Safety Lab Monitoring #: 1  Total Interventions Accepted: 2  Time Spent (min): 30

## 2020-08-20 ENCOUNTER — ANTI-COAG VISIT (OUTPATIENT)
Dept: PHARMACY | Age: 80
End: 2020-08-20
Payer: MEDICARE

## 2020-08-20 VITALS — TEMPERATURE: 97.5 F

## 2020-08-20 LAB — INR BLD: 1.7

## 2020-08-20 PROCEDURE — 99212 OFFICE O/P EST SF 10 MIN: CPT

## 2020-08-20 PROCEDURE — 85610 PROTHROMBIN TIME: CPT

## 2020-08-20 NOTE — PROGRESS NOTES
ANTICOAGULATION SERVICE    Yoly Caicedo" is a 79 y.o. male with PMHx significant for chronic AF ([id number] of 4), HTN who presents to clinic 8/20/2020 for anticoagulation monitoring and adjustment.     Anticoagulation Indication(s):  Afib    Referring Physician:  Dr. Russ Hansen    Goal INR Range:  2-3  Duration of Anticoagulation Therapy:  Indefinite  Time of day dose taken:  PM  Product patient has at home:  warfarin 5 mg (PEACH color)    INR Summary                           Warfarin regimen (mg)  Date INR   A/P   Sun Mon Tue Wed Thu Fri Sat Mg/wk  8/20 1.7 Below goal, bolus 5 5 2.5 5         5/2.5 5 2.5 27.5  8/18 1.2 Below goal, bolus+ Lovenox          7.5/2.5 5 INR  8/6 1.5 Below goal, see below  7/14 2.6 At goal, no change 5 5 2.5 5 2.5 5 2.5 27.5  6/16 2.7 At goal, no change 5 5 2.5 5 2.5 5 2.5 27.5  5/18 2.0 At goal, no change 5 5 2.5 5 2.5 5 2.5 27.5  5/4 3.6 Above goal, holdx1 5 5 2.5 5 2.5 5 2.5 27.5  3/24 2.6 At goal, no change 5 5 2.5 5 2.5 5 2.5 27.5  3/3 1.8 Below goal, continue   5 5 2.5 5 2.5 5 2.5 27.5  2/4 3.0 At goal, no change 5 5 2.5 5 2.5 5 2.5 27.5  1/21 2.7 At goal, continue  5 5 2.5 5 2.5 5 2.5 27.5  1/14 1.3 Below goal, bolus x1 5 5 7.5/2.5 5 2.5 5 2.5 27.5  12/10 1.9 Below goal, continue 5 5 2.5 5 2.5 5 2.5 27.5  11/12 2.1 At goal, no change 5 5 2.5 5 2.5 5 2.5 27.5  10/15 2.7 At goal, continue 5 5 2.5 5 2.5 5 2.5 27.5  9/17 1.9 Below goal, continue 5 5 2.5 5 2.5 5 2.5 27.5  8/20 2.2 At goal, continue 5 5 2.5 5 2.5 5 2.5 27.5  7/23 1.9 Below goal, continue 5 5 2.5 5 2.5 5 2.5 27.5  6/25 2.0 At goal, no change 5 5 2.5 5 2.5 5 2.5 27.5   6/11 2.3 At goal, no change 5 5 2.5 5 2.5 5 2.5 27.5  5/28 3.8 Above goal, holdx1 5 5 2.5 5 2.5 5 2.5 27.5  4/30 1.8 Below goal, continue 5 5 2.5 5 2.5 5 2.5 27.5  4/2 2.2 At goal, no change 5 5 2.5 5 2.5 5 2.5 27.5  3/5 2.6 At goal, no change  5 5 2.5 5 2.5 5 2.5 27.5  2/5 3.0 At goal, no change 5 5 2.5 5 2.5 5 2.5 27.5   1/8 2.2 At goal, no change 5 5 2.5 5 2.5 5 2.5 27.5   12/11 2.5 At goal, no change 5 5 2.5 5 2.5 5 2.5 27.5   11/13 2.3 At goal, no change 5 5 2.5 5 2.5 5 2.5 27.5   10/16 2.9 At goal, no change 5 5 2.5 5 2.5 5 2.5 27.5   9/18 2.2 At goal, no change 5 5 2.5 5 2.5 5 2.5 27.5   8/22 2.2 At goal, no change 5 5 2.5 5 2.5 5 2.5 27.5   8/2 2.3 At goal, no change 5 5 2.5 5 2.5 5 2.5 27.5  7/19 2.5 At goal, no change 5 5 2.5 5 2.5 5 2.5 27.5  7/12 4.4 Above goal, hold x 1 5 5 2.5 5 0/2.5 5 2.5 27.5  6/12 2.1 At goal, no change 5 5 2.5 5 2.5 5 2.5 27.5    Lab Results   Component Value Date    RBC 3.64 (L) 01/07/2020    HGB 13.1 (L) 01/07/2020    HCT 39.7 (L) 01/07/2020    .1 (H) 01/07/2020    MCH 36.1 (H) 01/07/2020    MPV 8.1 01/07/2020    RDW 12.9 01/07/2020     01/07/2020     [id number] Score for Atrial Fibrillation Stroke Risk   Risk   Factors  Component Value   C CHF No 0   H HTN Yes 1   A2 Age >= 76 Yes,  (79 y.o.) 2   D DM No 0   S2 Prior Stroke/TIA No 0   V Vascular Disease Yes 1   A Age 74-69 No,  (79 y.o.) 0   Sc Sex male 0    [id number]  Score  4   Score last updated 54/93/45 10:88 AM    Click here for a link to the UpToDate guideline \"Atrial Fibrillation: Anticoagulation therapy to prevent embolization    Disclaimer: Risk Score calculation is dependent on accuracy of patient problem list and past encounter diagnosis. 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 Reyna Strong - no change in meds  Dr Anna Hawkins 12/17/19 for bone marrow biopsy  Dr Anna Hawkins 1/7/19 for bone marrow biopsy  -12/6 echo    - 4/24-4/25/18[facility] for suspected GIB (drop in Hg to 8.5 from 13.4 in November). EGD 4/25 significant for: 2 small antral erosions, moderate hiatal hernia, slightly irregular Z-line, small distal duodenal polyp.  Patient d/c off of plavix and warfarin in preparation for outpt EGD with biopsies and colonoscopy. EGD/Colonoscopy 5/2; no bleeding source, resumed plavix and warfarin on 5/3.  -10/29-11/1/17: [facility] for STEMI, s/p [facility] 10/30 with primary stenting to proximal RCA. Recent medication changes 6/19 Completed MDP   5/4 More tylenol than usual over past week for sciatica pain (500 mg 2 tablets BID); plans to cut back    Medications taken regularly that may interact with warfarin or alter INR MVI (may contain vit K)  ASA (stent placement 10/30/17)   Warfarin dose taken as prescribed Yes - uses pillbox  held warfarin 1/2-1/6, 8/7-8/12 and bridged with Lovenox    Signs/symptoms of bleeding Hematuria resolved in December - patient states he was told by his urologist that as long as he is taking warfarin, hematuria may continue. H/o epistaxis, occasional hemorrhoids, anemia. Vitamin K intake Normally has broccoli, asparagus, kale/gay salads 3-4 per week  9/17: 7 servings in past week. 10/15: returned to baseline 3-4 servings per week. 12/10: \"a lot of greens\"- broccoli 3 days in a row+salads  1/14: 5 servings in past week including rory greens  3/3: 2 servings of green day before. 5/5: 1 serving in past week  8/6: increased: brussels, asparagus, 2c cooked greens, broccoli   Recent vomiting/diarrhea/fever, changes in weight or activity level Trying to improve diet/exercise and lose weight gradually. Tobacco or alcohol use Patient denies tobacco use  Will have 1 glass of wine per day max   Upcoming surgeries or procedures None     Assessment/Plan:   Patient's INR was subtherapeutic today (1.7), up from 1.2 on 8/18. INR is trending up after 5 day warfarin hold for colonoscopy. Patient denies missed/incorrect warfarin doses, recent med changes, illness, or bleeding. He has been taking warfarin as prescribed + Lovenox BID after procedure. Patient was instructed to take 5 mg tonight only, then resume previously stable dose of 2.5mg Tu/Th/Sa then 5mg all other days.  Patient instructed to continue lovenox for 1 more day, at which time I expect his INR will be close to 2, given upward trend over past 2 days. Patient was reminded to maintain consistent vitamin K intake and call with any bleeding, medication changes, or fever/vomiting/diarrhea. Per pt request, reviewed patient's med list including indications and directions. Discussed need for PPI. Pt states he has cut back to pantoprazole QOD and has not had any further GERD sxs. Suggested he consider d/c of medication as he denies any hx of GI ulcer or any further GERD sxs. He may use an acid reducer (tums or H2RA) prn sxs. Pt agreed and will discuss with PCP. Patient understands dosing directions and information discussed. Dosing schedule and follow up appointment given to patient. Progress note routed to referring physician's office. Patient acknowledges working in consult agreement with pharmacist as referred by his/her physician. Next Clinic Appointment:  8/27    Thanks! Joseph Chicas, Pharm. D Candidate  [facility]  [place]: [address] [place] [address] Street: [phone]  8/20/2020 9:22 AM    CLINICAL PHARMACY CONSULT: MED RECONCILIATION/REVIEW ADDENDUM    For Pharmacy Admin Tracking Only    PHSO: Yes  Total # of Interventions Recommended: 2  - Increased Dose #: 1  - Discontinued Medication #: 1 Discontinue Reason(s): Acute Therapy Complete  - Maintenance Safety Lab Monitoring #: 1  Total Interventions Accepted: 2  Time Spent (min): 20    Kristel Gloria, NahidD

## 2020-08-27 ENCOUNTER — ANTI-COAG VISIT (OUTPATIENT)
Dept: PHARMACY | Age: 80
End: 2020-08-27
Payer: MEDICARE

## 2020-08-27 VITALS — TEMPERATURE: 97.3 F

## 2020-08-27 LAB — INR BLD: 1.5

## 2020-08-27 PROCEDURE — 99212 OFFICE O/P EST SF 10 MIN: CPT

## 2020-08-27 PROCEDURE — 85610 PROTHROMBIN TIME: CPT

## 2020-08-27 NOTE — PROGRESS NOTES
ANTICOAGULATION SERVICE    Shivani Olmstead" is a 79 y.o. male with PMHx significant for chronic AF ([id number]-JREt of 4), HTN who presents to clinic 8/27/2020 for anticoagulation monitoring and adjustment.     Anticoagulation Indication(s):  Afib    Referring Physician:  Dr. Glez Apt    Goal INR Range:  2-3  Duration of Anticoagulation Therapy:  Indefinite  Time of day dose taken:  PM  Product patient has at home:  warfarin 5 mg (PEACH color)    INR Summary                           Warfarin regimen (mg)  Date INR   A/P   Sun Mon Tue Wed Thu Fri Sat Mg/wk  8/27 1.5 Below goal, incr 5 5 2.5 5        7.5/5 5 2.5 30  8/20 1.7 Below goal, bolus 5 5 2.5 5         5/2.5 5 2.5 27.5  8/18 1.2 Below goal, bolus+ Lovenox          7.5/2.5 5 INR  8/6 1.5 Below goal, see below  7/14 2.6 At goal, no change 5 5 2.5 5 2.5 5 2.5 27.5  6/16 2.7 At goal, no change 5 5 2.5 5 2.5 5 2.5 27.5  5/18 2.0 At goal, no change 5 5 2.5 5 2.5 5 2.5 27.5  5/4 3.6 Above goal, holdx1 5 5 2.5 5 2.5 5 2.5 27.5  3/24 2.6 At goal, no change 5 5 2.5 5 2.5 5 2.5 27.5  3/3 1.8 Below goal, continue   5 5 2.5 5 2.5 5 2.5 27.5  2/4 3.0 At goal, no change 5 5 2.5 5 2.5 5 2.5 27.5  1/21 2.7 At goal, continue  5 5 2.5 5 2.5 5 2.5 27.5  1/14 1.3 Below goal, bolus x1 5 5 7.5/2.5 5 2.5 5 2.5 27.5  12/10 1.9 Below goal, continue 5 5 2.5 5 2.5 5 2.5 27.5  11/12 2.1 At goal, no change 5 5 2.5 5 2.5 5 2.5 27.5  10/15 2.7 At goal, continue 5 5 2.5 5 2.5 5 2.5 27.5  9/17 1.9 Below goal, continue 5 5 2.5 5 2.5 5 2.5 27.5  8/20 2.2 At goal, continue 5 5 2.5 5 2.5 5 2.5 27.5  7/23 1.9 Below goal, continue 5 5 2.5 5 2.5 5 2.5 27.5  6/25 2.0 At goal, no change 5 5 2.5 5 2.5 5 2.5 27.5   6/11 2.3 At goal, no change 5 5 2.5 5 2.5 5 2.5 27.5  5/28 3.8 Above goal, holdx1 5 5 2.5 5 2.5 5 2.5 27.5  4/30 1.8 Below goal, continue 5 5 2.5 5 2.5 5 2.5 27.5  4/2 2.2 At goal, no change 5 5 2.5 5 2.5 5 2.5 27.5  3/5 2.6 At goal, no change  5 5 2.5 5 2.5 5 2.5 27.5  2/5 3.0 At goal, no change 5 5 2.5 5 2.5 5 2.5 27.5   1/8 2.2 At goal, no change 5 5 2.5 5 2.5 5 2.5 27.5   12/11 2.5 At goal, no change 5 5 2.5 5 2.5 5 2.5 27.5   11/13 2.3 At goal, no change 5 5 2.5 5 2.5 5 2.5 27.5   10/16 2.9 At goal, no change 5 5 2.5 5 2.5 5 2.5 27.5   9/18 2.2 At goal, no change 5 5 2.5 5 2.5 5 2.5 27.5   8/22 2.2 At goal, no change 5 5 2.5 5 2.5 5 2.5 27.5   8/2 2.3 At goal, no change 5 5 2.5 5 2.5 5 2.5 27.5  7/19 2.5 At goal, no change 5 5 2.5 5 2.5 5 2.5 27.5  7/12 4.4 Above goal, hold x 1 5 5 2.5 5 0/2.5 5 2.5 27.5  6/12 2.1 At goal, no change 5 5 2.5 5 2.5 5 2.5 27.5    Lab Results   Component Value Date    RBC 3.64 (L) 01/07/2020    HGB 13.1 (L) 01/07/2020    HCT 39.7 (L) 01/07/2020    .1 (H) 01/07/2020    MCH 36.1 (H) 01/07/2020    MPV 8.1 01/07/2020    RDW 12.9 01/07/2020     01/07/2020     [id number] Score for Atrial Fibrillation Stroke Risk   Risk   Factors  Component Value   C CHF No 0   H HTN Yes 1   A2 Age >= 76 Yes,  (79 y.o.) 2   D DM No 0   S2 Prior Stroke/TIA No 0   V Vascular Disease Yes 1   A Age 74-69 No,  (79 y.o.) 0   Sc Sex male 0    [id number]  Score  4   Score last updated 21/50/56 68:21 AM    Click here for a link to the UpToDate guideline \"Atrial Fibrillation: Anticoagulation therapy to prevent embolization    Disclaimer: Risk Score calculation is dependent on accuracy of patient problem list and past encounter diagnosis. 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 Edwards Gains - no change in meds  Dr Tulio Silva 12/17/19 for bone marrow biopsy  Dr Tulio Silva 1/7/19 for bone marrow biopsy  -12/6 echo    - 4/24-4/25/18[facility] for suspected GIB (drop in Hg to 8.5 from 13.4 in November). EGD 4/25 significant for: 2 small antral erosions, moderate hiatal hernia, slightly irregular Z-line, small distal duodenal polyp.  Patient d/c off of plavix and warfarin in preparation for outpt EGD with biopsies and colonoscopy. EGD/Colonoscopy 5/2; no bleeding source, resumed plavix and warfarin on 5/3.  -10/29-11/1/17: [facility] for STEMI, s/p [facility] 10/30 with primary stenting to proximal RCA. Recent medication changes 6/19 Completed MDP   5/4 More tylenol than usual over past week for sciatica pain (500 mg 2 tablets BID); plans to cut back   8/27 taking steroid injections in left knee joint space. Medications taken regularly that may interact with warfarin or alter INR MVI (may contain vit K)  ASA (stent placement 10/30/17)   Warfarin dose taken as prescribed Yes - uses pillbox  held warfarin 1/2-1/6, 8/7-8/12 and bridged with Lovenox    Signs/symptoms of bleeding Hematuria resolved in December - patient states he was told by his urologist that as long as he is taking warfarin, hematuria may continue. H/o epistaxis, occasional hemorrhoids, anemia. Vitamin K intake Normally has broccoli, asparagus, kale/gay salads 3-4 per week  9/17: 7 servings in past week. 10/15: returned to baseline 3-4 servings per week. 12/10: \"a lot of greens\"- broccoli 3 days in a row+salads  1/14: 5 servings in past week including rory greens  3/3: 2 servings of green day before. 5/5: 1 serving in past week  8/6: increased: brussels, asparagus, 2c cooked greens, broccoli  8/27: 4 servings. No cooked greens. 2 servings carmita., 2 servings salad   Recent vomiting/diarrhea/fever, changes in weight or activity level Trying to improve diet/exercise and lose weight gradually. Tobacco or alcohol use Patient denies tobacco use  Will have 1 glass of wine per day max   Upcoming surgeries or procedures None     Assessment/Plan:   Patient's INR was subtherapeutic today (1.5), down from 1.7 on 8/20 despite bolus dose last week. Patient denies missed/incorrect warfarin doses, recent med changes, illness, or bleeding.  He had 4 servings of high vit k foods, which is on the upper end of normal for

## 2020-09-03 ENCOUNTER — ANTI-COAG VISIT (OUTPATIENT)
Dept: PHARMACY | Age: 80
End: 2020-09-03
Payer: MEDICARE

## 2020-09-03 VITALS — TEMPERATURE: 97.1 F

## 2020-09-03 LAB — INTERNATIONAL NORMALIZATION RATIO, POC: 2.7

## 2020-09-03 PROCEDURE — 85610 PROTHROMBIN TIME: CPT

## 2020-09-03 PROCEDURE — 99211 OFF/OP EST MAY X REQ PHY/QHP: CPT

## 2020-09-03 NOTE — PROGRESS NOTES
5 5 2.5 5 2.5 5 2.5 27.5  2/5 3.0 At goal, no change 5 5 2.5 5 2.5 5 2.5 27.5   1/8 2.2 At goal, no change 5 5 2.5 5 2.5 5 2.5 27.5     Lab Results   Component Value Date    RBC 3.64 (L) 01/07/2020    HGB 13.1 (L) 01/07/2020    HCT 39.7 (L) 01/07/2020    .1 (H) 01/07/2020    MCH 36.1 (H) 01/07/2020    MPV 8.1 01/07/2020    RDW 12.9 01/07/2020     01/07/2020     [id number] Score for Atrial Fibrillation Stroke Risk   Risk   Factors  Component Value   C CHF No 0   H HTN Yes 1   A2 Age >= 76 Yes,  (79 y.o.) 2   D DM No 0   S2 Prior Stroke/TIA No 0   V Vascular Disease Yes 1   A Age 74-69 No,  (79 y.o.) 0   Sc Sex male 0    [id number]  Score  4   Score last updated 67/18/62 31:42 AM    Click here for a link to the UpToDate guideline \"Atrial Fibrillation: Anticoagulation therapy to prevent embolization    Disclaimer: Risk Score calculation is dependent on accuracy of patient problem list and past encounter diagnosis. 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 EGD 4/25 significant for: 2 small antral erosions, moderate hiatal hernia, slightly irregular Z-line, small distal duodenal polyp. Patient d/c off of plavix and warfarin in preparation for outpt EGD with biopsies and colonoscopy. EGD/Colonoscopy 5/2; no bleeding source, resumed plavix and warfarin on 5/3.  -10/29-11/1/17: [facility] for STEMI, s/p [facility] 10/30 with primary stenting to proximal RCA.     Recent medication changes None reported   Medications taken regularly that may interact with warfarin or alter INR MVI (may contain vit K)  ASA (stent placement 10/30/17)   Warfarin dose taken as prescribed Yes - uses pillbox  held warfarin 1/2-1/6, 8/7-8/12 and bridged with Lovenox    Signs/symptoms of bleeding H/o hematuria- patient states he was told by his urologist that as long as he is taking warfarin, hematuria may continue. H/o epistaxis, occasional hemorrhoids, anemia. Vitamin K intake Normally has broccoli, asparagus, kale/gay salads 3-4 per week    9/17: 7 servings in past week. .   12/10: \"a lot of greens\"- broccoli 3 days in a row+salads  1/14: 5 servings in past week including rory greens  3/3: 2 servings of green day before. 5/5: 1 serving in past week  8/6: brussels, asparagus, 2c cooked greens, broccoli  8/27:  2 servings carmita., 2 servings salad  9/3: only 1 serving broccoli   Recent vomiting/diarrhea/fever, changes in weight or activity level Trying to improve diet/exercise and lose weight gradually. Tobacco or alcohol use Patient denies tobacco use  Will have 1 glass of wine per day max   Upcoming surgeries or procedures None     Assessment/Plan:   Patient's INR was therapeutic today (2.7), up from 1.5 last week. Patient denies missed/incorrect warfarin doses, recent med changes, illness, or bleeding. He cut back to 1 serving of broccoli this week over concern for persistently subtherapeutic INRs, which is likely reason for drastic increase in INR. Patient was instructed to resume normal greens, then continue warfarin 2.5mg Tu/Sa and 5mg all other days. Repeat INR in 2 weeks. Patient was reminded to maintain consistent vitamin K intake and call with any bleeding, medication changes, or fever/vomiting/diarrhea. Per pt request, reviewed patient's med list including indications and directions. Discussed need for PPI. Pt states he has cut back to pantoprazole QOD and has not had any further GERD sxs. Suggested he consider d/c of medication as he denies any hx of GI ulcer or any further GERD sxs. He may use an acid reducer (tums or H2RA) prn sxs. Pt agreed and will discuss with PCP. Patient understands dosing directions and information discussed.  Dosing schedule and follow up appointment given to patient. Progress note routed to referring physician's office. Patient acknowledges working in consult agreement with pharmacist as referred by his/her physician. Next Clinic Appointment:  9/17    Thanks! Bria Molina, Pharm. D Candidate  [facility]  [place]: [address] [place] [address] Street: [phone]  9/3/2020 9:15 AM    CLINICAL PHARMACY CONSULT: MED RECONCILIATION/REVIEW ADDENDUM    For Pharmacy Admin Tracking Only      PHSO: Yes  Total # of Interventions Recommended: 0  - Increased Dose #: 0   - Maintenance Safety Lab Monitoring #: 1  Total Interventions Accepted: 0  Time Spent (min): 15    Bria Molina, NahidD

## 2020-09-17 ENCOUNTER — ANTI-COAG VISIT (OUTPATIENT)
Dept: PHARMACY | Age: 80
End: 2020-09-17
Payer: MEDICARE

## 2020-09-17 VITALS — TEMPERATURE: 96.6 F

## 2020-09-17 LAB — INR BLD: 2.1

## 2020-09-17 PROCEDURE — 99211 OFF/OP EST MAY X REQ PHY/QHP: CPT

## 2020-09-17 PROCEDURE — 85610 PROTHROMBIN TIME: CPT

## 2020-09-17 NOTE — PROGRESS NOTES
ANTICOAGULATION SERVICE    Gideon Rowland" is a 79 y.o. male with PMHx significant for chronic AF ([id number] of 4), HTN who presents to clinic 9/17/2020 for anticoagulation monitoring and adjustment.     Anticoagulation Indication(s):  Afib    Referring Physician:  Dr. Jarett Smith    Goal INR Range:  2-3  Duration of Anticoagulation Therapy:  Indefinite  Time of day dose taken:  PM  Product patient has at home:  warfarin 5 mg (PEACH color)    INR Summary                           Warfarin regimen (mg)  Date INR   A/P   Sun Mon Tue Wed Thu Fri Sat Mg/wk  9/17 2.1 At goal, no change 5 5 2.5 5 5 5 2.5 30  9/3 2.7 At goal, no change 5 5 2.5 5 5 5 2.5 30  8/27 1.5 Below goal, incr 5 5 2.5 5        7.5/5 5 2.5 30  8/20 1.7 Below goal, bolus 5 5 2.5 5         5/2.5 5 2.5 27.5  8/18 1.2 Below goal, bolus+ Lovenox          7.5/2.5 5 INR  8/6 1.5 Below goal, see below  7/14 2.6 At goal, no change 5 5 2.5 5 2.5 5 2.5 27.5  6/16 2.7 At goal, no change 5 5 2.5 5 2.5 5 2.5 27.5  5/18 2.0 At goal, no change 5 5 2.5 5 2.5 5 2.5 27.5  5/4 3.6 Above goal, holdx1 5 5 2.5 5 2.5 5 2.5 27.5  3/24 2.6 At goal, no change 5 5 2.5 5 2.5 5 2.5 27.5  3/3 1.8 Below goal, continue   5 5 2.5 5 2.5 5 2.5 27.5  2/4 3.0 At goal, no change 5 5 2.5 5 2.5 5 2.5 27.5  1/21 2.7 At goal, continue  5 5 2.5 5 2.5 5 2.5 27.5  1/14 1.3 Below goal, bolus x1 5 5 7.5/2.5 5 2.5 5 2.5 27.5  12/10 1.9 Below goal, continue 5 5 2.5 5 2.5 5 2.5 27.5  11/12 2.1 At goal, no change 5 5 2.5 5 2.5 5 2.5 27.5  10/15 2.7 At goal, continue 5 5 2.5 5 2.5 5 2.5 27.5  9/17 1.9 Below goal, continue 5 5 2.5 5 2.5 5 2.5 27.5  8/20 2.2 At goal, continue 5 5 2.5 5 2.5 5 2.5 27.5  7/23 1.9 Below goal, continue 5 5 2.5 5 2.5 5 2.5 27.5  6/25 2.0 At goal, no change 5 5 2.5 5 2.5 5 2.5 27.5   6/11 2.3 At goal, no change 5 5 2.5 5 2.5 5 2.5 27.5  5/28 3.8 Above goal, holdx1 5 5 2.5 5 2.5 5 2.5 27.5  4/30 1.8 Below goal, continue 5 5 2.5 5 2.5 5 2.5 27.5  4/2 2.2 At goal, no change 5 5 2.5 5 2.5 5 2.5 27.5  3/5 2.6 At goal, no change  5 5 2.5 5 2.5 5 2.5 27.5  2/5 3.0 At goal, no change 5 5 2.5 5 2.5 5 2.5 27.5   1/8 2.2 At goal, no change 5 5 2.5 5 2.5 5 2.5 27.5     Lab Results   Component Value Date    RBC 3.64 (L) 01/07/2020    HGB 13.1 (L) 01/07/2020    HCT 39.7 (L) 01/07/2020    .1 (H) 01/07/2020    MCH 36.1 (H) 01/07/2020    MPV 8.1 01/07/2020    RDW 12.9 01/07/2020     01/07/2020     [id number] Score for Atrial Fibrillation Stroke Risk   Risk   Factors  Component Value   C CHF No 0   H HTN Yes 1   A2 Age >= 76 Yes,  (79 y.o.) 2   D DM No 0   S2 Prior Stroke/TIA No 0   V Vascular Disease Yes 1   A Age 74-69 No,  (79 y.o.) 0   Sc Sex male 0    [id number]  Score  4   Score last updated 11/40/57 83:88 AM    Click here for a link to the UpToDate guideline \"Atrial Fibrillation: Anticoagulation therapy to prevent embolization    Disclaimer: Risk Score calculation is dependent on accuracy of patient problem list and past encounter diagnosis. 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 EGD 4/25 significant for: 2 small antral erosions, moderate hiatal hernia, slightly irregular Z-line, small distal duodenal polyp. Patient d/c off of plavix and warfarin in preparation for outpt EGD with biopsies and colonoscopy. EGD/Colonoscopy 5/2; no bleeding source, resumed plavix and warfarin on 5/3.  -10/29-11/1/17: [facility] STEMI, s/p [facility] 10/30 with primary stenting to proximal RCA.     Recent medication changes None reported   Medications taken regularly that may interact with warfarin or alter INR MVI (may contain vit K)  ASA (stent placement 10/30/17)   Warfarin dose taken as prescribed Yes - uses pillbox  held warfarin 1/2-1/6, 8/7-8/12 and bridged with Lovenox    Signs/symptoms of bleeding H/o hematuria- patient states he was told by his urologist that as long as he is taking warfarin, hematuria may continue. H/o epistaxis, occasional hemorrhoids, anemia. Vitamin K intake Normally has broccoli, asparagus, kale/gay salads 3-4 per week      9/17: 7 servings in past week. 12/10: \"a lot of greens\"- [id number] 3 days in a row+salads  1/14: 5 servings in past week including rory greens  3/3: 2 servings of green day before. 5/5: 1 serving in past week  8/6: brussels, asparagus, 2c cooked greens, broccoli  8/27:  2 servings carmita., 2 servings salad  9/3: only 1 serving broccoli  9/17: 3 servings of vit K foods alternates between cooked greens, kale/gay salads   Recent vomiting/diarrhea/fever, changes in weight or activity level Trying to improve diet/exercise and lose weight gradually. Due to covid has not been as active lately   Tobacco or alcohol use Patient denies tobacco use  Will have 1 glass of wine per day max   Upcoming surgeries or procedures None     Assessment/Plan:   Patient's INR was therapeutic today (2.1). Patient denies missed/incorrect warfarin doses, recent med changes, illness, or bleeding. He has been continuing his normal 3 servings of vitamin K foods after cutting back the week of 9/3  to 1 serving of broccoli this week over concern for persistently subtherapeutic INRs, which was likely the reason for the drastic increase in INR. Patient was instructed to be consistent with his intake of normal greens, then continue warfarin 2.5mg Tu/Sa and 5mg all other days. Repeat INR in 3 weeks. Patient was reminded to call with any bleeding, medication changes, or fever/vomiting/diarrhea. Per pt request, reviewed patient's med list including indications and directions. Discussed need for PPI. Pt states he has cut back to pantoprazole QOD and has not had any further GERD sxs.  Suggested he consider d/c of medication as he denies any hx of GI ulcer or any further GERD sxs. He may use an acid reducer (tums or H2RA) prn sxs. Pt agreed and will discuss with PCP. Patient understands dosing directions and information discussed. Dosing schedule and follow up appointment given to patient. Progress note routed to referring physician's office. Patient acknowledges working in consult agreement with pharmacist as referred by his/her physician. Next Clinic Appointment:  10/8    Thanks! Paige Warren, Pharm. D Candidate  [facility]  [place]: [address] [place] [address]: [phone]  9/17/2020 8:03 AM    CLINICAL PHARMACY CONSULT: MED RECONCILIATION/REVIEW ADDENDUM    For Pharmacy Admin Tracking Only      PHSO: Yes  Total # of Interventions Recommended: 0  - Increased Dose #: 0   - Maintenance Safety Lab Monitoring #: 1  Total Interventions Accepted: 0  Time Spent (min): 15    Jalen Ramirez PharmD

## 2020-10-08 ENCOUNTER — ANTI-COAG VISIT (OUTPATIENT)
Dept: PHARMACY | Age: 80
End: 2020-10-08
Payer: MEDICARE

## 2020-10-08 VITALS — TEMPERATURE: 96.2 F

## 2020-10-08 LAB — INTERNATIONAL NORMALIZATION RATIO, POC: 1.8

## 2020-10-08 PROCEDURE — 99212 OFFICE O/P EST SF 10 MIN: CPT

## 2020-10-08 PROCEDURE — 85610 PROTHROMBIN TIME: CPT

## 2020-10-08 NOTE — PROGRESS NOTES
Warfarin dose taken as prescribed Yes - uses pillbox  held warfarin 1/2-1/6, 8/7-8/12 and bridged with Lovenox    Signs/symptoms of bleeding H/o hematuria- patient states he was told by his urologist that as long as he is taking warfarin, hematuria may continue. H/o epistaxis, occasional hemorrhoids, anemia. Vitamin K intake Normally has broccoli, asparagus, kale/gay salads 3-4 per week    9/17: 7 servings in past week. 12/10: \"a lot of greens\"- [id number] 3 days in a row+salads  1/14: 5 servings in past week including rory greens  3/3: 2 servings of green day before. 5/5: 1 serving in past week  8/6: brussels, asparagus, 2c cooked greens, broccoli  8/27:  2 servings carmita., 2 servings salad  9/3: only 1 serving broccoli  9/17: 3 servings of vit K foods alternates between cooked greens, kale/gay salads  10/8: les; 2 servings (brussels and salad)   Recent vomiting/diarrhea/fever, changes in weight or activity level Trying to improve diet/exercise and lose weight gradually. Due to covid has not been as active lately   Tobacco or alcohol use Patient denies tobacco use  Will have 1 glass of wine per day max   Upcoming surgeries or procedures None     Assessment/Plan:   Patient's INR was subtherapeutic today (1.8). Patient denies missed/incorrect warfarin doses, significant med changes, illness, or bleeding. He has eaten less vitamin K foods, which should have elevated INR. Because he plans to return to previous vit k intake (3-4 servings per week), patient was instructed increase warfarin dose to 2.5mg on Saturdays and 5mg all other days. Repeat INR in 3 weeks. Patient was reminded to call with any bleeding, medication changes, or fever/vomiting/diarrhea. Per pt request, reviewed patient's med list including indications and directions. Discussed need for PPI. Pt states he has cut back to pantoprazole QOD and has not had any further GERD sxs.  Suggested he consider d/c of medication as he denies any hx of GI ulcer or any further GERD sxs. He may use an acid reducer (tums or H2RA) prn sxs. Pt agreed and will discuss with PCP. Patient understands dosing directions and information discussed. Dosing schedule and follow up appointment given to patient. Progress note routed to referring physician's office. Patient acknowledges working in consult agreement with pharmacist as referred by his/her physician. Next Clinic Appointment:  10/27    Thanks!   Elder Black, PharmD, [facility]  [facility]   Marilynn Thakur [address] Ph: [phone]  Anamaria Soto Ph: [phone]  10/8/2020 10:00 AM    CLINICAL PHARMACY CONSULT: MED RECONCILIATION/REVIEW ADDENDUM    For Pharmacy Admin Tracking Only    PHSO: Yes  Total # of Interventions Recommended: 1  - Increased Dose #: 1   - Maintenance Safety Lab Monitoring #: 1  Total Interventions Accepted: 1  Time Spent (min): 15    Nahid TanD

## 2020-10-23 ENCOUNTER — APPOINTMENT (OUTPATIENT)
Dept: GENERAL RADIOLOGY | Age: 80
End: 2020-10-23
Payer: MEDICARE

## 2020-10-23 ENCOUNTER — APPOINTMENT (OUTPATIENT)
Dept: CT IMAGING | Age: 80
End: 2020-10-23
Payer: MEDICARE

## 2020-10-23 ENCOUNTER — HOSPITAL ENCOUNTER (EMERGENCY)
Age: 80
Discharge: HOME | End: 2020-10-23
Attending: EMERGENCY MEDICINE
Payer: MEDICARE

## 2020-10-23 VITALS
BODY MASS INDEX: 33.34 KG/M2 | TEMPERATURE: 97.8 F | SYSTOLIC BLOOD PRESSURE: 140 MMHG | WEIGHT: 220 LBS | RESPIRATION RATE: 18 BRPM | HEART RATE: 91 BPM | HEIGHT: 68 IN | DIASTOLIC BLOOD PRESSURE: 87 MMHG | OXYGEN SATURATION: 99 %

## 2020-10-23 PROCEDURE — 73110 X-RAY EXAM OF WRIST: CPT

## 2020-10-23 PROCEDURE — 73200 CT UPPER EXTREMITY W/O DYE: CPT

## 2020-10-23 PROCEDURE — 99283 EMERGENCY DEPT VISIT LOW MDM: CPT

## 2020-10-23 PROCEDURE — 73130 X-RAY EXAM OF HAND: CPT

## 2020-10-23 ASSESSMENT — PAIN DESCRIPTION - ORIENTATION: ORIENTATION: RIGHT

## 2020-10-23 ASSESSMENT — ENCOUNTER SYMPTOMS
VOICE CHANGE: 0
WHEEZING: 0
SHORTNESS OF BREATH: 0
TROUBLE SWALLOWING: 0

## 2020-10-23 ASSESSMENT — PAIN DESCRIPTION - LOCATION: LOCATION: HAND;WRIST

## 2020-10-23 ASSESSMENT — PAIN SCALES - GENERAL: PAINLEVEL_OUTOF10: 7

## 2020-10-23 ASSESSMENT — PAIN DESCRIPTION - PAIN TYPE: TYPE: ACUTE PAIN

## 2020-10-23 NOTE — ED PROVIDER NOTES
[address] [facility]  eMERGENCY dEPARTMENT eNCOUnter      Pt Name: Tatiana Tillman  MRN: [id number]  [place] 1940  Date of evaluation: 10/23/2020  Provider: Carmen Key MD    [address] [place] [address]       Chief Complaint   Patient presents with    Wrist Pain     right     Hand Injury         HISTORY OF PRESENT ILLNESS   (Location/Symptom, Timing/Onset, Context/Setting, Quality, Duration, Modifying Factors, Severity)  Note limiting factors. Tatiana Tillman is a 79 y.o. male with a past medical history including but not limited to atrial fibrillation is currently on aspirin and Coumadin who presents 1 day after suffering a mechanical fall with mild to moderate right hand and wrist pain. The patient reports that he got his foot caught on a rug and stumbled onto his outstretched right hand. He adamantly denies any head neck back or other extremity pain. He reports mild pain and swelling to the right hand and right wrist which has gradually worsened and is now moderate. He reports pain is a moderate, aching, constant, and worsening. Reports bending movement worsens the pain and nothing improves it. He denies any weakness or numbness. HPI    Nursing Notes were reviewed. REVIEW OFSYSTEMS    (2-9 systems for level 4, 10 or more for level 5)     Review of Systems   Constitutional: Negative for fever. HENT: Negative for drooling, trouble swallowing and voice change. Eyes: Negative for visual disturbance. Respiratory: Negative for shortness of breath and wheezing. Cardiovascular: Negative for chest pain and palpitations. Musculoskeletal: Positive for arthralgias. Neurological: Negative for seizures and syncope. Psychiatric/Behavioral: Negative for self-injury and suicidal ideas. Except as noted above the remainder of the review of systems was reviewed and negative.        PAST MEDICAL HISTORY     Past Medical History:   Diagnosis Date    Allergic rhinitis     Anemia sigmoid colectomy           TUNNELED VENOUS PORT PLACEMENT      TURP  April 5, 2004    Dr. Chuck Conley       Previous Medications    ASPIRIN 81 MG TABLET    Take 81 mg by mouth daily    ATORVASTATIN (LIPITOR) 40 MG TABLET    Take 1 tablet by mouth nightly    CHOLECALCIFEROL (VITAMIN D) 2000 UNITS CAPS CAPSULE    Take 1 capsule by mouth 2 times daily    CYANOCOBALAMIN (VITAMIN B-12) 500 MCG LOZG    Take 500 mcg by mouth daily    ENOXAPARIN (LOVENOX) 100 MG/ML INJECTION    Inject 1 mL into the skin 2 times daily Administer one syring twice daily 12 hours apart. FINASTERIDE (PROSCAR) 5 MG TABLET    Take 5 mg by mouth daily    FUROSEMIDE (LASIX) 20 MG TABLET    Take 1 tablet by mouth daily Take before dinner    IRBESARTAN (AVAPRO) 75 MG TABLET    TAKE ONE TABLET BY MOUTH DAILY    METOPROLOL SUCCINATE (TOPROL XL) 200 MG EXTENDED RELEASE TABLET    TAKE ONE TABLET BY MOUTH DAILY    MULTIPLE VITAMIN (MULTIVITAMIN) CAPSULE    Take 1 capsule by mouth daily. NITROGLYCERIN (NITROSTAT) 0.4 MG SL TABLET    up to max of 3 total doses. If no relief after 1 dose, call 911. PANTOPRAZOLE (PROTONIX) 20 MG TABLET    Take 1 tablet by mouth daily    TAMSULOSIN (FLOMAX) 0.4 MG CAPSULE    Take 0.4 mg by mouth daily    TRIAMTERENE-HYDROCHLOROTHIAZIDE (MAXZIDE-25) 37.5-25 MG PER TABLET    Take 1 tablet by mouth daily    WARFARIN (COUMADIN) 5 MG TABLET    TAKE ONE-HALF TABLET BY MOUTH ON TUESDAYS, THURSDAYS, AND SATURDAYS, AND TAKE 1 TABLET BY MOUTH ALL OTHER DAYS OR AS DIRECTED BY CLINIC       ALLERGIES     Naproxen and Ciprofloxacin    FAMILY HISTORY       Family History   Problem Relation Age of Onset    Mult Sclerosis Son     Asthma Daughter     Arthritis Brother     High Blood Pressure Brother     Hypertension Brother     Cancer Father         ? ?? colon cancer    Heart Disease Mother     High Blood Pressure Mother     Coronary Art Dis Mother     Hypertension Mother    [facility] Cancer Brother         pancreatic cancer    High Blood Pressure Brother     Heart Disease Brother     Kidney Disease Brother     Hypertension Brother     Heart Surgery Brother     Coronary Art Dis Brother     Cancer Brother         small intestinal cancer    High Blood Pressure Brother     Hypertension Brother     Cancer Brother         colon cancer    High Blood Pressure Brother     Colon Cancer Brother     Hypertension Brother     High Blood Pressure Sister     Hypertension Sister     Hypertension Son           SOCIAL HISTORY       Social History     Socioeconomic History    Marital status:      Spouse name: Isma Ozuna Number of children: 3    Years of education: None    Highest education level: None   Occupational History    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    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    Occupation: retired -      Comment: as of 2013   Social Needs    Financial resource strain: None    Food insecurity     Worry: None     Inability: None    Transportation needs     Medical: None     Non-medical: None   Tobacco Use    Smoking status: Former Smoker     Packs/day: 0.10     Years: 7.00     Pack years: 0.70     Types: Cigars     Start date:      Last attempt to quit:      Years since quittin.8    Smokeless tobacco: Never Used    Tobacco comment: quit in the  -- former cigar smoker   Substance and Sexual Activity    Alcohol use: Yes     Comment: social    Drug use: No    Sexual activity: Yes     Partners: Female     Comment:  - Susan - 1967   Lifestyle    Physical activity     Days per week: None     Minutes per session: None    Stress: None   Relationships    Social connections     Talks on phone: None     Gets together: None     Attends Roman Catholic service: None     Active member of club or organization: None     Attends meetings of clubs or organizations: None     Relationship status: None    Intimate partner violence     Fear of current or ex partner: None     Emotionally abused: None     Physically abused: None     Forced sexual activity: None   Other Topics Concern    None   Social History Narrative    Past Surgical History     TURP: 2004    prostate biopsy - 1997                                                    Last updated by Primo Christensen MD on 10/29/2008                      Social History     Marital Status:  - 1967     Spouse: Susan    Children: 3      Children's Names: Virgie Valencia, Charissa Machado    Employment Status: retired -     Employer: [employer]    Occupation:  and [place]    Alcohol Use: socially    Drug Use: none    Tobacco Usage: prior smoker    Cigars/Pipes - Years Smoked: 65's & 62's                                                 Last updated by Primo Christensen MD on 2009                      Family History     mother -  - age 73-73 - coronary artery disease, hypertension, sudden death    father -  - age 80 - ? colon cancer        brother - Magalie Bueno -  - age 68 - 2009 - pancreatic cancer, hypertension, CABG, kidney problem    brother - Ronald Lang - small intestinal cancer, hypertension (Lyndia Mary)    brother - Marc Mccarty - hypertension    brother - Vicente Cueva -  - age 77 - 2008 - hypertension, colon cancer    brother -  - age 15 -  in a fire, smoke inhalation injury        sister - Kari Miller - hypertension        son - Ronald Lang - multiple sclerosis (age 37)    son - Hali Holter -        daughter - hCarissa Machado - asthma                                  Last updated by Prmio Christensen MD on 2010          PHYSICAL EXAM    (up to 7 for level 4, 8 or more for level 5)     ED Triage Vitals [10/23/20 1258]   BP Temp Temp Source Pulse Resp SpO2 Height Weight   (!) 140/87 97.8 °F (36.6 °C) Oral 91 18 99 % 5' 8\" (1.727 m) 220 lb (99.8 kg)       Physical Exam  Vitals signs and nursing note reviewed. Constitutional:       General: He is not in acute distress. Appearance: He is well-developed. HENT:      Head: Normocephalic and atraumatic. Comments: No raccoon sign or ordonez sign. No scalp hematomas. No signs of trauma to the head or neck. Eyes:      Conjunctiva/sclera: Conjunctivae normal.   Neck:      Vascular: No JVD. Trachea: No tracheal deviation. Cardiovascular:      Rate and Rhythm: Normal rate. Pulses: Normal pulses. Comments: His right ulnar pulses right upper extremity. Normal cap refill to all fingers of right hand. Pulmonary:      Effort: Pulmonary effort is normal. No respiratory distress. Musculoskeletal:         General: Tenderness present. Comments: Tenderness to the MCP joints of the right fourth and . Mild tenderness to the volar central right wrist.  No tenderness to the radial wrist or the anatomical snuffbox. Full range of motion of wrist and hand. Skin:     General: Skin is warm and dry. Neurological:      General: No focal deficit present. Mental Status: He is alert and oriented to person, place, and time. Comments: Sensation intact to radial, median, ulnar nerve distribution of the right upper extremity. DIAGNOSTIC RESULTS     RADIOLOGY:     Interpretation per the Radiologist below, if available at the time of this note:    CT WRIST RIGHT WO CONTRAST   Final Result      1. No fracture   2. Chronic scapholunate instability      XR HAND RIGHT (MIN 3 VIEWS)   Final Result      No acute bony pathology. Moderate degenerative change      XR WRIST RIGHT (MIN 3 VIEWS)   Final Result      Soft tissue swelling with questionable cortical irregularity of the distal radius possibly a nondisplaced fracture.               EMERGENCY DEPARTMENT COURSE and DIFFERENTIAL DIAGNOSIS/MDM:   Vitals:    Vitals:    10/23/20 1258   BP: (!) 140/87   Pulse: 91   Resp: 18   Temp: 97.8 °F (36.6 °C)   TempSrc: Oral   SpO2: 99%   Weight: 220 lb (99.8 kg)   Height: 5' 8\" (1.727 m)         MDM  Imaging is negative for any acute fracture dislocation but does show signs of chronic scapholunate instability as well as surrounding soft tissue swelling. The patient is neurovascularly intact. I suspect likely contusion and sprain of the right hand and wrist as well as chronic scaphoid lunate instability. I feel the patient is appropriate for Velcro wrist brace for support, rice therapy, orthopedic clinic follow-up, and strict ER return precautions for any new or worsening symptoms. I have considered occult trauma to the head neck or back do not suspect this based on patient's physical exam as well as the history is provided me. Strict ER return precautions for any new or worsening symptoms. The patient expresses understanding and agreement with this plan and is discharged home. I estimate there is low risk for COMPARTMENT SYNDROME, DEEP VENOUS THROMBOSIS, SEPTIC ARTHRITIS, TENDON OR NEUROVASCULAR INJURY, thus I consider the discharge disposition reasonable. The patient and I have discussed the diagnosis and risks, and we agree with discharging home to follow-up with their primary doctor or the referral orthopedist. We also discussed returning to the Emergency Department immediately if new or worsening symptoms occur. We have discussed the symptoms which are most concerning (e.g., changing or worsening pain, numbness, weakness) that necessitate immediate return         Procedures    FINAL IMPRESSION      1.  Scapholunate instability of right wrist          DISPOSITION/PLAN   DISPOSITION  Discharge      PATIENT REFERRED TO:  Elias Arcos, [address] [place] [address]  [phone]    In 1 week  Ask for orthopedic clinic appointment    [facility] Emergency Department  [address] [place] [id number] [phone]    If symptoms worsen        (Please note

## 2020-10-26 NOTE — PROGRESS NOTES
ANTICOAGULATION SERVICE    Kenny Parker" is a 79 y.o. male with PMHx significant for chronic AF ([id number] 4), HTN who presents to clinic 10/27/2020 for anticoagulation monitoring and adjustment.     Anticoagulation Indication(s):  Afib    Referring Physician:  Dr. Magdi Patel    Goal INR Range:  2-3  Duration of Anticoagulation Therapy:  Indefinite  Time of day dose taken:  PM  Product patient has at home:  warfarin 5 mg (PEACH color)    INR Summary                           Warfarin regimen (mg)  Date INR   A/P   Sun Mon Tue Wed Thu Fri Sat Mg/wk  10/27 3.0 At goal, no change 5 5 5 5 5 5 2.5 32.5  10/8 1.8 Below goal, increase 5 5 5 5 5 5 2.5 32.5  9/17 2.1 At goal, no change 5 5 2.5 5 5 5 2.5 30  9/3 2.7 At goal, no change 5 5 2.5 5 5 5 2.5 30  8/27 1.5 Below goal, incr 5 5 2.5 5        7.5/5 5 2.5 30  8/20 1.7 Below goal, bolus 5 5 2.5 5         5/2.5 5 2.5 27.5  8/18 1.2 Below goal, bolus+ Lovenox          7.5/2.5 5 INR  8/6 1.5 Below goal, see below  7/14 2.6 At goal, no change 5 5 2.5 5 2.5 5 2.5 27.5  6/16 2.7 At goal, no change 5 5 2.5 5 2.5 5 2.5 27.5  5/18 2.0 At goal, no change 5 5 2.5 5 2.5 5 2.5 27.5  5/4 3.6 Above goal, holdx1 5 5 2.5 5 2.5 5 2.5 27.5  3/24 2.6 At goal, no change 5 5 2.5 5 2.5 5 2.5 27.5  3/3 1.8 Below goal, continue   5 5 2.5 5 2.5 5 2.5 27.5  2/4 3.0 At goal, no change 5 5 2.5 5 2.5 5 2.5 27.5  1/21 2.7 At goal, continue  5 5 2.5 5 2.5 5 2.5 27.5  1/14 1.3 Below goal, bolus x1 5 5 7.5/2.5 5 2.5 5 2.5 27.5  12/10 1.9 Below goal, continue 5 5 2.5 5 2.5 5 2.5 27.5  11/12 2.1 At goal, no change 5 5 2.5 5 2.5 5 2.5 27.5  10/15 2.7 At goal, continue 5 5 2.5 5 2.5 5 2.5 27.5  9/17 1.9 Below goal, continue 5 5 2.5 5 2.5 5 2.5 27.5  8/20 2.2 At goal, continue 5 5 2.5 5 2.5 5 2.5 27.5  7/23 1.9 Below goal, continue 5 5 2.5 5 2.5 5 2.5 27.5  6/25 2.0 At goal, no change 5 5 2.5 5 2.5 5 2.5 27.5   6/11 2.3 At goal, no change 5 5 2.5 5 2.5 5 2.5 27.5  5/28 3.8 Above goal, bladder infection   Medications taken regularly that may interact with warfarin or alter INR MVI (may contain vit K)  ASA (stent placement 10/30/17)   Warfarin dose taken as prescribed Yes - uses pillbox  held warfarin 1/2-1/6, 8/7-8/12 and bridged with Lovenox    Signs/symptoms of bleeding 10/27 minor bruise on wrist, now resolved. H/o hematuria- patient states he was told by his urologist that as long as he is taking warfarin, hematuria may continue. H/o epistaxis, occasional hemorrhoids, anemia. Vitamin K intake Normally has broccoli, asparagus, kale/gay salads 3-4 per week    9/17: 7 servings in past week. 12/10: \"a lot of greens\"- [id number] 3 days in a row+salads  1/14: 5 servings in past week including rory greens  3/3: 2 servings of green day before. 5/5: 1 serving in past week  8/6: brussels, asparagus, 2c cooked greens, broccoli  8/27:  2 servings carmita., 2 servings salad  9/3: only 1 serving broccoli  9/17: 3 servings of vit K foods alternates between cooked greens, kale/gay salads  10/8: less; 2 servings (brussels and salad)  10/27: less, only 1 per week   Recent vomiting/diarrhea/fever, changes in weight or activity level Trying to improve diet/exercise and lose weight gradually. Due to covid has not been as active lately   Tobacco or alcohol use Patient denies tobacco use  Will have 1 glass of wine per day max   Upcoming surgeries or procedures None     Assessment/Plan:   Patient's INR was therapeutic today (3). Patient is taking 6660-1256 mg/d tylenol for wrist pain, but do not expect significant effect on INR with lower doses. INR is likely on higher end of range due to decreased vit k intake. Patient does plan to resume normal amount of vit k intake. He denies any other significant changes. Patient was instructed continue warfarin dose of 2.5mg on Saturdays and 5mg all other days. Repeat INR in 3 weeks.  Patient was reminded to call with any bleeding, medication changes, or

## 2020-10-27 ENCOUNTER — ANTI-COAG VISIT (OUTPATIENT)
Dept: PHARMACY | Age: 80
End: 2020-10-27
Payer: MEDICARE

## 2020-10-27 ENCOUNTER — OFFICE VISIT (OUTPATIENT)
Dept: CARDIOLOGY CLINIC | Age: 80
End: 2020-10-27
Payer: MEDICARE

## 2020-10-27 VITALS — TEMPERATURE: 96.2 F

## 2020-10-27 VITALS
HEART RATE: 68 BPM | DIASTOLIC BLOOD PRESSURE: 80 MMHG | SYSTOLIC BLOOD PRESSURE: 126 MMHG | WEIGHT: 226 LBS | TEMPERATURE: 98 F | BODY MASS INDEX: 34.36 KG/M2

## 2020-10-27 LAB — INTERNATIONAL NORMALIZATION RATIO, POC: 3

## 2020-10-27 PROCEDURE — 85610 PROTHROMBIN TIME: CPT

## 2020-10-27 PROCEDURE — 1123F ACP DISCUSS/DSCN MKR DOCD: CPT | Performed by: INTERNAL MEDICINE

## 2020-10-27 PROCEDURE — 4040F PNEUMOC VAC/ADMIN/RCVD: CPT | Performed by: INTERNAL MEDICINE

## 2020-10-27 PROCEDURE — G8417 CALC BMI ABV UP PARAM F/U: HCPCS | Performed by: INTERNAL MEDICINE

## 2020-10-27 PROCEDURE — G8427 DOCREV CUR MEDS BY ELIG CLIN: HCPCS | Performed by: INTERNAL MEDICINE

## 2020-10-27 PROCEDURE — 99214 OFFICE O/P EST MOD 30 MIN: CPT | Performed by: INTERNAL MEDICINE

## 2020-10-27 PROCEDURE — 99211 OFF/OP EST MAY X REQ PHY/QHP: CPT

## 2020-10-27 PROCEDURE — 1036F TOBACCO NON-USER: CPT | Performed by: INTERNAL MEDICINE

## 2020-10-27 PROCEDURE — G8484 FLU IMMUNIZE NO ADMIN: HCPCS | Performed by: INTERNAL MEDICINE

## 2020-10-27 NOTE — PROGRESS NOTES
Subjective:      Patient ID: Gerda Reyna is a 79 y.o. male. HPI:  Here for follow up afib/HTN/CAD/PTCA/Non st.  had fall and sprained wrist.  Still exercising 3x per wk. .   Edema stable. Wt stable. No sob. No orthopnea/PND. No complaints. No palp/tachycardia. No syncope. No exertional chest pain/sob. BP good at home.      Past Medical History:   Diagnosis Date    Allergic rhinitis     Anemia     Atrial fibrillation ([facility]     Atrial fibrillation (HCC)     Benign non-nodular prostatic hyperplasia with lower urinary tract symptoms 2/9/2017    Benign paroxysmal positional vertigo     BPH (benign prostatic hyperplasia)     BPH (benign prostatic hypertrophy)     Cataract 10/3/2014    Cholelithiasis     Colon cancer ([facility] [address] 4/9/2012    Diverticulosis     Dyslipidemia 7/21/2010    Elevated PSA     Erectile dysfunction     Essential hypertension 11/24/2015    Gastroesophageal reflux disease without esophagitis 11/9/2016    GERD (gastroesophageal reflux disease)     Glaucoma 4/5/2013    Hiatal hernia     Hypertension     Lumbar radiculopathy     Osteoarthritis     Peripheral neuropathy 12/7/2012    Proteinuria 7/22/2010    S/P laparoscopic-assisted sigmoidectomy 4/17/2012    Urinary frequency      Past Surgical History:   Procedure Laterality Date    CATARACT REMOVAL WITH IMPLANT Right October 7, 2014    Dr. Jayda Robb Left October 24, 2014    Dr. Manuel Bhatti  April 4, 2012    Dr. Usman Zuniga  May 15, 2013    Dr. Usman Zuniga  08/09/2016    Dr. Ashwini Delgadillo COLONOSCOPY N/A 02/17/2017    COLONOSCOPY N/A 8/13/2020    COLONOSCOPY performed by Nay Garcia MD at [address] Right October 7, 2014    Dr. Gómez Duran Left October 24, 2014     [facility] OTHER SURGICAL HISTORY  2013    port-a-cath removal     PROSTATE BIOPSY  1997    PROSTATE BIOPSY  May 10, 2010    Dr. Marilee Mohamud    SIGMOIDECTOMY  2012    Dr. Ximena Solano sigmoid colectomy           TUNNELED VENOUS PORT PLACEMENT      TURP  2004    Dr. Venessa Llanos         Allergies   Allergen Reactions    Naproxen Other (See Comments)     Patient gets nose bleeds. Patient should not take. Patient lost a lot of blood in November due to Naproxen.     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    Occupation: retired -      Comment: as of 2013   Social Needs    Financial resource strain: Not on file    Food insecurity     Worry: Not on file     Inability: Not on file   [employer] needs     Medical: Not on file     Non-medical: Not on file   Tobacco Use    Smoking status: Former Smoker     Packs/day: 0.10     Years: 7.00     Pack years: 0.70     Types: Cigars     Start date:      Last attempt to quit: 196     Years since quittin.8    Smokeless tobacco: Never Used    Tobacco comment: quit in the 1960s -- former cigar smoker   Substance and Sexual Activity    Alcohol use: Yes     Comment: social    Drug use: No    Sexual activity: Yes     Partners: Female     Comment:  - Susan - 1967   Lifestyle    Physical activity     Days per week: Not on file     Minutes per session: Not on file    Stress: Not on file   Relationships    Social connections     Talks on phone: Not on file     Gets together: Not on file     Attends Buddhist service: Not on file     Active member of club or organization: Not on file     Attends meetings of clubs or organizations: Not on file     Relationship status: Not on file    Intimate partner violence     Fear of current or ex partner: Not on file     Emotionally abused: Not on file     Physically abused: Not on file     Forced sexual activity: Not on file   Other Topics Concern    Not on file   Social History Narrative    Past Surgical History     TURP: 2004    prostate biopsy - 1997                                                    Last updated by Poornima Yo MD on 10/29/2008                      Social History     Marital Status:  - 1967     Spouse: Susan    Children: 3      Children's Names: Javad Zapien    Employment Status: retired -     Employer: [employer]    Occupation:  and Communications    Alcohol Use: socially    Drug Use: none    Tobacco Usage: prior smoker    Cigars/Pipes - Years Smoked: 65's & 62's                                                 Last updated by Poornima Yo MD on 2009                      Family History     mother -  - age 73-73 - coronary artery disease, hypertension, sudden death    father -  - age 80 - ? colon cancer        brother - Tommy Zamorano -  - age 68 - 2009 - pancreatic cancer, hypertension, CABG, kidney problem    brother - Nonda Goodell - small intestinal cancer, hypertension (Karen Askew)    brother - Roshan Finger - hypertension    brother - Karlos Draper -  - age 77 - 2008 - hypertension, colon cancer    brother -  - age 15 -  in a fire, smoke inhalation injury        sister - Deatrice Squibb - hypertension        son - Nonda Goodell - multiple sclerosis (age 37)    son - Orvis Dry - daughter - Varinder Murray - asthma                                  Last updated by Lisa Lopez MD on 01/07/2010         Patient has a family history includes Arthritis in his brother; Asthma in his daughter; Cancer in his brother, brother, brother, and father; Adline Alosa in his brother; Coronary Art Dis in his brother and mother; Heart Disease in his brother and mother; Heart Surgery in his brother; High Blood Pressure in his brother, brother, brother, brother, mother, and sister; Hypertension in his brother, brother, brother, brother, mother, sister, and son; Kidney Disease in his brother; Mult Sclerosis in his son. Current Outpatient Medications   Medication Sig Dispense Refill    metoprolol succinate (TOPROL XL) 200 MG extended release tablet TAKE ONE TABLET BY MOUTH DAILY 30 tablet 0    furosemide (LASIX) 20 MG tablet Take 1 tablet by mouth daily Take before dinner 90 tablet 3    warfarin (COUMADIN) 5 MG tablet TAKE ONE-HALF TABLET BY MOUTH ON TUESDAYS, THURSDAYS, AND SATURDAYS, AND TAKE 1 TABLET BY MOUTH ALL OTHER DAYS OR AS DIRECTED BY CLINIC 72 tablet 2    irbesartan (AVAPRO) 75 MG tablet TAKE ONE TABLET BY MOUTH DAILY 90 tablet 2    atorvastatin (LIPITOR) 40 MG tablet Take 1 tablet by mouth nightly 90 tablet 3    pantoprazole (PROTONIX) 20 MG tablet Take 1 tablet by mouth daily (Patient taking differently: Take 20 mg by mouth every other day ) 90 tablet 3    Cyanocobalamin (VITAMIN B-12) 500 MCG LOZG Take 500 mcg by mouth daily      nitroGLYCERIN (NITROSTAT) 0.4 MG SL tablet up to max of 3 total doses.  If no relief after 1 dose, call 911. 25 tablet 3    finasteride (PROSCAR) 5 MG tablet Take 5 mg by mouth daily      aspirin 81 MG tablet Take 81 mg by mouth daily      Cholecalciferol (VITAMIN D) 2000 UNITS CAPS capsule Take 1 capsule by mouth 2 times daily      tamsulosin (FLOMAX) 0.4 MG capsule Take 0.4 mg by mouth daily      Multiple Vitamin (MULTIVITAMIN) capsule Take 1 capsule by mouth daily.  triamterene-hydrochlorothiazide (MAXZIDE-25) 37.5-25 MG per tablet Take 1 tablet by mouth daily 90 tablet 3     No current facility-administered medications for this visit. Vitals:    10/27/20 1342   BP: 126/80   Pulse: 68   Temp: 98 °F (36.7 °C)       Weight:  226 lb (102.5 kg)      Review of Systems   Constitutional: Negative for activity change and fatigue. Respiratory: Negative for apnea, cough, choking, chest tightness and shortness of breath. Cardiovascular: Negative for chest pain, palpitations and leg swelling. No PND or orthopnea. No tachycardia. Gastrointestinal: Negative for abdominal distention. Musculoskeletal: Negative for myalgias. Neurological: Negative for dizziness, syncope and light-headedness. Psychiatric/Behavioral: Negative for behavioral problems, confusion and agitation. All other systems reviewed negative as done    Objective:   Physical Exam   Constitutional: He is oriented to person, place, and time. He appears well-developed and well-nourished. No distress. HENT:   Head: Normocephalic and atraumatic. Eyes: Conjunctivae and EOM are normal. Right eye exhibits no discharge. Left eye exhibits no discharge. Neck: Normal range of motion. Neck supple. No JVD present. Cardiovascular: Normal rate, S1 normal, S2 normal and normal heart sounds. An irregularly irregular rhythm present. Exam reveals no gallop. No murmur heard. Pulses:       Radial pulses are 2+ on the right side, and 2+ on the left side. Pulmonary/Chest: Effort normal and breath sounds normal. No respiratory distress. He has no wheezes. He has no rales. Abdominal: Soft. Bowel sounds are normal. No tenderness. Musculoskeletal: Normal range of motion. He exhibits no  tenderness. Tr edema  Neurological: He is alert and oriented to person, place, and time. Skin: Skin is warm and dry. Psychiatric: He has a normal mood and affect.  His behavior is normal. Thought content normal.       Assessment:       Diagnosis Orders   1. Chronic atrial fibrillation ([address]     2. CAD in native artery     3. History of PTCA     4. MI, old     11. Essential hypertension           Plan:      CV stable. Edema stable. Refill lasix. HR controlled. No angina  Wt stable. BP good. On AC/ASA. No bleeding issues. Watch salt. Encouraged diet, and wt loss. No changes. Reviewed previous records and testing including cath 10/17 and echo 12/18. Follow up 3 months.

## 2020-11-03 ENCOUNTER — OFFICE VISIT (OUTPATIENT)
Dept: ORTHOPEDIC SURGERY | Age: 80
End: 2020-11-03
Payer: MEDICARE

## 2020-11-03 VITALS
SYSTOLIC BLOOD PRESSURE: 139 MMHG | HEIGHT: 68 IN | WEIGHT: 220 LBS | TEMPERATURE: 97.1 F | HEART RATE: 68 BPM | BODY MASS INDEX: 33.34 KG/M2 | DIASTOLIC BLOOD PRESSURE: 89 MMHG

## 2020-11-03 PROCEDURE — 1123F ACP DISCUSS/DSCN MKR DOCD: CPT | Performed by: ORTHOPAEDIC SURGERY

## 2020-11-03 PROCEDURE — 1036F TOBACCO NON-USER: CPT | Performed by: ORTHOPAEDIC SURGERY

## 2020-11-03 PROCEDURE — G8417 CALC BMI ABV UP PARAM F/U: HCPCS | Performed by: ORTHOPAEDIC SURGERY

## 2020-11-03 PROCEDURE — G8427 DOCREV CUR MEDS BY ELIG CLIN: HCPCS | Performed by: ORTHOPAEDIC SURGERY

## 2020-11-03 PROCEDURE — 99203 OFFICE O/P NEW LOW 30 MIN: CPT | Performed by: ORTHOPAEDIC SURGERY

## 2020-11-03 PROCEDURE — 4040F PNEUMOC VAC/ADMIN/RCVD: CPT | Performed by: ORTHOPAEDIC SURGERY

## 2020-11-03 PROCEDURE — G8484 FLU IMMUNIZE NO ADMIN: HCPCS | Performed by: ORTHOPAEDIC SURGERY

## 2020-11-07 NOTE — PROGRESS NOTES
Date:  2020    Name:  Stewart Sanchez  Address:  [address] [place] [address] Road [facility]    :  1940      Age:   79 y.o.    SSN:  [id number]      Medical Record Number:  <[id number]>    Reason for Visit:    Chief Complaint    Wrist Pain (RT WRIST/HAND)      DOS:      HPI: Trey Glez is a 79 y.o. male here today for fall and injury to the right wrist. It is feeling better overall since fall and the presentation to the ER. CT scan had been taken with noted SL dissociation so he was told to return to the physician's office for evalaution. No wounds, no dysesthesias. Multiple previous minor injuries to the arm. Previous football ijuries. No absolute chronic issues involving the wrist.          Review of Systems:  Review of Systems   Constitutional: Negative for chills and fever. HENT: Negative for nosebleeds. Eyes: Negative for double vision. Cardiovascular: Negative for chest pain. Gastrointestinal: Negative for abdominal pain. Musculoskeletal: Positive for joint pain and myalgias. Skin: Negative for rash. Neurological: Negative for seizures. Psychiatric/Behavioral: Negative for hallucinations.         Past History:  Past Medical History:   Diagnosis Date    Allergic rhinitis     Anemia     Atrial fibrillation ([address]     Atrial fibrillation (HCC)     Benign non-nodular prostatic hyperplasia with lower urinary tract symptoms 2017    Benign paroxysmal positional vertigo     BPH (benign prostatic hyperplasia)     BPH (benign prostatic hypertrophy)     Cataract 10/3/2014    Cholelithiasis     Colon cancer ([address] 2012    Diverticulosis     Dyslipidemia 2010    Elevated PSA     Erectile dysfunction     Essential hypertension 2015    Gastroesophageal reflux disease without esophagitis 2016    GERD (gastroesophageal reflux disease)     Glaucoma 2013    Hiatal hernia     Hypertension     Lumbar radiculopathy     Osteoarthritis     Peripheral neuropathy 12/7/2012    Proteinuria 7/22/2010    S/P laparoscopic-assisted sigmoidectomy 4/17/2012    Urinary frequency      Past Surgical History:   Procedure Laterality Date    CATARACT REMOVAL WITH IMPLANT Right October 7, 2014    Dr. Lor Lin Left October 24, 2014    Dr. Harry Paulino  April 4, 2012    Dr. Valeria Malone  May 15, 2013    Dr. Valeria Malone  08/09/2016    Dr. Valeria Malone N/A 02/17/2017    COLONOSCOPY N/A 8/13/2020    COLONOSCOPY performed by Rafael Toscano MD at [address] [place] [address] Right October 7, 2014    Dr. Sammy Moffett Left October 24, 2014    Dr. Fairchild Even HISTORY  June 28, 2013    port-a-cath removal     PROSTATE BIOPSY  April 1997    PROSTATE BIOPSY  May 10, 2010    Dr. Jonnathan Andrea  April 13, 2012    Dr. Radha Raygoza sigmoid colectomy           TUNNELED VENOUS PORT PLACEMENT      TURP  April 5, 2004    Dr. Paige Romo     Current Outpatient Medications on File Prior to Visit   Medication Sig Dispense Refill    metoprolol succinate (TOPROL XL) 200 MG extended release tablet TAKE ONE TABLET BY MOUTH DAILY 30 tablet 0    furosemide (LASIX) 20 MG tablet Take 1 tablet by mouth daily Take before dinner 90 tablet 3    warfarin (COUMADIN) 5 MG tablet TAKE ONE-HALF TABLET BY MOUTH ON TUESDAYS, THURSDAYS, AND SATURDAYS, AND TAKE 1 TABLET BY MOUTH ALL OTHER DAYS OR AS DIRECTED BY CLINIC 72 tablet 2    irbesartan (AVAPRO) 75 MG tablet TAKE ONE TABLET BY MOUTH DAILY 90 tablet 2    atorvastatin (LIPITOR) 40 MG tablet Take 1 tablet by mouth nightly 90 tablet 3    pantoprazole (PROTONIX) 20 MG tablet Take 1 tablet by mouth daily (Patient taking differently: Take 20 mg by mouth every other day ) 90 tablet 3    triamterene-hydrochlorothiazide (MAXZIDE-25) 37.5-25 MG per tablet Take 1 tablet by mouth daily 90 tablet 3    Cyanocobalamin (VITAMIN B-12) 500 MCG LOZG Take 500 mcg by mouth daily      nitroGLYCERIN (NITROSTAT) 0.4 MG SL tablet up to max of 3 total doses. If no relief after 1 dose, call 911. 25 tablet 3    finasteride (PROSCAR) 5 MG tablet Take 5 mg by mouth daily      aspirin 81 MG tablet Take 81 mg by mouth daily      Cholecalciferol (VITAMIN D) 2000 UNITS CAPS capsule Take 1 capsule by mouth 2 times daily      tamsulosin (FLOMAX) 0.4 MG capsule Take 0.4 mg by mouth daily      Multiple Vitamin (MULTIVITAMIN) capsule Take 1 capsule by mouth daily. No current facility-administered medications on file prior to visit.       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    Occupation: retired -      Comment: as of 2013   Social Needs    Financial resource strain: Not on file    Food insecurity     Worry: Not on file     Inability: Not on file   [employer] needs     Medical: Not on file     Non-medical: Not on file   Tobacco Use    Smoking status: Former Smoker     Packs/day: 0.10     Years: 7.00     Pack years: 0.70     Types: Cigars     Start date:      Last attempt to quit:      Years since quittin.8    Smokeless tobacco: Never Used    Tobacco comment: quit in the 1960s -- former cigar smoker   Substance and Sexual Activity    Alcohol use: Yes     Comment: social    Drug use: No    Sexual activity: Yes     Partners: Female     Comment:  - Susan - 1967   Lifestyle    Physical activity     Days per week: Not on file     Minutes per session: Not on file    Stress: Not on file   Relationships    Social connections     Talks on phone: Not on file     Gets together: Not on file     Attends Latter-day service: Not on file     Active member of club or organization: Not on file     Attends meetings of clubs or organizations: Not on file     Relationship status: Not on file    Intimate partner violence     Fear of current or ex partner: Not on file     Emotionally abused: Not on file     Physically abused: Not on file     Forced sexual activity: Not on file   Other Topics Concern    Not on file   Social History Narrative    Past Surgical History     TURP: 2004    prostate biopsy - 1997                                                    Last updated by Nathalia Vásquez MD on 10/29/2008                      Social History     Marital Status:  - 1967     Spouse: Susan    Children: 3      Children's Names: Patricia Greer    Employment Status: retired -     Employer: [employer]    Occupation:  and Communications    Alcohol Use: socially    Drug Use: none    Tobacco Usage: prior smoker    Cigars/Pipes - Years Smoked: 65's & 62's                                                 Last updated by Nathalia Vásquez MD on 2009                      Family History     mother -  - age 73-73 - coronary artery disease, hypertension, sudden death    father -  - age 80 - ? colon cancer        brother - Nydia Robbins -  - age 68 - 2009 - pancreatic cancer, hypertension, CABG, kidney problem    brother - Eunice Villalobos - small intestinal cancer, hypertension Yusuf Boo)    brother - Belia Chandler - hypertension    brother - Dinesh Owens -  - age  - 2008 - hypertension, colon cancer    brother -  - age 15 -  in a fire, smoke inhalation injury        sister - Kyle Rodriguez - hypertension        son - Ashok Cruz - multiple sclerosis (age 37)    son - Toney Danger -        daughter - Prosper Bernstein - asthma                                  Last updated by Lo Duarte MD on 2010      Family History   Problem Relation Age of Onset    Mult Sclerosis Son     Asthma Daughter     Arthritis Brother     High Blood Pressure Brother     Hypertension Brother     Cancer Father         ? ?? colon cancer    Heart Disease Mother     High Blood Pressure Mother     Coronary Art Dis Mother     Hypertension Mother     Cancer Brother         pancreatic cancer    High Blood Pressure Brother     Heart Disease Brother     Kidney Disease Brother     Hypertension Brother     Heart Surgery Brother     Coronary Art Dis Brother     Cancer Brother         small intestinal cancer    High Blood Pressure Brother     Hypertension Brother     Cancer Brother         colon cancer    High Blood Pressure Brother     Colon Cancer Brother     Hypertension Brother     High Blood Pressure Sister     Hypertension Sister     Hypertension Son        Current Medications:    Current Outpatient Medications   Medication Sig Dispense Refill    metoprolol succinate (TOPROL XL) 200 MG extended release tablet TAKE ONE TABLET BY MOUTH DAILY 30 tablet 0    furosemide (LASIX) 20 MG tablet Take 1 tablet by mouth daily Take before dinner 90 tablet 3    warfarin (COUMADIN) 5 MG tablet TAKE ONE-HALF TABLET BY MOUTH ON , , AND , AND TAKE 1 TABLET BY MOUTH ALL OTHER DAYS OR AS DIRECTED BY CLINIC 72 tablet 2    irbesartan (AVAPRO) 75 MG tablet TAKE ONE TABLET BY MOUTH DAILY 90 tablet 2    atorvastatin (LIPITOR) 40 MG tablet Take 1 tablet by mouth nightly 90 tablet 3    pantoprazole (PROTONIX) 20 MG tablet Take 1 tablet by mouth daily (Patient taking differently: Take 20 mg by mouth every other day ) 90 tablet 3    triamterene-hydrochlorothiazide (MAXZIDE-25) 37.5-25 MG per tablet Take 1 tablet by mouth daily 90 tablet 3    Cyanocobalamin (VITAMIN B-12) 500 MCG LOZG Take 500 mcg by mouth daily      nitroGLYCERIN (NITROSTAT) 0.4 MG SL tablet up to max of 3 total doses. If no relief after 1 dose, call 911. 25 tablet 3    finasteride (PROSCAR) 5 MG tablet Take 5 mg by mouth daily      aspirin 81 MG tablet Take 81 mg by mouth daily      Cholecalciferol (VITAMIN D) 2000 UNITS CAPS capsule Take 1 capsule by mouth 2 times daily      tamsulosin (FLOMAX) 0.4 MG capsule Take 0.4 mg by mouth daily      Multiple Vitamin (MULTIVITAMIN) capsule Take 1 capsule by mouth daily. No current facility-administered medications for this visit. Allergies: Allergies   Allergen Reactions    Naproxen Other (See Comments)     Patient gets nose bleeds. Patient should not take. Patient lost a lot of blood in November due to Naproxen.  Ciprofloxacin      Mouth sores and sore throad       Physical Exam:  Vitals:    11/03/20 1452   BP: 139/89   Pulse: 68   Temp: 97.1 °F (36.2 °C)       Physical Exam   Constitutional: Patient is oriented to person, place, and time and well-developed, well-nourished, and in no distress. HENT:   Head: Normocephalic and atraumatic. Eyes: Pupils are equal, round, and reactive to light. Neck: No tracheal deviation present. No thyromegaly present. Pulmonary/Chest: Effort normal.   Abdominal: Soft. There is no guarding. Musculoskeletal: Patient exhibits tenderness and pain. Neurological: Patient is alert and oriented to person, place, and time. Skin: Skin is warm. Psychiatric: Affect normal.     General: Caitlin Trevino is a healthy and well appearing 79y.o. year old male who is sitting comfortably in our office in acute distress. Alert and oriented. Physical Exam:  RUE:   No local tenderenss over the scaphoid or the dorsal or volar scaphoid ligaments.    Full ability to make a fist.   Some areas of pain in the forearm, volar wrist more tendon then bony. No major loss of wrist rom other than secondary to mild guarding. No gross deformities noted. Sensation is intact to light touch throughout the median, ulnar and radial nerve distribution. Able to wiggle fingers, gives thumbs up, A-okay and cross index and middle fingers. Full range of motion of the hand, wrist, elbow and shoulder. LUE:   No gross deformities noted. Sensation is intact to light touch throughout the median, ulnar and radial nerve distribution. Able to wiggle fingers, gives thumbs up, A-okay and cross index and middle fingers. Full range of motion of the hand wrist elbow and shoulder. RLE:   No gross deformities noted. Sensation is intact to light touch throughout the lower extremity. Able to wiggle toes and plantar and dorsiflex foot. Full range of motion at the ankle, knee and hip    LLE:   No gross deformities noted. Sensation is intact to light touch throughout the lower extremity. Able to wiggle toes and plantar and dorsiflex foot. Full range of motion at the ankle, knee and hip    Diagnostics:  Xray   Have reviewed the xrays above from ER visit    and my impression is:CT scan with no obvious fracture of the scaphoid but notable scapholunate dissociation  There are no diagnoses linked to this encounter. 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 SL angle on the lateral >80 degrees but no absolute issues with the wrist itseld more of the muscles and tendons from injury    Plan: does not need wrist splint for support of this chronic injury. Plan for ice, rom occasional OTC Nsaids sparingly.        [unfilled]     Coco Groom    Date:    11/6/2020

## 2020-11-17 ENCOUNTER — ANTI-COAG VISIT (OUTPATIENT)
Dept: PHARMACY | Age: 80
End: 2020-11-17
Payer: MEDICARE

## 2020-11-17 VITALS — TEMPERATURE: 96.8 F

## 2020-11-17 LAB — INTERNATIONAL NORMALIZATION RATIO, POC: 3.7

## 2020-11-17 PROCEDURE — 85610 PROTHROMBIN TIME: CPT

## 2020-11-17 PROCEDURE — 99212 OFFICE O/P EST SF 10 MIN: CPT

## 2020-11-17 NOTE — PROGRESS NOTES
ANTICOAGULATION SERVICE    Kenny Parker" is a 79 y.o. male with PMHx significant for chronic AF ([id number]-BYGt of 4), HTN who presents to clinic 11/17/2020 for anticoagulation monitoring and adjustment.     Anticoagulation Indication(s):  Afib    Referring Physician:  Dr. Magdi Patel    Goal INR Range:  2-3  Duration of Anticoagulation Therapy:  Indefinite  Time of day dose taken:  PM  Product patient has at home:  warfarin 5 mg (PEACH color)    INR Summary                           Warfarin regimen (mg)  Date INR   A/P   Sun Mon Tue Wed Thu Fri Sat Mg/wk  11/17 3.7 Above goal, holdx1 5 5 2.5 5 5 5 2.5 30  10/27 3.0 At goal, no change 5 5 5 5 5 5 2.5 32.5  10/8 1.8 Below goal, increase 5 5 5 5 5 5 2.5 32.5  9/17 2.1 At goal, no change 5 5 2.5 5 5 5 2.5 30  9/3 2.7 At goal, no change 5 5 2.5 5 5 5 2.5 30  8/27 1.5 Below goal, incr 5 5 2.5 5        7.5/5 5 2.5 30  8/20 1.7 Below goal, bolus 5 5 2.5 5         5/2.5 5 2.5 27.5  8/18 1.2 Below goal, bolus+ Lovenox          7.5/2.5 5 INR  8/6 1.5 Below goal, see below  7/14 2.6 At goal, no change 5 5 2.5 5 2.5 5 2.5 27.5  6/16 2.7 At goal, no change 5 5 2.5 5 2.5 5 2.5 27.5  5/18 2.0 At goal, no change 5 5 2.5 5 2.5 5 2.5 27.5  5/4 3.6 Above goal, holdx1 5 5 2.5 5 2.5 5 2.5 27.5  3/24 2.6 At goal, no change 5 5 2.5 5 2.5 5 2.5 27.5  3/3 1.8 Below goal, continue   5 5 2.5 5 2.5 5 2.5 27.5  2/4 3.0 At goal, no change 5 5 2.5 5 2.5 5 2.5 27.5  1/21 2.7 At goal, continue  5 5 2.5 5 2.5 5 2.5 27.5  1/14 1.3 Below goal, bolus x1 5 5 7.5/2.5 5 2.5 5 2.5 27.5  12/10 1.9 Below goal, continue 5 5 2.5 5 2.5 5 2.5 27.5  11/12 2.1 At goal, no change 5 5 2.5 5 2.5 5 2.5 27.5    Lab Results   Component Value Date    RBC 3.64 (L) 01/07/2020    HGB 13.1 (L) 01/07/2020    HCT 39.7 (L) 01/07/2020    .1 (H) 01/07/2020    MCH 36.1 (H) 01/07/2020    MPV 8.1 01/07/2020    RDW 12.9 01/07/2020     01/07/2020     [id number] Score for Atrial Fibrillation Stroke Risk   Risk Factors  Component Value   C CHF No 0   H HTN Yes 1   A2 Age >= 76 Yes,  (79 y.o.) 2   D DM No 0   S2 Prior Stroke/TIA No 0   V Vascular Disease Yes 1   A Age 74-69 No,  (79 y.o.) 0   Sc Sex male 0    [id number]  Score  4   Score last updated 38/87/04 26:56 AM    Click here for a link to the UpToDate guideline \"Atrial Fibrillation: Anticoagulation therapy to prevent embolization    Disclaimer: Risk Score calculation is dependent on accuracy of patient problem list and past encounter diagnosis. 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 EGD 4/25 significant for: 2 small antral erosions, moderate hiatal hernia, slightly irregular Z-line, small distal duodenal polyp. Patient d/c off of plavix and warfarin in preparation for outpt EGD with biopsies and colonoscopy. EGD/Colonoscopy 5/2; no bleeding source, resumed plavix and warfarin on 5/3.  -10/29-11/1/17: [facility] for STEMI, s/p C 10/30 with primary stenting to proximal RCA. Recent medication changes 10/27: APAP 500 mg 2-3 times per day   9/19/20: macrobid for bladder infection   Medications taken regularly that may interact with warfarin or alter INR MVI (may contain vit K)  ASA (stent placement 10/30/17)   Warfarin dose taken as prescribed Yes - uses pillbox  held warfarin 1/2-1/6, 8/7-8/12 and bridged with Lovenox    Signs/symptoms of bleeding None reported  H/o hematuria- patient states he was told by his urologist that as long as he is taking warfarin, hematuria may continue. H/o epistaxis, occasional hemorrhoids, anemia. Vitamin K intake Normally has broccoli, asparagus, kale/gay salads 3-4 per week    9/17: 7 servings in past week.   12/10: \"a lot of greens\"- broccoli 3 days in a row+salads  1/14: 5 servings in past week including rory greens  3/3: 2 servings of green day before. 5/5: 1 serving in past week  8/6: brussels, asparagus, 2c cooked greens, broccoli  9/3: only 1 serving broccoli  10/8: less; 2 servings (brussels and salad)  10/27: less, only 1 per week  11/17: back to normal, 3-4 per week   Recent vomiting/diarrhea/fever, changes in weight or activity level Trying to improve diet/exercise and lose weight gradually. Due to covid has not been as active lately   Tobacco or alcohol use Patient denies tobacco use  Will have 1 glass of wine per day max   Upcoming surgeries or procedures None     Assessment/Plan:   Patient's INR was supratherapeutic today (3.7). Patient is taking 1187-5288 mg/d tylenol for wrist pain, but do not expect significant effect on INR with lower doses. His vit k intake returned to normal 3-4 per wk. Pt denies incorrect warfarin doses, changes to health, meds, or bleeding. Patient was instructed to hold warfarin today, then decrease warfarin dose to 2.5mg on Tuesdays and Saturdays and 5mg all other days. Repeat INR in 2 weeks. Patient was reminded to call with any bleeding, medication changes, or fever/vomiting/diarrhea. Patient understands dosing directions and information discussed. Dosing schedule and follow up appointment given to patient. Progress note routed to referring physician's office. Patient acknowledges working in consult agreement with pharmacist as referred by his/her physician. Next Clinic Appointment:  12/3    Thanks!   Polina Park, PharmD, [facility]  [facility]   Marilynn Joseisac Thakur Ela3 Ph: [phone]  Steverukhsana Claros Ph: [phone]  11/17/2020 9:19 AM    CLINICAL PHARMACY CONSULT: MED RECONCILIATION/REVIEW ADDENDUM    For Pharmacy Admin Tracking Only    PHSO: Yes  Total # of Interventions Recommended: 1  - Decreased Dose #: 1   - Maintenance Safety Lab Monitoring #: 1  Total Interventions Accepted: 1  Time Spent (min): 15    Blanquita Glasgow, NahidD

## 2020-12-03 ENCOUNTER — ANTI-COAG VISIT (OUTPATIENT)
Dept: PHARMACY | Age: 80
End: 2020-12-03
Payer: MEDICARE

## 2020-12-03 VITALS — TEMPERATURE: 96.9 F

## 2020-12-03 LAB — INTERNATIONAL NORMALIZATION RATIO, POC: 2.9

## 2020-12-03 PROCEDURE — 85610 PROTHROMBIN TIME: CPT

## 2020-12-03 PROCEDURE — 99211 OFF/OP EST MAY X REQ PHY/QHP: CPT

## 2020-12-03 NOTE — PROGRESS NOTES
ANTICOAGULATION SERVICE    [facility] [place] Ress[facility]" is a 79 y.o. male with PMHx significant for chronic AF (GFH7PD2-HOSy of 4), HTN who presents to clinic 12/3/2020 for anticoagulation monitoring and adjustment.     Anticoagulation Indication(s):  Afib    Referring Physician:  Dr. Shaneka Roque    Goal INR Range:  2-3  Duration of Anticoagulation Therapy:  Indefinite  Time of day dose taken:  PM  Product patient has at home:  warfarin 5 mg (PEACH color)    INR Summary                           Warfarin regimen (mg)  Date INR   A/P   Sun Mon Tue Wed Thu Fri Sat Mg/wk  12/3 2.9 At goal, no change 5 5 2.5 5 5 5 2.5 30  11/17 3.7 Above goal, holdx1 5 5 2.5 5 5 5 2.5 30  10/27 3.0 At goal, no change 5 5 5 5 5 5 2.5 32.5  10/8 1.8 Below goal, increase 5 5 5 5 5 5 2.5 32.5  9/17 2.1 At goal, no change 5 5 2.5 5 5 5 2.5 30  9/3 2.7 At goal, no change 5 5 2.5 5 5 5 2.5 30  8/27 1.5 Below goal, incr 5 5 2.5 5        7.5/5 5 2.5 30  8/20 1.7 Below goal, bolus 5 5 2.5 5         5/2.5 5 2.5 27.5  8/18 1.2 Below goal, bolus+ Lovenox          7.5/2.5 5 INR  8/6 1.5 Below goal, see below  7/14 2.6 At goal, no change 5 5 2.5 5 2.5 5 2.5 27.5  6/16 2.7 At goal, no change 5 5 2.5 5 2.5 5 2.5 27.5  5/18 2.0 At goal, no change 5 5 2.5 5 2.5 5 2.5 27.5  5/4 3.6 Above goal, holdx1 5 5 2.5 5 2.5 5 2.5 27.5  3/24 2.6 At goal, no change 5 5 2.5 5 2.5 5 2.5 27.5  3/3 1.8 Below goal, continue   5 5 2.5 5 2.5 5 2.5 27.5  2/4 3.0 At goal, no change 5 5 2.5 5 2.5 5 2.5 27.5  1/21 2.7 At goal, continue  5 5 2.5 5 2.5 5 2.5 27.5  1/14 1.3 Below goal, bolus x1 5 5 7.5/2.5 5 2.5 5 2.5 27.5  12/10 1.9 Below goal, continue 5 5 2.5 5 2.5 5 2.5 27.5  11/12 2.1 At goal, no change 5 5 2.5 5 2.5 5 2.5 27.5    Lab Results   Component Value Date    RBC 3.64 (L) 01/07/2020    HGB 13.1 (L) 01/07/2020    HCT 39.7 (L) 01/07/2020    .1 (H) 01/07/2020    MCH 36.1 (H) 01/07/2020    MPV 8.1 01/07/2020    RDW 12.9 01/07/2020     01/07/2020     [id number] Score for Atrial Fibrillation Stroke Risk   Risk   Factors  Component Value   C CHF No 0   H HTN Yes 1   A2 Age >= 76 Yes,  (79 y.o.) 2   D DM No 0   S2 Prior Stroke/TIA No 0   V Vascular Disease Yes 1   A Age 74-69 No,  (79 y.o.) 0   Sc Sex male 0    [id number]  Score  4   Score last updated 46/09/23 91:22 AM    Click here for a link to the UpToDate guideline \"Atrial Fibrillation: Anticoagulation therapy to prevent embolization    Disclaimer: Risk Score calculation is dependent on accuracy of patient problem list and past encounter diagnosis. 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 EGD 4/25 significant for: 2 small antral erosions, moderate hiatal hernia, slightly irregular Z-line, small distal duodenal polyp. Patient d/c off of plavix and warfarin in preparation for outpt EGD with biopsies and colonoscopy. EGD/Colonoscopy 5/2; no bleeding source, resumed plavix and warfarin on 5/3.  -10/29-11/1/17: [facility] for STEMI, s/p [facility] 10/30 with primary stenting to proximal RCA. Recent medication changes 10/27: APAP 500 mg 2-3 times per day   9/19/20: macrobid for bladder infection   Medications taken regularly that may interact with warfarin or alter INR MVI (may contain vit K)  ASA (stent placement 10/30/17)   Warfarin dose taken as prescribed Yes - uses pillbox  held warfarin 1/2-1/6, 8/7-8/12 and bridged with Lovenox    Signs/symptoms of bleeding None reported  H/o hematuria- patient states he was told by his urologist that as long as he is taking warfarin, hematuria may continue. H/o epistaxis, occasional hemorrhoids, anemia.    Vitamin K intake Normally has broccoli, asparagus, kale/gay salads 3-4 per week    9/17: 7 servings in past week. 12/10: \"a lot of greens\"- broccoli 3 days in a row+salads  1/14: 5 servings in past week including rory greens  3/3: 2 servings of green day before. 5/5: 1 serving in past week  8/6: brussels, asparagus, 2c cooked greens, broccoli  9/3: only 1 serving broccoli  10/8: less; 2 servings (brussels and salad)  10/27: less, only 1 per week  11/17: back to normal, 3-4 per week   Recent vomiting/diarrhea/fever, changes in weight or activity level Trying to improve diet/exercise and lose weight gradually. Due to covid has not been as active lately   Tobacco or alcohol use Patient denies tobacco use  Will have 1 glass of wine per day max   Upcoming surgeries or procedures None     Assessment/Plan:   Patient's INR was therapeutic today (2.9). Pt denies incorrect warfarin doses, changes to health, meds, vit k, or bleeding. Patient was instructed to continue warfarin dose of 2.5mg on Tuesdays and Saturdays and 5mg all other days. Repeat INR in 4 weeks. Patient was reminded to call with any bleeding, medication changes, or fever/vomiting/diarrhea. Patient understands dosing directions and information discussed. Dosing schedule and follow up appointment given to patient. Progress note routed to referring physician's office. Patient acknowledges working in consult agreement with pharmacist as referred by his/her physician. Next Clinic Appointment:  12/31    Thanks!   Nahid ProctorD, [facility]  Medication Management Clinic   [facility] [address] Ph: [phone]  [address] Ph: [phone]  12/3/2020 9:01 AM    CLINICAL PHARMACY CONSULT: MED RECONCILIATION/REVIEW ADDENDUM    For Pharmacy Admin Tracking Only    PHSO: Yes  Total # of Interventions Recommended: 0  - Decreased Dose #: 0   - Maintenance Safety Lab Monitoring #: 1  Total Interventions Accepted: 0  Time Spent (min): 15    Richard Miller PharmD

## 2020-12-31 ENCOUNTER — ANTI-COAG VISIT (OUTPATIENT)
Dept: PHARMACY | Age: 80
End: 2020-12-31
Payer: MEDICARE

## 2020-12-31 VITALS — TEMPERATURE: 96.4 F

## 2020-12-31 LAB — INTERNATIONAL NORMALIZATION RATIO, POC: 2.9

## 2020-12-31 PROCEDURE — 85610 PROTHROMBIN TIME: CPT

## 2020-12-31 PROCEDURE — 99211 OFF/OP EST MAY X REQ PHY/QHP: CPT

## 2020-12-31 NOTE — PROGRESS NOTES
ANTICOAGULATION SERVICE    Elieser Teran" is a 79 y.o. male with PMHx significant for chronic AF (QHV2DI5-PCJs of 4), HTN who presents to clinic 12/31/2020 for anticoagulation monitoring and adjustment.     Anticoagulation Indication(s):  Afib    Referring Physician:  Dr. Mary Charlton    Goal INR Range:  2-3  Duration of Anticoagulation Therapy:  Indefinite  Time of day dose taken:  PM  Product patient has at home:  warfarin 5 mg (PEACH color)    INR Summary                           Warfarin regimen (mg)  Date INR   A/P   Sun Mon Tue Wed Thu Fri Sat Mg/wk  12/31 2.9 At goal, no change 5 5 2.5 5 5 5 2.5 30  12/3 2.9 At goal, no change 5 5 2.5 5 5 5 2.5 30  11/17 3.7 Above goal, holdx1 5 5 2.5 5 5 5 2.5 30  10/27 3.0 At goal, no change 5 5 5 5 5 5 2.5 32.5  10/8 1.8 Below goal, increase 5 5 5 5 5 5 2.5 32.5  9/17 2.1 At goal, no change 5 5 2.5 5 5 5 2.5 30  9/3 2.7 At goal, no change 5 5 2.5 5 5 5 2.5 30  8/27 1.5 Below goal, incr 5 5 2.5 5        7.5/5 5 2.5 30  8/20 1.7 Below goal, bolus 5 5 2.5 5         5/2.5 5 2.5 27.5  8/18 1.2 Below goal, bolus+ Lovenox          7.5/2.5 5 INR  8/6 1.5 Below goal, see below  7/14 2.6 At goal, no change 5 5 2.5 5 2.5 5 2.5 27.5  6/16 2.7 At goal, no change 5 5 2.5 5 2.5 5 2.5 27.5  5/18 2.0 At goal, no change 5 5 2.5 5 2.5 5 2.5 27.5  5/4 3.6 Above goal, holdx1 5 5 2.5 5 2.5 5 2.5 27.5  3/24 2.6 At goal, no change 5 5 2.5 5 2.5 5 2.5 27.5  3/3 1.8 Below goal, continue   5 5 2.5 5 2.5 5 2.5 27.5  2/4 3.0 At goal, no change 5 5 2.5 5 2.5 5 2.5 27.5  1/21 2.7 At goal, continue  5 5 2.5 5 2.5 5 2.5 27.5  1/14 1.3 Below goal, bolus x1 5 5 7.5/2.5 5 2.5 5 2.5 27.5  12/10 1.9 Below goal, continue 5 5 2.5 5 2.5 5 2.5 27.5  11/12 2.1 At goal, no change 5 5 2.5 5 2.5 5 2.5 27.5    Lab Results   Component Value Date    RBC 3.64 (L) 01/07/2020    HGB 13.1 (L) 01/07/2020    HCT 39.7 (L) 01/07/2020    .1 (H) 01/07/2020    MCH 36.1 (H) 01/07/2020    MPV 8.1 01/07/2020 RDW 12.9 01/07/2020     01/07/2020     [id number] Score for Atrial Fibrillation Stroke Risk   Risk   Factors  Component Value   C CHF No 0   H HTN Yes 1   A2 Age >= 76 Yes,  (79 y.o.) 2   D DM No 0   S2 Prior Stroke/TIA No 0   V Vascular Disease Yes 1   A Age 74-69 No,  (79 y.o.) 0   Sc Sex male 0    [id number]  Score  4   Score last updated 45/41/15 56:06 AM    Click here for a link to the UpToDate guideline \"Atrial Fibrillation: Anticoagulation therapy to prevent embolization    Disclaimer: Risk Score calculation is dependent on accuracy of patient problem list and past encounter diagnosis. 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 EGD 4/25 significant for: 2 small antral erosions, moderate hiatal hernia, slightly irregular Z-line, small distal duodenal polyp. Patient d/c off of plavix and warfarin in preparation for outpt EGD with biopsies and colonoscopy. EGD/Colonoscopy 5/2; no bleeding source, resumed plavix and warfarin on 5/3.  -10/29-11/1/17: [facility] for STEMI, s/p [facility] 10/30 with primary stenting to proximal RCA. Recent medication changes None reported   Medications taken regularly that may interact with warfarin or alter INR MVI (may contain vit K)  ASA (stent placement 10/30/17)   Warfarin dose taken as prescribed Yes - uses pillbox  held warfarin 1/2-1/6, 8/7-8/12 and bridged with Lovenox    Signs/symptoms of bleeding 12/31/20: subconjunctival hemorrhage, seen by ophthalmologst  H/o hematuria- patient states he was told by his urologist that as long as he is taking warfarin, hematuria may continue. H/o epistaxis, occasional hemorrhoids, anemia. Vitamin K intake Normally has broccoli, asparagus, kale/gay salads 3-4 per week    9/17: 7 servings in past week. 12/10: \"a lot of greens\"- broccoli 3 days in a row+salads  1/14: 5 servings in past week including rory greens  3/3: 2 servings of green day before. 5/5: 1 serving in past week  8/6: brussels, asparagus, 2c cooked greens, broccoli  9/3: only 1 serving broccoli  10/8: less; 2 servings (brussels and salad)  10/27: less, only 1 per week  11/17: back to normal, 3-4 per week   Recent vomiting/diarrhea/fever, changes in weight or activity level Trying to improve diet/exercise and lose weight gradually. Due to covid has not been as active lately   Tobacco or alcohol use Patient denies tobacco use  Will have 1 glass of wine per day max   Upcoming surgeries or procedures None     Assessment/Plan:   Patient's INR was therapeutic today (2.9). Pt denies incorrect warfarin doses, changes to health, meds, vit k, or unusual/serious bleeding. Pt has been reassured by his ophthalmologist that there is nothing to worry about re: subconjunctival hemorrhage. He was told it can be caused by HTN, but his BP is wnl. Patient was instructed to continue warfarin dose of 2.5mg on Tuesdays and Saturdays and 5mg all other days. Repeat INR in 4 weeks. Patient was reminded to call with any bleeding, medication changes, or fever/vomiting/diarrhea. Patient understands dosing directions and information discussed. Dosing schedule and follow up appointment given to patient. Progress note routed to referring physician's office. Patient acknowledges working in consult agreement with pharmacist as referred by his/her physician. Next Clinic Appointment:  1/28    Thanks!   Luisana Nicolas, PharmD, [facility]  Medication Management Clinic   [address] Ph: [phone]  Cece Leung Ph: [phone]  12/31/2020 9:29 AM    CLINICAL PHARMACY CONSULT: MED RECONCILIATION/REVIEW ADDENDUM    For Pharmacy Admin Tracking Only    PHSO: Yes Total # of Interventions Recommended: 0  - Decreased Dose #: 0   - Maintenance Safety Lab Monitoring #: 1  Total Interventions Accepted: 0  Time Spent (min): 15    Danette Berrios, PharmD

## 2021-01-28 ENCOUNTER — ANTI-COAG VISIT (OUTPATIENT)
Dept: PHARMACY | Age: 81
End: 2021-01-28
Payer: MEDICARE

## 2021-01-28 VITALS — TEMPERATURE: 97.1 F

## 2021-01-28 DIAGNOSIS — I48.20 CHRONIC ATRIAL FIBRILLATION (HCC): ICD-10-CM

## 2021-01-28 LAB — INTERNATIONAL NORMALIZATION RATIO, POC: 3

## 2021-01-28 PROCEDURE — 85610 PROTHROMBIN TIME: CPT

## 2021-01-28 PROCEDURE — 99211 OFF/OP EST MAY X REQ PHY/QHP: CPT

## 2021-01-28 NOTE — PROGRESS NOTES
ANTICOAGULATION SERVICE    Jorge De La Cruz Mt" is a 79 y.o. male with PMHx significant for chronic AF (TWV9SH3-FNNf of 4), HTN who presents to clinic 1/28/2021 for anticoagulation monitoring and adjustment.     Anticoagulation Indication(s):  Afib    Referring Physician:  Dr. Yanet Rome    Goal INR Range:  2-3  Duration of Anticoagulation Therapy:  Indefinite  Time of day dose taken:  PM  Product patient has at home:  warfarin 5 mg (PEACH color)    INR Summary                           Warfarin regimen (mg)  Date INR   A/P   Sun Mon Tue Wed Thu Fri Sat Mg/wk  1/28 3.0 At goal, no change 5 5 2.5 5 5 5 2.5 30  12/31 2.9 At goal, no change 5 5 2.5 5 5 5 2.5 30  12/3 2.9 At goal, no change 5 5 2.5 5 5 5 2.5 30  11/17 3.7 Above goal, holdx1 5 5 2.5 5 5 5 2.5 30  10/27 3.0 At goal, no change 5 5 5 5 5 5 2.5 32.5  10/8 1.8 Below goal, increase 5 5 5 5 5 5 2.5 32.5  9/17 2.1 At goal, no change 5 5 2.5 5 5 5 2.5 30  9/3 2.7 At goal, no change 5 5 2.5 5 5 5 2.5 30  8/27 1.5 Below goal, incr 5 5 2.5 5        7.5/5 5 2.5 30  8/20 1.7 Below goal, bolus 5 5 2.5 5         5/2.5 5 2.5 27.5  8/18 1.2 Below goal, bolus+ Lovenox          7.5/2.5 5 INR  8/6 1.5 Below goal, see below  7/14 2.6 At goal, no change 5 5 2.5 5 2.5 5 2.5 27.5  6/16 2.7 At goal, no change 5 5 2.5 5 2.5 5 2.5 27.5  5/18 2.0 At goal, no change 5 5 2.5 5 2.5 5 2.5 27.5  5/4 3.6 Above goal, holdx1 5 5 2.5 5 2.5 5 2.5 27.5  3/24 2.6 At goal, no change 5 5 2.5 5 2.5 5 2.5 27.5  3/3 1.8 Below goal, continue   5 5 2.5 5 2.5 5 2.5 27.5  2/4 3.0 At goal, no change 5 5 2.5 5 2.5 5 2.5 27.5  1/21 2.7 At goal, continue  5 5 2.5 5 2.5 5 2.5 27.5  1/14 1.3 Below goal, bolus x1 5 5 7.5/2.5 5 2.5 5 2.5 27.5  12/10 1.9 Below goal, continue 5 5 2.5 5 2.5 5 2.5 27.5  11/12 2.1 At goal, no change 5 5 2.5 5 2.5 5 2.5 27.5    Lab Results   Component Value Date    RBC 3.64 (L) 01/07/2020    HGB 13.1 (L) 01/07/2020    HCT 39.7 (L) 01/07/2020    .1 (H) 01/07/2020 MCH 36.1 (H) 01/07/2020    MPV 8.1 01/07/2020    RDW 12.9 01/07/2020     01/07/2020     [id number] Score for Atrial Fibrillation Stroke Risk   Risk   Factors  Component Value   C CHF No 0   H HTN Yes 1   A2 Age >= 76 Yes,  (79 y.o.) 2   D DM No 0   S2 Prior Stroke/TIA No 0   V Vascular Disease Yes 1   A Age 74-69 No,  (79 y.o.) 0   Sc Sex male 0    [id number]  Score  4   Score last updated 13/83/34 54:87 AM    Click here for a link to the UpToDate guideline \"Atrial Fibrillation: Anticoagulation therapy to prevent embolization    Disclaimer: Risk Score calculation is dependent on accuracy of patient problem list and past encounter diagnosis. 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 EGD 4/25 significant for: 2 small antral erosions, moderate hiatal hernia, slightly irregular Z-line, small distal duodenal polyp. Patient d/c off of plavix and warfarin in preparation for outpt EGD with biopsies and colonoscopy. EGD/Colonoscopy 5/2; no bleeding source, resumed plavix and warfarin on 5/3.  -10/29-11/1/17: [facility] for STEMI, s/p C 10/30 with primary stenting to proximal RCA. Recent medication changes First COVID vaccine last week, occ APAP for knee pain   Medications taken regularly that may interact with warfarin or alter INR MVI (may contain vit K)  ASA (stent placement 10/30/17)   Warfarin dose taken as prescribed Yes - uses pillbox  held warfarin 1/2-1/6, 8/7-8/12 and bridged with Lovenox    Signs/symptoms of bleeding 12/31/20: subconjunctival hemorrhage, seen by ophthalmologst  H/o hematuria- patient states he was told by his urologist that as long as he is taking warfarin, hematuria may continue. H/o epistaxis, occasional hemorrhoids, anemia. Vitamin K intake Normally has broccoli, asparagus, kale/gay salads 3-4 per week    9/17: 7 servings in past week. 12/10: \"a lot of greens\"- broccoli 3 days in a row+salads  1/14: 5 servings in past week including rory greens  3/3: 2 servings of green day before. 5/5: 1 serving in past week  8/6: brussels, asparagus, 2c cooked greens, broccoli  9/3, 10/27: only 1 serving    Recent vomiting/diarrhea/fever, changes in weight or activity level Trying to improve diet/exercise and lose weight gradually. Due to covid has not been as active lately   Tobacco or alcohol use Patient denies tobacco use  Will have 1 glass of wine per day max   Upcoming surgeries or procedures None     Assessment/Plan:   Patient's INR was therapeutic today (3.0). Pt denies incorrect warfarin doses, changes to health, meds, vit k, or unusual/serious bleeding. Patient was instructed to continue warfarin dose of 2.5mg on Tuesdays and Saturdays and 5mg all other days. Repeat INR in 4 weeks. Patient was reminded to call with any bleeding, medication changes, or fever/vomiting/diarrhea. Patient understands dosing directions and information discussed. Dosing schedule and follow up appointment given to patient. Progress note routed to referring physician's office. Patient acknowledges working in consult agreement with pharmacist as referred by his/her physician. Next Clinic Appointment:  2/26    Thanks!   Elisabeth German, PharmD, [facility]  Medication Management Clinic   [address] Ph: [phone]  Merritt Brar Ph: [phone]  1/28/2021 9:42 AM    CLINICAL PHARMACY CONSULT: MED RECONCILIATION/REVIEW ADDENDUM    For Pharmacy Admin Tracking Only    PHSO: Yes  Total # of Interventions Recommended: 0  - Decreased Dose #: 0   - Maintenance Safety Lab Monitoring #: 1  Total Interventions Accepted: 0  Time Spent (min): 15    Primo Cruz PharmD

## 2021-02-03 ENCOUNTER — OFFICE VISIT (OUTPATIENT)
Dept: CARDIOLOGY CLINIC | Age: 81
End: 2021-02-03
Payer: MEDICARE

## 2021-02-03 VITALS
WEIGHT: 220 LBS | DIASTOLIC BLOOD PRESSURE: 70 MMHG | SYSTOLIC BLOOD PRESSURE: 118 MMHG | TEMPERATURE: 97.1 F | BODY MASS INDEX: 33.45 KG/M2 | HEART RATE: 68 BPM

## 2021-02-03 DIAGNOSIS — I10 ESSENTIAL HYPERTENSION: ICD-10-CM

## 2021-02-03 DIAGNOSIS — I25.10 CAD IN NATIVE ARTERY: ICD-10-CM

## 2021-02-03 DIAGNOSIS — I48.20 CHRONIC ATRIAL FIBRILLATION (HCC): Primary | ICD-10-CM

## 2021-02-03 DIAGNOSIS — Z98.61 HISTORY OF PTCA: ICD-10-CM

## 2021-02-03 DIAGNOSIS — I25.2 MI, OLD: ICD-10-CM

## 2021-02-03 PROCEDURE — G8484 FLU IMMUNIZE NO ADMIN: HCPCS | Performed by: INTERNAL MEDICINE

## 2021-02-03 PROCEDURE — 1036F TOBACCO NON-USER: CPT | Performed by: INTERNAL MEDICINE

## 2021-02-03 PROCEDURE — 4040F PNEUMOC VAC/ADMIN/RCVD: CPT | Performed by: INTERNAL MEDICINE

## 2021-02-03 PROCEDURE — 99214 OFFICE O/P EST MOD 30 MIN: CPT | Performed by: INTERNAL MEDICINE

## 2021-02-03 PROCEDURE — 1123F ACP DISCUSS/DSCN MKR DOCD: CPT | Performed by: INTERNAL MEDICINE

## 2021-02-03 PROCEDURE — G8427 DOCREV CUR MEDS BY ELIG CLIN: HCPCS | Performed by: INTERNAL MEDICINE

## 2021-02-03 PROCEDURE — G8417 CALC BMI ABV UP PARAM F/U: HCPCS | Performed by: INTERNAL MEDICINE

## 2021-02-03 NOTE — PROGRESS NOTES
Subjective:      Patient ID: Willis Rosales is a 79 y.o. male. HPI:  Here for follow up afib/HTN/CAD/PTCA/Non st.  had fall and sprained wrist.  Still exercising but not as much due to knee. .   Edema stable. Wt down. No sob. No orthopnea/PND. No complaints. No palp/tachycardia. No syncope. No exertional chest pain/sob. BP good at home.      Past Medical History:   Diagnosis Date    Allergic rhinitis     Anemia     Atrial fibrillation ([facility]     Atrial fibrillation (HCC)     Benign non-nodular prostatic hyperplasia with lower urinary tract symptoms 2/9/2017    Benign paroxysmal positional vertigo     BPH (benign prostatic hyperplasia)     BPH (benign prostatic hypertrophy)     Cataract 10/3/2014    Cholelithiasis     Colon cancer ([facility] 4/9/2012    Diverticulosis     Dyslipidemia 7/21/2010    Elevated PSA     Erectile dysfunction     Essential hypertension 11/24/2015    Gastroesophageal reflux disease without esophagitis 11/9/2016    GERD (gastroesophageal reflux disease)     Glaucoma 4/5/2013    Hiatal hernia     Hypertension     Lumbar radiculopathy     Osteoarthritis     Peripheral neuropathy 12/7/2012    Proteinuria 7/22/2010    S/P laparoscopic-assisted sigmoidectomy 4/17/2012    Urinary frequency      Past Surgical History:   Procedure Laterality Date    CATARACT REMOVAL WITH IMPLANT Right October 7, 2014    Dr. Merlin Heys Left October 24, 2014    Dr. Roxanne Angeles  April 4, 2012    Dr. Jayme Martinez  May 15, 2013    Dr. Jayme Martinez  08/09/2016    Dr. Kelechi Cruz COLONOSCOPY N/A 02/17/2017    COLONOSCOPY N/A 8/13/2020    COLONOSCOPY performed by Leopoldo Jaquez MD at [address] Right October 7, 2014    Dr. Shanna Damico Left October 2014    Dr. Erica Schaefer  2013    port-a-cath removal     PROSTATE BIOPSY  1997    PROSTATE BIOPSY  May 10, 2010    Dr. Sherrell Hernandez    SIGMOIDECTOMY  2012    Dr. Stephen Loya sigmoid colectomy           TUNNELED VENOUS PORT PLACEMENT      TURP  2004    Dr. Chiquis Beach         Allergies   Allergen Reactions    Naproxen Other (See Comments)     Patient gets nose bleeds. Patient should not take. Patient lost a lot of blood in November due to Naproxen.     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    Occupation: retired -      Comment: as of 2013   Social Needs    Financial resource strain: Not on file    Food insecurity     Worry: Not on file     Inability: Not on file   [email or web address] needs     Medical: Not on file     Non-medical: Not on file   Tobacco Use    Smoking status: Former Smoker     Packs/day: 0.10     Years: 7.00     Pack years: 0.70     Types: Cigars     Start date:      Quit date: [facility]     Years since quittin.1    Smokeless tobacco: Never Used    Tobacco comment: quit in the 1960s -- former cigar smoker   Substance and Sexual Activity    Alcohol use: Yes     Comment: social    Drug use: No    Sexual activity: Yes     Partners: Female     Comment:  - Susan - 1967   Lifestyle    Physical activity     Days per week: Not on file     Minutes per session: Not on file    Stress: Not on file   Relationships    Social connections     Talks on phone: Not on file     Gets together: Not on file     Attends Jain service: Not on file     Active member of club or organization: Not on file     Attends meetings of clubs or organizations: Not on file     Relationship status: Not on file    Intimate partner violence     Fear of current or ex partner: Not on file     Emotionally abused: Not on file     Physically abused: Not on file     Forced sexual activity: Not on file   Other Topics Concern    Not on file   Social History Narrative    Past Surgical History     TURP: 2004    prostate biopsy - 1997                                                    Last updated by Melva Mauricio MD on 10/29/2008                      Social History     Marital Status:  - 1967     Spouse: Susan    Children: 3      Children's Names: José Miguel Salamanca    Employment Status: retired -     Employer: [employer]    Occupation:  and Communications    Alcohol Use: socially    Drug Use: none    Tobacco Usage: prior smoker    Cigars/Pipes - Years Smoked: 65's & 62's                                                 Last updated by Melva Mauricio MD on 2009                      Family History     mother -  - age 73-73 - coronary artery disease, hypertension, sudden death    father -  - age 80 - ? colon cancer        brother - Collin Rizvi -  - age 68 - 2009 - pancreatic cancer, hypertension, CABG, kidney problem    brother - Jordon De La Torre - small intestinal cancer, hypertension ([place])    brother - Cass Jameson - hypertension    brother - Crystal Trotter -  - age 77 - 2008 - hypertension, colon cancer    brother -  - age 15 -  in a fire, smoke inhalation injury        sister - Dhruv Matthews - hypertension        son - Jordon De La Torre - multiple sclerosis (age 37)    son - Jammie Mir - daughter - Anna Marie Davila - asthma                                  Last updated by Adri Connors MD on 01/07/2010         Patient has a family history includes Arthritis in his brother; Asthma in his daughter; Cancer in his brother, brother, brother, and father; Catalina Sales in his brother; Coronary Art Dis in his brother and mother; Heart Disease in his brother and mother; Heart Surgery in his brother; High Blood Pressure in his brother, brother, brother, brother, mother, and sister; Hypertension in his brother, brother, brother, brother, mother, sister, and son; Kidney Disease in his brother; Mult Sclerosis in his son. Current Outpatient Medications   Medication Sig Dispense Refill    metoprolol succinate (TOPROL XL) 200 MG extended release tablet TAKE ONE TABLET BY MOUTH DAILY 30 tablet 0    furosemide (LASIX) 20 MG tablet Take 1 tablet by mouth daily Take before dinner 90 tablet 3    warfarin (COUMADIN) 5 MG tablet TAKE ONE-HALF TABLET BY MOUTH ON TUESDAYS, THURSDAYS, AND SATURDAYS, AND TAKE 1 TABLET BY MOUTH ALL OTHER DAYS OR AS DIRECTED BY CLINIC 72 tablet 2    irbesartan (AVAPRO) 75 MG tablet TAKE ONE TABLET BY MOUTH DAILY 90 tablet 2    atorvastatin (LIPITOR) 40 MG tablet Take 1 tablet by mouth nightly 90 tablet 3    pantoprazole (PROTONIX) 20 MG tablet Take 1 tablet by mouth daily (Patient taking differently: Take 20 mg by mouth every other day ) 90 tablet 3    Cyanocobalamin (VITAMIN B-12) 500 MCG LOZG Take 500 mcg by mouth daily      nitroGLYCERIN (NITROSTAT) 0.4 MG SL tablet up to max of 3 total doses.  If no relief after 1 dose, call 911. 25 tablet 3    finasteride (PROSCAR) 5 MG tablet Take 5 mg by mouth daily      aspirin 81 MG tablet Take 81 mg by mouth daily      Cholecalciferol (VITAMIN D) 2000 UNITS CAPS capsule Take 1 capsule by mouth 2 times daily      tamsulosin (FLOMAX) 0.4 MG capsule Take 0.4 mg by mouth daily      Multiple Vitamin (MULTIVITAMIN) capsule Take 1 capsule by mouth daily.  triamterene-hydrochlorothiazide (MAXZIDE-25) 37.5-25 MG per tablet Take 1 tablet by mouth daily 90 tablet 3     No current facility-administered medications for this visit. Vitals:    02/03/21 1331   BP: 118/70   Pulse: 68   Temp: 97.1 °F (36.2 °C)       Weight:  220 lb (99.8 kg)      Review of Systems   Constitutional: Negative for activity change and fatigue. Respiratory: Negative for apnea, cough, choking, chest tightness and shortness of breath. Cardiovascular: Negative for chest pain, palpitations and leg swelling. No PND or orthopnea. No tachycardia. Gastrointestinal: Negative for abdominal distention. Musculoskeletal: Negative for myalgias. Neurological: Negative for dizziness, syncope and light-headedness. Psychiatric/Behavioral: Negative for behavioral problems, confusion and agitation. All other systems reviewed negative as done    Objective:   Physical Exam   Constitutional: He is oriented to person, place, and time. He appears well-developed and well-nourished. No distress. HENT:   Head: Normocephalic and atraumatic. Eyes: Conjunctivae and EOM are normal. Right eye exhibits no discharge. Left eye exhibits no discharge. Neck: Normal range of motion. Neck supple. No JVD present. Cardiovascular: Normal rate, S1 normal, S2 normal and normal heart sounds. An irregularly irregular rhythm present. Exam reveals no gallop. No murmur heard. Pulses:       Radial pulses are 2+ on the right side, and 2+ on the left side. Pulmonary/Chest: Effort normal and breath sounds normal. No respiratory distress. He has no wheezes. He has no rales. Abdominal: Soft. Bowel sounds are normal. No tenderness. Musculoskeletal: Normal range of motion. He exhibits no  tenderness. Tr edema  Neurological: He is alert and oriented to person, place, and time. Skin: Skin is warm and dry. Psychiatric: He has a normal mood and affect.  His behavior is normal. Thought

## 2021-02-25 ENCOUNTER — ANTI-COAG VISIT (OUTPATIENT)
Dept: PHARMACY | Age: 81
End: 2021-02-25
Payer: MEDICARE

## 2021-02-25 VITALS — TEMPERATURE: 96.8 F

## 2021-02-25 DIAGNOSIS — I48.20 CHRONIC ATRIAL FIBRILLATION (HCC): ICD-10-CM

## 2021-02-25 LAB — INTERNATIONAL NORMALIZATION RATIO, POC: 1.8

## 2021-02-25 PROCEDURE — 99211 OFF/OP EST MAY X REQ PHY/QHP: CPT

## 2021-02-25 PROCEDURE — 85610 PROTHROMBIN TIME: CPT

## 2021-02-25 NOTE — PROGRESS NOTES
ANTICOAGULATION SERVICE    Storm Gamboa" is a 79 y.o. male with PMHx significant for chronic AF ([id number] of 4), HTN who presents to clinic 2/25/2021 for anticoagulation monitoring and adjustment.     Anticoagulation Indication(s):  Afib    Referring Physician:  Dr. Hay Le    Goal INR Range:  2-3  Duration of Anticoagulation Therapy:  Indefinite  Time of day dose taken:  PM  Product patient has at home:  warfarin 5 mg (PEACH color)    INR Summary                           Warfarin regimen (mg)  Date INR   A/P   Sun Mon Tue Wed Thu Fri Sat Mg/wk  2/25 1.8 Below goal, continue 5 5 2.5 5 5 5 2.5 30  1/28 3.0 At goal, no change 5 5 2.5 5 5 5 2.5 30  12/31 2.9 At goal, no change 5 5 2.5 5 5 5 2.5 30  12/3 2.9 At goal, no change 5 5 2.5 5 5 5 2.5 30  11/17 3.7 Above goal, holdx1 5 5 2.5 5 5 5 2.5 30  10/27 3.0 At goal, no change 5 5 5 5 5 5 2.5 32.5  10/8 1.8 Below goal, increase 5 5 5 5 5 5 2.5 32.5  9/17 2.1 At goal, no change 5 5 2.5 5 5 5 2.5 30  9/3 2.7 At goal, no change 5 5 2.5 5 5 5 2.5 30  8/27 1.5 Below goal, incr 5 5 2.5 5        7.5/5 5 2.5 30  8/20 1.7 Below goal, bolus 5 5 2.5 5         5/2.5 5 2.5 27.5  8/18 1.2 Below goal, bolus+ Lovenox          7.5/2.5 5 INR  8/6 1.5 Below goal, see below  7/14 2.6 At goal, no change 5 5 2.5 5 2.5 5 2.5 27.5  6/16 2.7 At goal, no change 5 5 2.5 5 2.5 5 2.5 27.5  5/18 2.0 At goal, no change 5 5 2.5 5 2.5 5 2.5 27.5  5/4 3.6 Above goal, holdx1 5 5 2.5 5 2.5 5 2.5 27.5  3/24 2.6 At goal, no change 5 5 2.5 5 2.5 5 2.5 27.5  3/3 1.8 Below goal, continue   5 5 2.5 5 2.5 5 2.5 27.5  2/4 3.0 At goal, no change 5 5 2.5 5 2.5 5 2.5 27.5  1/21 2.7 At goal, continue  5 5 2.5 5 2.5 5 2.5 27.5  1/14 1.3 Below goal, bolus x1 5 5 7.5/2.5 5 2.5 5 2.5 27.5  12/10 1.9 Below goal, continue 5 5 2.5 5 2.5 5 2.5 27.5  11/12 2.1 At goal, no change 5 5 2.5 5 2.5 5 2.5 27.5    Lab Results   Component Value Date    RBC 3.64 (L) 01/07/2020    HGB 13.1 (L) 01/07/2020    HCT 39.7 (L) 01/07/2020    .1 (H) 01/07/2020    MCH 36.1 (H) 01/07/2020    MPV 8.1 01/07/2020    RDW 12.9 01/07/2020     01/07/2020     [id number] Score for Atrial Fibrillation Stroke Risk   Risk   Factors  Component Value   C CHF No 0   H HTN Yes 1   A2 Age >= 76 Yes,  (79 y.o.) 2   D DM No 0   S2 Prior Stroke/TIA No 0   V Vascular Disease Yes 1   A Age 74-69 No,  (79 y.o.) 0   Sc Sex male 0    [id number]  Score  4   Score last updated 21/90/97 26:51 AM    Click here for a link to the UpToDate guideline \"Atrial Fibrillation: Anticoagulation therapy to prevent embolization    Disclaimer: Risk Score calculation is dependent on accuracy of patient problem list and past encounter diagnosis. 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 EGD 4/25 significant for: 2 small antral erosions, moderate hiatal hernia, slightly irregular Z-line, small distal duodenal polyp. Patient d/c off of plavix and warfarin in preparation for outpt EGD with biopsies and colonoscopy. EGD/Colonoscopy 5/2; no bleeding source, resumed plavix and warfarin on 5/3.  -10/29-11/1/17: [facility] for STEMI, s/p C 10/30 with primary stenting to proximal RCA.     Recent medication changes First COVID vaccine last week, occ APAP for knee pain   Medications taken regularly that may interact with warfarin or alter INR MVI (may contain vit K)  ASA (stent placement 10/30/17)   Warfarin dose taken as prescribed Yes - uses pillbox  held warfarin 1/2-1/6, 8/7-8/12 and bridged with Lovenox    Signs/symptoms of bleeding 12/31/20: subconjunctival hemorrhage, seen by ophthalmologst  H/o hematuria- patient states he was told by his urologist that as long as he is taking warfarin, hematuria may continue. H/o epistaxis, occasional hemorrhoids, anemia. Vitamin K intake Normally has broccoli, asparagus, kale/gay salads 3-4 per week    9/17: 7 servings in past week. 12/10: \"a lot of greens\"- broccoli 3 days in a row+salads  1/14: 5 servings in past week including rory greens  3/3: 2 servings of green day before. 5/5: 1 serving in past week  8/6: brussels, asparagus, 2c cooked greens, broccoli  9/3, 10/27: only 1 serving    Recent vomiting/diarrhea/fever, changes in weight or activity level Trying to improve diet/exercise and lose weight gradually. Due to covid has not been as active lately   Tobacco or alcohol use Patient denies tobacco use  Will have 1 glass of wine per day max   Upcoming surgeries or procedures None     Assessment/Plan:   Patient's INR was subtherapeutic today (1.8). Pt denies incorrect warfarin doses, changes to health, meds, or unusual/serious bleeding. He has had some extra vitamin K this week, having 1-2 more servings than his normal 3 servings per week. No change in activity level. Patient was instructed to continue warfarin dose of 2.5mg on Tuesdays and Saturdays and 5mg all other days. Repeat INR in 4 weeks. Continue eating 3 serving of vitamin K per week. Patient was reminded to call with any bleeding, medication changes, or fever/vomiting/diarrhea. Patient understands dosing directions and information discussed. Dosing schedule and follow up appointment given to patient. Progress note routed to referring physician's office. Patient acknowledges working in consult agreement with pharmacist as referred by his/her physician. Next Clinic Appointment:  3/25    Thanks!   Elkin Rodriguez, PharmD  PGY1 Pharmacy Resident   Wireless: [phone]  2/25/2021 9:39 AM    CLINICAL PHARMACY CONSULT: MED RECONCILIATION/REVIEW ADDENDUM    For Pharmacy Admin Tracking Only    PHSO: No  Total # of Interventions Recommended: 0  - Decreased Dose #: 0   - Maintenance Safety Lab Monitoring #: 1  Total Interventions Accepted: 0  Time Spent (min): 15    Nhi Weaver, NahidD

## 2021-03-12 NOTE — TELEPHONE ENCOUNTER
Requested Prescriptions     Pending Prescriptions Disp Refills    warfarin (COUMADIN) 5 MG tablet [Pharmacy Med Name: WARFARIN SODIUM 5 MG TABLET] 72 tablet 1     Sig: TAKE 1/2 TABLET BY MOUTH DAILY ON TUESDAYS, THURSDAYS, AND SATURDAYS AND TAKE ONE TABLET BY MOUTH ON ALL OTHER DAYS OR AS DIRECTED BY CLINIC          Number: 72    Refills: 1    Last Office Visit: 2/3/2021     Next Office Visit: 5/5/2021     Last Labs: 2.25.2021

## 2021-03-19 RX ORDER — WARFARIN SODIUM 5 MG/1
TABLET ORAL
Qty: 72 TABLET | Refills: 1 | Status: SHIPPED | OUTPATIENT
Start: 2021-03-19 | End: 2021-03-19

## 2021-03-19 RX ORDER — WARFARIN SODIUM 5 MG/1
TABLET ORAL
Qty: 72 TABLET | Refills: 1 | Status: SHIPPED | OUTPATIENT
Start: 2021-03-19 | End: 2021-05-05 | Stop reason: SDUPTHER

## 2021-03-19 NOTE — TELEPHONE ENCOUNTER
Requested Prescriptions     Pending Prescriptions Disp Refills    warfarin (COUMADIN) 5 MG tablet [Pharmacy Med Name: WARFARIN SODIUM 5 MG TABLET] 72 tablet 1     Sig: TAKE 1/2 TABLET BY MOUTH DAILY ON TUESDAYS, THURSDAYS, AND SATURDAYS AND TAKE ONE TABLET BY MOUTH ON ALL OTHER DAYS OR AS DIRECTED BY CLINIC          Last Office Visit: 2/3/2021     Next Office Visit: 5/5/2021

## 2021-03-25 ENCOUNTER — ANTI-COAG VISIT (OUTPATIENT)
Dept: PHARMACY | Age: 81
End: 2021-03-25
Payer: MEDICARE

## 2021-03-25 DIAGNOSIS — I48.20 CHRONIC ATRIAL FIBRILLATION (HCC): ICD-10-CM

## 2021-03-25 LAB — INTERNATIONAL NORMALIZATION RATIO, POC: 3.5

## 2021-03-25 PROCEDURE — 85610 PROTHROMBIN TIME: CPT

## 2021-03-25 PROCEDURE — 99212 OFFICE O/P EST SF 10 MIN: CPT

## 2021-03-25 NOTE — PROGRESS NOTES
ANTICOAGULATION SERVICE    Gogo Walden" is a 79 y.o. male with PMHx significant for chronic AF ([id number]-[id number] of 4), HTN who presents to clinic 3/25/2021 for anticoagulation monitoring and adjustment.     Anticoagulation Indication(s):  Afib    Referring Physician:  Dr. Francisco Javier Anand    Goal INR Range:  2-3  Duration of Anticoagulation Therapy:  Indefinite  Time of day dose taken:  PM  Product patient has at home:  warfarin 5 mg (PEACH color)    INR Summary                           Warfarin regimen (mg)  Date INR   A/P   Sun Mon Tue Wed Thu Fri Sat Mg/wk  3/25 3.5 Above goal, continue 5 5 2.5 5 2.5/5 5 2.5 30  2/25 1.8 Below goal, continue 5 5 2.5 5 5 5 2.5 30  1/28 3.0 At goal, no change 5 5 2.5 5 5 5 2.5 30  12/31 2.9 At goal, no change 5 5 2.5 5 5 5 2.5 30  12/3 2.9 At goal, no change 5 5 2.5 5 5 5 2.5 30  11/17 3.7 Above goal, holdx1 5 5 2.5 5 5 5 2.5 30  10/27 3.0 At goal, no change 5 5 5 5 5 5 2.5 32.5  10/8 1.8 Below goal, increase 5 5 5 5 5 5 2.5 32.5  9/17 2.1 At goal, no change 5 5 2.5 5 5 5 2.5 30  9/3 2.7 At goal, no change 5 5 2.5 5 5 5 2.5 30  8/27 1.5 Below goal, incr 5 5 2.5 5        7.5/5 5 2.5 30  8/20 1.7 Below goal, bolus 5 5 2.5 5         5/2.5 5 2.5 27.5  8/18 1.2 Below goal, bolus+ Lovenox          7.5/2.5 5 INR  8/6 1.5 Below goal, see below  7/14 2.6 At goal, no change 5 5 2.5 5 2.5 5 2.5 27.5  6/16 2.7 At goal, no change 5 5 2.5 5 2.5 5 2.5 27.5  5/18 2.0 At goal, no change 5 5 2.5 5 2.5 5 2.5 27.5  5/4 3.6 Above goal, holdx1 5 5 2.5 5 2.5 5 2.5 27.5  3/24 2.6 At goal, no change 5 5 2.5 5 2.5 5 2.5 27.5  3/3 1.8 Below goal, continue   5 5 2.5 5 2.5 5 2.5 27.5  2/4 3.0 At goal, no change 5 5 2.5 5 2.5 5 2.5 27.5  1/21 2.7 At goal, continue  5 5 2.5 5 2.5 5 2.5 27.5  1/14 1.3 Below goal, bolus x1 5 5 7.5/2.5 5 2.5 5 2.5 27.5  12/10 1.9 Below goal, continue 5 5 2.5 5 2.5 5 2.5 27.5  11/12 2.1 At goal, no change 5 5 2.5 5 2.5 5 2.5 27.5    Lab Results   Component Value Date    RBC 3.64 (L) 01/07/2020    HGB 13.1 (L) 01/07/2020    HCT 39.7 (L) 01/07/2020    .1 (H) 01/07/2020    MCH 36.1 (H) 01/07/2020    MPV 8.1 01/07/2020    RDW 12.9 01/07/2020     01/07/2020     [id number] Score for Atrial Fibrillation Stroke Risk   Risk   Factors  Component Value   C CHF No 0   H HTN Yes 1   A2 Age >= 76 Yes,  (79 y.o.) 2   D DM No 0   S2 Prior Stroke/TIA No 0   V Vascular Disease Yes 1   A Age 74-69 No,  (79 y.o.) 0   Sc Sex male 0    [id number]  Score  4   Score last updated 20/80/17 62:58 AM    Click here for a link to the UpToDate guideline \"Atrial Fibrillation: Anticoagulation therapy to prevent embolization    Disclaimer: Risk Score calculation is dependent on accuracy of patient problem list and past encounter diagnosis. 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 EGD 4/25 significant for: 2 small antral erosions, moderate hiatal hernia, slightly irregular Z-line, small distal duodenal polyp. Patient d/c off of plavix and warfarin in preparation for outpt EGD with biopsies and colonoscopy. EGD/Colonoscopy 5/2; no bleeding source, resumed plavix and warfarin on 5/3.  -10/29-11/1/17: [facility] for STEMI, s/p [facility] 10/30 with primary stenting to proximal RCA.     Recent medication changes First COVID vaccine last week, occ APAP for knee pain   Medications taken regularly that may interact with warfarin or alter INR MVI (may contain vit K)  ASA (stent placement 10/30/17)   Warfarin dose taken as prescribed Yes - uses pillbox  held warfarin 1/2-1/6, 8/7-8/12 and bridged with Lovenox    Signs/symptoms of bleeding 12/31/20: subconjunctival hemorrhage, seen by ophthalmologst  H/o hematuria- patient states he was told by his urologist that as long as he is taking warfarin, hematuria may continue. H/o epistaxis, occasional hemorrhoids, anemia. Vitamin K intake Normally has broccoli, asparagus, kale/gay salads 3-4 per week    9/17: 7 servings in past week. 12/10: \"a lot of greens\"- broccoli 3 days in a row+salads  1/14: 5 servings in past week including rory greens  3/3: 2 servings of green day before. 5/5: 1 serving in past week  8/6: brussels, asparagus, 2c cooked greens, broccoli  9/3, 10/27: only 1 serving    Recent vomiting/diarrhea/fever, changes in weight or activity level Trying to improve diet/exercise and lose weight gradually. Due to covid has not been as active lately   Tobacco or alcohol use Patient denies tobacco use  Will have 1 glass of wine per day max   Upcoming surgeries or procedures None     Assessment/Plan:   Patient's INR was supratherapeutic today (3.5). Patient reports cutting back on greens d/t low reading last visit. Plans to return to 3 servings a week. He denies incorrect warfarin doses, changes to health, meds, or unusual/serious bleeding. Patient was instructed to take a half dose of 2.5 mg warfarin today then to continue warfarin dose of 2.5 mg on Tuesdays and Saturdays and 5mg all other days. Repeat INR in 2 weeks. Continue eating 3 serving of vitamin K per week. Patient was reminded to call with any bleeding, medication changes, or fever/vomiting/diarrhea. Patient understands dosing directions and information discussed. Dosing schedule and follow up appointment given to patient. Progress note routed to referring physician's office. Patient acknowledges working in consult agreement with pharmacist as referred by his/her physician. Next Clinic Appointment:  4/8    Thanks!   Luz Maria Barger, PharmD  PGY1 Pharmacy Resident  3/25/2021 9:49 AM    CLINICAL PHARMACY CONSULT: MED RECONCILIATION/REVIEW ADDENDUM    For Pharmacy Admin Tracking Only    PHSO: No  Total # of Interventions Recommended: 0  - Decreased Dose #: 0   - Maintenance Safety Lab Monitoring #: 1  Total Interventions Accepted: 0  Time Spent (min): 15

## 2021-04-08 ENCOUNTER — ANTI-COAG VISIT (OUTPATIENT)
Dept: PHARMACY | Age: 81
End: 2021-04-08
Payer: MEDICARE

## 2021-04-08 VITALS — TEMPERATURE: 96 F

## 2021-04-08 DIAGNOSIS — I48.20 CHRONIC ATRIAL FIBRILLATION (HCC): ICD-10-CM

## 2021-04-08 LAB — INTERNATIONAL NORMALIZATION RATIO, POC: 2.6

## 2021-04-08 PROCEDURE — 85610 PROTHROMBIN TIME: CPT

## 2021-04-08 PROCEDURE — 99211 OFF/OP EST MAY X REQ PHY/QHP: CPT

## 2021-04-08 RX ORDER — EZETIMIBE 10 MG/1
10 TABLET ORAL DAILY
COMMUNITY

## 2021-04-08 NOTE — PROGRESS NOTES
ANTICOAGULATION SERVICE    Naga Lewis" is a 79 y.o. male with PMHx significant for chronic AF ([id number] of 4), HTN who presents to clinic 4/8/2021 for anticoagulation monitoring and adjustment.     Anticoagulation Indication(s):  Afib    Referring Physician:  Dr. Rosalina Machuca    Goal INR Range:  2-3  Duration of Anticoagulation Therapy:  Indefinite  Time of day dose taken:  PM  Product patient has at home:  warfarin 5 mg (PEACH color)    INR Summary                           Warfarin regimen (mg)  Date INR   A/P   Sun Mon Tue Wed Thu Fri Sat Mg/wk  4/8 2.6 At goal, no change 5 5 2.5 5 5 5 2.5 30  3/25 3.5 Above goal, continue 5 5 2.5 5 2.5/5 5 2.5 30  2/25 1.8 Below goal, continue 5 5 2.5 5 5 5 2.5 30  1/28 3.0 At goal, no change 5 5 2.5 5 5 5 2.5 30  12/31 2.9 At goal, no change 5 5 2.5 5 5 5 2.5 30  12/3 2.9 At goal, no change 5 5 2.5 5 5 5 2.5 30  11/17 3.7 Above goal, holdx1 5 5 2.5 5 5 5 2.5 30  10/27 3.0 At goal, no change 5 5 5 5 5 5 2.5 32.5  10/8 1.8 Below goal, increase 5 5 5 5 5 5 2.5 32.5  9/17 2.1 At goal, no change 5 5 2.5 5 5 5 2.5 30  9/3 2.7 At goal, no change 5 5 2.5 5 5 5 2.5 30  8/27 1.5 Below goal, incr 5 5 2.5 5        7.5/5 5 2.5 30  8/20 1.7 Below goal, bolus 5 5 2.5 5         5/2.5 5 2.5 27.5  8/18 1.2 Below goal, bolus+ Lovenox          7.5/2.5 5 INR  8/6 1.5 Below goal, see below  7/14 2.6 At goal, no change 5 5 2.5 5 2.5 5 2.5 27.5  6/16 2.7 At goal, no change 5 5 2.5 5 2.5 5 2.5 27.5  5/18 2.0 At goal, no change 5 5 2.5 5 2.5 5 2.5 27.5  5/4 3.6 Above goal, holdx1 5 5 2.5 5 2.5 5 2.5 27.5  3/24 2.6 At goal, no change 5 5 2.5 5 2.5 5 2.5 27.5  3/3 1.8 Below goal, continue   5 5 2.5 5 2.5 5 2.5 27.5  2/4 3.0 At goal, no change 5 5 2.5 5 2.5 5 2.5 27.5  1/21 2.7 At goal, continue  5 5 2.5 5 2.5 5 2.5 27.5  1/14 1.3 Below goal, bolus x1 5 5 7.5/2.5 5 2.5 5 2.5 27.5  12/10 1.9 Below goal, continue 5 5 2.5 5 2.5 5 2.5 27.5  11/12 2.1 At goal, no change 5 5 2.5 5 2.5 5 2.5 27.5    Lab Results   Component Value Date    RBC 3.64 (L) 01/07/2020    HGB 13.1 (L) 01/07/2020    HCT 39.7 (L) 01/07/2020    .1 (H) 01/07/2020    MCH 36.1 (H) 01/07/2020    MPV 8.1 01/07/2020    RDW 12.9 01/07/2020     01/07/2020     ONI2VR2-FZJd Score for Atrial Fibrillation Stroke Risk   Risk   Factors  Component Value   C CHF No 0   H HTN Yes 1   A2 Age >= 76 Yes,  (79 y.o.) 2   D DM No 0   S2 Prior Stroke/TIA No 0   V Vascular Disease Yes 1   A Age 74-69 No,  (79 y.o.) 0   Sc Sex male 0    [id number]  Score  4   Score last updated 54/22/72 62:86 AM    Click here for a link to the UpToDate guideline \"Atrial Fibrillation: Anticoagulation therapy to prevent embolization    Disclaimer: Risk Score calculation is dependent on accuracy of patient problem list and past encounter diagnosis. 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 EGD 4/25 significant for: 2 small antral erosions, moderate hiatal hernia, slightly irregular Z-line, small distal duodenal polyp. Patient d/c off of plavix and warfarin in preparation for outpt EGD with biopsies and colonoscopy. EGD/Colonoscopy 5/2; no bleeding source, resumed plavix and warfarin on 5/3.  -10/29-11/1/17: [facility] for STEMI, s/p [facility] 10/30 with primary stenting to proximal RCA.     Recent medication changes Macrobid for UTI 3/19/21 x 10d - no DI w/warfarin  Started ezetimibe 3/26/21- no DI w/warfarin  Possible cortisone shot in L knee in future   Medications taken regularly that may interact with warfarin or alter INR MVI (may contain vit K)  ASA (stent placement 10/30/17)  Occ APAP prn knee pain   Warfarin dose taken as prescribed Yes - uses pillbox  held warfarin 1/2-1/6, 8/7-8/12 and bridged with Lovenox    Signs/symptoms of bleeding 12/31/20: subconjunctival hemorrhage, seen by ophthalmologst  H/o hematuria- patient states he was told by his urologist that as long as he is taking warfarin, hematuria may continue. H/o epistaxis, occasional hemorrhoids, anemia. Vitamin K intake Normally has broccoli, asparagus, kale/gay salads 3-4 per week    9/17: 7 servings in past week. 12/10: \"a lot of greens\"- broccoli 3 days in a row+salads  1/14: 5 servings in past week including rory greens  3/3: 2 servings of green day before. 5/5: 1 serving in past week  8/6: brussels, asparagus, 2c cooked greens, broccoli  9/3, 10/27: only 1 serving   4/8/21: back to normal, most days of the week- broccoli   Recent vomiting/diarrhea/fever, changes in weight or activity level Trying to improve diet/exercise and lose weight gradually. Due to covid has not been as active lately   Tobacco or alcohol use Patient denies tobacco use  Will have 1 glass of wine per day max   Upcoming surgeries or procedures None     Assessment/Plan:   Patient's INR was therapeutic today (2.6) after returning to normal vit k intake. He plans to continue eating high vit k foods most days of the week. He denies incorrect warfarin doses, changes to health, significant med interactions, or unusual/serious bleeding. Patient was instructed to continue warfarin dose of 2.5 mg on Tuesdays and Saturdays and 5mg all other days. Repeat INR in 4 weeks. Patient was reminded to call with any bleeding, medication changes, or fever/vomiting/diarrhea. Patient understands dosing directions and information discussed. Dosing schedule and follow up appointment given to patient. Progress note routed to referring physician's office. Patient acknowledges working in consult agreement with pharmacist as referred by his/her physician. Next Clinic Appointment: 5/7    Thanks!   Mary Amos, PharmD, BCACP  [facility] Ph: [phone]  [facility] Ph: [phone]  4/8/2021 9:34 AM    CLINICAL PHARMACY CONSULT: MED RECONCILIATION/REVIEW ADDENDUM    For Pharmacy Admin Tracking Only    PHSO: No  Total # of Interventions Recommended: 0  - Decreased Dose #: 0   - Maintenance Safety Lab Monitoring #: 1  Total Interventions Accepted: 0  Time Spent (min): 15

## 2021-05-05 ENCOUNTER — OFFICE VISIT (OUTPATIENT)
Dept: CARDIOLOGY CLINIC | Age: 81
End: 2021-05-05
Payer: MEDICARE

## 2021-05-05 VITALS
DIASTOLIC BLOOD PRESSURE: 70 MMHG | HEART RATE: 68 BPM | WEIGHT: 215 LBS | BODY MASS INDEX: 32.69 KG/M2 | SYSTOLIC BLOOD PRESSURE: 130 MMHG

## 2021-05-05 DIAGNOSIS — I48.20 CHRONIC ATRIAL FIBRILLATION (HCC): Primary | ICD-10-CM

## 2021-05-05 DIAGNOSIS — I25.2 MI, OLD: ICD-10-CM

## 2021-05-05 DIAGNOSIS — Z98.61 HISTORY OF PTCA: ICD-10-CM

## 2021-05-05 DIAGNOSIS — I10 ESSENTIAL HYPERTENSION: ICD-10-CM

## 2021-05-05 DIAGNOSIS — I25.10 CAD IN NATIVE ARTERY: ICD-10-CM

## 2021-05-05 PROCEDURE — 4040F PNEUMOC VAC/ADMIN/RCVD: CPT | Performed by: INTERNAL MEDICINE

## 2021-05-05 PROCEDURE — G8427 DOCREV CUR MEDS BY ELIG CLIN: HCPCS | Performed by: INTERNAL MEDICINE

## 2021-05-05 PROCEDURE — 1036F TOBACCO NON-USER: CPT | Performed by: INTERNAL MEDICINE

## 2021-05-05 PROCEDURE — G8417 CALC BMI ABV UP PARAM F/U: HCPCS | Performed by: INTERNAL MEDICINE

## 2021-05-05 PROCEDURE — 99213 OFFICE O/P EST LOW 20 MIN: CPT | Performed by: INTERNAL MEDICINE

## 2021-05-05 PROCEDURE — 1123F ACP DISCUSS/DSCN MKR DOCD: CPT | Performed by: INTERNAL MEDICINE

## 2021-05-05 RX ORDER — WARFARIN SODIUM 5 MG/1
5 TABLET ORAL DAILY
Qty: 90 TABLET | Refills: 3 | Status: SHIPPED | OUTPATIENT
Start: 2021-05-05 | End: 2022-06-06

## 2021-05-05 NOTE — PROGRESS NOTES
Subjective:      Patient ID: Clyde Diop is a 79 y.o. male. HPI:  Here for follow up afib/HTN/CAD/PTCA/Non st.  had fall and sprained wrist.  Still exercising but not as much due to knee. .   Edema stable. Wt down. No sob. No orthopnea/PND. No complaints. No palp/tachycardia. No syncope. No exertional chest pain/sob. BP good at home.      Past Medical History:   Diagnosis Date    Allergic rhinitis     Anemia     Atrial fibrillation ([facility]     Atrial fibrillation (HCC)     Benign non-nodular prostatic hyperplasia with lower urinary tract symptoms 2/9/2017    Benign paroxysmal positional vertigo     BPH (benign prostatic hyperplasia)     BPH (benign prostatic hypertrophy)     Cataract 10/3/2014    Cholelithiasis     Colon cancer ([facility] [address] 4/9/2012    Diverticulosis     Dyslipidemia 7/21/2010    Elevated PSA     Erectile dysfunction     Essential hypertension 11/24/2015    Gastroesophageal reflux disease without esophagitis 11/9/2016    GERD (gastroesophageal reflux disease)     Glaucoma 4/5/2013    Hiatal hernia     Hypertension     Lumbar radiculopathy     Osteoarthritis     Peripheral neuropathy 12/7/2012    Proteinuria 7/22/2010    S/P laparoscopic-assisted sigmoidectomy 4/17/2012    Urinary frequency      Past Surgical History:   Procedure Laterality Date    CATARACT REMOVAL WITH IMPLANT Right October 7, 2014    Dr. Jeffrey Myrick Left October 24, 2014    Dr. Alvaro Patel  April 4, 2012    Dr. Karla Coombs  May 15, 2013    Dr. Karla Coombs  08/09/2016    Dr. Nancy Fried COLONOSCOPY N/A 02/17/2017    COLONOSCOPY N/A 8/13/2020    COLONOSCOPY performed by Selena Darling MD at [address] Right October 7, 2014    Dr. Munira Graham Left October per session: Not on file    Stress: Not on file   Relationships    Social connections     Talks on phone: Not on file     Gets together: Not on file     Attends Yarsanism service: Not on file     Active member of club or organization: Not on file     Attends meetings of clubs or organizations: Not on file     Relationship status: Not on file    Intimate partner violence     Fear of current or ex partner: Not on file     Emotionally abused: Not on file     Physically abused: Not on file     Forced sexual activity: Not on file   Other Topics Concern    Not on file   Social History Narrative    Past Surgical History     TURP: 2004    prostate biopsy - 1997                                                    Last updated by Anna Hopper MD on 10/29/2008                      Social History     Marital Status:  - 1967     Spouse: Susan    Children: 3      Children's Names: FXTrip    Employment Status: retired -     Employer: [employer]    Occupation:  and Communications    Alcohol Use: socially    Drug Use: none    Tobacco Usage: prior smoker    Cigars/Pipes - Years Smoked: 65's & 62's                                                 Last updated by Anna Hopper MD on 2009                      Family History     mother -  - age 73-73 - coronary artery disease, hypertension, sudden death    father -  - age 80 - ? colon cancer        brother - Emmanuel Tashia -  - age 68 - 2009 - pancreatic cancer, hypertension, CABG, kidney problem    brother - Chip Senior - small intestinal cancer, hypertension ([place])    brother - Charis Gonzales - hypertension    brother - Shira Getting -  - age 77 - 2008 - hypertension, colon cancer    brother -  - age 15 -  in a fire, smoke inhalation injury        sister - Mitchel Lipps - hypertension        son - Chip Senior - multiple sclerosis (age 37)    son - Joni Divers - daughter - Bam Otto - asthma                                  Last updated by Jaxon Beltran MD on 01/07/2010         Patient has a family history includes Arthritis in his brother; Asthma in his daughter; Cancer in his brother, brother, brother, and father; [place] in his brother; Coronary Art Dis in his brother and mother; Heart Disease in his brother and mother; Heart Surgery in his brother; High Blood Pressure in his brother, brother, brother, brother, mother, and sister; Hypertension in his brother, brother, brother, brother, mother, sister, and son; Kidney Disease in his brother; Mult Sclerosis in his son. Current Outpatient Medications   Medication Sig Dispense Refill    warfarin (COUMADIN) 5 MG tablet Take 1 tablet by mouth daily 90 tablet 3    Handicap Placard MISC by Does not apply route Unable to walk more than 250 feet before having to stop and rest.  DX: AFIB  Not to exceed 5 years 1 each 0    ezetimibe (ZETIA) 10 MG tablet Take 10 mg by mouth daily      metoprolol succinate (TOPROL XL) 200 MG extended release tablet TAKE ONE TABLET BY MOUTH DAILY 30 tablet 0    furosemide (LASIX) 20 MG tablet Take 1 tablet by mouth daily Take before dinner 90 tablet 3    irbesartan (AVAPRO) 75 MG tablet TAKE ONE TABLET BY MOUTH DAILY 90 tablet 2    atorvastatin (LIPITOR) 40 MG tablet Take 1 tablet by mouth nightly 90 tablet 3    pantoprazole (PROTONIX) 20 MG tablet Take 1 tablet by mouth daily (Patient taking differently: Take 20 mg by mouth every other day ) 90 tablet 3    Cyanocobalamin (VITAMIN B-12) 500 MCG LOZG Take 500 mcg by mouth daily      nitroGLYCERIN (NITROSTAT) 0.4 MG SL tablet up to max of 3 total doses.  If no relief after 1 dose, call 911. 25 tablet 3    finasteride (PROSCAR) 5 MG tablet Take 5 mg by mouth daily      aspirin 81 MG tablet Take 81 mg by mouth daily      Cholecalciferol (VITAMIN D) 2000 UNITS CAPS capsule Take 1 capsule by mouth 2 times daily      tamsulosin (FLOMAX) 0.4 MG capsule Take 0.4 mg by mouth daily      Multiple Vitamin (MULTIVITAMIN) capsule Take 1 capsule by mouth daily.  triamterene-hydrochlorothiazide (MAXZIDE-25) 37.5-25 MG per tablet Take 1 tablet by mouth daily 90 tablet 3     No current facility-administered medications for this visit. Vitals:    05/05/21 1306   BP: 130/70   Pulse: 68       Weight:  215 lb (97.5 kg)      Review of Systems   Constitutional: Negative for activity change and fatigue. Respiratory: Negative for apnea, cough, choking, chest tightness and shortness of breath. Cardiovascular: Negative for chest pain, palpitations and leg swelling. No PND or orthopnea. No tachycardia. Gastrointestinal: Negative for abdominal distention. Musculoskeletal: Negative for myalgias. Neurological: Negative for dizziness, syncope and light-headedness. Psychiatric/Behavioral: Negative for behavioral problems, confusion and agitation. All other systems reviewed negative as done    Objective:   Physical Exam   Constitutional: He is oriented to person, place, and time. He appears well-developed and well-nourished. No distress. HENT:   Head: Normocephalic and atraumatic. Eyes: Conjunctivae and EOM are normal. Right eye exhibits no discharge. Left eye exhibits no discharge. Neck: Normal range of motion. Neck supple. No JVD present. Cardiovascular: Normal rate, S1 normal, S2 normal and normal heart sounds. An irregularly irregular rhythm present. Exam reveals no gallop. No murmur heard. Pulses:       Radial pulses are 2+ on the right side, and 2+ on the left side. Pulmonary/Chest: Effort normal and breath sounds normal. No respiratory distress. He has no wheezes. He has no rales. Abdominal: Soft. Bowel sounds are normal. No tenderness. Musculoskeletal: Normal range of motion. He exhibits no  tenderness. Tr edema  Neurological: He is alert and oriented to person, place, and time.    Skin: Skin is warm and dry. Psychiatric: He has a normal mood and affect. His behavior is normal. Thought content normal.       Assessment:       Diagnosis Orders   1. Chronic atrial fibrillation (HCC)  Handicap Placard MISC   2. CAD in native artery     3. History of PTCA     4. MI, old     11. Essential hypertension             Plan:      CV stable. Edema stable. HR controlled. No angina  Wt stable. BP good. On AC/ASA. No bleeding issues. Watch salt. Walking. Encouraged diet, and wt loss. No changes. Reviewed previous records and testing including cath 10/17 and echo 12/18. Follow up 3 months.

## 2021-05-06 ENCOUNTER — ANTI-COAG VISIT (OUTPATIENT)
Dept: PHARMACY | Age: 81
End: 2021-05-06
Payer: MEDICARE

## 2021-05-06 DIAGNOSIS — I48.20 CHRONIC ATRIAL FIBRILLATION (HCC): ICD-10-CM

## 2021-05-06 LAB — INTERNATIONAL NORMALIZATION RATIO, POC: 3.2

## 2021-05-06 PROCEDURE — 85610 PROTHROMBIN TIME: CPT

## 2021-05-06 PROCEDURE — 99211 OFF/OP EST MAY X REQ PHY/QHP: CPT

## 2021-05-06 NOTE — PROGRESS NOTES
ANTICOAGULATION SERVICE    Yaw Wright" is a 79 y.o. male with PMHx significant for chronic AF (RKT2HJ0-ESOu of 4), HTN who presents to clinic 5/6/2021 for anticoagulation monitoring and adjustment.     Anticoagulation Indication(s):  Afib    Referring Physician:  Dr. Elana James    Goal INR Range:  2-3  Duration of Anticoagulation Therapy:  Indefinite  Time of day dose taken:  PM  Product patient has at home:  warfarin 5 mg (PEACH color)    INR Summary                           Warfarin regimen (mg)  Date INR   A/P   Sun Mon Tue Wed Thu Fri Sat Mg/wk  5/6 3.2 Above goal, continue 5 5 2.5 5 5 5 2.5 30  4/8 2.6 At goal, no change 5 5 2.5 5 5 5 2.5 30  3/25 3.5 Above goal, continue 5 5 2.5 5 2.5/5 5 2.5 30  2/25 1.8 Below goal, continue 5 5 2.5 5 5 5 2.5 30  1/28 3.0 At goal, no change 5 5 2.5 5 5 5 2.5 30  12/31 2.9 At goal, no change 5 5 2.5 5 5 5 2.5 30  12/3 2.9 At goal, no change 5 5 2.5 5 5 5 2.5 30  11/17 3.7 Above goal, holdx1 5 5 2.5 5 5 5 2.5 30  10/27 3.0 At goal, no change 5 5 5 5 5 5 2.5 32.5  10/8 1.8 Below goal, increase 5 5 5 5 5 5 2.5 32.5  9/17 2.1 At goal, no change 5 5 2.5 5 5 5 2.5 30  9/3 2.7 At goal, no change 5 5 2.5 5 5 5 2.5 30  8/27 1.5 Below goal, incr 5 5 2.5 5        7.5/5 5 2.5 30  8/20 1.7 Below goal, bolus 5 5 2.5 5         5/2.5 5 2.5 27.5  8/18 1.2 Below goal, bolus+ Lovenox          7.5/2.5 5 INR  8/6 1.5 Below goal, see below  7/14 2.6 At goal, no change 5 5 2.5 5 2.5 5 2.5 27.5  6/16 2.7 At goal, no change 5 5 2.5 5 2.5 5 2.5 27.5  5/18 2.0 At goal, no change 5 5 2.5 5 2.5 5 2.5 27.5  5/4 3.6 Above goal, holdx1 5 5 2.5 5 2.5 5 2.5 27.5  3/24 2.6 At goal, no change 5 5 2.5 5 2.5 5 2.5 27.5  3/3 1.8 Below goal, continue   5 5 2.5 5 2.5 5 2.5 27.5  2/4 3.0 At goal, no change 5 5 2.5 5 2.5 5 2.5 27.5  1/21 2.7 At goal, continue  5 5 2.5 5 2.5 5 2.5 27.5  1/14 1.3 Below goal, bolus x1 5 5 7.5/2.5 5 2.5 5 2.5 27.5  12/10 1.9 Below goal, continue 5 5 2.5 5 2.5 5 2.5 27.5  11/12 2.1 prescribed Yes - uses pillbox  held warfarin 1/2-1/6, 8/7-8/12 and bridged with Lovenox    Signs/symptoms of bleeding 12/31/20: subconjunctival hemorrhage, seen by ophthalmologst  H/o hematuria- patient states he was told by his urologist that as long as he is taking warfarin, hematuria may continue. H/o epistaxis, occasional hemorrhoids, anemia. Vitamin K intake Normally has broccoli, asparagus, kale/gay salads 3-4 per week    9/17: 7 servings in past week. 12/10: \"a lot of greens\"- broccoli 3 days in a row+salads  1/14: 5 servings in past week including rory greens  3/3: 2 servings of green day before. 5/5: 1 serving in past week  8/6: brussels, asparagus, 2c cooked greens, broccoli  9/3, 10/27: only 1 serving   5/6/21: 2 servings brocc, 1 salad in past week, no greens   Recent vomiting/diarrhea/fever, changes in weight or activity level Trying to improve diet/exercise and lose weight gradually. Due to covid has not been as active lately   Tobacco or alcohol use Patient denies tobacco use  Will have 1 glass of wine per day max   Upcoming surgeries or procedures None     Assessment/Plan:   Patient's INR was slightly supratherapeutic today (3.2). He denies incorrect warfarin doses, changes to health, significant med interactions, or unusual/serious bleeding. He eats high vit k food often, and changes b/t broccoli, asparagus, salad and cooked greens. Discussed that there can be significant variation in vit k content even in foods considered to be high in vit k, paula when comparing cooked rory greens to a green leaf salad or asparagus. Provided pt with handout for reference. Because his INR has been relatively stable over the past few months, patient was instructed to continue warfarin dose of 2.5 mg on Tuesdays and Saturdays and 5mg all other days. Repeat INR in 3 weeks. Patient was reminded to call with any bleeding, medication changes, or fever/vomiting/diarrhea.      Patient understands dosing directions and information discussed. Dosing schedule and follow up appointment given to patient. Progress note routed to referring physician's office. Patient acknowledges working in consult agreement with pharmacist as referred by his/her physician. Next Clinic Appointment: 5/25    Thanks!   Yamilka Simons, PharmD, [address] [facility]  [facility]   [address] Ph: [phone]  Elena [facility]ba Ph: [phone]  5/6/2021 9:30 AM    For Pharmacy Admin Tracking Only     Total # of Interventions Recommended: 0   Total # of Interventions Accepted: 0   Time Spent (min): 30

## 2021-05-26 ENCOUNTER — ANTI-COAG VISIT (OUTPATIENT)
Dept: PHARMACY | Age: 81
End: 2021-05-26
Payer: MEDICARE

## 2021-05-26 DIAGNOSIS — I48.20 CHRONIC ATRIAL FIBRILLATION (HCC): Primary | ICD-10-CM

## 2021-05-26 LAB — INR BLD: 2.5

## 2021-05-26 PROCEDURE — 85610 PROTHROMBIN TIME: CPT

## 2021-05-26 PROCEDURE — 99211 OFF/OP EST MAY X REQ PHY/QHP: CPT

## 2021-05-26 NOTE — PROGRESS NOTES
ANTICOAGULATION SERVICE    Tish Alvarado" is a 79 y.o. male with PMHx significant for chronic AF ([id number] of 4), HTN who presents to clinic 5/26/2021 for anticoagulation monitoring and adjustment.     Anticoagulation Indication(s):  Afib    Referring Physician:  Dr. Jean Weinstein    Goal INR Range:  2-3  Duration of Anticoagulation Therapy:  Indefinite  Time of day dose taken:  PM  Product patient has at home:  warfarin 5 mg (PEACH color)    INR Summary                           Warfarin regimen (mg)  Date INR   A/P   Sun Mon Tue Wed Thu Fri Sat Mg/wk  5/26 2.5 At goal, continue 5 5 2.5 5 5 5 2.5 30  5/6 3.2 Above goal, continue 5 5 2.5 5 5 5 2.5 30  4/8 2.6 At goal, no change 5 5 2.5 5 5 5 2.5 30  3/25 3.5 Above goal, continue 5 5 2.5 5 2.5/5 5 2.5 30  2/25 1.8 Below goal, continue 5 5 2.5 5 5 5 2.5 30  1/28 3.0 At goal, no change 5 5 2.5 5 5 5 2.5 30  12/31 2.9 At goal, no change 5 5 2.5 5 5 5 2.5 30  12/3 2.9 At goal, no change 5 5 2.5 5 5 5 2.5 30  11/17 3.7 Above goal, holdx1 5 5 2.5 5 5 5 2.5 30  10/27 3.0 At goal, no change 5 5 5 5 5 5 2.5 32.5  10/8 1.8 Below goal, increase 5 5 5 5 5 5 2.5 32.5  9/17 2.1 At goal, no change 5 5 2.5 5 5 5 2.5 30  9/3 2.7 At goal, no change 5 5 2.5 5 5 5 2.5 30  8/27 1.5 Below goal, incr 5 5 2.5 5        7.5/5 5 2.5 30  8/20 1.7 Below goal, bolus 5 5 2.5 5         5/2.5 5 2.5 27.5  8/18 1.2 Below goal, bolus+ Lovenox          7.5/2.5 5 INR  8/6 1.5 Below goal, see below  7/14 2.6 At goal, no change 5 5 2.5 5 2.5 5 2.5 27.5  6/16 2.7 At goal, no change 5 5 2.5 5 2.5 5 2.5 27.5  5/18 2.0 At goal, no change 5 5 2.5 5 2.5 5 2.5 27.5  5/4 3.6 Above goal, holdx1 5 5 2.5 5 2.5 5 2.5 27.5  3/24 2.6 At goal, no change 5 5 2.5 5 2.5 5 2.5 27.5  3/3 1.8 Below goal, continue   5 5 2.5 5 2.5 5 2.5 27.5  2/4 3.0 At goal, no change 5 5 2.5 5 2.5 5 2.5 27.5  1/21 2.7 At goal, continue  5 5 2.5 5 2.5 5 2.5 27.5  1/14 1.3 Below goal, bolus x1 5 5 7.5/2.5 5 2.5 5 2.5 27.5  12/10 1.9 Below goal, continue 5 5 2.5 5 2.5 5 2.5 27.5  11/12 2.1 At goal, no change 5 5 2.5 5 2.5 5 2.5 27.5    Lab Results   Component Value Date    RBC 3.64 (L) 01/07/2020    HGB 13.1 (L) 01/07/2020    HCT 39.7 (L) 01/07/2020    .1 (H) 01/07/2020    MCH 36.1 (H) 01/07/2020    MPV 8.1 01/07/2020    RDW 12.9 01/07/2020     01/07/2020     [id number] Score for Atrial Fibrillation Stroke Risk   Risk   Factors  Component Value   C CHF No 0   H HTN Yes 1   A2 Age >= 76 Yes,  (79 y.o.) 2   D DM No 0   S2 Prior Stroke/TIA No 0   V Vascular Disease Yes 1   A Age 74-69 No,  (79 y.o.) 0   Sc Sex male 0    [id number]  Score  4   Score last updated 87/29/52 01:26 AM    Click here for a link to the UpToDate guideline \"Atrial Fibrillation: Anticoagulation therapy to prevent embolization    Disclaimer: Risk Score calculation is dependent on accuracy of patient problem list and past encounter diagnosis. 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 EGD 4/25 significant for: 2 small antral erosions, moderate hiatal hernia, slightly irregular Z-line, small distal duodenal polyp. Patient d/c off of plavix and warfarin in preparation for outpt EGD with biopsies and colonoscopy. EGD/Colonoscopy 5/2; no bleeding source, resumed plavix and warfarin on 5/3.  -10/29-11/1/17: [facility] for STEMI, s/p C 10/30 with primary stenting to proximal RCA.     Recent medication changes Macrobid for UTI 3/19/21 x 10d - no DI w/warfarin  Started ezetimibe 3/26/21- no DI w/warfarin  Possible cortisone shot in L knee in future   Medications taken regularly that may interact with warfarin or alter INR MVI (may contain vit K)  ASA (stent placement 10/30/17)  Occ APAP prn knee pain  5/26/21: APAP occasionally    Warfarin dose taken as prescribed Yes - uses pillbox  held warfarin 1/2-1/6, 8/7-8/12 and bridged with Lovenox    Signs/symptoms of bleeding 12/31/20: subconjunctival hemorrhage, seen by ophthalmologst  H/o hematuria- patient states he was told by his urologist that as long as he is taking warfarin, hematuria may continue. H/o epistaxis, occasional hemorrhoids, anemia. Vitamin K intake Normally has broccoli, asparagus, kale/gay salads 3-4 per week    9/17: 7 servings in past week. 12/10: \"a lot of greens\"- broccoli 3 days in a row+salads  1/14: 5 servings in past week including rory greens  3/3: 2 servings of green day before. 5/5: 1 serving in past week  8/6: brussels, asparagus, 2c cooked greens, broccoli  9/3, 10/27: only 1 serving   5/6/21: 2 servings brocc, 1 salad in past week, no greens   Recent vomiting/diarrhea/fever, changes in weight or activity level Trying to improve diet/exercise and lose weight gradually. Due to covid has not been as active lately   Tobacco or alcohol use Patient denies tobacco use  Will have 1 glass of wine per day max   Upcoming surgeries or procedures None     Assessment/Plan:   Patient's INR was at goal today (2.5). Pt states taking tylenol occasionally. He denies incorrect warfarin doses, changes to health, significant med interactions, or unusual/serious bleeding. He eats high vit k food often, and changes b/t broccoli, asparagus, salad and cooked greens. Discussed that there can be significant variation in vit k content even in foods considered to be high in vit k, paula when comparing cooked rory greens to a green leaf salad or asparagus. Provided pt with handout for reference. Because his INR has been relatively stable over the past few months and is at goal today, patient was instructed to continue warfarin dose of 2.5 mg on Tuesdays and Saturdays and 5mg all other days.  Repeat INR in 5 weeks. Patient was reminded to call with any bleeding, medication changes, or fever/vomiting/diarrhea. Patient understands dosing directions and information discussed. Dosing schedule and follow up appointment given to patient. Progress note routed to referring physician's office. Patient acknowledges working in consult agreement with pharmacist as referred by his/her physician. Next Clinic Appointment: 6/30    Thanks!     Essence HAYES PharmD Candidate     For Pharmacy Admin Tracking Only     Total # of Interventions Recommended: 0   Total # of Interventions Accepted: 0   Time Spent (min): 30

## 2021-06-30 ENCOUNTER — ANTI-COAG VISIT (OUTPATIENT)
Dept: PHARMACY | Age: 81
End: 2021-06-30
Payer: MEDICARE

## 2021-06-30 DIAGNOSIS — I48.20 CHRONIC ATRIAL FIBRILLATION (HCC): Primary | ICD-10-CM

## 2021-06-30 LAB — INTERNATIONAL NORMALIZATION RATIO, POC: 2.8

## 2021-06-30 PROCEDURE — 85610 PROTHROMBIN TIME: CPT

## 2021-06-30 PROCEDURE — 99211 OFF/OP EST MAY X REQ PHY/QHP: CPT

## 2021-06-30 NOTE — PROGRESS NOTES
ANTICOAGULATION SERVICE    Jose C Lux" is a 79 y.o. male with PMHx significant for chronic AF (CJY2VQ3-ADRi of 4), HTN who presents to clinic 6/30/2021 for anticoagulation monitoring and adjustment.     Anticoagulation Indication(s):  Afib    Referring Physician:  Dr. Eitan Frye    Goal INR Range:  2-3  Duration of Anticoagulation Therapy:  Indefinite  Time of day dose taken:  PM  Product patient has at home:  warfarin 5 mg (PEACH color)    INR Summary                           Warfarin regimen (mg)  Date INR   A/P   Sun Mon Tue Wed Thu Fri Sat Mg/wk  6/30 2.8 At goal, continue 5 5 2.5 5 5 5 2.5 30  5/26 2.5 At goal, continue 5 5 2.5 5 5 5 2.5 30  5/6 3.2 Above goal, continue 5 5 2.5 5 5 5 2.5 30  4/8 2.6 At goal, no change 5 5 2.5 5 5 5 2.5 30  3/25 3.5 Above goal, continue 5 5 2.5 5 2.5/5 5 2.5 30  2/25 1.8 Below goal, continue 5 5 2.5 5 5 5 2.5 30  1/28 3.0 At goal, no change 5 5 2.5 5 5 5 2.5 30  12/31 2.9 At goal, no change 5 5 2.5 5 5 5 2.5 30  12/3 2.9 At goal, no change 5 5 2.5 5 5 5 2.5 30  11/17 3.7 Above goal, holdx1 5 5 2.5 5 5 5 2.5 30  10/27 3.0 At goal, no change 5 5 5 5 5 5 2.5 32.5  10/8 1.8 Below goal, increase 5 5 5 5 5 5 2.5 32.5  9/17 2.1 At goal, no change 5 5 2.5 5 5 5 2.5 30  9/3 2.7 At goal, no change 5 5 2.5 5 5 5 2.5 30  8/27 1.5 Below goal, incr 5 5 2.5 5        7.5/5 5 2.5 30  8/20 1.7 Below goal, bolus 5 5 2.5 5         5/2.5 5 2.5 27.5  8/18 1.2 Below goal, bolus+ Lovenox          7.5/2.5 5 INR  8/6 1.5 Below goal, see below  7/14 2.6 At goal, no change 5 5 2.5 5 2.5 5 2.5 27.5  6/16 2.7 At goal, no change 5 5 2.5 5 2.5 5 2.5 27.5      Lab Results   Component Value Date    RBC 3.64 (L) 01/07/2020    HGB 13.1 (L) 01/07/2020    HCT 39.7 (L) 01/07/2020    .1 (H) 01/07/2020    MCH 36.1 (H) 01/07/2020    MPV 8.1 01/07/2020    RDW 12.9 01/07/2020     01/07/2020     [id number] Score for Atrial Fibrillation Stroke Risk   Risk   Factors  Component Value   C CHF No 0   H HTN Yes 1   A2 Age >= 76 Yes,  (79 y.o.) 2   D DM No 0   S2 Prior Stroke/TIA No 0   V Vascular Disease Yes 1   A Age 74-69 No,  (79 y.o.) 0   Sc Sex male 0    [id number]  Score  4   Score last updated 29/60/84 24:27 AM    Click here for a link to the UpToDate guideline \"Atrial Fibrillation: Anticoagulation therapy to prevent embolization    Disclaimer: Risk Score calculation is dependent on accuracy of patient problem list and past encounter diagnosis. 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 EGD 4/25 significant for: 2 small antral erosions, moderate hiatal hernia, slightly irregular Z-line, small distal duodenal polyp. Patient d/c off of plavix and warfarin in preparation for outpt EGD with biopsies and colonoscopy. EGD/Colonoscopy 5/2; no bleeding source, resumed plavix and warfarin on 5/3.  -10/29-11/1/17: [facility] for STEMI, s/p LHC 10/30 with primary stenting to proximal RCA. Recent medication changes Possible cortisone shot in L knee in future   Medications taken regularly that may interact with warfarin or alter INR MVI (may contain vit K)  ASA (stent placement 10/30/17)  Occ APAP prn knee pain   Warfarin dose taken as prescribed Yes - uses pillbox  held warfarin 1/2-1/6, 8/7-8/12 and bridged with Lovenox    Signs/symptoms of bleeding 12/31/20: subconjunctival hemorrhage, seen by ophthalmologst  H/o hematuria- patient states he was told by his urologist that as long as he is taking warfarin, hematuria may continue. H/o epistaxis, occasional hemorrhoids, anemia. Vitamin K intake Normally has broccoli, asparagus, kale/gay salads 3-4 per week    9/17: 7 servings in past week.   12/10: \"a lot of greens\"- broccoli 3 days in a row+salads  1/14: 5 servings in past week including rory greens  3/3: 2 servings of green day before. 5/5: 1 serving in past week  8/6: brussels, asparagus, 2c cooked greens, broccoli  9/3, 10/27: only 1 serving   5/6/21: 2 servings brocc, 1 salad in past week, no greens   Recent vomiting/diarrhea/fever, changes in weight or activity level Trying to improve diet/exercise and lose weight gradually. Due to covid has not been as active lately   Tobacco or alcohol use Patient denies tobacco use  Will have 1 glass of wine per day max   Upcoming surgeries or procedures None     Assessment/Plan:   Patient's INR was at goal today (2.8). Pt states taking tylenol occasionally for knee pain, but usually only 0-2 per day. He denies incorrect warfarin doses, changes to health, significant med changes, or unusual/serious bleeding. He eats high vit k food often, and changes b/t broccoli, asparagus, salad and cooked greens. Discussed that there can be significant variation in vit k content even in foods considered to be high in vit k, paula when comparing cooked rory greens to a green leaf salad or asparagus. Provided pt with handout for reference. He has been consistent with these lately at 3-4 servings per week. Patient was instructed to continue warfarin dose of 2.5 mg on Tuesdays and Saturdays and 5mg all other days. Repeat INR in 4 weeks. Patient was reminded to call with any bleeding, medication changes, or fever/vomiting/diarrhea. Patient understands dosing directions and information discussed. Dosing schedule and follow up appointment given to patient. Progress note routed to referring physician's office. Patient acknowledges working in consult agreement with pharmacist as referred by his/her physician. Next Clinic Appointment: 7/28 KW    Thanks!     Mina Knott, PharmD, [facility]  [facility]   [address] Ph: [phone]  Gerald Barrera Ph: [phone]  6/30/2021 9:27 AM      For Pharmacy Admin Tracking Only    Total # of Interventions Recommended: 0  Total # of Interventions Accepted: 0  Time Spent (min): 15

## 2021-07-19 DIAGNOSIS — I50.9 HEART FAILURE, ACC/AHA STAGE B (HCC): ICD-10-CM

## 2021-07-19 DIAGNOSIS — I10 ESSENTIAL HYPERTENSION: ICD-10-CM

## 2021-07-19 NOTE — TELEPHONE ENCOUNTER
Requested Prescriptions     Pending Prescriptions Disp Refills    furosemide (LASIX) 20 MG tablet [Pharmacy Med Name: FUROSEMIDE 20 MG TABLET] 90 tablet 2     Sig: TAKE ONE TABLET BY MOUTH DAILY TAKE BEFORE DINNER          Number: 90    Refills: 3    Last Office Visit: 5/5/2021     Next Office Visit: 8/6/2021     Last Labs: [phone]

## 2021-07-22 RX ORDER — FUROSEMIDE 20 MG/1
TABLET ORAL
Qty: 90 TABLET | Refills: 3 | Status: SHIPPED | OUTPATIENT
Start: 2021-07-22 | End: 2022-07-19

## 2021-07-28 ENCOUNTER — ANTI-COAG VISIT (OUTPATIENT)
Dept: PHARMACY | Age: 81
End: 2021-07-28
Payer: MEDICARE

## 2021-07-28 ENCOUNTER — TELEPHONE (OUTPATIENT)
Dept: PHARMACY | Age: 81
End: 2021-07-28

## 2021-07-28 DIAGNOSIS — I48.20 CHRONIC ATRIAL FIBRILLATION (HCC): Primary | ICD-10-CM

## 2021-07-28 LAB — INTERNATIONAL NORMALIZATION RATIO, POC: 3

## 2021-07-28 PROCEDURE — 99211 OFF/OP EST MAY X REQ PHY/QHP: CPT

## 2021-07-28 NOTE — PROGRESS NOTES
ANTICOAGULATION SERVICE    Calixto Guardado" is a 79 y.o. male with PMHx significant for chronic AF ([id number] of 4), HTN who presents to clinic 7/28/2021 for anticoagulation monitoring and adjustment.     Anticoagulation Indication(s):  Afib    Referring Physician:  Dr. Nae Sanderson    Goal INR Range:  2-3  Duration of Anticoagulation Therapy:  Indefinite  Time of day dose taken:  PM  Product patient has at home:  warfarin 5 mg (PEACH color)    INR Summary                           Warfarin regimen (mg)  Date INR   A/P   Sun Mon Tue Wed Thu Fri Sat Mg/wk  7/28 3.0 At goal, continue 5 5 2.5 5 5 5 2.5 30  6/30 2.8 At goal, continue 5 5 2.5 5 5 5 2.5 30  5/26 2.5 At goal, continue 5 5 2.5 5 5 5 2.5 30  5/6 3.2 Above goal, continue 5 5 2.5 5 5 5 2.5 30  4/8 2.6 At goal, no change 5 5 2.5 5 5 5 2.5 30  3/25 3.5 Above goal, continue 5 5 2.5 5 2.5/5 5 2.5 30  2/25 1.8 Below goal, continue 5 5 2.5 5 5 5 2.5 30  1/28 3.0 At goal, no change 5 5 2.5 5 5 5 2.5 30  12/31 2.9 At goal, no change 5 5 2.5 5 5 5 2.5 30  12/3 2.9 At goal, no change 5 5 2.5 5 5 5 2.5 30  11/17 3.7 Above goal, holdx1 5 5 2.5 5 5 5 2.5 30  10/27 3.0 At goal, no change 5 5 5 5 5 5 2.5 32.5  10/8 1.8 Below goal, increase 5 5 5 5 5 5 2.5 32.5  9/17 2.1 At goal, no change 5 5 2.5 5 5 5 2.5 30  9/3 2.7 At goal, no change 5 5 2.5 5 5 5 2.5 30  8/27 1.5 Below goal, incr 5 5 2.5 5        7.5/5 5 2.5 30  8/20 1.7 Below goal, bolus 5 5 2.5 5         5/2.5 5 2.5 27.5  8/18 1.2 Below goal, bolus+ Lovenox          7.5/2.5 5 INR  8/6 1.5 Below goal, see below  7/14 2.6 At goal, no change 5 5 2.5 5 2.5 5 2.5 27.5  6/16 2.7 At goal, no change 5 5 2.5 5 2.5 5 2.5 27.5      Lab Results   Component Value Date    RBC 3.64 (L) 01/07/2020    HGB 13.1 (L) 01/07/2020    HCT 39.7 (L) 01/07/2020    .1 (H) 01/07/2020    MCH 36.1 (H) 01/07/2020    MPV 8.1 01/07/2020    RDW 12.9 01/07/2020     01/07/2020     [id number] Score for Atrial Fibrillation Stroke Risk Risk   Factors  Component Value   C CHF No 0   H HTN Yes 1   A2 Age >= 76 Yes,  (79 y.o.) 2   D DM No 0   S2 Prior Stroke/TIA No 0   V Vascular Disease Yes 1   A Age 74-69 No,  (79 y.o.) 0   Sc Sex male 0    [id number]  Score  4   Score last updated 39/20/99 48:57 AM    Click here for a link to the UpToDate guideline \"Atrial Fibrillation: Anticoagulation therapy to prevent embolization    Disclaimer: Risk Score calculation is dependent on accuracy of patient problem list and past encounter diagnosis. 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 for suspected GIB (Hg drop 13.4-->8.5 in Nov). EGD 4/25 significant for: 2 small antral erosions, moderate hiatal hernia, slightly irregular Z-line, small distal duodenal polyp. Patient d/c off of plavix and warfarin in preparation for outpt EGD with biopsies and colonoscopy. EGD/Colonoscopy 5/2; no bleeding source, resumed plavix and warfarin on 5/3.  -10/29-11/1/17: [facility] for STEMI, s/p LHC 10/30 with primary stenting to proximal RCA. Recent medication changes Possible cortisone shot in L knee in future   Medications taken regularly that may interact with warfarin or alter INR MVI (may contain vit K)  ASA (stent placement 10/30/17)  Occ APAP prn knee pain   Warfarin dose taken as prescribed Yes - uses pillbox  held warfarin 1/2-1/6, 8/7-8/12 and bridged with Lovenox    Signs/symptoms of bleeding 12/31/20: subconjunctival hemorrhage, seen by ophthalmologst  H/o hematuria- patient states he was told by his urologist that as long as he is taking warfarin, hematuria may continue. H/o epistaxis, occasional hemorrhoids, anemia.    Vitamin K intake Normally has broccoli, asparagus, kale/gay salads 3-4 per week    9/17: 7 servings in past week. 12/10: \"a lot of greens\"- broccoli 3 days in a row+salads  1/14: 5 servings in past week including rory greens  3/3: 2 servings of green day before. 5/5: 1 serving in past week  8/6: brussels, asparagus, 2c cooked greens, broccoli  9/3, 10/27: only 1 serving   5/6/21: 2 servings brocc, 1 salad in past week, no greens   Recent vomiting/diarrhea/fever, changes in weight or activity level Trying to improve diet/exercise and lose weight gradually. Due to covid has not been as active lately   Tobacco or alcohol use Patient denies tobacco use  Will have 1 glass of wine per day max   Upcoming surgeries or procedures None     Assessment/Plan:   Patient's INR was at goal today (3.0). Pt states taking tylenol occasionally for knee pain, but usually only 0-2 per day. He denies incorrect warfarin doses, changes to health, significant med changes, or unusual/serious bleeding. He eats high vit k food often, and changes b/t broccoli, asparagus, salad and cooked greens. He has been consistent with these lately at 3-4 servings per week. Patient was instructed to continue warfarin dose of 2.5 mg on Tuesdays and Saturdays and 5mg all other days. Repeat INR in 4 weeks. Patient was reminded to call with any bleeding, medication changes, or fever/vomiting/diarrhea. Patient understands dosing directions and information discussed. Dosing schedule and follow up appointment given to patient. Progress note routed to referring physician's office. Patient acknowledges working in consult agreement with pharmacist as referred by his/her physician. Next Clinic Appointment: 8/25 KW    Thanks!     Monalisa Perez, PharmD, [facility]  [facility]   Marilynn Joseqiana Mahmood Ph: [phone]  Ana Trimble Ph: [phone]  7/28/2021 12:19 PM      For Pharmacy Admin Tracking Only    Total # of Interventions Recommended: 0  Total # of Interventions Accepted: 0  Time Spent (min): 15

## 2021-08-06 ENCOUNTER — OFFICE VISIT (OUTPATIENT)
Dept: CARDIOLOGY CLINIC | Age: 81
End: 2021-08-06
Payer: MEDICARE

## 2021-08-06 VITALS
SYSTOLIC BLOOD PRESSURE: 130 MMHG | HEIGHT: 68 IN | HEART RATE: 80 BPM | WEIGHT: 217.2 LBS | BODY MASS INDEX: 32.92 KG/M2 | DIASTOLIC BLOOD PRESSURE: 82 MMHG

## 2021-08-06 DIAGNOSIS — I10 ESSENTIAL HYPERTENSION: ICD-10-CM

## 2021-08-06 DIAGNOSIS — I25.2 MI, OLD: ICD-10-CM

## 2021-08-06 DIAGNOSIS — I48.20 CHRONIC ATRIAL FIBRILLATION (HCC): Primary | ICD-10-CM

## 2021-08-06 DIAGNOSIS — I25.10 CAD IN NATIVE ARTERY: ICD-10-CM

## 2021-08-06 DIAGNOSIS — Z98.61 HISTORY OF PTCA: ICD-10-CM

## 2021-08-06 PROCEDURE — 99213 OFFICE O/P EST LOW 20 MIN: CPT | Performed by: INTERNAL MEDICINE

## 2021-08-06 PROCEDURE — 4040F PNEUMOC VAC/ADMIN/RCVD: CPT | Performed by: INTERNAL MEDICINE

## 2021-08-06 PROCEDURE — 1036F TOBACCO NON-USER: CPT | Performed by: INTERNAL MEDICINE

## 2021-08-06 PROCEDURE — G8417 CALC BMI ABV UP PARAM F/U: HCPCS | Performed by: INTERNAL MEDICINE

## 2021-08-06 PROCEDURE — 1123F ACP DISCUSS/DSCN MKR DOCD: CPT | Performed by: INTERNAL MEDICINE

## 2021-08-06 PROCEDURE — G8427 DOCREV CUR MEDS BY ELIG CLIN: HCPCS | Performed by: INTERNAL MEDICINE

## 2021-08-06 RX ORDER — NITROGLYCERIN 0.4 MG/1
TABLET SUBLINGUAL
Qty: 25 TABLET | Refills: 3 | Status: SHIPPED | OUTPATIENT
Start: 2021-08-06

## 2021-08-06 NOTE — PROGRESS NOTES
Subjective:      Patient ID: Artemio Goncalves is a 79 y.o. male. HPI:  Here for follow up afib/HTN/CAD/PTCA/Non st.  Still exercising but not as much due to knee. .  Edema stable. Wt down. No sob. No orthopnea/PND. No complaints. No palp/tachycardia. No syncope. No exertional chest pain/sob. BP good at home.      Past Medical History:   Diagnosis Date    Allergic rhinitis     Anemia     Atrial fibrillation ([facility]     Atrial fibrillation (HCC)     Benign non-nodular prostatic hyperplasia with lower urinary tract symptoms 2/9/2017    Benign paroxysmal positional vertigo     BPH (benign prostatic hyperplasia)     BPH (benign prostatic hypertrophy)     Cataract 10/3/2014    Cholelithiasis     Colon cancer ([facility] [address] [facility] 4/9/2012    Diverticulosis     Dyslipidemia 7/21/2010    Elevated PSA     Erectile dysfunction     Essential hypertension 11/24/2015    Gastroesophageal reflux disease without esophagitis 11/9/2016    GERD (gastroesophageal reflux disease)     Glaucoma 4/5/2013    Hiatal hernia     Hypertension     Lumbar radiculopathy     Osteoarthritis     Peripheral neuropathy 12/7/2012    Proteinuria 7/22/2010    S/P laparoscopic-assisted sigmoidectomy 4/17/2012    Urinary frequency      Past Surgical History:   Procedure Laterality Date    CATARACT REMOVAL WITH IMPLANT Right October 7, 2014    Dr. Almaz Elloitt Left October 24, 2014    Dr. Toby Veloz  April 4, 2012    Dr. Jun Gilbert  May 15, 2013    Dr. Jun Gilbert  08/09/2016    Dr. Honey Goldberg COLONOSCOPY N/A 02/17/2017    COLONOSCOPY N/A 8/13/2020    COLONOSCOPY performed by Ritesh Hurt MD at [address] Right October 7, 2014    Dr. Katelyn Milner Left October 24, 2014    Dr. Isac Parham - [address] OTHER SURGICAL HISTORY  2013    port-a-cath removal     PROSTATE BIOPSY  1997    PROSTATE BIOPSY  May 10, 2010    Dr. Jeremias Hoff    SIGMOIDECTOMY  2012    Dr. Jewel Glez sigmoid colectomy           TUNNELED VENOUS PORT PLACEMENT      TURP  2004    Dr. Irvin Curry         Allergies   Allergen Reactions    Naproxen Other (See Comments)     Patient gets nose bleeds. Patient should not take. Patient lost a lot of blood in November due to Naproxen.     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    Occupation: retired -      Comment: as of 2013   Tobacco Use    Smoking status: Former Smoker     Packs/day: 0.10     Years: 7.00     Pack years: 0.70     Types: Cigars     Start date:      Quit date:      Years since quittin.7    Smokeless tobacco: Never Used    Tobacco comment: quit in the 1960s -- former cigar smoker   Substance and Sexual Activity    Alcohol use: Yes     Comment: social    Drug use: No    Sexual activity: Yes     Partners: Female     Comment:  - Barby Kangolf - [id number]   Other Topics Concern    Not on file   Social History Narrative    Past Surgical History     TURP: 2004    prostate biopsy - 1997                                                    Last updated by Tayler Roberts MD on 10/29/2008                      Social History     Marital Status:  - Marital Status:    Intimate Partner Violence:     Fear of Current or Ex-Partner:     Emotionally Abused:     Physically Abused:     Sexually Abused:         Patient has a family history includes Arthritis in his brother; Asthma in his daughter; Cancer in his brother, brother, brother, and father; Anisha Del in his brother; Coronary Art Dis in his brother and mother; Heart Disease in his brother and mother; Heart Surgery in his brother; High Blood Pressure in his brother, brother, brother, brother, mother, and sister; Hypertension in his brother, brother, brother, brother, mother, sister, and son; Kidney Disease in his brother; Mult Sclerosis in his son.         Current Outpatient Medications   Medication Sig Dispense Refill    furosemide (LASIX) 20 MG tablet TAKE ONE TABLET BY MOUTH DAILY TAKE BEFORE DINNER 90 tablet 3    warfarin (COUMADIN) 5 MG tablet Take 1 tablet by mouth daily 90 tablet 3    Handicap Placard MISC by Does not apply route Unable to walk more than 250 feet before having to stop and rest.  DX: AFIB  Not to exceed 5 years 1 each 0    Handicap Placard MISC by Does not apply route 1 each 0    Handicap Placard MISC by Does not apply route DX: I48.20 afib  Duration: 5 years 1 each 0    ezetimibe (ZETIA) 10 MG tablet Take 10 mg by mouth daily      metoprolol succinate (TOPROL XL) 200 MG extended release tablet TAKE ONE TABLET BY MOUTH DAILY 30 tablet 0    irbesartan (AVAPRO) 75 MG tablet TAKE ONE TABLET BY MOUTH DAILY 90 tablet 2    atorvastatin (LIPITOR) 40 MG tablet Take 1 tablet by mouth nightly 90 tablet 3    pantoprazole (PROTONIX) 20 MG tablet Take 1 tablet by mouth daily (Patient taking differently: Take 20 mg by mouth every other day ) 90 tablet 3    triamterene-hydrochlorothiazide (MAXZIDE-25) 37.5-25 MG per tablet Take 1 tablet by mouth daily 90 tablet 3    Cyanocobalamin (VITAMIN B-12) 500 MCG LOZG Take 500 mcg by mouth daily      nitroGLYCERIN (NITROSTAT) 0.4 MG SL tablet up to max of 3 total doses. If no relief after 1 dose, call 911. 25 tablet 3    finasteride (PROSCAR) 5 MG tablet Take 5 mg by mouth daily      aspirin 81 MG tablet Take 81 mg by mouth daily      Cholecalciferol (VITAMIN D) 2000 UNITS CAPS capsule Take 1 capsule by mouth 2 times daily      tamsulosin (FLOMAX) 0.4 MG capsule Take 0.4 mg by mouth daily      Multiple Vitamin (MULTIVITAMIN) capsule Take 1 capsule by mouth daily. No current facility-administered medications for this visit. Vitals:    08/06/21 1015   BP: 130/82   Pulse: 80       Weight:  217 lb 3.2 oz (98.5 kg)      Review of Systems   Constitutional: Negative for activity change and fatigue. Respiratory: Negative for apnea, cough, choking, chest tightness and shortness of breath. Cardiovascular: Negative for chest pain, palpitations and leg swelling. No PND or orthopnea. No tachycardia. Gastrointestinal: Negative for abdominal distention. Musculoskeletal: Negative for myalgias. Neurological: Negative for dizziness, syncope and light-headedness. Psychiatric/Behavioral: Negative for behavioral problems, confusion and agitation. All other systems reviewed negative as done    Objective:   Physical Exam   Constitutional: He is oriented to person, place, and time. He appears well-developed and well-nourished. No distress. HENT:   Head: Normocephalic and atraumatic. Eyes: Conjunctivae and EOM are normal. Right eye exhibits no discharge. Left eye exhibits no discharge. Neck: Normal range of motion. Neck supple. No JVD present. Cardiovascular: Normal rate, S1 normal, S2 normal and normal heart sounds. An irregularly irregular rhythm present. Exam reveals no gallop. No murmur heard. Pulses:       Radial pulses are 2+ on the right side, and 2+ on the left side. Pulmonary/Chest: Effort normal and breath sounds normal. No respiratory distress. He has no wheezes. He has no rales. Abdominal: Soft.  Bowel sounds are normal. No tenderness. Musculoskeletal: Normal range of motion. He exhibits no  tenderness. Tr edema  Neurological: He is alert and oriented to person, place, and time. Skin: Skin is warm and dry. Psychiatric: He has a normal mood and affect. His behavior is normal. Thought content normal.       Assessment:       Diagnosis Orders   1. Chronic atrial fibrillation ([address]     2. CAD in native artery     3. History of PTCA     4. MI, old     11. Essential hypertension             Plan:      CV stable. Edema stable. HR controlled. No angina  Wt stable. BP good. On AC/ASA. No bleeding issues. Watch salt. Walking. Encouraged diet, and wt loss. No changes. Reviewed previous records and testing including cath 10/17 and echo 12/18. Follow up 3 months.

## 2021-08-25 ENCOUNTER — ANTI-COAG VISIT (OUTPATIENT)
Dept: PHARMACY | Age: 81
End: 2021-08-25
Payer: MEDICARE

## 2021-08-25 DIAGNOSIS — I48.20 CHRONIC ATRIAL FIBRILLATION (HCC): Primary | ICD-10-CM

## 2021-08-25 LAB — INTERNATIONAL NORMALIZATION RATIO, POC: 4

## 2021-08-25 PROCEDURE — 85610 PROTHROMBIN TIME: CPT | Performed by: PHARMACIST

## 2021-08-25 PROCEDURE — 99212 OFFICE O/P EST SF 10 MIN: CPT | Performed by: PHARMACIST

## 2021-08-25 NOTE — PROGRESS NOTES
ANTICOAGULATION SERVICE    Chick Arsen Cooper" is a 79 y.o. male with PMHx significant for chronic AF ([id number] of 4), HTN who presents to clinic 8/25/2021 for anticoagulation monitoring and adjustment.     Anticoagulation Indication(s):  Afib    Referring Physician:  Dr. Toña Hernández    Goal INR Range:  2-3  Duration of Anticoagulation Therapy:  Indefinite  Time of day dose taken:  PM  Product patient has at home:  warfarin 5 mg (PEACH color)    INR Summary                           Warfarin regimen (mg)  Date INR   A/P   Sun Mon Tue Wed Thu Fri Sat Mg/wk  8/25 4.0 Above goal, hold+dec 5 5 2.5 0/5 2.5 5 2.5 27.5  7/28 3.0 At goal, continue 5 5 2.5 5 5 5 2.5 30  6/30 2.8 At goal, continue 5 5 2.5 5 5 5 2.5 30  5/26 2.5 At goal, continue 5 5 2.5 5 5 5 2.5 30  5/6 3.2 Above goal, continue 5 5 2.5 5 5 5 2.5 30  4/8 2.6 At goal, no change 5 5 2.5 5 5 5 2.5 30  3/25 3.5 Above goal, continue 5 5 2.5 5 2.5/5 5 2.5 30  2/25 1.8 Below goal, continue 5 5 2.5 5 5 5 2.5 30  1/28 3.0 At goal, no change 5 5 2.5 5 5 5 2.5 30  12/31 2.9 At goal, no change 5 5 2.5 5 5 5 2.5 30  12/3 2.9 At goal, no change 5 5 2.5 5 5 5 2.5 30  11/17 3.7 Above goal, holdx1 5 5 2.5 5 5 5 2.5 30  10/27 3.0 At goal, no change 5 5 5 5 5 5 2.5 32.5  10/8 1.8 Below goal, increase 5 5 5 5 5 5 2.5 32.5  9/17 2.1 At goal, no change 5 5 2.5 5 5 5 2.5 30  9/3 2.7 At goal, no change 5 5 2.5 5 5 5 2.5 30  8/27 1.5 Below goal, incr 5 5 2.5 5        7.5/5 5 2.5 30  8/20 1.7 Below goal, bolus 5 5 2.5 5         5/2.5 5 2.5 27.5  8/18 1.2 Below goal, bolus+ Lovenox          7.5/2.5 5 INR  8/6 1.5 Below goal, see below  7/14 2.6 At goal, no change 5 5 2.5 5 2.5 5 2.5 27.5  6/16 2.7 At goal, no change 5 5 2.5 5 2.5 5 2.5 27.5      Lab Results   Component Value Date    RBC 3.64 (L) 01/07/2020    HGB 13.1 (L) 01/07/2020    HCT 39.7 (L) 01/07/2020    .1 (H) 01/07/2020    MCH 36.1 (H) 01/07/2020    MPV 8.1 01/07/2020    RDW 12.9 01/07/2020     01/07/2020 [id number] Score for Atrial Fibrillation Stroke Risk   Risk   Factors  Component Value   C CHF No 0   H HTN Yes 1   A2 Age >= 76 Yes,  (79 y.o.) 2   D DM No 0   S2 Prior Stroke/TIA No 0   V Vascular Disease Yes 1   A Age 74-69 No,  (79 y.o.) 0   Sc Sex male 0    [id number]  Score  4   Score last updated 90/24/02 51:83 AM    Click here for a link to the UpToDate guideline \"Atrial Fibrillation: Anticoagulation therapy to prevent embolization    Disclaimer: Risk Score calculation is dependent on accuracy of patient problem list and past encounter diagnosis. 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 EGD 4/25 significant for: 2 small antral erosions, moderate hiatal hernia, slightly irregular Z-line, small distal duodenal polyp. Patient d/c off of plavix and warfarin in preparation for outpt EGD with biopsies and colonoscopy. EGD/Colonoscopy 5/2; no bleeding source, resumed plavix and warfarin on 5/3.  -10/29-11/1/17: [facility] for STEMI, s/p LHC 10/30 with primary stenting to proximal RCA. Recent medication changes Possible cortisone shot in L knee in future   Medications taken regularly that may interact with warfarin or alter INR MVI (may contain vit K)  ASA (stent placement 10/30/17)  Occ APAP prn knee pain   Warfarin dose taken as prescribed Yes - uses pillbox  held warfarin 1/2-1/6, 8/7-8/12 and bridged with Lovenox    Signs/symptoms of bleeding 12/31/20: subconjunctival hemorrhage, seen by ophthalmologst  H/o hematuria- patient states he was told by his urologist that as long as he is taking warfarin, hematuria may continue. H/o epistaxis, occasional hemorrhoids, anemia.    Vitamin K intake Normally has broccoli, asparagus, kale/gay salads 3-4 per week    9/17: 7 servings in past week. 12/10: \"a lot of greens\"- broccoli 3 days in a row+salads  1/14: 5 servings in past week including rory greens  3/3: 2 servings of green day before. 5/5: 1 serving in past week  8/6: brussels, asparagus, 2c cooked greens, broccoli  9/3, 10/27: only 1 serving   5/6/21: 2 servings brocc, 1 salad in past week, no greens   Recent vomiting/diarrhea/fever, changes in weight or activity level Trying to improve diet/exercise and lose weight gradually. Due to covid has not been as active lately   Tobacco or alcohol use Patient denies tobacco use  Will have 1 glass of wine per day max   Upcoming surgeries or procedures None     Assessment/Plan:   Patient's INR was supratherapeutic today (4.0). Patient reports having a lot of stress recently due to the passing of three of his family members, which may be contributing to elevated INR today. Pt states taking tylenol occasionally for knee pain, but usually only 0-2 per day. He denies incorrect warfarin doses, changes to health, significant med changes, or unusual/serious bleeding. He eats high vit k food often, and changes b/t broccoli, asparagus, salad and cooked greens. He has been consistent with these lately at 3-4 servings per week. As INR has been trending up the past several appointments, patient was instructed to hold warfarin tonight then decrease warfarin dose to 2.5 mg on Tuesdays, Thursdays, and Saturdays and 5mg all other days. Repeat INR in 2 weeks. Patient was reminded to call with any bleeding, medication changes, or fever/vomiting/diarrhea. Patient understands dosing directions and information discussed. Dosing schedule and follow up appointment given to patient. Progress note routed to referring physician's office. Patient acknowledges working in consult agreement with pharmacist as referred by his/her physician.      Next Clinic Appointment: 9/8    Thanks!     Mame Sanchez, PharmD, BCPS  [facility]  [place]: [phone]  [place]: [phone]  8/25/2021 10:53 AM    For Pharmacy Admin Tracking Only     Intervention Detail: Dose Adjustment: 1, reason: Therapy Optimization   Total # of Interventions Recommended: 1   Total # of Interventions Accepted: 1   Time Spent (min): 15

## 2021-09-08 ENCOUNTER — ANTI-COAG VISIT (OUTPATIENT)
Dept: PHARMACY | Age: 81
End: 2021-09-08
Payer: MEDICARE

## 2021-09-08 DIAGNOSIS — I48.20 CHRONIC ATRIAL FIBRILLATION (HCC): Primary | ICD-10-CM

## 2021-09-08 LAB — INTERNATIONAL NORMALIZATION RATIO, POC: 3.8

## 2021-09-08 PROCEDURE — 99212 OFFICE O/P EST SF 10 MIN: CPT | Performed by: PHARMACIST

## 2021-09-08 PROCEDURE — 85610 PROTHROMBIN TIME: CPT | Performed by: PHARMACIST

## 2021-09-08 NOTE — PROGRESS NOTES
ANTICOAGULATION SERVICE    Parvez Brown" is a 79 y.o. male with PMHx significant for chronic AF ([id number] of 4), HTN who presents to clinic 9/8/2021 for anticoagulation monitoring and adjustment.     Anticoagulation Indication(s):  Afib    Referring Physician:  Dr. Shasta Hobbs    Goal INR Range:  2-3  Duration of Anticoagulation Therapy:  Indefinite  Time of day dose taken:  PM  Product patient has at home:  warfarin 5 mg (PEACH color)    INR Summary                           Warfarin regimen (mg)  Date INR   A/P   Sun Mon Tue Wed Thu Fri Sat Mg/wk  9/8 3.8 Above goal, hold+dec 2.5 5 2.5 0/5 2.5 5 2.5 25  8/25 4.0 Above goal, hold+dec 5 5 2.5 0/5 2.5 5 2.5 27.5  7/28 3.0 At goal, continue 5 5 2.5 5 5 5 2.5 30  6/30 2.8 At goal, continue 5 5 2.5 5 5 5 2.5 30  5/26 2.5 At goal, continue 5 5 2.5 5 5 5 2.5 30  5/6 3.2 Above goal, continue 5 5 2.5 5 5 5 2.5 30  4/8 2.6 At goal, no change 5 5 2.5 5 5 5 2.5 30  3/25 3.5 Above goal, continue 5 5 2.5 5 2.5/5 5 2.5 30  2/25 1.8 Below goal, continue 5 5 2.5 5 5 5 2.5 30  1/28 3.0 At goal, no change 5 5 2.5 5 5 5 2.5 30  12/31 2.9 At goal, no change 5 5 2.5 5 5 5 2.5 30  12/3 2.9 At goal, no change 5 5 2.5 5 5 5 2.5 30  11/17 3.7 Above goal, holdx1 5 5 2.5 5 5 5 2.5 30  10/27 3.0 At goal, no change 5 5 5 5 5 5 2.5 32.5  10/8 1.8 Below goal, increase 5 5 5 5 5 5 2.5 32.5  9/17 2.1 At goal, no change 5 5 2.5 5 5 5 2.5 30  9/3 2.7 At goal, no change 5 5 2.5 5 5 5 2.5 30  8/27 1.5 Below goal, incr 5 5 2.5 5        7.5/5 5 2.5 30  8/20 1.7 Below goal, bolus 5 5 2.5 5         5/2.5 5 2.5 27.5  8/18 1.2 Below goal, bolus+ Lovenox          7.5/2.5 5 INR  8/6 1.5 Below goal, see below  7/14 2.6 At goal, no change 5 5 2.5 5 2.5 5 2.5 27.5  6/16 2.7 At goal, no change 5 5 2.5 5 2.5 5 2.5 27.5      Lab Results   Component Value Date    RBC 3.64 (L) 01/07/2020    HGB 13.1 (L) 01/07/2020    HCT 39.7 (L) 01/07/2020    .1 (H) 01/07/2020    MCH 36.1 (H) 01/07/2020    MPV 8.1 01/07/2020    RDW 12.9 01/07/2020     01/07/2020     [id number] Score for Atrial Fibrillation Stroke Risk   Risk   Factors  Component Value   C CHF No 0   H HTN Yes 1   A2 Age >= 76 Yes,  (79 y.o.) 2   D DM No 0   S2 Prior Stroke/TIA No 0   V Vascular Disease Yes 1   A Age 74-69 No,  (79 y.o.) 0   Sc Sex male 0    [id number]  Score  4   Score last updated 47/21/69 59:71 AM    Click here for a link to the UpToDate guideline \"Atrial Fibrillation: Anticoagulation therapy to prevent embolization    Disclaimer: Risk Score calculation is dependent on accuracy of patient problem list and past encounter diagnosis. 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 EGD 4/25 significant for: 2 small antral erosions, moderate hiatal hernia, slightly irregular Z-line, small distal duodenal polyp. Patient d/c off of plavix and warfarin in preparation for outpt EGD with biopsies and colonoscopy. EGD/Colonoscopy 5/2; no bleeding source, resumed plavix and warfarin on 5/3.  -10/29-11/1/17: [facility] for STEMI, s/p LHC 10/30 with primary stenting to proximal RCA. Recent medication changes Possible cortisone shot in L knee in future   Medications taken regularly that may interact with warfarin or alter INR MVI (may contain vit K)  ASA (stent placement 10/30/17)  Occ APAP prn knee pain   Warfarin dose taken as prescribed Yes - uses pillbox  held warfarin 1/2-1/6, 8/7-8/12 and bridged with Lovenox    Signs/symptoms of bleeding 12/31/20: subconjunctival hemorrhage, seen by ophthalmologst  H/o hematuria- patient states he was told by his urologist that as long as he is taking warfarin, hematuria may continue.   H/o epistaxis, occasional hemorrhoids, anemia. Vitamin K intake Normally has broccoli, asparagus, kale/gay salads 3-4 per week    9/17: 7 servings in past week. 12/10: \"a lot of greens\"- broccoli 3 days in a row+salads  1/14: 5 servings in past week including rory greens  3/3: 2 servings of green day before. 5/5: 1 serving in past week  8/6: brussels, asparagus, 2c cooked greens, broccoli  9/3, 10/27: only 1 serving   5/6/21: 2 servings brocc, 1 salad in past week, no greens  9/8/21: only one serving of vit k foods this week    Recent vomiting/diarrhea/fever, changes in weight or activity level Trying to improve diet/exercise and lose weight gradually. Due to covid has not been as active lately   Tobacco or alcohol use Patient denies tobacco use  Will have 1 glass of wine per day max   Upcoming surgeries or procedures None     Assessment/Plan:   Patient's INR was supratherapeutic today (3.8) despite dose decrease last appointment. Patient reports having a lot of stress recently due to the passing of three of his family members, which may be contributing to elevated INR. Pt states taking tylenol occasionally for knee pain, but usually only 0-2 per day. He denies incorrect warfarin doses, changes to health, significant med changes, or unusual/serious bleeding. He eats high vit k food often, and changes b/t broccoli, asparagus, salad and cooked greens. He has only eaten one serving in the past week, but states he plans to go back to usual 3-4 servings per week. As INR remains elevated, patient was instructed to hold warfarin tonight then decrease warfarin dose to 5mg on MWF and 2.5mg all other days. Repeat INR in 2 weeks. Patient was reminded to call with any bleeding, medication changes, or fever/vomiting/diarrhea. Patient understands dosing directions and information discussed. Dosing schedule and follow up appointment given to patient. Progress note routed to referring physician's office.  Patient acknowledges working in consult agreement with pharmacist as referred by his/her physician. Next Clinic Appointment: 9/22    Thanks!     Ana Luisa Dougherty, PharmD, BCPS  [facility]  [place]: [phone]  [place]: [phone]  9/8/2021 10:31 AM    For Pharmacy Admin Tracking Only     Intervention Detail: Dose Adjustment: 1, reason: Therapy Optimization   Total # of Interventions Recommended: 1   Total # of Interventions Accepted: 1   Time Spent (min): 15

## 2021-09-24 ENCOUNTER — ANTI-COAG VISIT (OUTPATIENT)
Dept: PHARMACY | Age: 81
End: 2021-09-24
Payer: MEDICARE

## 2021-09-24 DIAGNOSIS — I48.20 CHRONIC ATRIAL FIBRILLATION (HCC): Primary | ICD-10-CM

## 2021-09-24 LAB — INTERNATIONAL NORMALIZATION RATIO, POC: 2.4

## 2021-09-24 PROCEDURE — 99211 OFF/OP EST MAY X REQ PHY/QHP: CPT

## 2021-09-24 PROCEDURE — 85610 PROTHROMBIN TIME: CPT

## 2021-09-24 NOTE — PROGRESS NOTES
ANTICOAGULATION SERVICE    Letty Dickinson" is a 79 y.o. male with PMHx significant for chronic AF ([id number] of 4), HTN who presents to clinic 9/24/2021 for anticoagulation monitoring and adjustment.     Anticoagulation Indication(s):  Afib    Referring Physician:  Dr. Cody Alfonso    Goal INR Range:  2-3  Duration of Anticoagulation Therapy:  Indefinite  Time of day dose taken:  PM  Product patient has at home:  warfarin 5 mg (PEACH color)    INR Summary                           Warfarin regimen (mg)  Date INR   A/P   Sun Mon Tue Wed Thu Fri Sat Mg/wk  9/24 2.4 At goal, no change 2.5 5 2.5 5 2.5 5 2.5 25  9/8 3.8 Above goal, hold+dec 2.5 5 2.5 0/5 2.5 5 2.5 25  8/25 4.0 Above goal, hold+dec 5 5 2.5 0/5 2.5 5 2.5 27.5  7/28 3.0 At goal, continue 5 5 2.5 5 5 5 2.5 30  6/30 2.8 At goal, continue 5 5 2.5 5 5 5 2.5 30  5/26 2.5 At goal, continue 5 5 2.5 5 5 5 2.5 30  5/6 3.2 Above goal, continue 5 5 2.5 5 5 5 2.5 30  4/8 2.6 At goal, no change 5 5 2.5 5 5 5 2.5 30  3/25 3.5 Above goal, continue 5 5 2.5 5 2.5/5 5 2.5 30  2/25 1.8 Below goal, continue 5 5 2.5 5 5 5 2.5 30  1/28 3.0 At goal, no change 5 5 2.5 5 5 5 2.5 30  12/31 2.9 At goal, no change 5 5 2.5 5 5 5 2.5 30  12/3 2.9 At goal, no change 5 5 2.5 5 5 5 2.5 30  11/17 3.7 Above goal, holdx1 5 5 2.5 5 5 5 2.5 30  10/27 3.0 At goal, no change 5 5 5 5 5 5 2.5 32.5  10/8 1.8 Below goal, increase 5 5 5 5 5 5 2.5 32.5  9/17 2.1 At goal, no change 5 5 2.5 5 5 5 2.5 30  9/3 2.7 At goal, no change 5 5 2.5 5 5 5 2.5 30  8/27 1.5 Below goal, incr 5 5 2.5 5        7.5/5 5 2.5 30  8/20 1.7 Below goal, bolus 5 5 2.5 5         5/2.5 5 2.5 27.5  8/18 1.2 Below goal, bolus+ Lovenox          7.5/2.5 5 INR  8/6 1.5 Below goal, see below  7/14 2.6 At goal, no change 5 5 2.5 5 2.5 5 2.5 27.5  6/16 2.7 At goal, no change 5 5 2.5 5 2.5 5 2.5 27.5      Lab Results   Component Value Date    RBC 3.64 (L) 01/07/2020    HGB 13.1 (L) 01/07/2020    HCT 39.7 (L) 01/07/2020    .1 (H) 01/07/2020    MCH 36.1 (H) 01/07/2020    MPV 8.1 01/07/2020    RDW 12.9 01/07/2020     01/07/2020     [id number] Score for Atrial Fibrillation Stroke Risk   Risk   Factors  Component Value   C CHF No 0   H HTN Yes 1   A2 Age >= 76 Yes,  (79 y.o.) 2   D DM No 0   S2 Prior Stroke/TIA No 0   V Vascular Disease Yes 1   A Age 74-69 No,  (79 y.o.) 0   Sc Sex male 0    [id number]  Score  4   Score last updated 59/77/42 42:87 AM    Click here for a link to the UpToDate guideline \"Atrial Fibrillation: Anticoagulation therapy to prevent embolization    Disclaimer: Risk Score calculation is dependent on accuracy of patient problem list and past encounter diagnosis. 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 EGD 4/25 significant for: 2 small antral erosions, moderate hiatal hernia, slightly irregular Z-line, small distal duodenal polyp. Patient d/c off of plavix and warfarin in preparation for outpt EGD with biopsies and colonoscopy. EGD/Colonoscopy 5/2; no bleeding source, resumed plavix and warfarin on 5/3.  -10/29-11/1/17: [facility] for STEMI, s/p LHC 10/30 with primary stenting to proximal RCA.     Recent medication changes Possible cortisone shot in L knee in future   Medications taken regularly that may interact with warfarin or alter INR MVI (may contain vit K)  ASA (stent placement 10/30/17)  Occ APAP prn knee pain   Warfarin dose taken as prescribed Yes - uses pillbox  held warfarin 1/2-1/6, 8/7-8/12 and bridged with Lovenox    Signs/symptoms of bleeding 12/31/20: subconjunctival hemorrhage, seen by ophthalmologst  H/o hematuria- patient states he was told by his urologist that as long as he is taking warfarin, hematuria may continue. H/o epistaxis, occasional hemorrhoids, anemia. Vitamin K intake Normally has broccoli, asparagus, kale/gay salads 3-4 per week    9/17: 7 servings in past week. 12/10: \"a lot of greens\"- broccoli 3 days in a row+salads  1/14: 5 servings in past week including rory greens  3/3: 2 servings of green day before. 5/5: 1 serving in past week  8/6: brussels, asparagus, 2c cooked greens, broccoli  9/3, 10/27: only 1 serving   5/6/21: 2 servings brocc, 1 salad in past week, no greens  9/8/21: only one serving of vit k foods this week    Recent vomiting/diarrhea/fever, changes in weight or activity level Trying to improve diet/exercise and lose weight gradually. Due to covid has not been as active lately   Tobacco or alcohol use Patient denies tobacco use  Will have 1 glass of wine per day max   Upcoming surgeries or procedures None     Assessment/Plan:   Patient's INR was therapeutic today (2.4) following warfarin dose decrease at last two appointments. Patient reports having a lot of stress recently due to the passing of three of his family members, which may have contributed to elevated INR. Pt is taking Tylenol occasionally for knee pain, but usually only 0-2 per day. He denies incorrect warfarin doses, changes to health, significant med changes, or unusual/serious bleeding. He eats high vitamin K foods often (broccoli, asparagus, salad, cooked greens), but had decreased his intake recently. Today, he reports that he has been trying to increase back to usual 3-4 servings per week. Patient was instructed to continue lower warfarin dose of 5mg on MWF and 2.5mg all other days for now. Repeat INR in 3 weeks (recommended 2 weeks, but patient preferred to extend further). Patient was reminded to call with any bleeding, medication changes, or fever/vomiting/diarrhea. Patient understands dosing directions and information discussed.  Dosing schedule and follow up appointment given to patient. Progress note routed to referring physician's office. Patient acknowledges working in consult agreement with pharmacist as referred by his/her physician. Next Clinic Appointment: 10/14    Thanks! Heather Yuan.  Adrienne Arambula, NahidD, [facility]  [facility]  Ph: [phone]  2021 10:59 AM    For Pharmacy Admin Tracking Only     Intervention Detail: Adherence Monitorin   Total # of Interventions Recommended: 0   Total # of Interventions Accepted: 0   Time Spent (min): 15

## 2021-10-14 ENCOUNTER — ANTI-COAG VISIT (OUTPATIENT)
Dept: PHARMACY | Age: 81
End: 2021-10-14
Payer: MEDICARE

## 2021-10-14 DIAGNOSIS — I48.20 CHRONIC ATRIAL FIBRILLATION (HCC): Primary | ICD-10-CM

## 2021-10-14 LAB — INTERNATIONAL NORMALIZATION RATIO, POC: 1.9

## 2021-10-14 PROCEDURE — 85610 PROTHROMBIN TIME: CPT | Performed by: PHARMACIST

## 2021-10-14 PROCEDURE — 99211 OFF/OP EST MAY X REQ PHY/QHP: CPT | Performed by: PHARMACIST

## 2021-10-14 NOTE — PROGRESS NOTES
ANTICOAGULATION SERVICE    Yesenia Brasher" is a 79 y.o. male with PMHx significant for chronic AF ([id number] of 4), HTN who presents to clinic 10/14/2021 for anticoagulation monitoring and adjustment.     Anticoagulation Indication(s):  Afib    Referring Physician:  Dr. Diya Duarte    Goal INR Range:  2-3  Duration of Anticoagulation Therapy:  Indefinite  Time of day dose taken:  PM  Product patient has at home:  warfarin 5 mg (PEACH color)    INR Summary                           Warfarin regimen (mg)  Date INR   A/P   Sun Mon Tue Wed Thu Fri Sat Mg/wk  10/14 1.9 Below goal, continue  2.5 5 2.5 5 2.5 5 2.5 25  9/24 2.4 At goal, no change 2.5 5 2.5 5 2.5 5 2.5 25  9/8 3.8 Above goal, hold+dec 2.5 5 2.5 0/5 2.5 5 2.5 25  8/25 4.0 Above goal, hold+dec 5 5 2.5 0/5 2.5 5 2.5 27.5  7/28 3.0 At goal, continue 5 5 2.5 5 5 5 2.5 30  6/30 2.8 At goal, continue 5 5 2.5 5 5 5 2.5 30  5/26 2.5 At goal, continue 5 5 2.5 5 5 5 2.5 30  5/6 3.2 Above goal, continue 5 5 2.5 5 5 5 2.5 30  4/8 2.6 At goal, no change 5 5 2.5 5 5 5 2.5 30  3/25 3.5 Above goal, continue 5 5 2.5 5 2.5/5 5 2.5 30  2/25 1.8 Below goal, continue 5 5 2.5 5 5 5 2.5 30  1/28 3.0 At goal, no change 5 5 2.5 5 5 5 2.5 30  12/31 2.9 At goal, no change 5 5 2.5 5 5 5 2.5 30  12/3 2.9 At goal, no change 5 5 2.5 5 5 5 2.5 30  11/17 3.7 Above goal, holdx1 5 5 2.5 5 5 5 2.5 30  10/27 3.0 At goal, no change 5 5 5 5 5 5 2.5 32.5  10/8 1.8 Below goal, increase 5 5 5 5 5 5 2.5 32.5  9/17 2.1 At goal, no change 5 5 2.5 5 5 5 2.5 30  9/3 2.7 At goal, no change 5 5 2.5 5 5 5 2.5 30  8/27 1.5 Below goal, incr 5 5 2.5 5        7.5/5 5 2.5 30  8/20 1.7 Below goal, bolus 5 5 2.5 5         5/2.5 5 2.5 27.5  8/18 1.2 Below goal, bolus+ Lovenox          7.5/2.5 5 INR  8/6 1.5 Below goal, see below  7/14 2.6 At goal, no change 5 5 2.5 5 2.5 5 2.5 27.5  6/16 2.7 At goal, no change 5 5 2.5 5 2.5 5 2.5 27.5      Lab Results   Component Value Date    RBC 3.64 (L) 01/07/2020    HGB 13.1 (L) 01/07/2020    HCT 39.7 (L) 01/07/2020    .1 (H) 01/07/2020    MCH 36.1 (H) 01/07/2020    MPV 8.1 01/07/2020    RDW 12.9 01/07/2020     01/07/2020     [id number] Score for Atrial Fibrillation Stroke Risk   Risk   Factors  Component Value   C CHF No 0   H HTN Yes 1   A2 Age >= 76 Yes,  (79 y.o.) 2   D DM No 0   S2 Prior Stroke/TIA No 0   V Vascular Disease Yes 1   A Age 74-69 No,  (79 y.o.) 0   Sc Sex male 0    [id number]  Score  4   Score last updated 43/76/82 69:77 AM    Click here for a link to the UpToDate guideline \"Atrial Fibrillation: Anticoagulation therapy to prevent embolization    Disclaimer: Risk Score calculation is dependent on accuracy of patient problem list and past encounter diagnosis. 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 EGD 4/25 significant for: 2 small antral erosions, moderate hiatal hernia, slightly irregular Z-line, small distal duodenal polyp. Patient d/c off of plavix and warfarin in preparation for outpt EGD with biopsies and colonoscopy. EGD/Colonoscopy 5/2; no bleeding source, resumed plavix and warfarin on 5/3.  -10/29-11/1/17: [facility] for STEMI, s/p LHC 10/30 with primary stenting to proximal RCA.     Recent medication changes Possible cortisone shot in L knee in future   Medications taken regularly that may interact with warfarin or alter INR MVI (may contain vit K)  ASA (stent placement 10/30/17)  Occ APAP prn knee pain   Warfarin dose taken as prescribed Yes - uses pillbox  held warfarin 1/2-1/6, 8/7-8/12 and bridged with Lovenox    Signs/symptoms of bleeding 12/31/20: subconjunctival hemorrhage, seen by ophthalmologst  H/o hematuria- patient states he was told by his urologist that as long as he is taking warfarin, hematuria may continue. H/o epistaxis, occasional hemorrhoids, anemia. Vitamin K intake Normally has broccoli, asparagus, kale/gay salads 3-4 per week    9/17: 7 servings in past week. 12/10: \"a lot of greens\"- broccoli 3 days in a row+salads  1/14: 5 servings in past week including rory greens  3/3: 2 servings of green day before. 5/5: 1 serving in past week  8/6: brussels, asparagus, 2c cooked greens, broccoli  9/3, 10/27: only 1 serving   5/6/21: 2 servings brocc, 1 salad in past week, no greens  9/8/21: only one serving of vit k foods this week   10/14/21: brussel sprouts at least 3x and broccoli (more than baseline)     Recent vomiting/diarrhea/fever, changes in weight or activity level Trying to improve diet/exercise and lose weight gradually. Due to covid has not been as active lately   Tobacco or alcohol use Patient denies tobacco use  Will have 1 glass of wine per day max   Upcoming surgeries or procedures None     Assessment/Plan:   Patient's INR was slightly subtherapeutic today (1.9) likely due to increase in vitamin K intake in the past week. Previously he had 3-4 servings per week but recently has only been having 1-2. This week he has had at least 3 servings of brussel sprouts and broccoli within the past few days. He reports this is more than normal and plans to decrease intake this week. Patient reports having a lot of stress recently due to the passing of three of his family members, which may have contributed to elevated INR previously. Pt is taking Tylenol occasionally for knee pain, but usually only 0-2 per day. He denies incorrect warfarin doses, changes to health, significant med changes, or unusual/serious bleeding. Patient was instructed to continue lower warfarin dose of 5mg on MWF and 2.5mg all other days for now. May need to increase dose if patient returns to baseline vitamin K intake. Repeat INR in 3 weeks. Patient was reminded to call with any bleeding, medication changes, or fever/vomiting/diarrhea. Patient understands dosing directions and information discussed. Dosing schedule and follow up appointment given to patient. Progress note routed to referring physician's office. Patient acknowledges working in consult agreement with pharmacist as referred by his/her physician. Next Clinic Appointment: 11/4    Thanks!   Jose Cordova, PharmD, BCPS  [facility]  [place]: [phone]  [place]: [phone]  10/14/2021 3:31 PM     For Pharmacy Admin Tracking Only   Total # of Interventions Recommended: 0   Total # of Interventions Accepted: 0   Time Spent (min): 15

## 2021-11-03 NOTE — PROGRESS NOTES
ANTICOAGULATION SERVICE    Larissa Choi" is a 79 y.o. male with PMHx significant for chronic AF ([id number] of 4), HTN who presents to clinic 11/3/2021 for anticoagulation monitoring and adjustment.     Anticoagulation Indication(s):  Afib    Referring Physician:  Dr. Emi Hernández    Goal INR Range:  2-3  Duration of Anticoagulation Therapy:  Indefinite  Time of day dose taken:  PM  Product patient has at home:  warfarin 5 mg (PEACH color)    INR Summary                           Warfarin regimen (mg)  Date INR   A/P   Sun Mon Tue Wed Thu Fri Sat Mg/wk  11/4 2.2 At goal, no change 2.5 5 2.5 5 2.5 5 2.5 25  10/14 1.9 Below goal, continue  2.5 5 2.5 5 2.5 5 2.5 25  9/24 2.4 At goal, no change 2.5 5 2.5 5 2.5 5 2.5 25  9/8 3.8 Above goal, hold+dec 2.5 5 2.5 0/5 2.5 5 2.5 25  8/25 4.0 Above goal, hold+dec 5 5 2.5 0/5 2.5 5 2.5 27.5  7/28 3.0 At goal, continue 5 5 2.5 5 5 5 2.5 30  6/30 2.8 At goal, continue 5 5 2.5 5 5 5 2.5 30  5/26 2.5 At goal, continue 5 5 2.5 5 5 5 2.5 30  5/6 3.2 Above goal, continue 5 5 2.5 5 5 5 2.5 30  4/8 2.6 At goal, no change 5 5 2.5 5 5 5 2.5 30  3/25 3.5 Above goal, continue 5 5 2.5 5 2.5/5 5 2.5 30  2/25 1.8 Below goal, continue 5 5 2.5 5 5 5 2.5 30  1/28 3.0 At goal, no change 5 5 2.5 5 5 5 2.5 30  12/31 2.9 At goal, no change 5 5 2.5 5 5 5 2.5 30  12/3 2.9 At goal, no change 5 5 2.5 5 5 5 2.5 30  11/17 3.7 Above goal, holdx1 5 5 2.5 5 5 5 2.5 30  10/27 3.0 At goal, no change 5 5 5 5 5 5 2.5 32.5  10/8 1.8 Below goal, increase 5 5 5 5 5 5 2.5 32.5  9/17 2.1 At goal, no change 5 5 2.5 5 5 5 2.5 30  9/3 2.7 At goal, no change 5 5 2.5 5 5 5 2.5 30  8/27 1.5 Below goal, incr 5 5 2.5 5        7.5/5 5 2.5 30  8/20 1.7 Below goal, bolus 5 5 2.5 5         5/2.5 5 2.5 27.5  8/18 1.2 Below goal, bolus+ Lovenox          7.5/2.5 5 INR  8/6 1.5 Below goal, see below  7/14 2.6 At goal, no change 5 5 2.5 5 2.5 5 2.5 27.5  6/16 2.7 At goal, no change 5 5 2.5 5 2.5 5 2.5 27.5      Lab Results Component Value Date    RBC 3.64 (L) 01/07/2020    HGB 13.1 (L) 01/07/2020    HCT 39.7 (L) 01/07/2020    .1 (H) 01/07/2020    MCH 36.1 (H) 01/07/2020    MPV 8.1 01/07/2020    RDW 12.9 01/07/2020     01/07/2020     [id number] Score for Atrial Fibrillation Stroke Risk   Risk   Factors  Component Value   C CHF No 0   H HTN Yes 1   A2 Age >= 76 Yes,  (79 y.o.) 2   D DM No 0   S2 Prior Stroke/TIA No 0   V Vascular Disease Yes 1   A Age 74-69 No,  (79 y.o.) 0   Sc Sex male 0    [id number]  Score  4   Score last updated 79/11/84 13:66 AM    Click here for a link to the UpToDate guideline \"Atrial Fibrillation: Anticoagulation therapy to prevent embolization    Disclaimer: Risk Score calculation is dependent on accuracy of patient problem list and past encounter diagnosis. 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 EGD 4/25 significant for: 2 small antral erosions, moderate hiatal hernia, slightly irregular Z-line, small distal duodenal polyp. Patient d/c off of plavix and warfarin in preparation for outpt EGD with biopsies and colonoscopy. EGD/Colonoscopy 5/2; no bleeding source, resumed plavix and warfarin on 5/3.  -10/29-11/1/17: [facility] for STEMI, s/p [facility] 10/30 with primary stenting to proximal RCA.     Recent medication changes None reported   Medications taken regularly that may interact with warfarin or alter INR MVI (may contain vit K)  ASA (stent placement 10/30/17)  Occ APAP prn knee pain   Warfarin dose taken as prescribed Yes - uses pillbox  held warfarin 1/2-1/6, 8/7-8/12 and bridged with Lovenox    Signs/symptoms of bleeding 12/31/20: subconjunctival hemorrhage, seen by ophthalmologst  H/o hematuria- patient states he was told by his urologist that as long as he is taking warfarin, hematuria may continue. H/o epistaxis, occasional hemorrhoids, anemia. Vitamin K intake Normally has broccoli, asparagus, kale/gay salads 3-4 per week    9/17: 7 servings in past week. 12/10: \"a lot of greens\"- broccoli 3 days in a row+salads  1/14: 5 servings in past week including rory greens  3/3: 2 servings of green day before. 5/5: 1 serving in past week  8/6: brussels, asparagus, 2c cooked greens, broccoli  9/3, 10/27: only 1 serving   5/6/21: 2 servings brocc, 1 salad in past week, no greens  9/8/21: only one serving of vit k foods this week   10/14/21: brussel sprouts at least 3x and broccoli (more than baseline)    11/4: baseline vit k ~3 per week   Recent vomiting/diarrhea/fever, changes in weight or activity level Trying to improve diet/exercise and lose weight gradually. Due to covid has not been as active lately   Tobacco or alcohol use Patient denies tobacco use  Will have 1 glass of wine per day max   Upcoming surgeries or procedures None     Assessment/Plan:   Patient's INR was therapeutic today (2.2). Pt's INR had been labile due to changes in vit k intake, which has since stabilized. Patient reports having a lot of stress recently due to the passing of three of his family members, which may have contributed to elevated INR previously. Pt is taking Tylenol occasionally for knee pain, but usually only 0-2 per day. He denies incorrect warfarin doses, changes to health, significant med changes, or unusual/serious bleeding. Patient was instructed to continue warfarin dose of 5mg on MWF and 2.5mg all other days for now. Repeat INR in 4 weeks. Patient was reminded to call with any bleeding, medication changes, or fever/vomiting/diarrhea. Patient understands dosing directions and information discussed. Dosing schedule and follow up appointment given to patient. Progress note routed to referring physician's office. Patient acknowledges working in consult agreement with pharmacist as referred by his/her physician. Next Clinic Appointment: 12/2    Thanks!   Irma Galarza, PharmD, [facility]  Medication Management Clinic   [address] Ph: [phone]  [facility] Ph: [phone]  11/3/2021 2:19 PM      For Pharmacy Admin Tracking Only   Total # of Interventions Recommended: 0   Total # of Interventions Accepted: 0   Time Spent (min): 15

## 2021-11-04 ENCOUNTER — ANTI-COAG VISIT (OUTPATIENT)
Dept: PHARMACY | Age: 81
End: 2021-11-04
Payer: MEDICARE

## 2021-11-04 DIAGNOSIS — I48.20 CHRONIC ATRIAL FIBRILLATION (HCC): Primary | ICD-10-CM

## 2021-11-04 LAB — INTERNATIONAL NORMALIZATION RATIO, POC: 2.2

## 2021-11-04 PROCEDURE — 99211 OFF/OP EST MAY X REQ PHY/QHP: CPT

## 2021-11-04 PROCEDURE — 85610 PROTHROMBIN TIME: CPT

## 2021-11-11 ENCOUNTER — OFFICE VISIT (OUTPATIENT)
Dept: CARDIOLOGY CLINIC | Age: 81
End: 2021-11-11
Payer: MEDICARE

## 2021-11-11 VITALS
SYSTOLIC BLOOD PRESSURE: 126 MMHG | DIASTOLIC BLOOD PRESSURE: 80 MMHG | HEART RATE: 60 BPM | BODY MASS INDEX: 32.23 KG/M2 | WEIGHT: 212 LBS

## 2021-11-11 DIAGNOSIS — I10 ESSENTIAL HYPERTENSION: ICD-10-CM

## 2021-11-11 DIAGNOSIS — I48.20 CHRONIC ATRIAL FIBRILLATION (HCC): Primary | ICD-10-CM

## 2021-11-11 DIAGNOSIS — I25.10 CAD IN NATIVE ARTERY: ICD-10-CM

## 2021-11-11 DIAGNOSIS — I25.2 MI, OLD: ICD-10-CM

## 2021-11-11 DIAGNOSIS — Z98.61 HISTORY OF PTCA: ICD-10-CM

## 2021-11-11 PROCEDURE — 4040F PNEUMOC VAC/ADMIN/RCVD: CPT | Performed by: INTERNAL MEDICINE

## 2021-11-11 PROCEDURE — G8417 CALC BMI ABV UP PARAM F/U: HCPCS | Performed by: INTERNAL MEDICINE

## 2021-11-11 PROCEDURE — 1123F ACP DISCUSS/DSCN MKR DOCD: CPT | Performed by: INTERNAL MEDICINE

## 2021-11-11 PROCEDURE — 1036F TOBACCO NON-USER: CPT | Performed by: INTERNAL MEDICINE

## 2021-11-11 PROCEDURE — G8484 FLU IMMUNIZE NO ADMIN: HCPCS | Performed by: INTERNAL MEDICINE

## 2021-11-11 PROCEDURE — G8427 DOCREV CUR MEDS BY ELIG CLIN: HCPCS | Performed by: INTERNAL MEDICINE

## 2021-11-11 PROCEDURE — 99214 OFFICE O/P EST MOD 30 MIN: CPT | Performed by: INTERNAL MEDICINE

## 2021-11-11 NOTE — PROGRESS NOTES
Subjective:      Patient ID: Sebastian Merrill is a 79 y.o. male. HPI:  Here for follow up afib/HTN/CAD/PTCA/Non st.  Still exercising. Edema stable. Wt down. No sob. No orthopnea/PND. No complaints. No palp/tachycardia. No syncope. No exertional chest pain/sob. BP good at home.      Past Medical History:   Diagnosis Date    Allergic rhinitis     Anemia     Atrial fibrillation ([facility]     Atrial fibrillation (HCC)     Benign non-nodular prostatic hyperplasia with lower urinary tract symptoms 2/9/2017    Benign paroxysmal positional vertigo     BPH (benign prostatic hyperplasia)     BPH (benign prostatic hypertrophy)     Cataract 10/3/2014    Cholelithiasis     Colon cancer ([facility] 4/9/2012    Diverticulosis     Dyslipidemia 7/21/2010    Elevated PSA     Erectile dysfunction     Essential hypertension 11/24/2015    Gastroesophageal reflux disease without esophagitis 11/9/2016    GERD (gastroesophageal reflux disease)     Glaucoma 4/5/2013    Hiatal hernia     Hypertension     Lumbar radiculopathy     Osteoarthritis     Peripheral neuropathy 12/7/2012    Proteinuria 7/22/2010    S/P laparoscopic-assisted sigmoidectomy 4/17/2012    Urinary frequency      Past Surgical History:   Procedure Laterality Date    CATARACT REMOVAL WITH IMPLANT Right October 7, 2014    Dr. Obie Meraz Left October 24, 2014    Dr. Tanika Gill  April 4, 2012    Dr. Marshall Pulido  May 15, 2013    Dr. Marshall Pulido  08/09/2016    Dr. Natalie Diaz COLONOSCOPY N/A 02/17/2017    COLONOSCOPY N/A 8/13/2020    COLONOSCOPY performed by Luz Lerma MD at [address] Right October 7, 2014    Dr. Capo Londono Left October 24, 2014    Dr. Isaura Hastings OTHER SURGICAL HISTORY  2013    port-a-cath removal     PROSTATE BIOPSY  1997    PROSTATE BIOPSY  May 10, 2010    Dr. Ravi Krishna    SIGMOIDECTOMY  2012    Dr. Heather Campbell sigmoid colectomy           TUNNELED VENOUS PORT PLACEMENT      TURP  2004    Dr. Mathew Awad         Allergies   Allergen Reactions    Naproxen Other (See Comments)     Patient gets nose bleeds. Patient should not take. Patient lost a lot of blood in November due to Naproxen.     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    Occupation: retired -      Comment: as of 2013   Tobacco Use    Smoking status: Former Smoker     Packs/day: 0.10     Years: 7.00     Pack years: 0.70     Types: Cigars     Start date:      Quit date:      Years since quittin.10    Smokeless tobacco: Never Used    Tobacco comment: quit in the 1960s -- former cigar smoker   Substance and Sexual Activity    Alcohol use: Yes     Comment: social    Drug use: No    Sexual activity: Yes     Partners: Female     Comment:  - Abner Hidalgo - [id number]   Other Topics Concern    Not on file   Social History Narrative    Past Surgical History     TURP: 2004    prostate biopsy - 1997                                                    Last updated by Richie Krabbe MD on 10/29/2008                      Social History     Marital Status:  - 1967     Spouse: Abner Hidalgo Children: 3      Children's Names: Damaris Wise    Employment Status: retired -     Employer: [employer]    Occupation:  and Communications    Alcohol Use: socially    Drug Use: none    Tobacco Usage: prior smoker    Cigars/Pipes - Years Smoked: 65's & 62's                                                 Last updated by Nico Doe MD on 2009                      Family History     mother -  - age 73-73 - coronary artery disease, hypertension, sudden death    father -  - age 80 - ? colon cancer        brother - Ashok Vu -  - age 68 - 2009 - pancreatic cancer, hypertension, CABG, kidney problem    brother - Jade Toledo - small intestinal cancer, hypertension ([place])    brother - Mi Agarwal - hypertension    brother - Boy Polio -  - age 77 - 2008 - hypertension, colon cancer    brother -  - age 15 -  in a fire, smoke inhalation injury        sister - Kyle Kwong - hypertension        son - Jade Toledo - multiple sclerosis (age 37)    son - Tunde Ramirez -        daughter - Sergio Debar - asthma                                  Last updated by Nico Doe MD on 2010      Social Determinants of Health     Financial Resource Strain:     Difficulty of Paying Living Expenses: Not on file   Food Insecurity:     Worried About [address] in the Last Year: Not on file    [address] in the Last Year: Not on file   Transportation Needs:     Lack of Transportation (Medical): Not on file    Lack of Transportation (Non-Medical):  Not on file   Physical Activity:     Days of Exercise per Week: Not on file    Minutes of Exercise per Session: Not on file   Stress:     Feeling of Stress : Not on file   Social Connections:     Frequency of Communication with Friends and Family: Not on file    Frequency of Social Gatherings with Friends and Family: Not on file    Attends [employer] Services: Not on file   Camryn Dennison Active Member of Clubs or Organizations: Not on file    Attends Club or Organization Meetings: Not on file    Marital Status: Not on file   Intimate Partner Violence:     Fear of Current or Ex-Partner: Not on file    Emotionally Abused: Not on file    Physically Abused: Not on file    Sexually Abused: Not on file   Housing Stability:     Unable to Pay for Housing in the Last Year: Not on file    Number of [place] in the Last Year: Not on file    Unstable Housing in the Last Year: Not on file        Patient has a family history includes Arthritis in his brother; Asthma in his daughter; Cancer in his brother, brother, brother, and father; Colon Cancer in his brother; Coronary Art Dis in his brother and mother; Heart Disease in his brother and mother; Heart Surgery in his brother; High Blood Pressure in his brother, brother, brother, brother, mother, and sister; Hypertension in his brother, brother, brother, brother, mother, sister, and son; Kidney Disease in his brother; Mult Sclerosis in his son. Current Outpatient Medications   Medication Sig Dispense Refill    nitroGLYCERIN (NITROSTAT) 0.4 MG SL tablet up to max of 3 total doses.  If no relief after 1 dose, call 911. 25 tablet 3    furosemide (LASIX) 20 MG tablet TAKE ONE TABLET BY MOUTH DAILY TAKE BEFORE DINNER 90 tablet 3    warfarin (COUMADIN) 5 MG tablet Take 1 tablet by mouth daily 90 tablet 3    Handicap Placard MISC by Does not apply route Unable to walk more than 250 feet before having to stop and rest.  DX: AFIB  Not to exceed 5 years 1 each 0    Handicap Placard MISC by Does not apply route 1 each 0    Handicap Placard MISC by Does not apply route DX: I48.20 afib  Duration: 5 years 1 each 0    ezetimibe (ZETIA) 10 MG tablet Take 10 mg by mouth daily      metoprolol succinate (TOPROL XL) 200 MG extended release tablet TAKE ONE TABLET BY MOUTH DAILY 30 tablet 0    irbesartan (AVAPRO) 75 MG tablet TAKE ONE TABLET BY MOUTH DAILY 90 tablet 2    atorvastatin (LIPITOR) 40 MG tablet Take 1 tablet by mouth nightly 90 tablet 3    pantoprazole (PROTONIX) 20 MG tablet Take 1 tablet by mouth daily (Patient taking differently: Take 20 mg by mouth every other day ) 90 tablet 3    Cyanocobalamin (VITAMIN B-12) 500 MCG LOZG Take 500 mcg by mouth daily      finasteride (PROSCAR) 5 MG tablet Take 5 mg by mouth daily      Cholecalciferol (VITAMIN D) 2000 UNITS CAPS capsule Take 1 capsule by mouth 2 times daily      tamsulosin (FLOMAX) 0.4 MG capsule Take 0.4 mg by mouth daily      Multiple Vitamin (MULTIVITAMIN) capsule Take 1 capsule by mouth daily.  triamterene-hydrochlorothiazide (MAXZIDE-25) 37.5-25 MG per tablet Take 1 tablet by mouth daily 90 tablet 3    aspirin 81 MG tablet Take 81 mg by mouth daily (Patient not taking: Reported on 11/11/2021)       No current facility-administered medications for this visit. Vitals:    11/11/21 0949   BP: 126/80   Pulse: 60       Weight:  212 lb (96.2 kg)      Review of Systems   Constitutional: Negative for activity change and fatigue. Respiratory: Negative for apnea, cough, choking, chest tightness and shortness of breath. Cardiovascular: Negative for chest pain, palpitations and leg swelling. No PND or orthopnea. No tachycardia. Gastrointestinal: Negative for abdominal distention. Musculoskeletal: Negative for myalgias. Neurological: Negative for dizziness, syncope and light-headedness. Psychiatric/Behavioral: Negative for behavioral problems, confusion and agitation. All other systems reviewed negative as done    Objective:   Physical Exam   Constitutional: He is oriented to person, place, and time. He appears well-developed and well-nourished. No distress. HENT:   Head: Normocephalic and atraumatic. Eyes: Conjunctivae and EOM are normal. Right eye exhibits no discharge. Left eye exhibits no discharge. Neck: Normal range of motion. Neck supple.  No JVD present. Cardiovascular: Normal rate, S1 normal, S2 normal and normal heart sounds. An irregularly irregular rhythm present. Exam reveals no gallop. No murmur heard. Pulses:       Radial pulses are 2+ on the right side, and 2+ on the left side. Pulmonary/Chest: Effort normal and breath sounds normal. No respiratory distress. He has no wheezes. He has no rales. Abdominal: Soft. Bowel sounds are normal. No tenderness. Musculoskeletal: Normal range of motion. He exhibits no  tenderness. Tr edema  Neurological: He is alert and oriented to person, place, and time. Skin: Skin is warm and dry. Psychiatric: He has a normal mood and affect. His behavior is normal. Thought content normal.       Assessment:       Diagnosis Orders   1. Chronic atrial fibrillation ([address]     2. CAD in native artery     3. MI, old     3. Essential hypertension     5. History of PTCA             Plan:      CV stable. Edema stable. HR controlled. No angina  Wt stable. BP good. On AC/ASA. No bleeding issues. Watch salt. Walking. Will continue Toprol/avapro Maxzide for BP/CAD. Will continue coumadin for afib/CVA proph. Encouraged diet, and wt loss. No changes. Reviewed previous records and testing including cath 10/17 and echo 12/18. Follow up 3 months.

## 2021-12-02 ENCOUNTER — ANTI-COAG VISIT (OUTPATIENT)
Dept: PHARMACY | Age: 81
End: 2021-12-02
Payer: MEDICARE

## 2021-12-02 DIAGNOSIS — I48.20 CHRONIC ATRIAL FIBRILLATION (HCC): Primary | ICD-10-CM

## 2021-12-02 LAB — INTERNATIONAL NORMALIZATION RATIO, POC: 2.4

## 2021-12-02 PROCEDURE — 85610 PROTHROMBIN TIME: CPT

## 2021-12-02 PROCEDURE — 99211 OFF/OP EST MAY X REQ PHY/QHP: CPT

## 2021-12-02 NOTE — PROGRESS NOTES
ANTICOAGULATION SERVICE    Florentin Andres" is a 79 y.o. male with PMHx significant for chronic AF ([id number] of 4), HTN who presents to clinic 12/2/2021 for anticoagulation monitoring and adjustment.     Anticoagulation Indication(s):  Afib    Referring Physician:  Dr. Lisa Dang    Goal INR Range:  2-3  Duration of Anticoagulation Therapy:  Indefinite  Time of day dose taken:  PM  Product patient has at home:  warfarin 5 mg (PEACH color)    INR Summary                           Warfarin regimen (mg)  Date INR   A/P   Sun Mon Tue Wed Thu Fri Sat Mg/wk  12/2 2.4 At goal, no change 2.5 5 2.5 5 2.5 5 2.5 25  11/4 2.2 At goal, no change 2.5 5 2.5 5 2.5 5 2.5 25  10/14 1.9 Below goal, continue  2.5 5 2.5 5 2.5 5 2.5 25  9/24 2.4 At goal, no change 2.5 5 2.5 5 2.5 5 2.5 25  9/8 3.8 Above goal, hold+dec 2.5 5 2.5 0/5 2.5 5 2.5 25  8/25 4.0 Above goal, hold+dec 5 5 2.5 0/5 2.5 5 2.5 27.5  7/28 3.0 At goal, continue 5 5 2.5 5 5 5 2.5 30  6/30 2.8 At goal, continue 5 5 2.5 5 5 5 2.5 30  5/26 2.5 At goal, continue 5 5 2.5 5 5 5 2.5 30  5/6 3.2 Above goal, continue 5 5 2.5 5 5 5 2.5 30  4/8 2.6 At goal, no change 5 5 2.5 5 5 5 2.5 30  3/25 3.5 Above goal, continue 5 5 2.5 5 2.5/5 5 2.5 30  2/25 1.8 Below goal, continue 5 5 2.5 5 5 5 2.5 30  1/28 3.0 At goal, no change 5 5 2.5 5 5 5 2.5 30  12/31 2.9 At goal, no change 5 5 2.5 5 5 5 2.5 30  12/3 2.9 At goal, no change 5 5 2.5 5 5 5 2.5 30  11/17 3.7 Above goal, holdx1 5 5 2.5 5 5 5 2.5 30  10/27 3.0 At goal, no change 5 5 5 5 5 5 2.5 32.5  10/8 1.8 Below goal, increase 5 5 5 5 5 5 2.5 32.5  9/17 2.1 At goal, no change 5 5 2.5 5 5 5 2.5 30  9/3 2.7 At goal, no change 5 5 2.5 5 5 5 2.5 30  8/27 1.5 Below goal, incr 5 5 2.5 5        7.5/5 5 2.5 30  8/20 1.7 Below goal, bolus 5 5 2.5 5         5/2.5 5 2.5 27.5  8/18 1.2 Below goal, bolus+ Lovenox          7.5/2.5 5 INR  8/6 1.5 Below goal, see below  7/14 2.6 At goal, no change 5 5 2.5 5 2.5 5 2.5 27.5  6/16 2.7 At goal, no change 5 5 2.5 5 2.5 5 2.5 27.5      Lab Results   Component Value Date    RBC 3.64 (L) 01/07/2020    HGB 13.1 (L) 01/07/2020    HCT 39.7 (L) 01/07/2020    .1 (H) 01/07/2020    MCH 36.1 (H) 01/07/2020    MPV 8.1 01/07/2020    RDW 12.9 01/07/2020     01/07/2020     [id number] Score for Atrial Fibrillation Stroke Risk   Risk   Factors  Component Value   C CHF No 0   H HTN Yes 1   A2 Age >= 76 Yes,  (79 y.o.) 2   D DM No 0   S2 Prior Stroke/TIA No 0   V Vascular Disease Yes 1   A Age 74-69 No,  (79 y.o.) 0   Sc Sex male 0    [id number]  Score  4   Score last updated 49/35/95 81:89 AM    Click here for a link to the UpToDate guideline \"Atrial Fibrillation: Anticoagulation therapy to prevent embolization    Disclaimer: Risk Score calculation is dependent on accuracy of patient problem list and past encounter diagnosis. 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 EGD 4/25 significant for: 2 small antral erosions, moderate hiatal hernia, slightly irregular Z-line, small distal duodenal polyp. Patient d/c off of plavix and warfarin in preparation for outpt EGD with biopsies and colonoscopy. EGD/Colonoscopy 5/2; no bleeding source, resumed plavix and warfarin on 5/3.  -10/29-11/1/17: [facility] for STEMI, s/p LHC 10/30 with primary stenting to proximal RCA.     Recent medication changes Will start synvisc injections   Medications taken regularly that may interact with warfarin or alter INR MVI (may contain vit K)  ASA (stent placement 10/30/17)  Occ APAP prn knee pain   Warfarin dose taken as prescribed Yes - uses pillbox  held warfarin 1/2-1/6, 8/7-8/12 and bridged with Lovenox    Signs/symptoms of bleeding 12/31/20: subconjunctival hemorrhage, seen by ophthalmologst  H/o hematuria- patient states he was told by his urologist that as long as he is taking warfarin, hematuria may continue. H/o epistaxis, occasional hemorrhoids, anemia. Vitamin K intake Normally has broccoli, asparagus, kale/gay salads 3-4 per week    9/17: 7 servings in past week. 12/10: \"a lot of greens\"- broccoli 3 days in a row+salads  1/14: 5 servings in past week including rory greens  3/3: 2 servings of green day before. 5/5: 1 serving in past week  8/6: brussels, asparagus, 2c cooked greens, broccoli  9/3, 10/27: only 1 serving   5/6/21: 2 servings brocc, 1 salad in past week, no greens  9/8/21: only one serving of vit k foods this week   10/14/21: brussel sprouts at least 3x and broccoli (more than baseline)    11/4-current: baseline vit k ~3 per week   Recent vomiting/diarrhea/fever, changes in weight or activity level Trying to improve diet/exercise and lose weight gradually. Due to covid has not been as active lately   Tobacco or alcohol use Patient denies tobacco use  Will have 1 glass of wine per day max   Upcoming surgeries or procedures None     Assessment/Plan:   Patient's INR was therapeutic today (2.4). Pt's INR had been labile due to changes in vit k intake, which has since stabilized. Patient reports having a lot of stress recently due to the passing of three of his family members, which may have contributed to elevated INR previously. Pt is taking Tylenol occasionally for knee pain, but usually only 0-2 per day. He denies incorrect warfarin doses, changes to health, significant med changes, or unusual/serious bleeding. Patient was instructed to continue warfarin dose of 5mg on MWF and 2.5mg all other days for now. Repeat INR in 4 weeks. Patient was reminded to call with any bleeding, medication changes, or fever/vomiting/diarrhea. Patient understands dosing directions and information discussed.  Dosing schedule and follow up appointment given to patient. Progress note routed to referring physician's office. Patient acknowledges working in consult agreement with pharmacist as referred by his/her physician. Next Clinic Appointment: 12/30    Thanks!   Carlita Gibbons, PharmD, [facility]  Medication Management Clinic   [address] Ph: [phone]  Alfredito Mailindsey Ph: [phone]  12/2/2021 9:55 AM      For Pharmacy Admin Tracking Only   Total # of Interventions Recommended: 0   Total # of Interventions Accepted: 0   Time Spent (min): 15

## 2021-12-30 ENCOUNTER — ANTI-COAG VISIT (OUTPATIENT)
Dept: PHARMACY | Age: 81
End: 2021-12-30
Payer: MEDICARE

## 2021-12-30 DIAGNOSIS — I48.20 CHRONIC ATRIAL FIBRILLATION (HCC): Primary | ICD-10-CM

## 2021-12-30 LAB — INTERNATIONAL NORMALIZATION RATIO, POC: 3.2

## 2021-12-30 PROCEDURE — 85610 PROTHROMBIN TIME: CPT

## 2021-12-30 PROCEDURE — 99211 OFF/OP EST MAY X REQ PHY/QHP: CPT

## 2021-12-30 NOTE — PROGRESS NOTES
ANTICOAGULATION SERVICE    Hillary Reyes" is a 79 y.o. male with PMHx significant for chronic AF ([id number]-IRPx of 4), HTN who presents to clinic 12/30/2021 for anticoagulation monitoring and adjustment.     Anticoagulation Indication(s):  Afib    Referring Physician:  Dr. Paola Musa    Goal INR Range:  2-3  Duration of Anticoagulation Therapy:  Indefinite  Time of day dose taken:  PM  Product patient has at home:  warfarin 5 mg (PEACH color)    INR Summary                           Warfarin regimen (mg)  Date INR   A/P   Sun Mon Tue Wed Thu Fri Sat Mg/wk  12/30 3.2 At goal, no change 2.5 5 2.5 5 2.5 5 2.5 25  12/2 2.4 At goal, no change 2.5 5 2.5 5 2.5 5 2.5 25  11/4 2.2 At goal, no change 2.5 5 2.5 5 2.5 5 2.5 25  10/14 1.9 Below goal, continue  2.5 5 2.5 5 2.5 5 2.5 25  9/24 2.4 At goal, no change 2.5 5 2.5 5 2.5 5 2.5 25  9/8 3.8 Above goal, hold+dec 2.5 5 2.5 0/5 2.5 5 2.5 25  8/25 4.0 Above goal, hold+dec 5 5 2.5 0/5 2.5 5 2.5 27.5  7/28 3.0 At goal, continue 5 5 2.5 5 5 5 2.5 30  6/30 2.8 At goal, continue 5 5 2.5 5 5 5 2.5 30  5/26 2.5 At goal, continue 5 5 2.5 5 5 5 2.5 30  5/6 3.2 Above goal, continue 5 5 2.5 5 5 5 2.5 30  4/8 2.6 At goal, no change 5 5 2.5 5 5 5 2.5 30  3/25 3.5 Above goal, continue 5 5 2.5 5 2.5/5 5 2.5 30  2/25 1.8 Below goal, continue 5 5 2.5 5 5 5 2.5 30  1/28 3.0 At goal, no change 5 5 2.5 5 5 5 2.5 30  12/31 2.9 At goal, no change 5 5 2.5 5 5 5 2.5 30  12/3 2.9 At goal, no change 5 5 2.5 5 5 5 2.5 30  11/17 3.7 Above goal, holdx1 5 5 2.5 5 5 5 2.5 30  10/27 3.0 At goal, no change 5 5 5 5 5 5 2.5 32.5  10/8 1.8 Below goal, increase 5 5 5 5 5 5 2.5 32.5  9/17 2.1 At goal, no change 5 5 2.5 5 5 5 2.5 30  9/3 2.7 At goal, no change 5 5 2.5 5 5 5 2.5 30  8/27 1.5 Below goal, incr 5 5 2.5 5        7.5/5 5 2.5 30  8/20 1.7 Below goal, bolus 5 5 2.5 5         5/2.5 5 2.5 27.5  8/18 1.2 Below goal, bolus+ Lovenox          7.5/2.5 5 INR  8/6 1.5 Below goal, see below  7/14 2.6 At goal, no change 5 5 2.5 5 2.5 5 2.5 27.5  6/16 2.7 At goal, no change 5 5 2.5 5 2.5 5 2.5 27.5      Lab Results   Component Value Date    RBC 3.64 (L) 01/07/2020    HGB 13.1 (L) 01/07/2020    HCT 39.7 (L) 01/07/2020    .1 (H) 01/07/2020    MCH 36.1 (H) 01/07/2020    MPV 8.1 01/07/2020    RDW 12.9 01/07/2020     01/07/2020     [id number] Score for Atrial Fibrillation Stroke Risk   Risk   Factors  Component Value   C CHF No 0   H HTN Yes 1   A2 Age >= 76 Yes,  (79 y.o.) 2   D DM No 0   S2 Prior Stroke/TIA No 0   V Vascular Disease Yes 1   A Age 74-69 No,  (79 y.o.) 0   Sc Sex male 0    [id number]  Score  4   Score last updated 89/86/55 76:13 AM    Click here for a link to the UpToDate guideline \"Atrial Fibrillation: Anticoagulation therapy to prevent embolization    Disclaimer: Risk Score calculation is dependent on accuracy of patient problem list and past encounter diagnosis. 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 EGD 4/25 significant for: 2 small antral erosions, moderate hiatal hernia, slightly irregular Z-line, small distal duodenal polyp. Patient d/c off of plavix and warfarin in preparation for outpt EGD with biopsies and colonoscopy. EGD/Colonoscopy 5/2; no bleeding source, resumed plavix and warfarin on 5/3.  -10/29-11/1/17: [facility] for STEMI, s/p LHC 10/30 with primary stenting to proximal RCA.     Recent medication changes Will start synvisc injections   Medications taken regularly that may interact with warfarin or alter INR MVI (may contain vit K)  ASA (stent placement 10/30/17)  Occ APAP prn knee pain   Warfarin dose taken as prescribed Yes - uses pillbox  held warfarin 1/2-1/6, 8/7-8/12 and bridged with Lovenox    Signs/symptoms of bleeding 12/31/20: subconjunctival hemorrhage, seen by ophthalmologst  H/o hematuria- patient states he was told by his urologist that as long as he is taking warfarin, hematuria may continue. H/o epistaxis, occasional hemorrhoids, anemia. Vitamin K intake Normally has broccoli, asparagus, kale/gay salads 3-4 per week    9/17: 7 servings in past week. 12/10: \"a lot of greens\"- broccoli 3 days in a row+salads  1/14: 5 servings in past week including rory greens  3/3: 2 servings of green day before. 5/5: 1 serving in past week  8/6: brussels, asparagus, 2c cooked greens, broccoli  9/3, 10/27: only 1 serving   5/6/21: 2 servings brocc, 1 salad in past week, no greens  9/8/21: only one serving of vit k foods this week   10/14/21: brussel sprouts at least 3x and broccoli (more than baseline)    11/4-current: baseline vit k ~3 per week   Recent vomiting/diarrhea/fever, changes in weight or activity level Trying to improve diet/exercise and lose weight gradually. Due to covid has not been as active lately   Tobacco or alcohol use Patient denies tobacco use  Will have 1 glass of wine per day max   Upcoming surgeries or procedures None     Assessment/Plan:   Patient's INR was supratherapeutic today (3.2). Pt's INR had been labile due to changes in vit k intake, which has since stabilized for the most part. Patient states he knows he ate less greens over the holiday as his holidays meals were mostly other food groups. Pt is taking still Tylenol occasionally for knee pain, but usually only 0-2 per day which is unchanged from last visit. He denies incorrect warfarin doses, changes to health, significant med changes, or unusual/serious bleeding. Patient was instructed to continue warfarin dose of 5mg on MWF and 2.5mg all other days for now. Instructed him to have a serving of greens tonight to bring his INR back down. Repeat INR in 3 weeks.  Patient was reminded to call with any bleeding, medication changes, or fever/vomiting/diarrhea. Patient understands dosing directions and information discussed. Dosing schedule and follow up appointment given to patient. Progress note routed to referring physician's office. Patient acknowledges working in consult agreement with pharmacist as referred by his/her physician. Next Clinic Appointment: 1/20    Thanks!   Sarah David, PharmD  Med[facility] Management [facility]   [address] Ph: [phone]  Ena Celaya Ph: [phone]  12/30/2021 9:51 AM    For Pharmacy Admin Tracking Only   Total # of Interventions Recommended: 0   Total # of Interventions Accepted: 0   Time Spent (min): 15

## 2022-01-20 ENCOUNTER — ANTI-COAG VISIT (OUTPATIENT)
Dept: PHARMACY | Age: 82
End: 2022-01-20
Payer: MEDICARE

## 2022-01-20 DIAGNOSIS — I48.20 CHRONIC ATRIAL FIBRILLATION (HCC): Primary | ICD-10-CM

## 2022-01-20 LAB — INTERNATIONAL NORMALIZATION RATIO, POC: 2.7

## 2022-01-20 PROCEDURE — 85610 PROTHROMBIN TIME: CPT

## 2022-01-20 PROCEDURE — 99211 OFF/OP EST MAY X REQ PHY/QHP: CPT

## 2022-01-20 NOTE — PROGRESS NOTES
ANTICOAGULATION SERVICE    Derk Kussmaul Melony Huff" is a 79 y.o. male with PMHx significant for chronic AF ([id number] of 4), HTN who presents to clinic 1/20/2022 for anticoagulation monitoring and adjustment.     Anticoagulation Indication(s):  Afib    Referring Physician:  Dr. Nasreen Garcia    Goal INR Range:  2-3  Duration of Anticoagulation Therapy:  Indefinite  Time of day dose taken:  PM  Product patient has at home:  warfarin 5 mg (PEACH color)    INR Summary                           Warfarin regimen (mg)  Date INR   A/P   Sun Mon Tue Wed Thu Fri Sat Mg/wk  1/20 2.7 At goal, no change 2.5 5 2.5 5 2.5 5 2.5 25  12/30 3.2 At goal, no change 2.5 5 2.5 5 2.5 5 2.5 25  12/2 2.4 At goal, no change 2.5 5 2.5 5 2.5 5 2.5 25  11/4 2.2 At goal, no change 2.5 5 2.5 5 2.5 5 2.5 25  10/14 1.9 Below goal, continue  2.5 5 2.5 5 2.5 5 2.5 25  9/24 2.4 At goal, no change 2.5 5 2.5 5 2.5 5 2.5 25  9/8 3.8 Above goal, hold+dec 2.5 5 2.5 0/5 2.5 5 2.5 25  8/25 4.0 Above goal, hold+dec 5 5 2.5 0/5 2.5 5 2.5 27.5  7/28 3.0 At goal, continue 5 5 2.5 5 5 5 2.5 30  6/30 2.8 At goal, continue 5 5 2.5 5 5 5 2.5 30  5/26 2.5 At goal, continue 5 5 2.5 5 5 5 2.5 30  5/6 3.2 Above goal, continue 5 5 2.5 5 5 5 2.5 30  4/8 2.6 At goal, no change 5 5 2.5 5 5 5 2.5 30  3/25 3.5 Above goal, continue 5 5 2.5 5 2.5/5 5 2.5 30  2/25 1.8 Below goal, continue 5 5 2.5 5 5 5 2.5 30  1/28 3.0 At goal, no change 5 5 2.5 5 5 5 2.5 30  12/31 2.9 At goal, no change 5 5 2.5 5 5 5 2.5 30  12/3 2.9 At goal, no change 5 5 2.5 5 5 5 2.5 30  11/17 3.7 Above goal, holdx1 5 5 2.5 5 5 5 2.5 30  10/27 3.0 At goal, no change 5 5 5 5 5 5 2.5 32.5  10/8 1.8 Below goal, increase 5 5 5 5 5 5 2.5 32.5  9/17 2.1 At goal, no change 5 5 2.5 5 5 5 2.5 30  9/3 2.7 At goal, no change 5 5 2.5 5 5 5 2.5 30  8/27 1.5 Below goal, incr 5 5 2.5 5        7.5/5 5 2.5 30  8/20 1.7 Below goal, bolus 5 5 2.5 5         5/2.5 5 2.5 27.5  8/18 1.2 Below goal, bolus+ Lovenox prescribed Yes - uses pillbox  held warfarin 1/2-1/6, 8/7-8/12 and bridged with Lovenox    Signs/symptoms of bleeding 12/31/20: subconjunctival hemorrhage, seen by ophthalmologst  H/o hematuria- patient states he was told by his urologist that as long as he is taking warfarin, hematuria may continue. H/o epistaxis, occasional hemorrhoids, anemia. Vitamin K intake Normally has broccoli, asparagus, kale/gay salads 3-4 per week    9/17: 7 servings in past week. 12/10: \"a lot of greens\"- broccoli 3 days in a row+salads  1/14: 5 servings in past week including rory greens  3/3: 2 servings of green day before. 5/5: 1 serving in past week  8/6: brussels, asparagus, 2c cooked greens, broccoli  9/3, 10/27: only 1 serving   5/6/21: 2 servings brocc, 1 salad in past week, no greens  9/8/21: only one serving of vit k foods this week   10/14/21: brussel sprouts at least 3x and broccoli (more than baseline)    11/4-current: baseline vit k ~3 per week   Recent vomiting/diarrhea/fever, changes in weight or activity level Trying to improve diet/exercise and lose weight gradually. Due to covid has not been as active lately   Tobacco or alcohol use Patient denies tobacco use  Will have 1 glass of wine per day max   Upcoming surgeries or procedures None     Assessment/Plan:   Patient's INR was therapeutic today (2.7) after being supratherapeutic last visit. Pt's INR had been labile due to changes in vit k intake, which has since stabilized. He denies incorrect warfarin doses, changes to health, significant med changes, or unusual/serious bleeding. Patient was instructed to continue warfarin dose of 5mg on MWF and 2.5mg all other days for now. Repeat INR in 4 weeks. Patient was reminded to call with any bleeding, medication changes, or fever/vomiting/diarrhea. Patient understands dosing directions and information discussed. Dosing schedule and follow up appointment given to patient.  Progress note routed to referring physician's office. Patient acknowledges working in consult agreement with pharmacist as referred by his/her physician. Next Clinic Appointment: 2/17    Please call [facility] at [phone] with any questions. Please call The [address] [place] [address] Avenue at [phone] with any questions. Thanks!   Norm Sutton, PharmD  PGY1 Pharmacy Resident  [facility]  1/20/2022 9:52 AM     For Pharmacy Admin Tracking Only   Total # of Interventions Recommended: 0   Total # of Interventions Accepted: 0   Time Spent (min): 15

## 2022-02-17 ENCOUNTER — ANTI-COAG VISIT (OUTPATIENT)
Dept: PHARMACY | Age: 82
End: 2022-02-17
Payer: MEDICARE

## 2022-02-17 DIAGNOSIS — I48.20 CHRONIC ATRIAL FIBRILLATION (HCC): Primary | ICD-10-CM

## 2022-02-17 LAB — INTERNATIONAL NORMALIZATION RATIO, POC: 2.8

## 2022-02-17 PROCEDURE — 85610 PROTHROMBIN TIME: CPT

## 2022-02-17 PROCEDURE — 99211 OFF/OP EST MAY X REQ PHY/QHP: CPT

## 2022-02-17 NOTE — PROGRESS NOTES
ANTICOAGULATION SERVICE    Helena Castillo" is a 79 y.o. male with PMHx significant for chronic AF ([id number] of 4), HTN who presents to clinic 2/17/2022 for anticoagulation monitoring and adjustment.     Anticoagulation Indication(s):  Afib    Referring Physician:  Dr. Miller Maynard    Goal INR Range:  2-3  Duration of Anticoagulation Therapy:  Indefinite  Time of day dose taken:  PM  Product patient has at home:  warfarin 5 mg (PEACH color)    INR Summary                           Warfarin regimen (mg)  Date INR   A/P   Sun Mon Tue Wed Thu Fri Sat Mg/wk  2/17 2.8 At goal, no change 2.5 5 2.5 5 2.5 5 2.5 25  1/20 2.7 At goal, no change 2.5 5 2.5 5 2.5 5 2.5 25  12/30 3.2 At goal, no change 2.5 5 2.5 5 2.5 5 2.5 25  12/2 2.4 At goal, no change 2.5 5 2.5 5 2.5 5 2.5 25  11/4 2.2 At goal, no change 2.5 5 2.5 5 2.5 5 2.5 25  10/14 1.9 Below goal, continue  2.5 5 2.5 5 2.5 5 2.5 25  9/24 2.4 At goal, no change 2.5 5 2.5 5 2.5 5 2.5 25  9/8 3.8 Above goal, hold+dec 2.5 5 2.5 0/5 2.5 5 2.5 25  8/25 4.0 Above goal, hold+dec 5 5 2.5 0/5 2.5 5 2.5 27.5  7/28 3.0 At goal, continue 5 5 2.5 5 5 5 2.5 30  6/30 2.8 At goal, continue 5 5 2.5 5 5 5 2.5 30  5/26 2.5 At goal, continue 5 5 2.5 5 5 5 2.5 30  5/6 3.2 Above goal, continue 5 5 2.5 5 5 5 2.5 30  4/8 2.6 At goal, no change 5 5 2.5 5 5 5 2.5 30  3/25 3.5 Above goal, continue 5 5 2.5 5 2.5/5 5 2.5 30  2/25 1.8 Below goal, continue 5 5 2.5 5 5 5 2.5 30  1/28 3.0 At goal, no change 5 5 2.5 5 5 5 2.5 30  12/31 2.9 At goal, no change 5 5 2.5 5 5 5 2.5 30  12/3 2.9 At goal, no change 5 5 2.5 5 5 5 2.5 30  11/17 3.7 Above goal, holdx1 5 5 2.5 5 5 5 2.5 30  10/27 3.0 At goal, no change 5 5 5 5 5 5 2.5 32.5  10/8 1.8 Below goal, increase 5 5 5 5 5 5 2.5 32.5  9/17 2.1 At goal, no change 5 5 2.5 5 5 5 2.5 30  9/3 2.7 At goal, no change 5 5 2.5 5 5 5 2.5 30  8/27 1.5 Below goal, incr 5 5 2.5 5        7.5/5 5 2.5 30  8/20 1.7 Below goal, bolus 5 5 2.5 5 5/2.5 5 2.5 27.5  8/18 1.2 Below goal, bolus+ Lovenox          7.5/2.5 5 INR  8/6 1.5 Below goal, see below  7/14 2.6 At goal, no change 5 5 2.5 5 2.5 5 2.5 27.5  6/16 2.7 At goal, no change 5 5 2.5 5 2.5 5 2.5 27.5      Lab Results   Component Value Date    RBC 3.64 (L) 01/07/2020    HGB 13.1 (L) 01/07/2020    HCT 39.7 (L) 01/07/2020    .1 (H) 01/07/2020    MCH 36.1 (H) 01/07/2020    MPV 8.1 01/07/2020    RDW 12.9 01/07/2020     01/07/2020     [id number] Score for Atrial Fibrillation Stroke Risk   Risk   Factors  Component Value   C CHF No 0   H HTN Yes 1   A2 Age >= 76 Yes,  (79 y.o.) 2   D DM No 0   S2 Prior Stroke/TIA No 0   V Vascular Disease Yes 1   A Age 74-69 No,  (79 y.o.) 0   Sc Sex male 0    [id number]  Score  4   Score last updated 58/63/69 71:77 AM    Click here for a link to the UpToDate guideline \"Atrial Fibrillation: Anticoagulation therapy to prevent embolization    Disclaimer: Risk Score calculation is dependent on accuracy of patient problem list and past encounter diagnosis. 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 EGD 4/25 significant for: 2 small antral erosions, moderate hiatal hernia, slightly irregular Z-line, small distal duodenal polyp. Patient d/c off of plavix and warfarin in preparation for outpt EGD with biopsies and colonoscopy. EGD/Colonoscopy 5/2; no bleeding source, resumed plavix and warfarin on 5/3.  -10/29-11/1/17: [facility] for STEMI, s/p [facility] 10/30 with primary stenting to proximal RCA.     Recent medication changes 1/20/22-has finished synvisc injections   Medications taken regularly that may interact with warfarin or alter INR MVI (may contain vit K)  ASA (stent placement 10/30/17)  Occ APAP prn knee pain   Warfarin dose taken as prescribed Yes - uses pillbox  held warfarin 1/2-1/6, 8/7-8/12 and bridged with Lovenox    Signs/symptoms of bleeding 12/31/20: subconjunctival hemorrhage, seen by ophthalmologst  H/o hematuria- patient states he was told by his urologist that as long as he is taking warfarin, hematuria may continue. H/o epistaxis, occasional hemorrhoids, anemia. Vitamin K intake Normally has broccoli, asparagus, kale/gay salads 3-4 per week    9/17: 7 servings in past week. 12/10: \"a lot of greens\"- broccoli 3 days in a row+salads  1/14: 5 servings in past week including rory greens  3/3: 2 servings of green day before. 5/5: 1 serving in past week  8/6: brussels, asparagus, 2c cooked greens, broccoli  9/3, 10/27: only 1 serving   5/6/21: 2 servings brocc, 1 salad in past week, no greens  9/8/21: only one serving of vit k foods this week   10/14/21: brussel sprouts at least 3x and broccoli (more than baseline)    11/4-current: baseline vit k ~3 per week   Recent vomiting/diarrhea/fever, changes in weight or activity level Trying to improve diet/exercise and lose weight gradually. Due to covid has not been as active lately   Tobacco or alcohol use Patient denies tobacco use  Will have 1 glass of wine per day max   Upcoming surgeries or procedures None     Assessment/Plan:   Patient's INR was therapeutic today (2.8) and he has been stable for several months now. Patient is now compliant with 3-4 servings of high vitamin K foods per week. He denies incorrect warfarin doses, changes to health, significant med changes, or unusual/serious bleeding. Patient did have a recent fall where he hit his shoulder. He denies hitting his head and states that the shoulder is improving. Patient was instructed to continue warfarin dose of 5mg on MWF and 2.5mg all other days for now. Repeat INR in 4 weeks.  Patient was reminded to call with any bleeding, medication changes, or fever/vomiting/diarrhea. Patient understands dosing directions and information discussed. Dosing schedule and follow up appointment given to patient. Progress note routed to referring physician's office. Patient acknowledges working in consult agreement with pharmacist as referred by his/her physician. Next Clinic Appointment: 2/17    Please call [facility] at [phone] with any questions. Please call The [facility] [address] at [phone] with any questions. Thanks!   Alina Farooq, PharmD  PGY1 Pharmacy Resident  Medication Management Clinic  2/17/2022 9:38 AM     For Pharmacy Admin Tracking Only   Total # of Interventions Recommended: 0   Total # of Interventions Accepted: 0   Time Spent (min): 15

## 2022-02-22 ENCOUNTER — OFFICE VISIT (OUTPATIENT)
Dept: CARDIOLOGY CLINIC | Age: 82
End: 2022-02-22
Payer: MEDICARE

## 2022-02-22 VITALS
HEART RATE: 68 BPM | BODY MASS INDEX: 32.23 KG/M2 | SYSTOLIC BLOOD PRESSURE: 132 MMHG | WEIGHT: 212 LBS | DIASTOLIC BLOOD PRESSURE: 80 MMHG

## 2022-02-22 DIAGNOSIS — I48.20 CHRONIC ATRIAL FIBRILLATION (HCC): Primary | ICD-10-CM

## 2022-02-22 DIAGNOSIS — Z98.61 HISTORY OF PTCA: ICD-10-CM

## 2022-02-22 DIAGNOSIS — I10 ESSENTIAL HYPERTENSION: ICD-10-CM

## 2022-02-22 DIAGNOSIS — I25.10 CAD IN NATIVE ARTERY: ICD-10-CM

## 2022-02-22 DIAGNOSIS — I25.2 MI, OLD: ICD-10-CM

## 2022-02-22 PROCEDURE — G8484 FLU IMMUNIZE NO ADMIN: HCPCS | Performed by: INTERNAL MEDICINE

## 2022-02-22 PROCEDURE — 4040F PNEUMOC VAC/ADMIN/RCVD: CPT | Performed by: INTERNAL MEDICINE

## 2022-02-22 PROCEDURE — 1036F TOBACCO NON-USER: CPT | Performed by: INTERNAL MEDICINE

## 2022-02-22 PROCEDURE — 1123F ACP DISCUSS/DSCN MKR DOCD: CPT | Performed by: INTERNAL MEDICINE

## 2022-02-22 PROCEDURE — G8427 DOCREV CUR MEDS BY ELIG CLIN: HCPCS | Performed by: INTERNAL MEDICINE

## 2022-02-22 PROCEDURE — G8417 CALC BMI ABV UP PARAM F/U: HCPCS | Performed by: INTERNAL MEDICINE

## 2022-02-22 PROCEDURE — 99214 OFFICE O/P EST MOD 30 MIN: CPT | Performed by: INTERNAL MEDICINE

## 2022-02-22 NOTE — PROGRESS NOTES
Subjective:      Patient ID: Melida Inman is a 79 y.o. male. HPI:  Here for follow up afib/HTN/CAD/PTCA/Non st.  Still exercising. Edema stable. Wt down. No sob. No orthopnea/PND. No complaints. No palp/tachycardia. No syncope. No exertional chest pain/sob. BP good at home.      Past Medical History:   Diagnosis Date    Allergic rhinitis     Anemia     [facility]rial [facility]     Atrial fibrillation (HCC)     Benign non-nodular prostatic hyperplasia with lower urinary tract symptoms 2/9/2017    Benign paroxysmal positional vertigo     BPH (benign prostatic hyperplasia)     BPH (benign prostatic hypertrophy)     Cataract 10/3/2014    Cholelithiasis     Colon cancer ([facility] [address] 4/9/2012    Diverticulosis     Dyslipidemia 7/21/2010    Elevated PSA     Erectile dysfunction     Essential hypertension 11/24/2015    Gastroesophageal reflux disease without esophagitis 11/9/2016    GERD (gastroesophageal reflux disease)     Glaucoma 4/5/2013    Hiatal hernia     Hypertension     Lumbar radiculopathy     Osteoarthritis     Peripheral neuropathy 12/7/2012    Proteinuria 7/22/2010    S/P laparoscopic-assisted sigmoidectomy 4/17/2012    Urinary frequency      Past Surgical History:   Procedure Laterality Date    CATARACT REMOVAL WITH IMPLANT Right October 7, 2014    Dr. Ashok Nguyen Left October 24, 2014    Dr. Mariza Garcia  April 4, 2012    Dr. Ross Rand  May 15, 2013    Dr. Ross Rand  08/09/2016    Dr. Andrew Jaeger COLONOSCOPY N/A 02/17/2017    COLONOSCOPY N/A 8/13/2020    COLONOSCOPY performed by Karri Willis MD at [address] Right October 7, 2014    Dr. Jadyn Rincon Left October 24, 2014    Dr. Yanet Green OTHER SURGICAL HISTORY  2013    port-a-cath removal     PROSTATE BIOPSY  1997    PROSTATE BIOPSY  May 10, 2010    Dr. Alfred Mazariegos    SIGMOIDECTOMY  2012    Dr. Madelin Gomez sigmoid colectomy           TUNNELED VENOUS PORT PLACEMENT      TURP  2004    Dr. Salina Dupree         Allergies   Allergen Reactions    Naproxen Other (See Comments)     Patient gets nose bleeds. Patient should not take. Patient lost a lot of blood in November due to Naproxen.     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    Occupation: retired -      Comment: as of 2013   Tobacco Use    Smoking status: Former Smoker     Packs/day: 0.10     Years: 7.00     Pack years: 0.70     Types: Cigars     Start date:      Quit date:      Years since quittin.1    Smokeless tobacco: Never Used    Tobacco comment: quit in the 1960s -- former cigar smoker   Substance and Sexual Activity    Alcohol use: Yes     Comment: social    Drug use: No    Sexual activity: Yes     Partners: Female     Comment:  - Alice Alexander - [id number]   Other Topics Concern    Not on file   Social History Narrative    Past Surgical History     TURP: 2004    prostate biopsy - 1997                                                    Last updated by Aaron Marroquin MD on 10/29/2008                      Social History     Marital Status:  - 1967     Spouse: Alice Catherine Children: 3      Children's Names: Jaren Pierce    Employment Status: retired -     Employer: [employer]    Occupation:  and Communications    Alcohol Use: socially    Drug Use: none    Tobacco Usage: prior smoker    Cigars/Pipes - Years Smoked: 65's & 62's                                                 Last updated by Debbie Barber MD on 2009                      Family History     mother -  - age 73-73 - coronary artery disease, hypertension, sudden death    father -  - age 80 - ? colon cancer        brother - Aracely Florentino -  - age 68 - 2009 - pancreatic cancer, hypertension, CABG, kidney problem    brother - Kitty Cooley - small intestinal cancer, hypertension (Moraima Chavez)    brother - Osei Toth - hypertension    brother - Nina Dean -  - age 77 - 2008 - hypertension, colon cancer    brother -  - age 15 -  in a fire, smoke inhalation injury        sister - Malgorzata Manley - hypertension        son - Kitty Cooley - multiple sclerosis (age 37)    son - Raf Bella -        daughter - Aminah Appl - asthma                                  Last updated by Debbie Barber MD on 2010      Social Determinants of Health     Financial Resource Strain:     Difficulty of Paying Living Expenses: Not on file   Food Insecurity:     Worried About [address] in the Last Year: Not on file    [address] in the Last Year: Not on file   Transportation Needs:     Lack of Transportation (Medical): Not on file    Lack of Transportation (Non-Medical):  Not on file   Physical Activity:     Days of Exercise per Week: Not on file    Minutes of Exercise per Session: Not on file   Stress:     Feeling of Stress : Not on file   Social Connections:     Frequency of Communication with Friends and Family: Not on file    Frequency of Social Gatherings with Friends and Family: Not on file    Attends Cheondo[employer] Services: Not on file   [facility] Active Member of Clubs or Organizations: Not on file    Attends Club or Organization Meetings: Not on file    Marital Status: Not on file   Intimate Partner Violence:     Fear of Current or Ex-Partner: Not on file    Emotionally Abused: Not on file    Physically Abused: Not on file    Sexually Abused: Not on file   Housing Stability:     Unable to Pay for Housing in the Last Year: Not on file    Number of [place] in the Last Year: Not on file    Unstable Housing in the Last Year: Not on file        Patient has a family history includes Arthritis in his brother; Asthma in his daughter; Cancer in his brother, brother, brother, and father; Colon Cancer in his brother; Coronary Art Dis in his brother and mother; Heart Disease in his brother and mother; Heart Surgery in his brother; High Blood Pressure in his brother, brother, brother, brother, mother, and sister; Hypertension in his brother, brother, brother, brother, mother, sister, and son; Kidney Disease in his brother; Mult Sclerosis in his son. Current Outpatient Medications   Medication Sig Dispense Refill    nitroGLYCERIN (NITROSTAT) 0.4 MG SL tablet up to max of 3 total doses.  If no relief after 1 dose, call 911. 25 tablet 3    furosemide (LASIX) 20 MG tablet TAKE ONE TABLET BY MOUTH DAILY TAKE BEFORE DINNER 90 tablet 3    warfarin (COUMADIN) 5 MG tablet Take 1 tablet by mouth daily 90 tablet 3    Handicap Placard MISC by Does not apply route Unable to walk more than 250 feet before having to stop and rest.  DX: AFIB  Not to exceed 5 years 1 each 0    Handicap Placard MISC by Does not apply route 1 each 0    Handicap Placard MISC by Does not apply route DX: I48.20 afib  Duration: 5 years 1 each 0    ezetimibe (ZETIA) 10 MG tablet Take 10 mg by mouth daily      metoprolol succinate (TOPROL XL) 200 MG extended release tablet TAKE ONE TABLET BY MOUTH DAILY 30 tablet 0    irbesartan (AVAPRO) 75 MG tablet TAKE ONE TABLET BY MOUTH DAILY 90 tablet 2    atorvastatin (LIPITOR) 40 MG tablet Take 1 tablet by mouth nightly 90 tablet 3    pantoprazole (PROTONIX) 20 MG tablet Take 1 tablet by mouth daily (Patient taking differently: Take 20 mg by mouth every other day ) 90 tablet 3    triamterene-hydrochlorothiazide (MAXZIDE-25) 37.5-25 MG per tablet Take 1 tablet by mouth daily 90 tablet 3    Cyanocobalamin (VITAMIN B-12) 500 MCG LOZG Take 500 mcg by mouth daily      finasteride (PROSCAR) 5 MG tablet Take 5 mg by mouth daily      aspirin 81 MG tablet Take 81 mg by mouth daily (Patient not taking: Reported on 11/11/2021)      Cholecalciferol (VITAMIN D) 2000 UNITS CAPS capsule Take 1 capsule by mouth 2 times daily      tamsulosin (FLOMAX) 0.4 MG capsule Take 0.4 mg by mouth daily      Multiple Vitamin (MULTIVITAMIN) capsule Take 1 capsule by mouth daily. No current facility-administered medications for this visit. Vitals:    02/22/22 0950   BP: 132/80   Pulse: 68         Weight:   212      Review of Systems   Constitutional: Negative for activity change and fatigue. Respiratory: Negative for apnea, cough, choking, chest tightness and shortness of breath. Cardiovascular: Negative for chest pain, palpitations and leg swelling. No PND or orthopnea. No tachycardia. Gastrointestinal: Negative for abdominal distention. Musculoskeletal: Negative for myalgias. Neurological: Negative for dizziness, syncope and light-headedness. Psychiatric/Behavioral: Negative for behavioral problems, confusion and agitation. All other systems reviewed negative as done    Objective:   Physical Exam   Constitutional: He is oriented to person, place, and time. He appears well-developed and well-nourished. No distress. HENT:   Head: Normocephalic and atraumatic. Eyes: Conjunctivae and EOM are normal. Right eye exhibits no discharge. Left eye exhibits no discharge. Neck: Normal range of motion. Neck supple. No JVD present. Cardiovascular: Normal rate, S1 normal, S2 normal and normal heart sounds. An irregularly irregular rhythm present. Exam reveals no gallop. No murmur heard. Pulses:       Radial pulses are 2+ on the right side, and 2+ on the left side. Pulmonary/Chest: Effort normal and breath sounds normal. No respiratory distress. He has no wheezes. He has no rales. Abdominal: Soft. Bowel sounds are normal. No tenderness. Musculoskeletal: Normal range of motion. He exhibits no  tenderness. Tr edema  Neurological: He is alert and oriented to person, place, and time. Skin: Skin is warm and dry. Psychiatric: He has a normal mood and affect. His behavior is normal. Thought content normal.       Assessment:       Diagnosis Orders   1. Chronic atrial fibrillation ([address]     2. CAD in native artery     3. History of PTCA     4. MI, old     11. Essential hypertension             Plan:      CV stable. Exercising regularly. Edema stable. HR controlled. No angina  Wt stable. BP good. On AC/ASA. No bleeding issues. Watch salt. Walking. Will continue Toprol 200 mg qd/avapro 75 mg qd/ Maxzide for BP/CAD. Will continue coumadin for afib/CVA proph. Routine INR. Encouraged diet, and wt loss. No changes. Reviewed previous records and testing including cath 10/17 and echo 12/18. Follow up 3 months.

## 2022-03-17 ENCOUNTER — ANTI-COAG VISIT (OUTPATIENT)
Dept: PHARMACY | Age: 82
End: 2022-03-17
Payer: MEDICARE

## 2022-03-17 DIAGNOSIS — I48.20 CHRONIC ATRIAL FIBRILLATION (HCC): Primary | ICD-10-CM

## 2022-03-17 LAB — INTERNATIONAL NORMALIZATION RATIO, POC: 2

## 2022-03-17 PROCEDURE — 85610 PROTHROMBIN TIME: CPT

## 2022-03-17 PROCEDURE — 99211 OFF/OP EST MAY X REQ PHY/QHP: CPT

## 2022-03-17 NOTE — PROGRESS NOTES
ANTICOAGULATION SERVICE    Val Campbell" is a 79 y.o. male with PMHx significant for chronic AF ([id number] of 4), HTN who presents to clinic 3/17/2022 for anticoagulation monitoring and adjustment.     Anticoagulation Indication(s):  Afib    Referring Physician:  Dr. Hans Francis    Goal INR Range:  2-3  Duration of Anticoagulation Therapy:  Indefinite  Time of day dose taken:  PM  Product patient has at home:  warfarin 5 mg (PEACH color)    INR Summary                           Warfarin regimen (mg)  Date INR   A/P   Sun Mon Tue Wed Thu Fri Sat Mg/wk  3/17 2.0 At goal, no change 2.5 5 2.5 5 2.5 5 2.5 25  2/17 2.8 At goal, no change 2.5 5 2.5 5 2.5 5 2.5 25  1/20 2.7 At goal, no change 2.5 5 2.5 5 2.5 5 2.5 25  12/30 3.2 At goal, no change 2.5 5 2.5 5 2.5 5 2.5 25  12/2 2.4 At goal, no change 2.5 5 2.5 5 2.5 5 2.5 25  11/4 2.2 At goal, no change 2.5 5 2.5 5 2.5 5 2.5 25  10/14 1.9 Below goal, continue  2.5 5 2.5 5 2.5 5 2.5 25  9/24 2.4 At goal, no change 2.5 5 2.5 5 2.5 5 2.5 25  9/8 3.8 Above goal, hold+dec 2.5 5 2.5 0/5 2.5 5 2.5 25  8/25 4.0 Above goal, hold+dec 5 5 2.5 0/5 2.5 5 2.5 27.5  7/28 3.0 At goal, continue 5 5 2.5 5 5 5 2.5 30  6/30 2.8 At goal, continue 5 5 2.5 5 5 5 2.5 30  5/26 2.5 At goal, continue 5 5 2.5 5 5 5 2.5 30  5/6 3.2 Above goal, continue 5 5 2.5 5 5 5 2.5 30  4/8 2.6 At goal, no change 5 5 2.5 5 5 5 2.5 30  3/25 3.5 Above goal, continue 5 5 2.5 5 2.5/5 5 2.5 30  2/25 1.8 Below goal, continue 5 5 2.5 5 5 5 2.5 30  1/28 3.0 At goal, no change 5 5 2.5 5 5 5 2.5 30  12/31 2.9 At goal, no change 5 5 2.5 5 5 5 2.5 30  12/3 2.9 At goal, no change 5 5 2.5 5 5 5 2.5 30  11/17 3.7 Above goal, holdx1 5 5 2.5 5 5 5 2.5 30  10/27 3.0 At goal, no change 5 5 5 5 5 5 2.5 32.5  10/8 1.8 Below goal, increase 5 5 5 5 5 5 2.5 32.5  9/17 2.1 At goal, no change 5 5 2.5 5 5 5 2.5 30  9/3 2.7 At goal, no change 5 5 2.5 5 5 5 2.5 30  8/27 1.5 Below goal, incr 5 5 2.5 5        7.5/5 5 2.5 30  8/20 1.7 Below goal, bolus 5 5 2.5 5         5/2.5 5 2.5 27.5  8/18 1.2 Below goal, bolus+ Lovenox          7.5/2.5 5 INR  8/6 1.5 Below goal, see below  7/14 2.6 At goal, no change 5 5 2.5 5 2.5 5 2.5 27.5  6/16 2.7 At goal, no change 5 5 2.5 5 2.5 5 2.5 27.5      Lab Results   Component Value Date    RBC 3.64 (L) 01/07/2020    HGB 13.1 (L) 01/07/2020    HCT 39.7 (L) 01/07/2020    .1 (H) 01/07/2020    MCH 36.1 (H) 01/07/2020    MPV 8.1 01/07/2020    RDW 12.9 01/07/2020     01/07/2020     [id number] Score for Atrial Fibrillation Stroke Risk   Risk   Factors  Component Value   C CHF No 0   H HTN Yes 1   A2 Age >= 76 Yes,  (79 y.o.) 2   D DM No 0   S2 Prior Stroke/TIA No 0   V Vascular Disease Yes 1   A Age 74-69 No,  (79 y.o.) 0   Sc Sex male 0    [id number]  Score  4   Score last updated 40/41/73 90:34 AM    Click here for a link to the UpToDate guideline \"Atrial Fibrillation: Anticoagulation therapy to prevent embolization    Disclaimer: Risk Score calculation is dependent on accuracy of patient problem list and past encounter diagnosis. 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 EGD 4/25 significant for: 2 small antral erosions, moderate hiatal hernia, slightly irregular Z-line, small distal duodenal polyp. Patient d/c off of plavix and warfarin in preparation for outpt EGD with biopsies and colonoscopy. EGD/Colonoscopy 5/2; no bleeding source, resumed plavix and warfarin on 5/3.  -10/29-11/1/17: [facility] for STEMI, s/p [facility] 10/30 with primary stenting to proximal RCA.     Recent medication changes None reported   Medications taken regularly that may interact with warfarin or alter INR MVI (may contain vit K)  ASA (stent placement 10/30/17)  Occ APAP prn knee pain   Warfarin dose taken as prescribed Yes - uses pillbox  held warfarin 1/2-1/6, 8/7-8/12 and bridged with Lovenox    Signs/symptoms of bleeding 12/31/20: subconjunctival hemorrhage, seen by ophthalmologst  H/o hematuria- patient states he was told by his urologist that as long as he is taking warfarin, hematuria may continue. H/o epistaxis, occasional hemorrhoids, anemia. Vitamin K intake Normally has broccoli, asparagus, kale/gay salads 3-4 per week    9/17: 7 servings in past week. 12/10: \"a lot of greens\"- broccoli 3 days in a row+salads  1/14: 5 servings in past week including rory greens  3/3: 2 servings of green day before. 5/5: 1 serving in past week  8/6: brussels, asparagus, 2c cooked greens, broccoli  9/3, 10/27: only 1 serving   5/6/21: 2 servings brocc, 1 salad in past week, no greens  9/8/21: only one serving of vit k foods this week   10/14/21: brussel sprouts at least 3x and broccoli (more than baseline)    11/4-current: baseline vit k ~3 per week   Recent vomiting/diarrhea/fever, changes in weight or activity level Trying to improve diet/exercise and lose weight gradually. Due to covid has not been as active lately   Tobacco or alcohol use Patient denies tobacco use  Will have 1 glass of wine per day max   Upcoming surgeries or procedures None     Assessment/Plan:   Patient's INR was therapeutic today (2.0) and he has been stable for several months now. Patient is now compliant with 3-4 servings of high vitamin K foods per week. He denies incorrect warfarin doses, changes to health, significant med changes, or unusual/serious bleeding. Patient was instructed to continue warfarin dose of 5mg on MWF and 2.5mg all other days for now. Repeat INR in 4 weeks. Patient was reminded to call with any bleeding, medication changes, or fever/vomiting/diarrhea. Patient understands dosing directions and information discussed.  Dosing schedule and follow up appointment given to patient. Progress note routed to referring physician's office. Patient acknowledges working in consult agreement with pharmacist as referred by his/her physician. Next Clinic Appointment: 4/14    Please call [facility] at [phone] with any questions. Please call The [address] [place] [address] Avenue at [phone] with any questions. Thanks!   Gregg Weber, PharmD, [facility]  Medication Management Clinic   Marilynn Thakur [address] Ph: [phone]  Lashonda Ly Ph: [phone]  3/17/2022 10:09 AM      For Pharmacy Admin Tracking Only   Total # of Interventions Recommended: 0   Total # of Interventions Accepted: 0   Time Spent (min): 15

## 2022-04-14 ENCOUNTER — ANTI-COAG VISIT (OUTPATIENT)
Dept: PHARMACY | Age: 82
End: 2022-04-14
Payer: MEDICARE

## 2022-04-14 DIAGNOSIS — I48.20 CHRONIC ATRIAL FIBRILLATION (HCC): Primary | ICD-10-CM

## 2022-04-14 LAB — INTERNATIONAL NORMALIZATION RATIO, POC: 1.4

## 2022-04-14 PROCEDURE — 99212 OFFICE O/P EST SF 10 MIN: CPT

## 2022-04-14 PROCEDURE — 85610 PROTHROMBIN TIME: CPT

## 2022-04-14 NOTE — PROGRESS NOTES
goal, incr 5 5 2.5 5        7.5/5 5 2.5 30  8/20 1.7 Below goal, bolus 5 5 2.5 5         5/2.5 5 2.5 27.5  8/18 1.2 Below goal, bolus+ Lovenox          7.5/2.5 5 INR  8/6 1.5 Below goal, see below  7/14 2.6 At goal, no change 5 5 2.5 5 2.5 5 2.5 27.5  6/16 2.7 At goal, no change 5 5 2.5 5 2.5 5 2.5 27.5      Lab Results   Component Value Date    RBC 3.64 (L) 01/07/2020    HGB 13.1 (L) 01/07/2020    HCT 39.7 (L) 01/07/2020    .1 (H) 01/07/2020    MCH 36.1 (H) 01/07/2020    MPV 8.1 01/07/2020    RDW 12.9 01/07/2020     01/07/2020     [id number] Score for Atrial Fibrillation Stroke Risk   Risk   Factors  Component Value   C CHF No 0   H HTN Yes 1   A2 Age >= 76 Yes,  (79 y.o.) 2   D DM No 0   S2 Prior Stroke/TIA No 0   V Vascular Disease Yes 1   A Age 74-69 No,  (79 y.o.) 0   Sc Sex male 0    [id number]  Score  4   Score last updated 81/88/89 00:20 AM    Click here for a link to the UpToDate guideline \"Atrial Fibrillation: Anticoagulation therapy to prevent embolization    Disclaimer: Risk Score calculation is dependent on accuracy of patient problem list and past encounter diagnosis. 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 EGD 4/25 significant for: 2 small antral erosions, moderate hiatal hernia, slightly irregular Z-line, small distal duodenal polyp. Patient d/c off of plavix and warfarin in preparation for outpt EGD with biopsies and colonoscopy. EGD/Colonoscopy 5/2; no bleeding source, resumed plavix and warfarin on 5/3.  -10/29-11/1/17: [facility] for STEMI, s/p [facility] 10/30 with primary stenting to proximal RCA.     Recent medication changes Started supplement with turmeric ~1 month ago but plans to stop it because it's ineffective. Medications taken regularly that may interact with warfarin or alter INR MVI (may contain vit K)  ASA (stent placement 10/30/17)- d/c  Occ APAP prn knee pain   Warfarin dose taken as prescribed Missed dose on 4/12/22   - uses pillbox  held warfarin 1/2-1/6, 8/7-8/12 and bridged with Lovenox    Signs/symptoms of bleeding 12/31/20: subconjunctival hemorrhage, seen by ophthalmologst  H/o hematuria- patient states he was told by his urologist that as long as he is taking warfarin, hematuria may continue. H/o epistaxis, occasional hemorrhoids, anemia. Vitamin K intake Normally has broccoli, asparagus, kale/gay salads 3-4 per week    9/17: 7 servings in past week. 12/10: \"a lot of greens\"- broccoli 3 days in a row+salads  1/14: 5 servings in past week including rory greens  3/3: 2 servings of green day before. 5/5: 1 serving in past week  8/6: brussels, asparagus, 2c cooked greens, broccoli  9/3, 10/27: only 1 serving   5/6/21: 2 servings brocc, 1 salad in past week, no greens  9/8/21: only one serving of vit k foods this week   10/14/21: brussel sprouts at least 3x and broccoli (more than baseline)    11/4-current: baseline vit k ~3 per week  4/14/22: salad/kale almost every day past week, plans to continue this   Recent vomiting/diarrhea/fever, changes in weight or activity level Trying to improve diet/exercise and lose weight gradually. Due to covid has not been as active lately   Tobacco or alcohol use Patient denies tobacco use  Will have 1 glass of wine per day max   Upcoming surgeries or procedures None     Assessment/Plan:   Patient's INR was subtherapeutic today (1.4) due to missed dose and increased vit k intake. Patient was counseled on increased risk of bleeding with turmeric supplement and plans to stop this supplement. He plans to continue eating salads most days of the week through summer.      Patient was instructed to take 5 mg warfarin tonight only, then increase dose to 5mg on SuMWF and 2.5mg all other days for now. Repeat INR in 2 weeks. Patient was reminded to call with any bleeding, medication changes, or fever/vomiting/diarrhea. Patient understands dosing directions and information discussed. Dosing schedule and follow up appointment given to patient. Progress note routed to referring physician's office. Patient acknowledges working in consult agreement with pharmacist as referred by his/her physician. Next Clinic Appointment: 4/28    Please call [place] [address] Anticoagulation Clinic at [phone] with any questions. Please call The [address] [place] [address] Avenue at [phone] with any questions. Thanks!   Issa Olivia, PharmD, [facility]  Medication Management Clinic   Marilynn Thakur [address] Ph: [phone]  Caitie Null Ph: [phone]  4/14/2022 9:47 AM    For Pharmacy Admin Tracking Only    Intervention Detail: Dose Adjustment: 1, reason: Therapy Optimization  Total # of Interventions Recommended: 1  Total # of Interventions Accepted: 1  Time Spent (min): 15       

## 2022-04-28 ENCOUNTER — ANTI-COAG VISIT (OUTPATIENT)
Dept: PHARMACY | Age: 82
End: 2022-04-28
Payer: MEDICARE

## 2022-04-28 DIAGNOSIS — I48.20 CHRONIC ATRIAL FIBRILLATION (HCC): Primary | ICD-10-CM

## 2022-04-28 LAB — INTERNATIONAL NORMALIZATION RATIO, POC: 4.5

## 2022-04-28 PROCEDURE — 99212 OFFICE O/P EST SF 10 MIN: CPT

## 2022-04-28 PROCEDURE — 85610 PROTHROMBIN TIME: CPT

## 2022-04-28 NOTE — PROGRESS NOTES
ANTICOAGULATION SERVICE    [facility] is a 79 y.o. male with PMHx significant for chronic AF ([id number]-YYEn of 4), HTN who presents to clinic 4/28/2022 for anticoagulation monitoring and adjustment. Anticoagulation Indication(s):  Afib    Referring Physician:  Dr. Shaneka Roque    Goal INR Range:  2-3  Duration of Anticoagulation Therapy:  Indefinite  Time of day dose taken:  PM  Product patient has at home:  warfarin 5 mg (PEACH color)    [id number] Score for Atrial Fibrillation Stroke Risk   Risk   Factors  Component Value   C CHF No 0   H HTN Yes 1   A2 Age >= 76 Yes,  (79 y.o.) 2   D DM No 0   S2 Prior Stroke/TIA No 0   V Vascular Disease Yes 1   A Age 74-69 No,  (79 y.o.) 0   Sc Sex male 0    [id number]  Score  4   Score last updated 4/28/22 1:79 PM EDT    Click here for a link to the UpToDate guideline \"Atrial Fibrillation: Anticoagulation therapy to prevent embolization    Disclaimer: Risk Score calculation is dependent on accuracy of patient problem list and past encounter diagnosis.       INR Summary                           Warfarin regimen (mg)  Date INR   A/P   Sun Mon Tue Wed Thu Fri Sat Mg/wk  4/28 4.5 Above goal,holdx+dec 2.5 5 2.5 5 0/2.5 5 2.5 25  4/14 1.4 Below goal, ([facility]) inc 5 5 2.5 5 5/2.5 5 2.5 27.5  3/17 2.0 At goal, no change 2.5 5 2.5 5 2.5 5 2.5 25  2/17 2.8 At goal, no change 2.5 5 2.5 5 2.5 5 2.5 25  1/20 2.7 At goal, no change 2.5 5 2.5 5 2.5 5 2.5 25  12/30 3.2 At goal, no change 2.5 5 2.5 5 2.5 5 2.5 25  12/2 2.4 At goal, no change 2.5 5 2.5 5 2.5 5 2.5 25  11/4 2.2 At goal, no change 2.5 5 2.5 5 2.5 5 2.5 25  10/14 1.9 Below goal, continue  2.5 5 2.5 5 2.5 5 2.5 25  9/24 2.4 At goal, no change 2.5 5 2.5 5 2.5 5 2.5 25  9/8 3.8 Above goal, hold+dec 2.5 5 2.5 0/5 2.5 5 2.5 25  8/25 4.0 Above goal, hold+dec 5 5 2.5 0/5 2.5 5 2.5 27.5  7/28 3.0 At goal, continue 5 5 2.5 5 5 5 2.5 30  6/30 2.8 At goal, continue 5 5 2.5 5 5 5 2.5 30  5/26 2.5 At goal, continue 5 5 2.5 5 5 5 2.5 30  5/6 3.2 Above goal, continue 5 5 2.5 5 5 5 2.5 30  4/8 2.6 At goal, no change 5 5 2.5 5 5 5 2.5 30  3/25 3.5 Above goal, continue 5 5 2.5 5 2.5/5 5 2.5 30  2/25 1.8 Below goal, continue 5 5 2.5 5 5 5 2.5 30  1/28 3.0 At goal, no change 5 5 2.5 5 5 5 2.5 30  12/31 2.9 At goal, no change 5 5 2.5 5 5 5 2.5 30  12/3 2.9 At goal, no change 5 5 2.5 5 5 5 2.5 30  11/17 3.7 Above goal, holdx1 5 5 2.5 5 5 5 2.5 30  10/27 3.0 At goal, no change 5 5 5 5 5 5 2.5 32.5  10/8 1.8 Below goal, increase 5 5 5 5 5 5 2.5 32.5  9/17 2.1 At goal, no change 5 5 2.5 5 5 5 2.5 30  9/3 2.7 At goal, no change 5 5 2.5 5 5 5 2.5 30  8/27 1.5 Below goal, incr 5 5 2.5 5        7.5/5 5 2.5 30  8/20 1.7 Below goal, bolus 5 5 2.5 5         5/2.5 5 2.5 27.5  8/18 1.2 Below goal, bolus+ Lovenox          7.5/2.5 5 INR  8/6 1.5 Below goal, see below  7/14 2.6 At goal, no change 5 5 2.5 5 2.5 5 2.5 27.5  6/16 2.7 At goal, no change 5 5 2.5 5 2.5 5 2.5 27.5    [facility]  3/2/22:   Hgb 12.6  Hct 40.1    Lab Results   Component Value Date    RBC 3.64 (L) 01/07/2020    HGB 13.1 (L) 01/07/2020    HCT 39.7 (L) 01/07/2020    .1 (H) 01/07/2020    MCH 36.1 (H) 01/07/2020    MPV 8.1 01/07/2020    RDW 12.9 01/07/2020     01/07/2020     Patient History:  Recent hospitalizations/HC visits 10/23: ED for wrist sprain/contusion 2/2 mechanical fall  8/13: colonoscopy, held warfarin x5d + Lovenox bridge   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 EGD 4/25 significant for: 2 small antral erosions, moderate hiatal hernia, slightly irregular Z-line, small distal duodenal polyp. Patient d/c off of plavix and warfarin in preparation for outpt EGD with biopsies and colonoscopy.  EGD/Colonoscopy 5/2; no bleeding source, resumed plavix and warfarin on 5/3.  -10/29-11/1/17: [facility] for STEMI, s/p [facility] 10/30 with primary stenting to proximal RCA. Recent medication changes Med list reviewed- pt plans to discuss d/c of aspirin and addition of zetia with cardiologist   Medications taken regularly that may interact with warfarin or alter INR MVI (may contain vit K)  ASA (stent placement 10/30/17)- d/c  Occ APAP prn knee pain   Warfarin dose taken as prescribed Missed dose on 4/12/22   - uses pillbox  held warfarin 1/2-1/6, 8/7-8/12 and bridged with Lovenox    Signs/symptoms of bleeding 4/28/22: tiny string of blood in urine a couple times. Pt plans to notify urologist, but has hx of this. 12/31/20: subconjunctival hemorrhage, seen by ophthalmologst  H/o hematuria- patient states he was told by his urologist that as long as he is taking warfarin, hematuria may continue. H/o epistaxis, occasional hemorrhoids, anemia. Vitamin K intake Normally has broccoli, asparagus, kale/gay salads 3-4 per week    9/17: 7 servings in past week. 12/10: \"a lot of greens\"- broccoli 3 days in a row+salads  1/14: 5 servings in past week including rory greens  3/3: 2 servings of green day before. 5/5: 1 serving in past week  8/6: brussels, asparagus, 2c cooked greens, broccoli  9/3, 10/27: only 1 serving   5/6/21: 2 servings brocc, 1 salad in past week, no greens  9/8/21: only one serving of vit k foods this week   10/14/21: brussel sprouts at least 3x and broccoli (more than baseline)    11/4-current: baseline vit k ~3 per week  4/14/22: salad/kale almost every day past week, plans to continue this  4/28/22: less kale, more gay. Does not plan to continue as much kale   Recent vomiting/diarrhea/fever, changes in weight or activity level Trying to improve diet/exercise and lose weight gradually.   Due to covid has not been as active lately   Tobacco or alcohol use Patient denies tobacco use  Will have 1 glass of wine per day max   Upcoming surgeries or procedures None     Assessment/Plan:   Patient's INR was supratherapeutic today (4.5). Last INR low because of missed dose and increased vit k intake. He did not continue eating kale as anticipated. He plans to continue eating gay salads most days of the week through summer. Patient stopped turmeric supplement as recommended. Patient was instructed to hold warfarin today, then decrease dose to 5mg on MWF and 2.5mg all other days for now. Repeat INR in 2 weeks. Patient was reminded to call with any bleeding, medication changes, or fever/vomiting/diarrhea. Patient understands dosing directions and information discussed. Dosing schedule and follow up appointment given to patient. Progress note routed to referring physician's office. Patient acknowledges working in consult agreement with pharmacist as referred by his/her physician. Next Clinic Appointment: 5/12    Please call [facility] Clinic at [phone] with any questions. Please call The [address] [place] [address] Avenue at [phone] with any questions. Thanks!   Henny Jenkins, PharmD, [facility]  Medication Management Clinic   [facility] Ph: [phone]  [facility] Ph: [phone]  4/28/2022 8:39 AM    For Pharmacy Admin Tracking Only    Intervention Detail: Dose Adjustment: 1, reason: Therapy De-escalation  Total # of Interventions Recommended: 1  Total # of Interventions Accepted: 1  Time Spent (min): 15

## 2022-05-10 ENCOUNTER — ANTI-COAG VISIT (OUTPATIENT)
Dept: PHARMACY | Age: 82
End: 2022-05-10
Payer: MEDICARE

## 2022-05-10 DIAGNOSIS — I48.20 CHRONIC ATRIAL FIBRILLATION (HCC): Primary | ICD-10-CM

## 2022-05-10 LAB — INTERNATIONAL NORMALIZATION RATIO, POC: 1.8

## 2022-05-10 PROCEDURE — 99211 OFF/OP EST MAY X REQ PHY/QHP: CPT

## 2022-05-10 PROCEDURE — 85610 PROTHROMBIN TIME: CPT

## 2022-05-10 NOTE — PROGRESS NOTES
continue 5 5 2.5 5 5 5 2.5 30  5/26 2.5 At goal, continue 5 5 2.5 5 5 5 2.5 30  5/6 3.2 Above goal, continue 5 5 2.5 5 5 5 2.5 30  4/8 2.6 At goal, no change 5 5 2.5 5 5 5 2.5 30  3/25 3.5 Above goal, continue 5 5 2.5 5 2.5/5 5 2.5 30  2/25 1.8 Below goal, continue 5 5 2.5 5 5 5 2.5 30  1/28 3.0 At goal, no change 5 5 2.5 5 5 5 2.5 30  12/31 2.9 At goal, no change 5 5 2.5 5 5 5 2.5 30  12/3 2.9 At goal, no change 5 5 2.5 5 5 5 2.5 30  11/17 3.7 Above goal, holdx1 5 5 2.5 5 5 5 2.5 30  10/27 3.0 At goal, no change 5 5 5 5 5 5 2.5 32.5  10/8 1.8 Below goal, increase 5 5 5 5 5 5 2.5 32.5  9/17 2.1 At goal, no change 5 5 2.5 5 5 5 2.5 30  9/3 2.7 At goal, no change 5 5 2.5 5 5 5 2.5 30  8/27 1.5 Below goal, incr 5 5 2.5 5        7.5/5 5 2.5 30  8/20 1.7 Below goal, bolus 5 5 2.5 5         5/2.5 5 2.5 27.5  8/18 1.2 Below goal, bolus+ Lovenox          7.5/2.5 5 INR  8/6 1.5 Below goal, see below  7/14 2.6 At goal, no change 5 5 2.5 5 2.5 5 2.5 27.5  6/16 2.7 At goal, no change 5 5 2.5 5 2.5 5 2.5 27.5    [facility]  3/2/22:   Hgb 12.6  Hct 40.1    Lab Results   Component Value Date    RBC 3.64 (L) 01/07/2020    HGB 13.1 (L) 01/07/2020    HCT 39.7 (L) 01/07/2020    .1 (H) 01/07/2020    MCH 36.1 (H) 01/07/2020    MPV 8.1 01/07/2020    RDW 12.9 01/07/2020     01/07/2020     Patient History:  Recent hospitalizations/HC visits 10/23: ED for wrist sprain/contusion 2/2 mechanical fall  8/13: colonoscopy, held warfarin x5d + Lovenox bridge   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 EGD 4/25 significant for: 2 small antral erosions, moderate hiatal hernia, slightly irregular Z-line, small distal duodenal polyp. Patient d/c off of plavix and warfarin in preparation for outpt EGD with biopsies and colonoscopy.  EGD/Colonoscopy 5/2; no bleeding source, resumed plavix and warfarin on 5/3.  -10/29-11/1/17: [facility] for STEMI, s/p [facility] 10/30 with primary stenting to proximal RCA. Recent medication changes Med list reviewed- pt plans to discuss d/c of aspirin and addition of zetia with cardiologist   Medications taken regularly that may interact with warfarin or alter INR MVI (may contain vit K)  ASA (stent placement 10/30/17)- d/c per Dr Marie Ulloa, but will Dr Cleve Light to confirm. Occ APAP prn knee pain   Warfarin dose taken as prescribed Missed dose on 4/12/22   - uses pillbox  held warfarin 1/2-1/6, 8/7-8/12 and bridged with Lovenox    Signs/symptoms of bleeding None reported  H/o hematuria- patient states he was told by his urologist that as long as he is taking warfarin, hematuria may continue. H/o epistaxis, occasional hemorrhoids, anemia. Vitamin K intake Normally has broccoli, asparagus, kale/gay salads 3-4 per week    9/17: 7 servings in past week. 12/10: \"a lot of greens\"- broccoli 3 days in a row+salads  1/14: 5 servings in past week including rory greens  3/3: 2 servings of green day before. 5/5: 1 serving in past week  8/6: brussels, asparagus, 2c cooked greens, broccoli  9/3, 10/27: only 1 serving   5/6/21: 2 servings brocc, 1 salad in past week, no greens  9/8/21: only one serving of vit k foods this week   10/14/21: brussel sprouts at least 3x and broccoli (more than baseline)    11/4-current: baseline vit k ~3 per week  4/14/22: salad/kale almost every day past week, will continue  4/28/22: less kale, more gay. 5/10/22: 2 servings of 1.5c cooked greens + fresh salad in the past 7 days   Recent vomiting/diarrhea/fever, changes in weight or activity level Trying to improve diet/exercise and lose weight gradually. Due to covid has not been as active lately   Tobacco or alcohol use Patient denies tobacco use  Will have 1 glass of wine per day max   Upcoming surgeries or procedures None     Assessment/Plan:   Patient's INR was subtherapeutic today (1.8).  Recent INR fluctuations likely due to changes in vit k intake. He had 2 fairly large servings of cooked greens in the past week with a salad. He typically only has gay salads, which are not as high in vit k. Patient was instructed to continue warfarin dose of 5mg on MWF and 2.5mg all other days for now. Repeat INR in 2 weeks. Patient was reminded to call with any bleeding, medication changes, or fever/vomiting/diarrhea. Patient understands dosing directions and information discussed. Dosing schedule and follow up appointment given to patient. Progress note routed to referring physician's office. Patient acknowledges working in consult agreement with pharmacist as referred by his/her physician. Next Clinic Appointment: 5/26    Please call [facility]ticoagulation [facility] at [phone] with any questions. Please call The [address] [place] [address] Avenue at [phone] with any questions. Thanks!   Dalila Vazquez, PharmD, [facility]  Medication Management Clinic   Marilynn Thakur [facility] Ph: [phone]  Ulysses Jungling Ph: [phone]  5/10/2022 9:04 AM    For Pharmacy Admin Tracking Only    Total # of Interventions Recommended: 0  Total # of Interventions Accepted: 0  Time Spent (min): 15

## 2022-05-12 ENCOUNTER — OFFICE VISIT (OUTPATIENT)
Dept: CARDIOLOGY CLINIC | Age: 82
End: 2022-05-12
Payer: MEDICARE

## 2022-05-12 VITALS
HEART RATE: 60 BPM | SYSTOLIC BLOOD PRESSURE: 130 MMHG | DIASTOLIC BLOOD PRESSURE: 80 MMHG | BODY MASS INDEX: 32.39 KG/M2 | WEIGHT: 213 LBS

## 2022-05-12 DIAGNOSIS — I10 ESSENTIAL HYPERTENSION: ICD-10-CM

## 2022-05-12 DIAGNOSIS — I48.20 CHRONIC ATRIAL FIBRILLATION (HCC): Primary | ICD-10-CM

## 2022-05-12 DIAGNOSIS — Z98.61 HISTORY OF PTCA: ICD-10-CM

## 2022-05-12 DIAGNOSIS — I25.2 MI, OLD: ICD-10-CM

## 2022-05-12 DIAGNOSIS — I25.10 CAD IN NATIVE ARTERY: ICD-10-CM

## 2022-05-12 PROCEDURE — 4040F PNEUMOC VAC/ADMIN/RCVD: CPT | Performed by: INTERNAL MEDICINE

## 2022-05-12 PROCEDURE — 1123F ACP DISCUSS/DSCN MKR DOCD: CPT | Performed by: INTERNAL MEDICINE

## 2022-05-12 PROCEDURE — G8427 DOCREV CUR MEDS BY ELIG CLIN: HCPCS | Performed by: INTERNAL MEDICINE

## 2022-05-12 PROCEDURE — G8417 CALC BMI ABV UP PARAM F/U: HCPCS | Performed by: INTERNAL MEDICINE

## 2022-05-12 PROCEDURE — 1036F TOBACCO NON-USER: CPT | Performed by: INTERNAL MEDICINE

## 2022-05-12 PROCEDURE — 99214 OFFICE O/P EST MOD 30 MIN: CPT | Performed by: INTERNAL MEDICINE

## 2022-05-12 NOTE — PROGRESS NOTES
Subjective:      Patient ID: Sariah Mejia is a 79 y.o. male. HPI:  Here for follow up afib/HTN/CAD/PTCA/Non st.  Still exercising. Edema stable. Wt down. No sob. No orthopnea/PND. No complaints. No palp/tachycardia. No syncope. No exertional chest pain/sob. BP good at home.      Past Medical History:   Diagnosis Date    Allergic rhinitis     Anemia     Atrial fibrillation ([facility]     Atrial fibrillation (HCC)     Benign non-nodular prostatic hyperplasia with lower urinary tract symptoms 2/9/2017    Benign paroxysmal positional vertigo     BPH (benign prostatic hyperplasia)     BPH (benign prostatic hypertrophy)     Cataract 10/3/2014    Cholelithiasis     Colon cancer ([facility] [address] 4/9/2012    Diverticulosis     Dyslipidemia 7/21/2010    Elevated PSA     Erectile dysfunction     Essential hypertension 11/24/2015    Gastroesophageal reflux disease without esophagitis 11/9/2016    GERD (gastroesophageal reflux disease)     Glaucoma 4/5/2013    Hiatal hernia     Hypertension     Lumbar radiculopathy     Osteoarthritis     Peripheral neuropathy 12/7/2012    Proteinuria 7/22/2010    S/P laparoscopic-assisted sigmoidectomy 4/17/2012    Urinary frequency      Past Surgical History:   Procedure Laterality Date    CATARACT REMOVAL WITH IMPLANT Right October 7, 2014    Dr. Cj Fox Left October 24, 2014    Dr. Sammie Slater  April 4, 2012    Dr. Loreto De Jesus  May 15, 2013    Dr. Loreto De Jesus  08/09/2016    Dr. Lisette Oro COLONOSCOPY N/A 02/17/2017    COLONOSCOPY N/A 8/13/2020    COLONOSCOPY performed by Mark Lou MD at [address] Right October 7, 2014    Dr. Ildefonso Grissom Left October 24, 2014    Dr. Caryle Hauser OTHER SURGICAL HISTORY  2013    port-a-cath removal     PROSTATE BIOPSY  1997    PROSTATE BIOPSY  May 10, 2010    Dr. Etienne Tamez    SIGMOIDECTOMY  2012    Dr. Felipe Kimbrough sigmoid colectomy           TUNNELED VENOUS PORT PLACEMENT      TURP  2004    Dr. Jey Hernandez         Allergies   Allergen Reactions    Naproxen Other (See Comments)     Patient gets nose bleeds. Patient should not take. Patient lost a lot of blood in November due to Naproxen.     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    Occupation: retired -      Comment: as of 2013   Tobacco Use    Smoking status: Former Smoker     Packs/day: 0.10     Years: 7.00     Pack years: 0.70     Types: Cigars     Start date:      Quit date:      Years since quittin.4    Smokeless tobacco: Never Used    Tobacco comment: quit in the 1960s -- former cigar smoker   Substance and Sexual Activity    Alcohol use: Yes     Comment: social    Drug use: No    Sexual activity: Yes     Partners: Female     Comment:  - Loren Aguilar - [id number]   Other Topics Concern    Not on file   Social History Narrative    Past Surgical History     TURP: 2004    prostate biopsy - 1997                                                    Last updated by Nicole Garcai MD on 10/29/2008                      Social History     Marital Status:  - 1967     Spouse: Loren Aguilar Children: 3      Children's Names: Tamara Gutierrez    Employment Status: retired -     Employer: [employer]    Occupation:  and Communications    Alcohol Use: socially    Drug Use: none    Tobacco Usage: prior smoker    Cigars/Pipes - Years Smoked: 65's & 62's                                                 Last updated by Pooja De Los Santos MD on 2009                      Family History     mother -  - age 73-73 - coronary artery disease, hypertension, sudden death    father -  - age 80 - ? colon cancer        brother - Carolina Arnold -  - age 68 - 2009 - pancreatic cancer, hypertension, CABG, kidney problem    brother - Tiarra Rodriguez - small intestinal cancer, hypertension (Félix Schwarz)    brother - Wilber Cortez - hypertension    brother - Raffi Taylor -  - age 77 - 2008 - hypertension, colon cancer    brother -  - age 15 -  in a fire, smoke inhalation injury        sister - Hector Stahl - hypertension        son - Tiarra Rodriguez - multiple sclerosis (age 37)    son - Kathryn Harris -        daughter - Dmitry Vazquez - asthma                                  Last updated by Pooja De Los Santos MD on 2010      Social Determinants of Health     Financial Resource Strain:     Difficulty of Paying Living Expenses: Not on file   Food Insecurity:     Worried About [address] in the Last Year: Not on file    [address] in the Last Year: Not on file   Transportation Needs:     Lack of Transportation (Medical): Not on file    Lack of Transportation (Non-Medical):  Not on file   Physical Activity:     Days of Exercise per Week: Not on file    Minutes of Exercise per Session: Not on file   Stress:     Feeling of Stress : Not on file   Social Connections:     Frequency of Communication with Friends and Family: Not on file    Frequency of Social Gatherings with Friends and Family: Not on file    Attends [employer] Services: Not on file   [facility] Active Member of Clubs or Organizations: Not on file    Attends Club or Organization Meetings: Not on file    Marital Status: Not on file   Intimate Partner Violence:     Fear of Current or Ex-Partner: Not on file    Emotionally Abused: Not on file    Physically Abused: Not on file    Sexually Abused: Not on file   Housing Stability:     Unable to Pay for Housing in the Last Year: Not on file    Number of Jir[place]odianna in the Last Year: Not on file    Unstable Housing in the Last Year: Not on file        Patient has a family history includes Arthritis in his brother; Asthma in his daughter; Cancer in his brother, brother, brother, and father; Colon Cancer in his brother; Coronary Art Dis in his brother and mother; Heart Disease in his brother and mother; Heart Surgery in his brother; High Blood Pressure in his brother, brother, brother, brother, mother, and sister; Hypertension in his brother, brother, brother, brother, mother, sister, and son; Kidney Disease in his brother; Mult Sclerosis in his son. Current Outpatient Medications   Medication Sig Dispense Refill    nitroGLYCERIN (NITROSTAT) 0.4 MG SL tablet up to max of 3 total doses.  If no relief after 1 dose, call 911. 25 tablet 3    furosemide (LASIX) 20 MG tablet TAKE ONE TABLET BY MOUTH DAILY TAKE BEFORE DINNER 90 tablet 3    warfarin (COUMADIN) 5 MG tablet Take 1 tablet by mouth daily 90 tablet 3    ezetimibe (ZETIA) 10 MG tablet Take 10 mg by mouth daily      metoprolol succinate (TOPROL XL) 200 MG extended release tablet TAKE ONE TABLET BY MOUTH DAILY 30 tablet 0    irbesartan (AVAPRO) 75 MG tablet TAKE ONE TABLET BY MOUTH DAILY 90 tablet 2    atorvastatin (LIPITOR) 40 MG tablet Take 1 tablet by mouth nightly 90 tablet 3    pantoprazole (PROTONIX) 20 MG tablet Take 1 tablet by mouth daily (Patient taking differently: Take 20 mg by mouth every other day ) 90 tablet 3    Cyanocobalamin (VITAMIN B-12) 500 MCG LOZG Take 500 mcg by mouth daily      finasteride (PROSCAR) 5 MG tablet Take 5 mg by mouth daily      Cholecalciferol (VITAMIN D) 2000 UNITS CAPS capsule Take 1 capsule by mouth 2 times daily      tamsulosin (FLOMAX) 0.4 MG capsule Take 0.4 mg by mouth daily      Multiple Vitamin (MULTIVITAMIN) capsule Take 1 capsule by mouth daily.  Handicap Placard MISC by Does not apply route Unable to walk more than 250 feet before having to stop and rest.  DX: AFIB  Not to exceed 5 years 1 each 0    Handicap Placard MISC by Does not apply route 1 each 0    Handicap Placard MISC by Does not apply route DX: I48.20 afib  Duration: 5 years 1 each 0    triamterene-hydrochlorothiazide (MAXZIDE-25) 37.5-25 MG per tablet Take 1 tablet by mouth daily 90 tablet 3     No current facility-administered medications for this visit. Vitals:    05/12/22 0910   BP: 130/80   Pulse: 60         Weight:  213 lb (96.6 kg)      Review of Systems   Constitutional: Negative for activity change and fatigue. Respiratory: Negative for apnea, cough, choking, chest tightness and shortness of breath. Cardiovascular: Negative for chest pain, palpitations and leg swelling. No PND or orthopnea. No tachycardia. Gastrointestinal: Negative for abdominal distention. Musculoskeletal: Negative for myalgias. Neurological: Negative for dizziness, syncope and light-headedness. Psychiatric/Behavioral: Negative for behavioral problems, confusion and agitation. All other systems reviewed negative as done    Objective:   Physical Exam   Constitutional: He is oriented to person, place, and time. He appears well-developed and well-nourished. No distress. HENT:   Head: Normocephalic and atraumatic. Eyes: Conjunctivae and EOM are normal. Right eye exhibits no discharge. Left eye exhibits no discharge. Neck: Normal range of motion. Neck supple. No JVD present. Cardiovascular: Normal rate, S1 normal, S2 normal and normal heart sounds.   An irregularly irregular rhythm present. Exam reveals no gallop. No murmur heard. Pulses:       Radial pulses are 2+ on the right side, and 2+ on the left side. Pulmonary/Chest: Effort normal and breath sounds normal. No respiratory distress. He has no wheezes. He has no rales. Abdominal: Soft. Bowel sounds are normal. No tenderness. Musculoskeletal: Normal range of motion. He exhibits no  tenderness. Tr edema  Neurological: He is alert and oriented to person, place, and time. Skin: Skin is warm and dry. Psychiatric: He has a normal mood and affect. His behavior is normal. Thought content normal.       Assessment:       Diagnosis Orders   1. Chronic atrial fibrillation ([address]     2. CAD in native artery     3. History of PTCA     4. Essential hypertension     5. MI, old             Plan:      CV stable. Exercising regularly. Edema stable. HR controlled. No angina  Wt stable. BP good. On AC/ASA. No bleeding issues. Watch salt. Walking. Will continue Toprol 200 mg qd/avapro 75 mg qd/ Maxzide for BP/CAD. Will continue coumadin for afib/CVA proph. Routine INR. Encouraged diet, and wt loss. No changes. Reviewed previous records and testing including cath 10/17 and echo 12/18. Follow up 3 months.

## 2022-05-26 ENCOUNTER — ANTI-COAG VISIT (OUTPATIENT)
Dept: PHARMACY | Age: 82
End: 2022-05-26
Payer: MEDICARE

## 2022-05-26 DIAGNOSIS — I48.20 CHRONIC ATRIAL FIBRILLATION (HCC): Primary | ICD-10-CM

## 2022-05-26 LAB — INTERNATIONAL NORMALIZATION RATIO, POC: 1.9

## 2022-05-26 PROCEDURE — 99211 OFF/OP EST MAY X REQ PHY/QHP: CPT | Performed by: PHARMACIST

## 2022-05-26 PROCEDURE — 85610 PROTHROMBIN TIME: CPT | Performed by: PHARMACIST

## 2022-05-26 NOTE — PROGRESS NOTES
ANTICOAGULATION SERVICE    Adrianne Hatfield" is a 79 y.o. male with PMHx significant for chronic AF ([id number] of 4), HTN who presents to clinic 5/26/2022 for anticoagulation monitoring and adjustment. Anticoagulation Indication(s):  Afib    Referring Physician:  Dr. Tom Lazo    Goal INR Range:  2-3  Duration of Anticoagulation Therapy:  Indefinite  Time of day dose taken:  PM  Product patient has at home:  warfarin 5 mg (PEACH color)    [id number] Score for Atrial Fibrillation Stroke Risk   Risk   Factors  Component Value   C CHF No 0   H HTN Yes 1   A2 Age >= 76 Yes,  (79 y.o.) 2   D DM No 0   S2 Prior Stroke/TIA No 0   V Vascular Disease Yes 1   A Age 74-69 No,  (79 y.o.) 0   Sc Sex male 0    [id number]  Score  4   Score last updated 4/28/22 8:28 PM EDT    Click here for a link to the UpToDate guideline \"Atrial Fibrillation: Anticoagulation therapy to prevent embolization    Disclaimer: Risk Score calculation is dependent on accuracy of patient problem list and past encounter diagnosis.       INR Summary                           Warfarin regimen (mg)  Date INR   A/P   Sun Mon Tue Wed Thu Fri Sat Mg/wk  5/26 1.9 Below goal, no change 2.5 5 2.5 5 2.5 5 2.5 25  5/10 1.8 Below goal, no change 2.5 5 2.5 5 2.5 5 2.5 25  4/28 4.5 Above goal,holdx+dec 2.5 5 2.5 5 0/2.5 5 2.5 25  4/14 1.4 Below goal, (miss) inc 5 5 2.5 5 5/2.5 5 2.5 27.5  3/17 2.0 At goal, no change 2.5 5 2.5 5 2.5 5 2.5 25  2/17 2.8 At goal, no change 2.5 5 2.5 5 2.5 5 2.5 25  1/20 2.7 At goal, no change 2.5 5 2.5 5 2.5 5 2.5 25  12/30 3.2 At goal, no change 2.5 5 2.5 5 2.5 5 2.5 25  12/2 2.4 At goal, no change 2.5 5 2.5 5 2.5 5 2.5 25  11/4 2.2 At goal, no change 2.5 5 2.5 5 2.5 5 2.5 25  10/14 1.9 Below goal, continue  2.5 5 2.5 5 2.5 5 2.5 25  9/24 2.4 At goal, no change 2.5 5 2.5 5 2.5 5 2.5 25  9/8 3.8 Above goal, hold+dec 2.5 5 2.5 0/5 2.5 5 2.5 25  8/25 4.0 Above goal, hold+dec 5 5 2.5 0/5 2.5 5 2.5 27.5  7/28 3.0 At goal, continue 5 5 2.5 5 5 5 2.5 30  6/30 2.8 At goal, continue 5 5 2.5 5 5 5 2.5 30  5/26 2.5 At goal, continue 5 5 2.5 5 5 5 2.5 30  5/6 3.2 Above goal, continue 5 5 2.5 5 5 5 2.5 30  4/8 2.6 At goal, no change 5 5 2.5 5 5 5 2.5 30  3/25 3.5 Above goal, continue 5 5 2.5 5 2.5/5 5 2.5 30  2/25 1.8 Below goal, continue 5 5 2.5 5 5 5 2.5 30  1/28 3.0 At goal, no change 5 5 2.5 5 5 5 2.5 30  12/31 2.9 At goal, no change 5 5 2.5 5 5 5 2.5 30  12/3 2.9 At goal, no change 5 5 2.5 5 5 5 2.5 30  11/17 3.7 Above goal, holdx1 5 5 2.5 5 5 5 2.5 30  10/27 3.0 At goal, no change 5 5 5 5 5 5 2.5 32.5  10/8 1.8 Below goal, increase 5 5 5 5 5 5 2.5 32.5  9/17 2.1 At goal, no change 5 5 2.5 5 5 5 2.5 30  9/3 2.7 At goal, no change 5 5 2.5 5 5 5 2.5 30  8/27 1.5 Below goal, incr 5 5 2.5 5        7.5/5 5 2.5 30  8/20 1.7 Below goal, bolus 5 5 2.5 5         5/2.5 5 2.5 27.5  8/18 1.2 Below goal, bolus+ Lovenox          7.5/2.5 5 INR  8/6 1.5 Below goal, see below  7/14 2.6 At goal, no change 5 5 2.5 5 2.5 5 2.5 27.5  6/16 2.7 At goal, no change 5 5 2.5 5 2.5 5 2.5 27.5    [facility]  3/2/22:   Hgb 12.6  Hct 40.1    Lab Results   Component Value Date    RBC 3.64 (L) 01/07/2020    HGB 13.1 (L) 01/07/2020    HCT 39.7 (L) 01/07/2020    .1 (H) 01/07/2020    MCH 36.1 (H) 01/07/2020    MPV 8.1 01/07/2020    RDW 12.9 01/07/2020     01/07/2020     Patient History:  Recent hospitalizations/HC visits 10/23: ED for wrist sprain/contusion 2/2 mechanical fall  8/13: colonoscopy, held warfarin x5d + Lovenox bridge   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 EGD 4/25 significant for: 2 small antral erosions, moderate hiatal hernia, slightly irregular Z-line, small distal duodenal polyp. Patient d/c off of plavix and warfarin in preparation for outpt EGD with biopsies and colonoscopy.  EGD/Colonoscopy 5/2; no bleeding source, resumed plavix and warfarin on 5/3.  -10/29-11/1/17: [facility] for STEMI, s/p [facility] 10/30 with primary stenting to proximal RCA. Recent medication changes Med list reviewed- pt plans to discuss d/c of aspirin and addition of zetia with cardiologist   Medications taken regularly that may interact with warfarin or alter INR MVI (may contain vit K)  ASA (stent placement 10/30/17)- d/c per Dr Gallardo, but will Dr Jamari Wilder to confirm. Occ APAP prn knee pain   Warfarin dose taken as prescribed Missed dose on 4/12/22   - uses pillbox  held warfarin 1/2-1/6, 8/7-8/12 and bridged with Lovenox    Signs/symptoms of bleeding None reported  H/o hematuria- patient states he was told by his urologist that as long as he is taking warfarin, hematuria may continue. H/o epistaxis, occasional hemorrhoids, anemia. Vitamin K intake Normally has broccoli, asparagus, kale/gay salads 3-4 per week    9/17: 7 servings in past week. 12/10: \"a lot of greens\"- broccoli 3 days in a row+salads  1/14: 5 servings in past week including rory greens  3/3: 2 servings of green day before. 5/5: 1 serving in past week  8/6: brussels, asparagus, 2c cooked greens, broccoli  9/3, 10/27: only 1 serving   5/6/21: 2 servings brocc, 1 salad in past week, no greens  9/8/21: only one serving of vit k foods this week   10/14/21: brussel sprouts at least 3x and broccoli (more than baseline)    11/4-current: baseline vit k ~3 per week  4/14/22: salad/kale almost every day past week, will continue  4/28/22: less kale, more gay. 5/10/22: 2 servings of 1.5c cooked greens + fresh salad in the past 7 days   Recent vomiting/diarrhea/fever, changes in weight or activity level Trying to improve diet/exercise and lose weight gradually.   Due to covid has not been as active lately   Tobacco or alcohol use Patient denies tobacco use  Will have 1 glass of wine per day max   Upcoming surgeries or procedures None     Assessment/Plan:   Patient's INR was slightly subtherapeutic today (1.9). Patient reports eating more vitamin K foods this week, so INR likely would have been higher today if he had eaten his usual 3 servings per week. INR has been fluctuating due to changes in vitamin K intake. He typically only has gay salads, which are not as high in vit k. Patient was instructed to continue warfarin dose of 5mg on MWF and 2.5mg all other days for now. Repeat INR in 3 weeks. Patient was reminded to call with any bleeding, medication changes, or fever/vomiting/diarrhea. Patient understands dosing directions and information discussed. Dosing schedule and follow up appointment given to patient. Progress note routed to referring physician's office. Patient acknowledges working in consult agreement with pharmacist as referred by his/her physician. Next Clinic Appointment: 6/16    Please call [facility] Anticoagulation Clinic at [phone] with any questions. Please call The [address] [place] [address] Avenue at [phone] with any questions. Thanks!   Calvin Venegas, PharmD, BCPS  [facility] Medication Management Clinic  [place]: [phone]  [facility]: [phone]  5/26/2022 9:16 AM    For Pharmacy Admin Tracking Only    Total # of Interventions Recommended: 0  Total # of Interventions Accepted: 0  Time Spent (min): 15

## 2022-06-06 RX ORDER — WARFARIN SODIUM 5 MG/1
TABLET ORAL
Qty: 90 TABLET | Refills: 3 | Status: SHIPPED | OUTPATIENT
Start: 2022-06-06

## 2022-06-16 ENCOUNTER — ANTI-COAG VISIT (OUTPATIENT)
Dept: PHARMACY | Age: 82
End: 2022-06-16
Payer: MEDICARE

## 2022-06-16 DIAGNOSIS — I48.20 CHRONIC ATRIAL FIBRILLATION (HCC): Primary | ICD-10-CM

## 2022-06-16 LAB — INTERNATIONAL NORMALIZATION RATIO, POC: 2.7

## 2022-06-16 PROCEDURE — 85610 PROTHROMBIN TIME: CPT

## 2022-06-16 PROCEDURE — 99211 OFF/OP EST MAY X REQ PHY/QHP: CPT

## 2022-06-16 NOTE — PROGRESS NOTES
ANTICOAGULATION SERVICE    Ethan Porter" is a 79 y.o. male with PMHx significant for chronic AF ([id number] of 4), HTN who presents to clinic 6/16/2022 for anticoagulation monitoring and adjustment. Anticoagulation Indication(s):  Afib    Referring Physician:  Dr. Jamari Wilder    Goal INR Range:  2-3  Duration of Anticoagulation Therapy:  Indefinite  Time of day dose taken:  PM  Product patient has at home:  warfarin 5 mg (PEACH color)    [id number] Score for Atrial Fibrillation Stroke Risk   Risk   Factors  Component Value   C CHF No 0   H HTN Yes 1   A2 Age >= 76 Yes,  (79 y.o.) 2   D DM No 0   S2 Prior Stroke/TIA No 0   V Vascular Disease Yes 1   A Age 74-69 No,  (79 y.o.) 0   Sc Sex male 0    [id number]  Score  4   Score last updated 4/28/22 2:45 PM EDT    Click here for a link to the UpToDate guideline \"Atrial Fibrillation: Anticoagulation therapy to prevent embolization    Disclaimer: Risk Score calculation is dependent on accuracy of patient problem list and past encounter diagnosis.       INR Summary                           Warfarin regimen (mg)  Date INR   A/P   Sun Mon Tue Wed Thu Fri Sat Mg/wk  6/16 2.7 At goal, no change 2.5 5 2.5 5 2.5 5 2.5 25  5/26 1.9 Below goal, no change 2.5 5 2.5 5 2.5 5 2.5 25  5/10 1.8 Below goal, no change 2.5 5 2.5 5 2.5 5 2.5 25  4/28 4.5 Above goal,holdx+dec 2.5 5 2.5 5 0/2.5 5 2.5 25  4/14 1.4 Below goal, (miss) inc 5 5 2.5 5 5/2.5 5 2.5 27.5  3/17 2.0 At goal, no change 2.5 5 2.5 5 2.5 5 2.5 25  2/17 2.8 At goal, no change 2.5 5 2.5 5 2.5 5 2.5 25  1/20 2.7 At goal, no change 2.5 5 2.5 5 2.5 5 2.5 25  12/30 3.2 At goal, no change 2.5 5 2.5 5 2.5 5 2.5 25  12/2 2.4 At goal, no change 2.5 5 2.5 5 2.5 5 2.5 25  11/4 2.2 At goal, no change 2.5 5 2.5 5 2.5 5 2.5 25  10/14 1.9 Below goal, continue  2.5 5 2.5 5 2.5 5 2.5 25  9/24 2.4 At goal, no change 2.5 5 2.5 5 2.5 5 2.5 25  9/8 3.8 Above goal, hold+dec 2.5 5 2.5 0/5 2.5 5 2.5 25  8/25 4.0 Above goal, hold+dec 5 5 2.5 0/5 2.5 5 2.5 27.5  7/28 3.0 At goal, continue 5 5 2.5 5 5 5 2.5 30  6/30 2.8 At goal, continue 5 5 2.5 5 5 5 2.5 30  5/26 2.5 At goal, continue 5 5 2.5 5 5 5 2.5 30  5/6 3.2 Above goal, continue 5 5 2.5 5 5 5 2.5 30  4/8 2.6 At goal, no change 5 5 2.5 5 5 5 2.5 30  3/25 3.5 Above goal, continue 5 5 2.5 5 2.5/5 5 2.5 30  2/25 1.8 Below goal, continue 5 5 2.5 5 5 5 2.5 30  1/28 3.0 At goal, no change 5 5 2.5 5 5 5 2.5 30  12/31 2.9 At goal, no change 5 5 2.5 5 5 5 2.5 30  12/3 2.9 At goal, no change 5 5 2.5 5 5 5 2.5 30  11/17 3.7 Above goal, holdx1 5 5 2.5 5 5 5 2.5 30  10/27 3.0 At goal, no change 5 5 5 5 5 5 2.5 32.5    [facility]  3/2/22:   Hgb 12.6  Hct 40.1    Lab Results   Component Value Date    RBC 3.64 (L) 01/07/2020    HGB 13.1 (L) 01/07/2020    HCT 39.7 (L) 01/07/2020    .1 (H) 01/07/2020    MCH 36.1 (H) 01/07/2020    MPV 8.1 01/07/2020    RDW 12.9 01/07/2020     01/07/2020     Patient History:  Recent hospitalizations/HC visits 10/23: ED for wrist sprain/contusion 2/2 mechanical fall  8/13: colonoscopy, held warfarin x5d + Lovenox bridge   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 EGD 4/25 significant for: 2 small antral erosions, moderate hiatal hernia, slightly irregular Z-line, small distal duodenal polyp. Patient d/c off of plavix and warfarin in preparation for outpt EGD with biopsies and colonoscopy. EGD/Colonoscopy 5/2; no bleeding source, resumed plavix and warfarin on 5/3.  -10/29-11/1/17: [facility] for STEMI, s/p [facility] 10/30 with primary stenting to proximal RCA.     Recent medication changes Med list reviewed- pt plans to discuss d/c of aspirin and addition of zetia with cardiologist   Medications taken regularly that may interact with warfarin or alter INR MVI (may contain vit K)  ASA (stent placement 10/30/17)- d/c per Dr Manju Hartmann, but will Dr Yumiko Valencia to confirm. Occ APAP prn knee pain   Warfarin dose taken as prescribed Missed dose on 4/12/22   - uses pillbox  held warfarin 1/2-1/6, 8/7-8/12 and bridged with Lovenox    Signs/symptoms of bleeding None reported  H/o hematuria- patient states he was told by his urologist that as long as he is taking warfarin, hematuria may continue. H/o epistaxis, occasional hemorrhoids, anemia. Vitamin K intake Normally has broccoli, asparagus, kale/gay salads 3-4 per week    9/17: 7 servings in past week. 12/10: \"a lot of greens\"- broccoli 3 days in a row+salads  1/14: 5 servings in past week including rory greens  3/3: 2 servings of green day before. 5/5: 1 serving in past week  8/6: brussels, asparagus, 2c cooked greens, broccoli  9/3, 10/27: only 1 serving   5/6/21: 2 servings brocc, 1 salad in past week, no greens  9/8/21: only one serving of vit k foods this week   10/14/21: brussel sprouts at least 3x and broccoli (more than baseline)    11/4-current: baseline vit k ~3 per week  4/14/22: salad/kale almost every day past week, will continue  4/28/22: less kale, more gay. 5/10/22: 2 servings of 1.5c cooked greens + fresh salad in the past 7 days   Recent vomiting/diarrhea/fever, changes in weight or activity level Trying to improve diet/exercise and lose weight gradually. Due to covid has not been as active lately   Tobacco or alcohol use Patient denies tobacco use  Will have 1 glass of wine per day max   Upcoming surgeries or procedures None     Assessment/Plan:   Patient's INR was slightly subtherapeutic today (2.7). INR has been fluctuating due to changes in vitamin K intake. He typically only has gay salads, which are not as high in vit k. Patient states he was extra careful with vit K this week    Patient was instructed to continue warfarin dose of 5mg on MWF and 2.5mg all other days for now. Repeat INR in 3 weeks.  Patient was reminded to call with any bleeding, medication changes, or fever/vomiting/diarrhea. Patient understands dosing directions and information discussed. Dosing schedule and follow up appointment given to patient. Progress note routed to referring physician's office. Patient acknowledges working in consult agreement with pharmacist as referred by his/her physician. Next Clinic Appointment: 7/14    Please call [facility] at [phone] with any questions. Please call The [address] [place] [address] Avenue at [phone] with any questions. Thanks!   Sai Melendez, PharmD Candidate  [facility]  [place]:  [phone]  06/16/22, 8:52 AM    For Pharmacy Admin Tracking Only     Total # of Interventions Recommended: 1   Total # of Interventions Accepted: 1   Time Spent (min): 15

## 2022-07-14 ENCOUNTER — ANTI-COAG VISIT (OUTPATIENT)
Dept: PHARMACY | Age: 82
End: 2022-07-14
Payer: MEDICARE

## 2022-07-14 DIAGNOSIS — I48.20 CHRONIC ATRIAL FIBRILLATION (HCC): Primary | ICD-10-CM

## 2022-07-14 LAB — INTERNATIONAL NORMALIZATION RATIO, POC: 2.7

## 2022-07-14 PROCEDURE — 99211 OFF/OP EST MAY X REQ PHY/QHP: CPT | Performed by: PHARMACIST

## 2022-07-14 PROCEDURE — 85610 PROTHROMBIN TIME: CPT | Performed by: PHARMACIST

## 2022-07-14 NOTE — PROGRESS NOTES
ANTICOAGULATION SERVICE    Harrison Crowell" is a 79 y.o. male with PMHx significant for chronic AF ([id number] of 4), HTN who presents to clinic 7/14/2022 for anticoagulation monitoring and adjustment. Anticoagulation Indication(s):  Afib    Referring Physician:  Dr. Ankur Tejeda    Goal INR Range:  2-3  Duration of Anticoagulation Therapy:  Indefinite  Time of day dose taken:  PM  Product patient has at home:  warfarin 5 mg (PEACH color)    [id number] Score for Atrial Fibrillation Stroke Risk   Risk   Factors  Component Value   C CHF No 0   H HTN Yes 1   A2 Age >= 76 Yes,  (79 y.o.) 2   D DM No 0   S2 Prior Stroke/TIA No 0   V Vascular Disease Yes 1   A Age 74-69 No,  (79 y.o.) 0   Sc Sex male 0    [id number]  Score  4   Score last updated 4/28/22 2:62 PM EDT    Click here for a link to the UpToDate guideline \"Atrial Fibrillation: Anticoagulation therapy to prevent embolization    Disclaimer: Risk Score calculation is dependent on accuracy of patient problem list and past encounter diagnosis.       INR Summary                           Warfarin regimen (mg)  Date INR   A/P   Sun Mon Tue Wed Thu Fri Sat Mg/wk  7/14 2.7 At goal, no change 2.5 5 2.5 5 2.5 5 2.5 25  6/16 2.7 At goal, no change 2.5 5 2.5 5 2.5 5 2.5 25  5/26 1.9 Below goal, no change 2.5 5 2.5 5 2.5 5 2.5 25  5/10 1.8 Below goal, no change 2.5 5 2.5 5 2.5 5 2.5 25  4/28 4.5 Above goal,holdx+dec 2.5 5 2.5 5 0/2.5 5 2.5 25  4/14 1.4 Below goal, (miss) inc 5 5 2.5 5 5/2.5 5 2.5 27.5  3/17 2.0 At goal, no change 2.5 5 2.5 5 2.5 5 2.5 25  2/17 2.8 At goal, no change 2.5 5 2.5 5 2.5 5 2.5 25  1/20 2.7 At goal, no change 2.5 5 2.5 5 2.5 5 2.5 25  12/30 3.2 At goal, no change 2.5 5 2.5 5 2.5 5 2.5 25  12/2 2.4 At goal, no change 2.5 5 2.5 5 2.5 5 2.5 25  11/4 2.2 At goal, no change 2.5 5 2.5 5 2.5 5 2.5 25  10/14 1.9 Below goal, continue  2.5 5 2.5 5 2.5 5 2.5 25  9/24 2.4 At goal, no change 2.5 5 2.5 5 2.5 5 2.5 25  9/8 3.8 Above goal, hold+dec 2.5 5 2.5 0/5 2.5 5 2.5 25  8/25 4.0 Above goal, hold+dec 5 5 2.5 0/5 2.5 5 2.5 27.5  7/28 3.0 At goal, continue 5 5 2.5 5 5 5 2.5 30  6/30 2.8 At goal, continue 5 5 2.5 5 5 5 2.5 30  5/26 2.5 At goal, continue 5 5 2.5 5 5 5 2.5 30  5/6 3.2 Above goal, continue 5 5 2.5 5 5 5 2.5 30  4/8 2.6 At goal, no change 5 5 2.5 5 5 5 2.5 30  3/25 3.5 Above goal, continue 5 5 2.5 5 2.5/5 5 2.5 30  2/25 1.8 Below goal, continue 5 5 2.5 5 5 5 2.5 30  1/28 3.0 At goal, no change 5 5 2.5 5 5 5 2.5 30  12/31 2.9 At goal, no change 5 5 2.5 5 5 5 2.5 30  12/3 2.9 At goal, no change 5 5 2.5 5 5 5 2.5 30  11/17 3.7 Above goal, holdx1 5 5 2.5 5 5 5 2.5 30  10/27 3.0 At goal, no change 5 5 5 5 5 5 2.5 32.5    [facility]  3/2/22:   Hgb 12.6  Hct 40.1    Lab Results   Component Value Date    RBC 3.64 (L) 01/07/2020    HGB 13.1 (L) 01/07/2020    HCT 39.7 (L) 01/07/2020    .1 (H) 01/07/2020    MCH 36.1 (H) 01/07/2020    MPV 8.1 01/07/2020    RDW 12.9 01/07/2020     01/07/2020     Patient History:  Recent hospitalizations/HC visits 10/23: ED for wrist sprain/contusion 2/2 mechanical fall  8/13: colonoscopy, held warfarin x5d + Lovenox bridge   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 EGD 4/25 significant for: 2 small antral erosions, moderate hiatal hernia, slightly irregular Z-line, small distal duodenal polyp. Patient d/c off of plavix and warfarin in preparation for outpt EGD with biopsies and colonoscopy. EGD/Colonoscopy 5/2; no bleeding source, resumed plavix and warfarin on 5/3.  -10/29-11/1/17: [facility] for STEMI, s/p C 10/30 with primary stenting to proximal RCA.     Recent medication changes Med list reviewed- pt plans to discuss d/c of aspirin and addition of zetia with cardiologist   Medications taken regularly that may interact with warfarin or alter INR MVI (may contain vit K)  ASA (stent placement 10/30/17)- d/c per Dr Naye Roldan, but will Dr Sanjay Eckert to confirm. Occ APAP prn knee pain   Warfarin dose taken as prescribed Missed dose on 4/12/22   - uses pillbox  held warfarin 1/2-1/6, 8/7-8/12 and bridged with Lovenox    Signs/symptoms of bleeding None reported  H/o hematuria- patient states he was told by his urologist that as long as he is taking warfarin, hematuria may continue. H/o epistaxis, occasional hemorrhoids, anemia. Vitamin K intake Normally has broccoli, asparagus, kale/gay salads 3-4 per week    9/17: 7 servings in past week. 12/10: \"a lot of greens\"- broccoli 3 days in a row+salads  1/14: 5 servings in past week including rory greens  3/3: 2 servings of green day before. 5/5: 1 serving in past week  8/6: brussels, asparagus, 2c cooked greens, broccoli  9/3, 10/27: only 1 serving   5/6/21: 2 servings brocc, 1 salad in past week, no greens  9/8/21: only one serving of vit k foods this week   10/14/21: brussel sprouts at least 3x and broccoli (more than baseline)    11/4-current: baseline vit k ~3 per week  4/14/22: salad/kale almost every day past week, will continue  4/28/22: less kale, more gay. 5/10/22: 2 servings of 1.5c cooked greens + fresh salad in the past 7 days   Recent vomiting/diarrhea/fever, changes in weight or activity level Trying to improve diet/exercise and lose weight gradually. Due to covid has not been as active lately   Tobacco or alcohol use Patient denies tobacco use  Will have 1 glass of wine per day max   Upcoming surgeries or procedures None     Assessment/Plan:   Patient's INR was therapeutic today (2.7). INR has been consistent for the past 2 visits, patient has been instructed to keep consistent Vitamin K intake and exercise regimen. In the past INR was fluctuating due to changes in vitamin K intake. He typically only has gay salads, which are not as high in vit k.  Patient states he was extra careful with vit K this week    Patient was instructed to continue warfarin dose of 5mg on MWF and 2.5mg all other days for now. Repeat INR in 4 weeks. Patient was reminded to call with any bleeding, medication changes, or fever/vomiting/diarrhea. Patient understands dosing directions and information discussed. Dosing schedule and follow up appointment given to patient. Progress note routed to referring physician's office. Patient acknowledges working in consult agreement with pharmacist as referred by his/her physician. Next Clinic Appointment: 8/11    Please call [facility] at [phone] with any questions. Please call The [address] [place] [address] Avenue at [phone] with any questions. Thanks!   Fredo Leigh, PharmD Candidate  [facility]  [place]:  [phone]  07/14/22, 8:58 AM     For Pharmacy Admin Tracking Only     Total # of Interventions Recommended: 0   Total # of Interventions Accepted: 0   Time Spent (min): 15

## 2022-07-18 DIAGNOSIS — I10 ESSENTIAL HYPERTENSION: ICD-10-CM

## 2022-07-18 DIAGNOSIS — I50.9 HEART FAILURE, ACC/AHA STAGE B (HCC): ICD-10-CM

## 2022-07-18 NOTE — TELEPHONE ENCOUNTER
Requested Prescriptions     Pending Prescriptions Disp Refills    furosemide (LASIX) 20 MG tablet [Pharmacy Med Name: FUROSEMIDE 20 MG TABLET] 90 tablet 3     Sig: TAKE ONE TABLET BY MOUTH DAILY BEFORE DINNER          Number: 90    Refills: 3    Last Office Visit: 11/11/2021     Next Office Visit: 8/19/22

## 2022-07-19 RX ORDER — FUROSEMIDE 20 MG/1
TABLET ORAL
Qty: 90 TABLET | Refills: 3 | Status: SHIPPED | OUTPATIENT
Start: 2022-07-19

## 2022-08-11 ENCOUNTER — ANTI-COAG VISIT (OUTPATIENT)
Dept: PHARMACY | Age: 82
End: 2022-08-11
Payer: MEDICARE

## 2022-08-11 DIAGNOSIS — I48.20 CHRONIC ATRIAL FIBRILLATION (HCC): Primary | ICD-10-CM

## 2022-08-11 LAB — INTERNATIONAL NORMALIZATION RATIO, POC: 2.3

## 2022-08-11 PROCEDURE — 99211 OFF/OP EST MAY X REQ PHY/QHP: CPT

## 2022-08-11 PROCEDURE — 85610 PROTHROMBIN TIME: CPT

## 2022-08-11 RX ORDER — ASPIRIN 81 MG/1
81 TABLET ORAL DAILY
COMMUNITY

## 2022-08-11 NOTE — PROGRESS NOTES
ANTICOAGULATION SERVICE    Hillary Reyes" is a 79 y.o. male with PMHx significant for chronic AF ([id number] of 4), HTN who presents to clinic 8/11/2022 for anticoagulation monitoring and adjustment. Anticoagulation Indication(s):  Afib    Referring Physician:  Dr. Paola Musa    Goal INR Range:  2-3  Duration of Anticoagulation Therapy:  Indefinite  Time of day dose taken:  PM  Product patient has at home:  warfarin 5 mg (PEACH color)    [id number] Score for Atrial Fibrillation Stroke Risk   Risk   Factors  Component Value   C CHF No 0   H HTN Yes 1   A2 Age >= 76 Yes,  (79 y.o.) 2   D DM No 0   S2 Prior Stroke/TIA No 0   V Vascular Disease Yes 1   A Age 74-69 No,  (79 y.o.) 0   Sc Sex male 0    [id number]  Score  4   Score last updated 4/28/22 6:88 PM EDT    Click here for a link to the UpToDate guideline \"Atrial Fibrillation: Anticoagulation therapy to prevent embolization    Disclaimer: Risk Score calculation is dependent on accuracy of patient problem list and past encounter diagnosis.     INR Summary                           Warfarin regimen (mg)  Date INR   A/P   Sun Mon Tue Wed Thu Fri Sat Mg/wk  8/11 2.3 At goal, no change 2.5 5 2.5 5 2.5 5 2.5 25  7/14 2.7 At goal, no change 2.5 5 2.5 5 2.5 5 2.5 25  6/16 2.7 At goal, no change 2.5 5 2.5 5 2.5 5 2.5 25  5/26 1.9 Below goal, no change 2.5 5 2.5 5 2.5 5 2.5 25  5/10 1.8 Below goal, no change 2.5 5 2.5 5 2.5 5 2.5 25  4/28 4.5 Above goal,holdx+dec 2.5 5 2.5 5 0/2.5 5 2.5 25  4/14 1.4 Below goal, (miss) inc 5 5 2.5 5 5/2.5 5 2.5 27.5  3/17 2.0 At goal, no change 2.5 5 2.5 5 2.5 5 2.5 25  2/17 2.8 At goal, no change 2.5 5 2.5 5 2.5 5 2.5 25  1/20 2.7 At goal, no change 2.5 5 2.5 5 2.5 5 2.5 25  12/30 3.2 At goal, no change 2.5 5 2.5 5 2.5 5 2.5 25  12/2 2.4 At goal, no change 2.5 5 2.5 5 2.5 5 2.5 25  11/4 2.2 At goal, no change 2.5 5 2.5 5 2.5 5 2.5 25  10/14 1.9 Below goal, continue  2.5 5 2.5 5 2.5 5 2.5 25  9/24 2.4 At goal, no asparagus, 2c cooked greens, broccoli  9/3, 10/27: only 1 serving   5/6/21: 2 servings brocc, 1 salad in past week, no greens  9/8/21: only one serving of vit k foods this week   10/14/21: brussel sprouts at least 3x and broccoli (more than baseline)    11/4-current: baseline vit k ~3 per week  4/14/22: salad/kale almost every day past week, will continue  4/28/22: less kale, more gay. 5/10/22: 2 servings of 1.5c cooked greens + fresh salad in the past 7 days   Recent vomiting/diarrhea/fever, changes in weight or activity level Trying to improve diet/exercise and lose weight gradually. Due to covid has not been as active lately   Tobacco or alcohol use Patient denies tobacco use  Will have 1 glass of wine per day max   Upcoming surgeries or procedures None     Assessment/Plan:   Patient's INR was therapeutic today (2.3). In the past, INR was fluctuating due to changes in vitamin K intake. He typically only has gay salads, which are not as high in vit k. He has been consistent lately. Patient was instructed to continue warfarin dose of 5mg on MWF and 2.5mg all other days for now. Repeat INR in 1 month. Patient was reminded to call with any bleeding, medication changes, or fever/vomiting/diarrhea. Patient understands dosing directions and information discussed. Dosing schedule and follow up appointment given to patient. Progress note routed to referring physician's office. Patient acknowledges working in consult agreement with pharmacist as referred by his/her physician. Next Clinic Appointment: 9/8    Please call [facility] at [phone] with any questions. Please call The [address] [place] [address] Avenue at [phone] with any questions. Thanks!   Almaz Medina, PharmD, [facility]  [facility]   Jaiden Camejo Ph: [phone]  Keyla Garcia Ph: [phone]  8/11/2022 9:02 AM      For Pharmacy Admin Tracking Only    Total # of Interventions Recommended: 0  Total # of Interventions Accepted: 0  Time Spent (min): 15

## 2022-08-24 ENCOUNTER — OFFICE VISIT (OUTPATIENT)
Dept: CARDIOLOGY CLINIC | Age: 82
End: 2022-08-24
Payer: MEDICARE

## 2022-08-24 VITALS
DIASTOLIC BLOOD PRESSURE: 80 MMHG | BODY MASS INDEX: 32.69 KG/M2 | WEIGHT: 215 LBS | SYSTOLIC BLOOD PRESSURE: 134 MMHG | HEART RATE: 60 BPM

## 2022-08-24 DIAGNOSIS — I48.20 CHRONIC ATRIAL FIBRILLATION (HCC): Primary | ICD-10-CM

## 2022-08-24 DIAGNOSIS — Z98.61 HISTORY OF PTCA: ICD-10-CM

## 2022-08-24 DIAGNOSIS — I25.2 MI, OLD: ICD-10-CM

## 2022-08-24 DIAGNOSIS — I10 ESSENTIAL HYPERTENSION: ICD-10-CM

## 2022-08-24 DIAGNOSIS — I25.10 CAD IN NATIVE ARTERY: ICD-10-CM

## 2022-08-24 PROCEDURE — G8417 CALC BMI ABV UP PARAM F/U: HCPCS | Performed by: INTERNAL MEDICINE

## 2022-08-24 PROCEDURE — 1123F ACP DISCUSS/DSCN MKR DOCD: CPT | Performed by: INTERNAL MEDICINE

## 2022-08-24 PROCEDURE — 1036F TOBACCO NON-USER: CPT | Performed by: INTERNAL MEDICINE

## 2022-08-24 PROCEDURE — G8427 DOCREV CUR MEDS BY ELIG CLIN: HCPCS | Performed by: INTERNAL MEDICINE

## 2022-08-24 PROCEDURE — 99214 OFFICE O/P EST MOD 30 MIN: CPT | Performed by: INTERNAL MEDICINE

## 2022-08-24 NOTE — PROGRESS NOTES
Subjective:      Patient ID: Dayanara Bernstein is a 79 y.o. male. HPI:  Here for follow up afib/HTN/CAD/PTCA/Non st.  [place] at airport. Still exercising. Edema stable. Wt down. No sob. No orthopnea/PND. No complaints. No palp/tachycardia. No syncope. No exertional chest pain/sob. BP good at home.      Past Medical History:   Diagnosis Date    Allergic rhinitis     Anemia     Atrial fibrillation (HCC)     Atrial fibrillation (HCC)     Benign non-nodular prostatic hyperplasia with lower urinary tract symptoms 2/9/2017    Benign paroxysmal positional vertigo     BPH (benign prostatic hyperplasia)     BPH (benign prostatic hypertrophy)     Cataract 10/3/2014    Cholelithiasis     Colon cancer ([facility] [address] 4/9/2012    Diverticulosis     Dyslipidemia 7/21/2010    Elevated PSA     Erectile dysfunction     Essential hypertension 11/24/2015    Gastroesophageal reflux disease without esophagitis 11/9/2016    GERD (gastroesophageal reflux disease)     Glaucoma 4/5/2013    Hiatal hernia     Hypertension     Lumbar radiculopathy     Osteoarthritis     Peripheral neuropathy 12/7/2012    Proteinuria 7/22/2010    S/P laparoscopic-assisted sigmoidectomy 4/17/2012    Urinary frequency      Past Surgical History:   Procedure Laterality Date    CATARACT EXTRACTION W/  INTRAOCULAR LENS IMPLANT Right October 7, 2014    Dr. Vipul Ontiveros Left October 24, 2014    Dr. Marylen Callander  April 4, 2012    Dr. Starla Denver  May 15, 2013    Dr. Starla Denver  08/09/2016    Dr. Krishna Her    COLONOSCOPY N/A 02/17/2017    COLONOSCOPY N/A 8/13/2020    [facility] Emilie Sierra [facility] October 7, 2014    Dr. Sandor Antonio Left October 24, 2014    Dr. Inocencia Crawford - [facility]    OTHER SURGICAL HISTORY  2013    port-a-cath removal     PROSTATE BIOPSY  1997    PROSTATE BIOPSY  May 10, 2010    Dr. Slade Arroyo    SIGMOIDECTOMY  2012    Dr. Aleja Resendiz sigmoid colectomy           TRANSURETHRAL RESECTION OF PROSTATE  2004    Dr. Emili Carbajal    TUNNELED VENOUS PORT PLACEMENT           Allergies   Allergen Reactions    Naproxen Other (See Comments)     Patient gets nose bleeds. Patient should not take. Patient lost a lot of blood in November due to Naproxen.      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    Occupation: retired -      Comment: as of 2013   Tobacco Use    Smoking status: Former     Packs/day: 0.10     Years: 7.00     Pack years: 0.70     Types: Cigars, Cigarettes     Start date:      Quit date:      Years since quittin.6    Smokeless tobacco: Never    Tobacco comments:     quit in the 1960s -- former cigar smoker   Substance and Sexual Activity    Alcohol use: Yes     Comment: social    Drug use: No    Sexual activity: Yes     Partners: Female     Comment:  - Steven Left - [id number]   Other Topics Concern    Not on file   Social History Narrative    Past Surgical History     TURP: 2004    prostate biopsy - 1997                                                    Last updated by Leonarda Nunez MD on 10/29/2008                      Social History     Marital Status:  - 1967     Spouse: Susan    Children: 3      Children's Names: Ashley Lu    Employment Status: retired -     Employer: [employer]    Occupation:  and [employer]    Alcohol Use: socially    Drug Use: none    Tobacco Usage: prior smoker    Cigars/Pipes - Years Smoked: 65's & 62's                                                 Last updated by Richie Krabbe MD on 2009                      Family History     mother -  - age 73-73 - coronary artery disease, hypertension, sudden death    father -  - age 80 - ? colon cancer        brother - Jaclyn Bar -  - age 68 - 2009 - pancreatic cancer, hypertension, CABG, kidney problem    brother - Jermaine Swan - small intestinal cancer, hypertension ([place])    brother - Mitch Burks - hypertension    brother - Joselin Fofana -  - age 77 - 2008 - hypertension, colon cancer    brother -  - age 15 -  in a fire, smoke inhalation injury        sister - Teo Blood - hypertension        son - Jermaine Swan - multiple sclerosis (age 37)    son - Katerina Joshi -        daughter - Saul Greer - asthma                                  Last updated by Richie Krabbe MD on 2010      Social Determinants of Health     Financial Resource Strain: Not on file   Food Insecurity: Not on file   Transportation Needs: Not on file   Physical Activity: Not on file   Stress: Not on file   Social Connections: Not on file   Intimate Partner Violence: Not on file   Housing Stability: Not on file        Patient has a family history includes Arthritis in his brother; Asthma in his daughter; Cancer in his brother, brother, brother, and father; Hawkins Luster in his brother; Coronary Art Dis in his brother and mother; Heart Disease in his brother and mother; Heart Surgery in his brother; High Blood Pressure in his brother, brother, brother, brother, mother, and sister;  Hypertension in his brother, brother, brother, brother, mother, sister, and son; Kidney Disease in his brother; Mult Sclerosis in his son. Current Outpatient Medications   Medication Sig Dispense Refill    aspirin 81 MG EC tablet Take 81 mg by mouth in the morning. furosemide (LASIX) 20 MG tablet TAKE ONE TABLET BY MOUTH DAILY BEFORE DINNER 90 tablet 3    warfarin (COUMADIN) 5 MG tablet TAKE ONE TABLET BY MOUTH DAILY 90 tablet 3    nitroGLYCERIN (NITROSTAT) 0.4 MG SL tablet up to max of 3 total doses. If no relief after 1 dose, call 911. 25 tablet 3    Handicap Placard MISC by Does not apply route Unable to walk more than 250 feet before having to stop and rest.  DX: AFIB  Not to exceed 5 years 1 each 0    ezetimibe (ZETIA) 10 MG tablet Take 10 mg by mouth daily      metoprolol succinate (TOPROL XL) 200 MG extended release tablet TAKE ONE TABLET BY MOUTH DAILY 30 tablet 0    irbesartan (AVAPRO) 75 MG tablet TAKE ONE TABLET BY MOUTH DAILY 90 tablet 2    atorvastatin (LIPITOR) 40 MG tablet Take 1 tablet by mouth nightly 90 tablet 3    pantoprazole (PROTONIX) 20 MG tablet Take 1 tablet by mouth daily (Patient taking differently: Take 20 mg by mouth every other day) 90 tablet 3    Cyanocobalamin (VITAMIN B-12) 500 MCG LOZG Take 500 mcg by mouth daily      finasteride (PROSCAR) 5 MG tablet Take 5 mg by mouth daily      Cholecalciferol (VITAMIN D) 2000 UNITS CAPS capsule Take 1 capsule by mouth 2 times daily      tamsulosin (FLOMAX) 0.4 MG capsule Take 0.4 mg by mouth daily      Multiple Vitamin (MULTIVITAMIN) capsule Take 1 capsule by mouth daily. Handicap Placard MISC by Does not apply route 1 each 0    Handicap Placard MISC by Does not apply route DX: I48.20 afib  Duration: 5 years 1 each 0    triamterene-hydrochlorothiazide (MAXZIDE-25) 37.5-25 MG per tablet Take 1 tablet by mouth daily 90 tablet 3     No current facility-administered medications for this visit.             Vitals:    08/24/22 1323   BP: 134/80   Pulse: 60 Weight: 215        Review of Systems   Constitutional: Negative for activity change and fatigue. Respiratory: Negative for apnea, cough, choking, chest tightness and shortness of breath. Cardiovascular: Negative for chest pain, palpitations and leg swelling. No PND or orthopnea. No tachycardia. Gastrointestinal: Negative for abdominal distention. Musculoskeletal: Negative for myalgias. Neurological: Negative for dizziness, syncope and light-headedness. Psychiatric/Behavioral: Negative for behavioral problems, confusion and agitation. All other systems reviewed negative as done    Objective:   Physical Exam   Constitutional: He is oriented to person, place, and time. He appears well-developed and well-nourished. No distress. HENT:   Head: Normocephalic and atraumatic. Eyes: Conjunctivae and EOM are normal. Right eye exhibits no discharge. Left eye exhibits no discharge. Neck: Normal range of motion. Neck supple. No JVD present. Cardiovascular: Normal rate, S1 normal, S2 normal and normal heart sounds. An irregularly irregular rhythm present. Exam reveals no gallop. No murmur heard. Pulses:       Radial pulses are 2+ on the right side, and 2+ on the left side. Pulmonary/Chest: Effort normal and breath sounds normal. No respiratory distress. He has no wheezes. He has no rales. Abdominal: Soft. Bowel sounds are normal. No tenderness. Musculoskeletal: Normal range of motion. He exhibits no  tenderness. Tr edema  Neurological: He is alert and oriented to person, place, and time. Skin: Skin is warm and dry. Psychiatric: He has a normal mood and affect. His behavior is normal. Thought content normal.       Assessment:         Diagnosis Orders   1. Chronic atrial fibrillation ([address]        2. CAD in native artery        3. History of PTCA        4. Essential hypertension        5. MI, old                  Plan:      CV stable. Twisted knee. Exercising regularly. Edema stable. HR controlled. No angina  Wt stable. BP good. On AC/ASA. No bleeding issues. Watch salt. Walking. Will continue Toprol 200 mg qd/avapro 75 mg qd/ Maxzide for BP/CAD. Will continue coumadin for afib/CVA proph. Routine INR. Encouraged diet, and wt loss. No changes. Reviewed previous records and testing including cath 10/17 and echo 12/18. Follow up 3 months.

## 2022-09-15 ENCOUNTER — ANTI-COAG VISIT (OUTPATIENT)
Dept: PHARMACY | Age: 82
End: 2022-09-15
Payer: MEDICARE

## 2022-09-15 DIAGNOSIS — I48.20 CHRONIC ATRIAL FIBRILLATION (HCC): Primary | ICD-10-CM

## 2022-09-15 LAB — INTERNATIONAL NORMALIZATION RATIO, POC: 2.3

## 2022-09-15 PROCEDURE — 85610 PROTHROMBIN TIME: CPT

## 2022-09-15 PROCEDURE — 99211 OFF/OP EST MAY X REQ PHY/QHP: CPT
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
## 2022-10-13 ENCOUNTER — ANTI-COAG VISIT (OUTPATIENT)
Dept: PHARMACY | Age: 82
End: 2022-10-13
Payer: MEDICARE

## 2022-10-13 ENCOUNTER — TELEPHONE (OUTPATIENT)
Dept: PHARMACY | Age: 82
End: 2022-10-13

## 2022-10-13 DIAGNOSIS — I48.20 CHRONIC ATRIAL FIBRILLATION (HCC): Primary | ICD-10-CM

## 2022-10-13 LAB — INTERNATIONAL NORMALIZATION RATIO, POC: 1.7

## 2022-10-13 PROCEDURE — 99211 OFF/OP EST MAY X REQ PHY/QHP: CPT

## 2022-10-13 PROCEDURE — 85610 PROTHROMBIN TIME: CPT
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
## 2022-11-09 ENCOUNTER — OFFICE VISIT (OUTPATIENT)
Dept: CARDIOLOGY CLINIC | Age: 82
End: 2022-11-09
Payer: MEDICARE

## 2022-11-09 ENCOUNTER — ANTI-COAG VISIT (OUTPATIENT)
Dept: PHARMACY | Age: 82
End: 2022-11-09
Payer: MEDICARE

## 2022-11-09 VITALS
HEART RATE: 60 BPM | DIASTOLIC BLOOD PRESSURE: 80 MMHG | SYSTOLIC BLOOD PRESSURE: 114 MMHG | WEIGHT: 212 LBS | BODY MASS INDEX: 32.23 KG/M2

## 2022-11-09 DIAGNOSIS — I48.20 CHRONIC ATRIAL FIBRILLATION (HCC): Primary | ICD-10-CM

## 2022-11-09 DIAGNOSIS — I25.10 CAD IN NATIVE ARTERY: ICD-10-CM

## 2022-11-09 DIAGNOSIS — I10 ESSENTIAL HYPERTENSION: ICD-10-CM

## 2022-11-09 DIAGNOSIS — Z98.61 HISTORY OF PTCA: ICD-10-CM

## 2022-11-09 DIAGNOSIS — I25.2 MI, OLD: ICD-10-CM

## 2022-11-09 LAB — INTERNATIONAL NORMALIZATION RATIO, POC: 2.2

## 2022-11-09 PROCEDURE — 99214 OFFICE O/P EST MOD 30 MIN: CPT | Performed by: INTERNAL MEDICINE

## 2022-11-09 PROCEDURE — G8484 FLU IMMUNIZE NO ADMIN: HCPCS | Performed by: INTERNAL MEDICINE

## 2022-11-09 PROCEDURE — 3074F SYST BP LT 130 MM HG: CPT | Performed by: INTERNAL MEDICINE

## 2022-11-09 PROCEDURE — 1123F ACP DISCUSS/DSCN MKR DOCD: CPT | Performed by: INTERNAL MEDICINE

## 2022-11-09 PROCEDURE — 85610 PROTHROMBIN TIME: CPT | Performed by: PHARMACIST

## 2022-11-09 PROCEDURE — 1036F TOBACCO NON-USER: CPT | Performed by: INTERNAL MEDICINE

## 2022-11-09 PROCEDURE — 3078F DIAST BP <80 MM HG: CPT | Performed by: INTERNAL MEDICINE

## 2022-11-09 PROCEDURE — G8427 DOCREV CUR MEDS BY ELIG CLIN: HCPCS | Performed by: INTERNAL MEDICINE

## 2022-11-09 PROCEDURE — G8417 CALC BMI ABV UP PARAM F/U: HCPCS | Performed by: INTERNAL MEDICINE

## 2022-11-09 PROCEDURE — 99211 OFF/OP EST MAY X REQ PHY/QHP: CPT | Performed by: PHARMACIST

## 2022-11-09 RX ORDER — TIZANIDINE 2 MG/1
TABLET ORAL
COMMUNITY
Start: 2022-10-31

## 2022-11-09 NOTE — PROGRESS NOTES
Subjective:      Patient ID: Bashir Bernal is a 79 y.o. male. HPI:  Here for follow up afib/HTN/CAD/PTCA/Non st.  Still with some knee issues. Sciatica recently. Slowly improving. Edema stable. Wt down. No sob. No orthopnea/PND. No complaints. No palp/tachycardia. No syncope. No exertional chest pain/sob. BP good at home.      Past Medical History:   Diagnosis Date    Allergic rhinitis     Anemia     Atrial fibrillation (HCC)     Atrial fibrillation ([facility]     Benign non-nodular prostatic hyperplasia with lower urinary tract symptoms 2/9/2017    Benign paroxysmal positional vertigo     BPH (benign prostatic hyperplasia)     BPH (benign prostatic hypertrophy)     Cataract 10/3/2014    Cholelithiasis     Colon cancer ([facility] [address] 4/9/2012    Diverticulosis     Dyslipidemia 7/21/2010    Elevated PSA     Erectile dysfunction     Essential hypertension 11/24/2015    Gastroesophageal reflux disease without esophagitis 11/9/2016    GERD (gastroesophageal reflux disease)     Glaucoma 4/5/2013    Hiatal hernia     Hypertension     Lumbar radiculopathy     Osteoarthritis     Peripheral neuropathy 12/7/2012    Proteinuria 7/22/2010    S/P laparoscopic-assisted sigmoidectomy 4/17/2012    Urinary frequency      Past Surgical History:   Procedure Laterality Date    CATARACT EXTRACTION W/  INTRAOCULAR LENS IMPLANT Right October 7, 2014    Dr. Lorna Woo Left October 24, 2014    Dr. Braydon Chase  April 4, 2012    Dr. Stanislaw Hills  May 15, 2013    Dr. Stanislaw Hills  08/09/2016    Dr. Rosana Shepard    COLONOSCOPY N/A 02/17/2017    COLONOSCOPY N/A 8/13/2020    COLONOSCOPY performed by Holden Chavarria MD at [facility], [address] [facility] Right October 7, 2014    Dr. Marsha Li Left October 24, 2014 Dr. Napoleon Monsivais  2013    port-a-cath removal     PROSTATE BIOPSY  1997    PROSTATE BIOPSY  May 10, 2010    Dr. Skylar Cox    SIGMOIDECTOMY  2012    Dr. Marguerite Morales sigmoid colectomy           TRANSURETHRAL RESECTION OF PROSTATE  2004    Dr. Puneet Aragon    TUNNELED VENOUS PORT PLACEMENT           Allergies   Allergen Reactions    Naproxen Other (See Comments)     Patient gets nose bleeds. Patient should not take. Patient lost a lot of blood in November due to Naproxen.      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    Occupation: retired -      Comment: as of 2013   Tobacco Use    Smoking status: Former     Packs/day: 0.10     Years: 7.00     Pack years: 0.70     Types: Cigars, Cigarettes     Start date:      Quit date:      Years since quittin.8    Smokeless tobacco: Never    Tobacco comments:     quit in the 1960s -- former cigar smoker   Substance and Sexual Activity    Alcohol use: Yes     Comment: social    Drug use: No    Sexual activity: Yes     Partners: Female     Comment:  - Salma Zapata - [id number]   Other Topics Concern    Not on file   Social History Narrative    Past Surgical History     TURP: 2004    prostate biopsy - 1997                                                    Last updated by Sonali Carver MD on 10/29/2008                      Social History Marital Status:  - 1967     Spouse: Susan    Children: 3      Children's Names: Jose L Marquez    Employment Status: retired -     Employer: [employer]    Occupation:  and [place]    Alcohol Use: socially    Drug Use: none    Tobacco Usage: prior smoker    Cigars/Pipes - Years Smoked: 65's & 62's                                                 Last updated by Mariana Phillips MD on 2009                      Family History     mother -  - age 73-73 - coronary artery disease, hypertension, sudden death    father -  - age 80 - ? colon cancer        brother - Bradford Chatman -  - age 68 - 2009 - pancreatic cancer, hypertension, CABG, kidney problem    brother - Yossi Dodge - small intestinal cancer, hypertension ([place]ad[place] Gilmore)    brother - Yo Lee - hypertension    brother - Natan Garza -  - age 77 - 2008 - hypertension, colon cancer    brother -  - age 15 -  in a fire, smoke inhalation injury        sister - Shaneka Mcmullen - hypertension        son - Yossi Dodge - multiple sclerosis (age 37)    son - Jay Rosario -        daughter - Ahsan Tadeo - asthma                                  Last updated by Mariana Phillips MD on 2010      Social Determinants of Health     Financial Resource Strain: Not on file   Food Insecurity: Not on file   Transportation Needs: Not on file   Physical Activity: Not on file   Stress: Not on file   Social Connections: Not on file   Intimate Partner Violence: Not on file   Housing Stability: Not on file        Patient has a family history includes Arthritis in his brother; Asthma in his daughter; Cancer in his brother, brother, brother, and father; Rhett Rafael in his brother; Coronary Art Dis in his brother and mother; Heart Disease in his brother and mother; Heart Surgery in his brother; High Blood Pressure in his brother, brother, brother, brother, mother, and sister;  Hypertension in his brother, brother, brother, brother, mother, sister, and son; Kidney Disease in his brother; Mult Sclerosis in his son. Current Outpatient Medications   Medication Sig Dispense Refill    aspirin 81 MG EC tablet Take 81 mg by mouth in the morning. furosemide (LASIX) 20 MG tablet TAKE ONE TABLET BY MOUTH DAILY BEFORE DINNER 90 tablet 3    warfarin (COUMADIN) 5 MG tablet TAKE ONE TABLET BY MOUTH DAILY 90 tablet 3    nitroGLYCERIN (NITROSTAT) 0.4 MG SL tablet up to max of 3 total doses. If no relief after 1 dose, call 911. 25 tablet 3    Handicap Placard MISC by Does not apply route Unable to walk more than 250 feet before having to stop and rest.  DX: AFIB  Not to exceed 5 years 1 each 0    Handicap Placard MISC by Does not apply route 1 each 0    Handicap Placard MISC by Does not apply route DX: I48.20 afib  Duration: 5 years 1 each 0    ezetimibe (ZETIA) 10 MG tablet Take 10 mg by mouth daily      metoprolol succinate (TOPROL XL) 200 MG extended release tablet TAKE ONE TABLET BY MOUTH DAILY 30 tablet 0    irbesartan (AVAPRO) 75 MG tablet TAKE ONE TABLET BY MOUTH DAILY 90 tablet 2    atorvastatin (LIPITOR) 40 MG tablet Take 1 tablet by mouth nightly 90 tablet 3    pantoprazole (PROTONIX) 20 MG tablet Take 1 tablet by mouth daily (Patient taking differently: Take 20 mg by mouth every other day) 90 tablet 3    triamterene-hydrochlorothiazide (MAXZIDE-25) 37.5-25 MG per tablet Take 1 tablet by mouth daily 90 tablet 3    Cyanocobalamin (VITAMIN B-12) 500 MCG LOZG Take 500 mcg by mouth daily      finasteride (PROSCAR) 5 MG tablet Take 5 mg by mouth daily      Cholecalciferol (VITAMIN D) 2000 UNITS CAPS capsule Take 1 capsule by mouth 2 times daily      tamsulosin (FLOMAX) 0.4 MG capsule Take 0.4 mg by mouth daily      Multiple Vitamin (MULTIVITAMIN) capsule Take 1 capsule by mouth daily. No current facility-administered medications for this visit.            Vitals:    11/09/22 1026   BP: 114/80   Pulse: 60 Weight: 212        Review of Systems   Constitutional: Negative for activity change and fatigue. Respiratory: Negative for apnea, cough, choking, chest tightness and shortness of breath. Cardiovascular: Negative for chest pain, palpitations and leg swelling. No PND or orthopnea. No tachycardia. Gastrointestinal: Negative for abdominal distention. Musculoskeletal: Negative for myalgias. Neurological: Negative for dizziness, syncope and light-headedness. Psychiatric/Behavioral: Negative for behavioral problems, confusion and agitation. All other systems reviewed negative as done    Objective:   Physical Exam   Constitutional: He is oriented to person, place, and time. He appears well-developed and well-nourished. No distress. HENT:   Head: Normocephalic and atraumatic. Eyes: Conjunctivae and EOM are normal. Right eye exhibits no discharge. Left eye exhibits no discharge. Neck: Normal range of motion. Neck supple. No JVD present. Cardiovascular: Normal rate, S1 normal, S2 normal and normal heart sounds. An irregularly irregular rhythm present. Exam reveals no gallop. No murmur heard. Pulses:       Radial pulses are 2+ on the right side, and 2+ on the left side. Pulmonary/Chest: Effort normal and breath sounds normal. No respiratory distress. He has no wheezes. He has no rales. Abdominal: Soft. Bowel sounds are normal. No tenderness. Musculoskeletal: Normal range of motion. He exhibits no  tenderness. Tr edema  Neurological: He is alert and oriented to person, place, and time. Skin: Skin is warm and dry. Psychiatric: He has a normal mood and affect. His behavior is normal. Thought content normal.       Assessment:       Diagnosis Orders   1. Chronic atrial fibrillation ([address]        2. CAD in native artery        3. History of PTCA        4. Essential hypertension        5. MI, old                  Plan:      CV stable. Knee and sciatic problem. Edema stable.   HR controlled. No angina  Wt stable. BP good. On AC/ASA. No bleeding issues. Watch salt. Walking. Will continue Toprol 200 mg qd/avapro 75 mg qd/ Maxzide for BP/CAD. Will continue coumadin for afib/CVA proph. Routine INR. Encouraged diet, and wt loss. No changes. Reviewed previous records and testing including cath 10/17 and echo 12/18. Follow up 3 months.
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
## 2022-12-08 ENCOUNTER — ANTI-COAG VISIT (OUTPATIENT)
Dept: PHARMACY | Age: 82
End: 2022-12-08
Payer: MEDICARE

## 2022-12-08 DIAGNOSIS — I48.20 CHRONIC ATRIAL FIBRILLATION (HCC): Primary | ICD-10-CM

## 2022-12-08 LAB — INTERNATIONAL NORMALIZATION RATIO, POC: 2.2

## 2022-12-08 PROCEDURE — 99211 OFF/OP EST MAY X REQ PHY/QHP: CPT

## 2022-12-08 PROCEDURE — 85610 PROTHROMBIN TIME: CPT
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
## 2023-01-05 ENCOUNTER — ANTI-COAG VISIT (OUTPATIENT)
Dept: PHARMACY | Age: 83
End: 2023-01-05
Payer: MEDICARE

## 2023-01-05 DIAGNOSIS — I48.20 CHRONIC ATRIAL FIBRILLATION (HCC): Primary | ICD-10-CM

## 2023-01-05 LAB — INTERNATIONAL NORMALIZATION RATIO, POC: 2.5

## 2023-01-05 PROCEDURE — 99211 OFF/OP EST MAY X REQ PHY/QHP: CPT

## 2023-01-05 PROCEDURE — 85610 PROTHROMBIN TIME: CPT
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
## 2023-01-24 ENCOUNTER — TELEPHONE (OUTPATIENT)
Dept: PHARMACY | Age: 83
End: 2023-01-24

## 2023-01-24 NOTE — TELEPHONE ENCOUNTER
Pt called to report he started Bactrim DS BID x7d on 1/23/23, which has a strong drug interaction with warfarin. Pt instructed to decrease his warfarin dose to 5 mg on Fridays only and 2.5 mg all other days for the next week. Pt only took 2.5 mg on Monday already. R/s INR check for 1/26/23.     Nyla Mckeon, PharmD, [facility]  Medication Management Clinic   Figueroa Rojas Ph: [phone]  Matt Bhat Ph: [phone]  1/24/2023 11:44 AM    For Pharmacy Admin Tracking Only    Intervention Detail: Dose Adjustment: 1, reason: Therapy De-escalation  Total # of Interventions Recommended: 1  Total # of Interventions Accepted: 1  Time Spent (min): 10

## 2023-01-26 ENCOUNTER — ANTI-COAG VISIT (OUTPATIENT)
Dept: PHARMACY | Age: 83
End: 2023-01-26
Payer: MEDICARE

## 2023-01-26 DIAGNOSIS — I48.20 CHRONIC ATRIAL FIBRILLATION (HCC): Primary | ICD-10-CM

## 2023-01-26 LAB — INTERNATIONAL NORMALIZATION RATIO, POC: 1.4

## 2023-01-26 PROCEDURE — 99212 OFFICE O/P EST SF 10 MIN: CPT

## 2023-01-26 PROCEDURE — 85610 PROTHROMBIN TIME: CPT
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
## 2023-02-09 ENCOUNTER — ANTI-COAG VISIT (OUTPATIENT)
Dept: PHARMACY | Age: 83
End: 2023-02-09
Payer: MEDICARE

## 2023-02-09 DIAGNOSIS — I48.20 CHRONIC ATRIAL FIBRILLATION (HCC): Primary | ICD-10-CM

## 2023-02-09 LAB — INR BLD: 2.1

## 2023-02-09 PROCEDURE — 85610 PROTHROMBIN TIME: CPT

## 2023-02-09 PROCEDURE — 99211 OFF/OP EST MAY X REQ PHY/QHP: CPT

## 2023-02-09 NOTE — PROGRESS NOTES
ANTICOAGULATION SERVICE    Memory Gian Bartlett" is a 79 y.o. male with PMHx significant for chronic AF (LFX4WY7-BNFi of 4), HTN who presents to clinic 2/9/2023 for anticoagulation monitoring and adjustment. Anticoagulation Indication(s):  Afib    Referring Physician:  Dr. Randa Banda    Goal INR Range:  2-3  Duration of Anticoagulation Therapy:  Indefinite  Time of day dose taken:  PM  Product patient has at home:  warfarin 5 mg (PEACH color)    [id number] Score for Atrial Fibrillation Stroke Risk   Risk   Factors  Component Value   C CHF No 0   H HTN Yes 1   A2 Age >= 76 Yes,  (79 y.o.) 2   D DM No 0   S2 Prior Stroke/TIA No 0   V Vascular Disease Yes 1   A Age 74-69 No,  (79 y.o.) 0   Sc Sex male 0    [id number]  Score  4   Score last updated 4/28/22 4:82 PM EDT    Click here for a link to the UpToDate guideline \"Atrial Fibrillation: Anticoagulation therapy to prevent embolization    Disclaimer: Risk Score calculation is dependent on accuracy of patient problem list and past encounter diagnosis.       INR Summary                           Warfarin regimen (mg)  Date INR   A/P   Sun Mon Tue Wed Thu Fri Sat Mg/wk  2/9 2.1 At goal, continue 2.5 5 2.5 5 2.5 5 2.5 25  1/26 1.4 Below goal, resume 2.5 5 2.5 5 2.5 5 2.5 25    Started bactrim: 2.5 2.5 2.5 2.5 2.5 5 2.5 20  1/5 2.5 At goal, no change 2.5 5 2.5 5 2.5 5 2.5 25  12/8 2.2 At goal, continue 2.5 5 2.5 5 2.5 5 2.5 25  11/9 2.2 At goal, continue 2.5 5 2.5 5 2.5 5 2.5 25  10/13 1.7 Below goal, continue 2.5 5 2.5 5 2.5 5 2.5 25  9/15 2.3 At goal, no change 2.5 5 2.5 5 2.5 5 2.5 25  8/11 2.3 At goal, no change 2.5 5 2.5 5 2.5 5 2.5 25  7/14 2.7 At goal, no change 2.5 5 2.5 5 2.5 5 2.5 25  6/16 2.7 At goal, no change 2.5 5 2.5 5 2.5 5 2.5 25  5/26 1.9 Below goal, no change 2.5 5 2.5 5 2.5 5 2.5 25  5/10 1.8 Below goal, no change 2.5 5 2.5 5 2.5 5 2.5 25  4/28 4.5 Above goal,holdx+dec 2.5 5 2.5 5 0/2.5 5 2.5 25  4/14 1.4 Below goal, (miss) inc 5 5 2.5 5 5/2.5 5 2.5 27.5  3/17 2.0 At goal, no change 2.5 5 2.5 5 2.5 5 2.5 25  2/17 2.8 At goal, no change 2.5 5 2.5 5 2.5 5 2.5 25  1/20 2.7 At goal, no change 2.5 5 2.5 5 2.5 5 2.5 25  12/30 3.2 At goal, no change 2.5 5 2.5 5 2.5 5 2.5 25  12/2 2.4 At goal, no change 2.5 5 2.5 5 2.5 5 2.5 25  11/4 2.2 At goal, no change 2.5 5 2.5 5 2.5 5 2.5 25  10/14 1.9 Below goal, continue  2.5 5 2.5 5 2.5 5 2.5 25  9/24 2.4 At goal, no change 2.5 5 2.5 5 2.5 5 2.5 25  9/8 3.8 Above goal, hold+dec 2.5 5 2.5 0/5 2.5 5 2.5 25  8/25 4.0 Above goal, hold+dec 5 5 2.5 0/5 2.5 5 2.5 27.5  7/28 3.0 At goal, continue 5 5 2.5 5 5 5 2.5 30  6/30 2.8 At goal, continue 5 5 2.5 5 5 5 2.5 30    [facility] Lab  3/2/22:   Hgb 12.6  Hct 40.1    Patient History:  Recent hospitalizations/HC visits 10/31/22: PCP for sciatic nerve pain  10/23: ED for wrist sprain/contusion 2/2 mechanical fall  8/13: colonoscopy, held warfarin x5d + Lovenox bridge   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 EGD 4/25 significant for: 2 small antral erosions, moderate hiatal hernia, slightly irregular Z-line, small distal duodenal polyp. Patient d/c off of plavix and warfarin in preparation for outpt EGD with biopsies and colonoscopy. EGD/Colonoscopy 5/2; no bleeding source, resumed plavix and warfarin on 5/3.  -10/29-11/1/17: [facility] for STEMI, s/p [facility] 10/30 with primary stenting to proximal RCA.     Recent medication changes Completed Bactrim DS BID x7d on 1/30/23  Taking APAP 2 tabs BID for knee pain   Medications taken regularly that may interact with warfarin or alter INR MVI (may contain vit K)  ASA (stent placement 10/30/17)  4 APAP/day prn knee pain   Warfarin dose taken as prescribed yes - uses pillbox  Reduced dose 1/23-1/26 as directed for DI with bactrim  held warfarin 1/2-1/6, 8/7-8/12 and bridged with Lovenox    Signs/symptoms of bleeding H/o hematuria- patient states he was told by his urologist that as long as he is taking warfarin, hematuria may continue. H/o epistaxis, occasional hemorrhoids, anemia. Vitamin K intake Normally has broccoli, asparagus, kale/gay salads 3-4 per week    10/13/22: increase vit past week   Recent vomiting/diarrhea/fever, changes in weight or activity level Trying to improve diet/exercise and lose weight gradually. Tobacco or alcohol use Patient denies tobacco use  Will have 1 glass of wine per day max   Upcoming surgeries or procedures None     Assessment/Plan:   Patient's INR was therapeutic today (2.1). Pt recently finished 7-day course of bactrim (1/23-1/30) and had a temporarily reduced dose but returned to his regular dose after subtherapeutic INR on 1/26. Patient was instructed to continue warfarin dose of 5mg on MWF and 2.5mg all other days. Repeat INR in 4 weeks. Patient was reminded to call with any bleeding, medication changes, or fever/vomiting/diarrhea. Patient understands dosing directions and information discussed. Dosing schedule and follow up appointment given to patient. Progress note routed to referring physician's office. Patient acknowledges working in consult agreement with pharmacist as referred by his/her physician. Next Clinic Appointment: 3/9    Please call The [address] at [phone] with any questions. Thanks!   Violetta Jama - PharmD Candidate 2023  Medication Management Clinic   Leonel Diaz Ph: [phone]  Estephanie Bazzi Ph: [phone]  2/9/2023 9:27 AM      For Pharmacy Admin Tracking Only    Time Spent (min): 15

## 2023-02-16 ENCOUNTER — OFFICE VISIT (OUTPATIENT)
Dept: CARDIOLOGY CLINIC | Age: 83
End: 2023-02-16
Payer: MEDICARE

## 2023-02-16 VITALS
DIASTOLIC BLOOD PRESSURE: 80 MMHG | WEIGHT: 221 LBS | HEART RATE: 60 BPM | SYSTOLIC BLOOD PRESSURE: 124 MMHG | BODY MASS INDEX: 33.6 KG/M2

## 2023-02-16 DIAGNOSIS — I10 ESSENTIAL HYPERTENSION: ICD-10-CM

## 2023-02-16 DIAGNOSIS — I48.20 CHRONIC ATRIAL FIBRILLATION (HCC): Primary | ICD-10-CM

## 2023-02-16 DIAGNOSIS — I25.10 CAD IN NATIVE ARTERY: ICD-10-CM

## 2023-02-16 DIAGNOSIS — I25.2 MI, OLD: ICD-10-CM

## 2023-02-16 DIAGNOSIS — Z98.61 HISTORY OF PTCA: ICD-10-CM

## 2023-02-16 PROCEDURE — 3074F SYST BP LT 130 MM HG: CPT | Performed by: INTERNAL MEDICINE

## 2023-02-16 PROCEDURE — 3079F DIAST BP 80-89 MM HG: CPT | Performed by: INTERNAL MEDICINE

## 2023-02-16 PROCEDURE — G8417 CALC BMI ABV UP PARAM F/U: HCPCS | Performed by: INTERNAL MEDICINE

## 2023-02-16 PROCEDURE — 99214 OFFICE O/P EST MOD 30 MIN: CPT | Performed by: INTERNAL MEDICINE

## 2023-02-16 PROCEDURE — G8484 FLU IMMUNIZE NO ADMIN: HCPCS | Performed by: INTERNAL MEDICINE

## 2023-02-16 PROCEDURE — 1123F ACP DISCUSS/DSCN MKR DOCD: CPT | Performed by: INTERNAL MEDICINE

## 2023-02-16 PROCEDURE — G8427 DOCREV CUR MEDS BY ELIG CLIN: HCPCS | Performed by: INTERNAL MEDICINE

## 2023-02-16 PROCEDURE — 1036F TOBACCO NON-USER: CPT | Performed by: INTERNAL MEDICINE

## 2023-02-16 NOTE — PROGRESS NOTES
Subjective:      Patient ID: Louis Lepe is a 79 y.o. male. HPI:  Here for follow up afib/HTN/CAD/PTCA/Non st.  Still with some knee issues. Sciatica recently. Slowly improving. Edema stable. Wt down. No sob. No orthopnea/PND. No complaints. No palp/tachycardia. No syncope. No exertional chest pain/sob. BP good at home.      Past Medical History:   Diagnosis Date    Allergic rhinitis     Anemia     Atrial fibrillation (HCC)     Atrial fibrillation ([facility]     Benign non-nodular prostatic hyperplasia with lower urinary tract symptoms 2/9/2017    Benign paroxysmal positional vertigo     BPH (benign prostatic hyperplasia)     BPH (benign prostatic hypertrophy)     Cataract 10/3/2014    Cholelithiasis     Colon cancer ([facility] [address] 4/9/2012    Diverticulosis     Dyslipidemia 7/21/2010    Elevated PSA     Erectile dysfunction     Essential hypertension 11/24/2015    Gastroesophageal reflux disease without esophagitis 11/9/2016    GERD (gastroesophageal reflux disease)     Glaucoma 4/5/2013    Hiatal hernia     Hypertension     Lumbar radiculopathy     Osteoarthritis     Peripheral neuropathy 12/7/2012    Proteinuria 7/22/2010    S/P laparoscopic-assisted sigmoidectomy 4/17/2012    Urinary frequency      Past Surgical History:   Procedure Laterality Date    CATARACT EXTRACTION W/  INTRAOCULAR LENS IMPLANT Right October 7, 2014    Dr. Bettina Weems Left October 24, 2014    Dr. Pearl [facility]  April 4, 2012    Dr. Tara Petersen  May 15, 2013    Dr. Tara Petersen  08/09/2016    Dr. Bj Negrete    COLONOSCOPY N/A 02/17/2017    COLONOSCOPY N/A 8/13/2020    COLONOSCOPY performed by Kory Dailey MD at [facility], [address] Right October 7, 2014    Dr. Hiram Magallanes Left October 24, 2014 Dr. Naga Hinton  2013    port-a-cath removal     PROSTATE BIOPSY  1997    PROSTATE BIOPSY  May 10, 2010    Dr. Michelle Graham    SIGMOIDECTOMY  2012    Dr. Caitlin Garcia sigmoid colectomy           TRANSURETHRAL RESECTION OF PROSTATE  2004    Dr. Kyle Chadwick    TUNNELED VENOUS PORT PLACEMENT           Allergies   Allergen Reactions    Naproxen Other (See Comments)     Patient gets nose bleeds. Patient should not take. Patient lost a lot of blood in November due to Naproxen.      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    Occupation: retired -      Comment: as of 2013   Tobacco Use    Smoking status: Former     Packs/day: 0.10     Years: 7.00     Pack years: 0.70     Types: Cigars, Cigarettes     Start date:      Quit date:      Years since quittin.1    Smokeless tobacco: Never    Tobacco comments:     quit in the 1960s -- former cigar smoker   Substance and Sexual Activity    Alcohol use: Yes     Comment: social    Drug use: No    Sexual activity: Yes     Partners: Female     Comment:  - Koko Almazan - [id number]   Other Topics Concern    Not on file   Social History Narrative    Past Surgical History     TURP: 2004    prostate biopsy - 1997                                                    Last updated by Linda Diaz MD on 10/29/2008                      Social History Marital Status:  - 1967     Spouse: Susan    Children: 3      Children's Names: Artemio Hunter    Employment Status: retired -     Employer: [employer]    Occupation:  and [employer]    Alcohol Use: socially    Drug Use: none    Tobacco Usage: prior smoker    Cigars/Pipes - Years Smoked: 65's & 63's                                                 Last updated by Graciela Atwood MD on 2009                      Family History     mother -  - age 73-73 - coronary artery disease, hypertension, sudden death    father -  - age 80 - ? colon cancer        brother - Miguel Vu -  - age 68 - 2009 - pancreatic cancer, hypertension, CABG, kidney problem    brother - Luis Thomson - small intestinal cancer, hypertension (Rochelle Rothman)    brother - Katrin  - hypertension    brother - Peña Lama -  - age 77 - 2008 - hypertension, colon cancer    brother -  - age 15 -  in a fire, smoke inhalation injury        sister - Murtaza Raymond - hypertension        son - Luis Thomson - multiple sclerosis (age 37)    son - Dionte Jones -        daughter - Arturo Resendez - asthma                                  Last updated by Graciela Atwood MD on 2010      Social Determinants of Health     Financial Resource Strain: Not on file   Food Insecurity: Not on file   Transportation Needs: Not on file   Physical Activity: Not on file   Stress: Not on file   Social Connections: Not on file   Intimate Partner Violence: Not on file   Housing Stability: Not on file        Patient has a family history includes Arthritis in his brother; Asthma in his daughter; Cancer in his brother, brother, brother, and father; [place] in his brother; Coronary Art Dis in his brother and mother; Heart Disease in his brother and mother; Heart Surgery in his brother; High Blood Pressure in his brother, brother, brother, brother, mother, and sister;  Hypertension in his brother, brother, brother, brother, mother, sister, and son; Kidney Disease in his brother; Mult Sclerosis in his son. Current Outpatient Medications   Medication Sig Dispense Refill    tiZANidine (ZANAFLEX) 2 MG tablet       aspirin 81 MG EC tablet Take 81 mg by mouth in the morning. furosemide (LASIX) 20 MG tablet TAKE ONE TABLET BY MOUTH DAILY BEFORE DINNER 90 tablet 3    warfarin (COUMADIN) 5 MG tablet TAKE ONE TABLET BY MOUTH DAILY 90 tablet 3    nitroGLYCERIN (NITROSTAT) 0.4 MG SL tablet up to max of 3 total doses. If no relief after 1 dose, call 911. 25 tablet 3    Handicap Placard MISC by Does not apply route Unable to walk more than 250 feet before having to stop and rest.  DX: AFIB  Not to exceed 5 years 1 each 0    Handicap Placard MISC by Does not apply route 1 each 0    Handicap Placard MISC by Does not apply route DX: I48.20 afib  Duration: 5 years 1 each 0    ezetimibe (ZETIA) 10 MG tablet Take 10 mg by mouth daily      metoprolol succinate (TOPROL XL) 200 MG extended release tablet TAKE ONE TABLET BY MOUTH DAILY 30 tablet 0    irbesartan (AVAPRO) 75 MG tablet TAKE ONE TABLET BY MOUTH DAILY 90 tablet 2    atorvastatin (LIPITOR) 40 MG tablet Take 1 tablet by mouth nightly 90 tablet 3    pantoprazole (PROTONIX) 20 MG tablet Take 1 tablet by mouth daily (Patient taking differently: Take 20 mg by mouth every other day) 90 tablet 3    Cyanocobalamin (VITAMIN B-12) 500 MCG LOZG Take 500 mcg by mouth daily      finasteride (PROSCAR) 5 MG tablet Take 5 mg by mouth daily      Cholecalciferol (VITAMIN D) 2000 UNITS CAPS capsule Take 1 capsule by mouth 2 times daily      tamsulosin (FLOMAX) 0.4 MG capsule Take 0.4 mg by mouth daily      Multiple Vitamin (MULTIVITAMIN) capsule Take 1 capsule by mouth daily. triamterene-hydrochlorothiazide (MAXZIDE-25) 37.5-25 MG per tablet Take 1 tablet by mouth daily 90 tablet 3     No current facility-administered medications for this visit.            Vitals: 02/16/23 1001   BP: 124/80   Pulse: 60           Weight: 221        Review of Systems   Constitutional: Negative for activity change and fatigue. Respiratory: Negative for apnea, cough, choking, chest tightness and shortness of breath. Cardiovascular: Negative for chest pain, palpitations and leg swelling. No PND or orthopnea. No tachycardia. Gastrointestinal: Negative for abdominal distention. Musculoskeletal: Negative for myalgias. Neurological: Negative for dizziness, syncope and light-headedness. Psychiatric/Behavioral: Negative for behavioral problems, confusion and agitation. All other systems reviewed negative as done    Objective:   Physical Exam   Constitutional: He is oriented to person, place, and time. He appears well-developed and well-nourished. No distress. HENT:   Head: Normocephalic and atraumatic. Eyes: Conjunctivae and EOM are normal. Right eye exhibits no discharge. Left eye exhibits no discharge. Neck: Normal range of motion. Neck supple. No JVD present. Cardiovascular: Normal rate, S1 normal, S2 normal and normal heart sounds. An irregularly irregular rhythm present. Exam reveals no gallop. No murmur heard. Pulses:       Radial pulses are 2+ on the right side, and 2+ on the left side. Pulmonary/Chest: Effort normal and breath sounds normal. No respiratory distress. He has no wheezes. He has no rales. Abdominal: Soft. Bowel sounds are normal. No tenderness. Musculoskeletal: Normal range of motion. He exhibits no  tenderness. Tr edema  Neurological: He is alert and oriented to person, place, and time. Skin: Skin is warm and dry. Psychiatric: He has a normal mood and affect. His behavior is normal. Thought content normal.       Assessment:       Diagnosis Orders   1. Chronic atrial fibrillation ([address]        2. CAD in native artery        3. History of PTCA        4. Essential hypertension        5. MI, old                  Plan:      CV stable. Knee and sciatic problem. Edema stable. HR controlled. No angina  Wt stable. BP good. On AC/ASA. No bleeding issues. Watch salt. Walking. Will continue Toprol 200 mg qd/avapro 75 mg qd/ Maxzide for BP/CAD. Will continue coumadin for afib/CVA proph. Routine INR. No bleeding issues. Encouraged diet, and wt loss. No changes. Reviewed previous records and testing including cath 10/17 and echo 12/18. Follow up 3 months.

## 2023-03-09 ENCOUNTER — ANTI-COAG VISIT (OUTPATIENT)
Dept: PHARMACY | Age: 83
End: 2023-03-09
Payer: MEDICARE

## 2023-03-09 DIAGNOSIS — I48.20 CHRONIC ATRIAL FIBRILLATION (HCC): Primary | ICD-10-CM

## 2023-03-09 LAB — INR BLD: 1.8

## 2023-03-09 PROCEDURE — 85610 PROTHROMBIN TIME: CPT

## 2023-03-09 PROCEDURE — 99211 OFF/OP EST MAY X REQ PHY/QHP: CPT

## 2023-03-09 NOTE — PROGRESS NOTES
ANTICOAGULATION SERVICE    Amber Livingston" is a 79 y.o. male with PMHx significant for chronic AF (BII9SA2-YORu of 4), HTN who presents to clinic 3/9/2023 for anticoagulation monitoring and adjustment. Anticoagulation Indication(s):  Afib    Referring Physician:  Dr. Kari Escamilla    Goal INR Range:  2-3  Duration of Anticoagulation Therapy:  Indefinite  Time of day dose taken:  PM  Product patient has at home:  warfarin 5 mg (PEACH color)    [id number] Score for Atrial Fibrillation Stroke Risk   Risk   Factors  Component Value   C CHF No 0   H HTN Yes 1   A2 Age >= 76 Yes,  (79 y.o.) 2   D DM No 0   S2 Prior Stroke/TIA No 0   V Vascular Disease Yes 1   A Age 74-69 No,  (79 y.o.) 0   Sc Sex male 0    [id number]  Score  4   Score last updated 4/28/22 7:95 PM EDT    Click here for a link to the UpToDate guideline \"Atrial Fibrillation: Anticoagulation therapy to prevent embolization    Disclaimer: Risk Score calculation is dependent on accuracy of patient problem list and past encounter diagnosis.       INR Summary                           Warfarin regimen (mg)  Date INR   A/P   Sun Mon Tue Wed Thu Fri Sat Mg/wk  3/9 1.8 At goal, continue 2.5 5 2.5 5 2.5 5 2.5 25  2/9 2.1 At goal, continue 2.5 5 2.5 5 2.5 5 2.5 25  1/26 1.4 Below goal, resume 2.5 5 2.5 5 2.5 5 2.5 25    Started bactrim: 2.5 2.5 2.5 2.5 2.5 5 2.5 20  1/5 2.5 At goal, no change 2.5 5 2.5 5 2.5 5 2.5 25  12/8 2.2 At goal, continue 2.5 5 2.5 5 2.5 5 2.5 25  11/9 2.2 At goal, continue 2.5 5 2.5 5 2.5 5 2.5 25  10/13 1.7 Below goal, continue 2.5 5 2.5 5 2.5 5 2.5 25  9/15 2.3 At goal, no change 2.5 5 2.5 5 2.5 5 2.5 25  8/11 2.3 At goal, no change 2.5 5 2.5 5 2.5 5 2.5 25  7/14 2.7 At goal, no change 2.5 5 2.5 5 2.5 5 2.5 25  6/16 2.7 At goal, no change 2.5 5 2.5 5 2.5 5 2.5 25  5/26 1.9 Below goal, no change 2.5 5 2.5 5 2.5 5 2.5 25  5/10 1.8 Below goal, no change 2.5 5 2.5 5 2.5 5 2.5 25  4/28 4.5 Above goal,holdx+dec 2.5 5 2.5 5 0/2.5 5 2.5 25  4/14 1.4 Below goal, (miss) inc 5 5 2.5 5 5/2.5 5 2.5 27.5  3/17 2.0 At goal, no change 2.5 5 2.5 5 2.5 5 2.5 25  2/17 2.8 At goal, no change 2.5 5 2.5 5 2.5 5 2.5 25  1/20 2.7 At goal, no change 2.5 5 2.5 5 2.5 5 2.5 25  12/30 3.2 At goal, no change 2.5 5 2.5 5 2.5 5 2.5 25  12/2 2.4 At goal, no change 2.5 5 2.5 5 2.5 5 2.5 25  11/4 2.2 At goal, no change 2.5 5 2.5 5 2.5 5 2.5 25  10/14 1.9 Below goal, continue  2.5 5 2.5 5 2.5 5 2.5 25  9/24 2.4 At goal, no change 2.5 5 2.5 5 2.5 5 2.5 25  9/8 3.8 Above goal, hold+dec 2.5 5 2.5 0/5 2.5 5 2.5 25  8/25 4.0 Above goal, hold+dec 5 5 2.5 0/5 2.5 5 2.5 27.5  7/28 3.0 At goal, continue 5 5 2.5 5 5 5 2.5 30  6/30 2.8 At goal, continue 5 5 2.5 5 5 5 2.5 30    The 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 EGD 4/25 significant for: 2 small antral erosions, moderate hiatal hernia, slightly irregular Z-line, small distal duodenal polyp. Patient d/c off of plavix and warfarin in preparation for outpt EGD with biopsies and colonoscopy. EGD/Colonoscopy 5/2; no bleeding source, resumed plavix and warfarin on 5/3.  -10/29-11/1/17: [facility] for STEMI, s/p [facility] 10/30 with primary stenting to proximal RCA.     Recent medication changes Completed Bactrim DS BID x7d on 1/30/23  Taking APAP 2 tabs BID for knee pain   Medications taken regularly that may interact with warfarin or alter INR MVI (may contain vit K)  ASA (stent placement 10/30/17)  4 APAP/day prn knee pain   Warfarin dose taken as prescribed yes - uses pillbox  Reduced dose 1/23-1/26 as directed for DI with bactrim  held warfarin 1/2-1/6, 8/7-8/12 and bridged with Lovenox    Signs/symptoms of bleeding H/o hematuria- patient states he was told by his urologist that as long as he is taking warfarin, hematuria may continue. H/o epistaxis, occasional hemorrhoids, anemia. Vitamin K intake 3/9: reports increased vitamin K intake this week, big salad for dinner night before INR check  Normally has broccoli, asparagus, kale/gay salads 3-4 per week    10/13/22: increase vit past week   Recent vomiting/diarrhea/fever, changes in weight or activity level Trying to improve diet/exercise and lose weight gradually. Tobacco or alcohol use Patient denies tobacco use  Will have 1 glass of wine per day max   Upcoming surgeries or procedures None     Assessment/Plan:   Patient's INR was subtherapeutic today (1.8). Patient denies any recent missed warfarin doses or med changes, but does report increased vitamin K intake. Patient reports he had a big salad last night for dinner, which may be contributing to low INR today. Patient was instructed to continue warfarin dose of 5mg on MWF and 2.5mg all other days. Repeat INR in 4 weeks. Patient was reminded to call with any bleeding, medication changes, or fever/vomiting/diarrhea. Patient understands dosing directions and information discussed. Dosing schedule and follow up appointment given to patient. Progress note routed to referring physician's office. Patient acknowledges working in consult agreement with pharmacist as referred by his/her physician. Next Clinic Appointment: 4/6    Please call The [address] [place] [address] Avenue at [phone] with any questions. Thanks!   Marylin Youssef, PharmD Candidate 2023  [facility]   Rip Coker Ph: [phone]  Alfredito Carrillo Ph: [phone]  3/9/2023 9:12 AM      For Pharmacy Admin Tracking Only    Time Spent (min): 15

## 2023-04-06 ENCOUNTER — ANTI-COAG VISIT (OUTPATIENT)
Dept: PHARMACY | Age: 83
End: 2023-04-06
Payer: MEDICARE

## 2023-04-06 DIAGNOSIS — I48.20 CHRONIC ATRIAL FIBRILLATION (HCC): Primary | Chronic | ICD-10-CM

## 2023-04-06 LAB — INTERNATIONAL NORMALIZATION RATIO, POC: 2.2

## 2023-04-06 PROCEDURE — 85610 PROTHROMBIN TIME: CPT

## 2023-04-06 PROCEDURE — 99211 OFF/OP EST MAY X REQ PHY/QHP: CPT

## 2023-04-06 NOTE — PROGRESS NOTES
ANTICOAGULATION SERVICE    Elmer Daniel" is a 79 y.o. male with PMHx significant for chronic AF (MOF7GE9-BYJn of 4), HTN who presents to clinic 4/6/2023 for anticoagulation monitoring and adjustment. Anticoagulation Indication(s):  Afib    Referring Physician:  Dr. Brennan Mott    Goal INR Range:  2-3  Duration of Anticoagulation Therapy:  Indefinite  Time of day dose taken:  PM  Product patient has at home:  warfarin 5 mg (PEACH color)    [id number] Score for Atrial Fibrillation Stroke Risk   Risk   Factors  Component Value   C CHF No 0   H HTN Yes 1   A2 Age >= 76 Yes,  (79 y.o.) 2   D DM No 0   S2 Prior Stroke/TIA No 0   V Vascular Disease Yes 1   A Age 74-69 No,  (79 y.o.) 0   Sc Sex male 0    [id number]  Score  4   Score last updated 4/28/22 9:07 PM EDT    Click here for a link to the UpToDate guideline \"Atrial Fibrillation: Anticoagulation therapy to prevent embolization    Disclaimer: Risk Score calculation is dependent on accuracy of patient problem list and past encounter diagnosis.       INR Summary                           Warfarin regimen (mg)  Date INR   A/P   Sun Mon Tue Wed Thu Fri Sat Mg/wk  4/6 2.2 At goal, continue 2.5 5 2.5 5 2.5 5 2.5 25  3/9 1.8 Below goal, continue 2.5 5 2.5 5 2.5 5 2.5 25  2/9 2.1 At goal, continue 2.5 5 2.5 5 2.5 5 2.5 25  1/26 1.4 Below goal, resume 2.5 5 2.5 5 2.5 5 2.5 25    Started bactrim: 2.5 2.5 2.5 2.5 2.5 5 2.5 20  1/5 2.5 At goal, no change 2.5 5 2.5 5 2.5 5 2.5 25  12/8 2.2 At goal, continue 2.5 5 2.5 5 2.5 5 2.5 25  11/9 2.2 At goal, continue 2.5 5 2.5 5 2.5 5 2.5 25  10/13 1.7 Below goal, continue 2.5 5 2.5 5 2.5 5 2.5 25  9/15 2.3 At goal, no change 2.5 5 2.5 5 2.5 5 2.5 25  8/11 2.3 At goal, no change 2.5 5 2.5 5 2.5 5 2.5 25  7/14 2.7 At goal, no change 2.5 5 2.5 5 2.5 5 2.5 25  6/16 2.7 At goal, no change 2.5 5 2.5 5 2.5 5 2.5 25  5/26 1.9 Below goal, no change 2.5 5 2.5 5 2.5 5 2.5 25  5/10 1.8 Below goal, no change

## 2023-05-04 ENCOUNTER — ANTI-COAG VISIT (OUTPATIENT)
Dept: PHARMACY | Age: 83
End: 2023-05-04
Payer: MEDICARE

## 2023-05-04 DIAGNOSIS — I48.20 CHRONIC ATRIAL FIBRILLATION (HCC): Primary | Chronic | ICD-10-CM

## 2023-05-04 LAB — INTERNATIONAL NORMALIZATION RATIO, POC: 1.6

## 2023-05-04 PROCEDURE — 85610 PROTHROMBIN TIME: CPT

## 2023-05-04 PROCEDURE — 99212 OFFICE O/P EST SF 10 MIN: CPT

## 2023-05-04 NOTE — PROGRESS NOTES
Adjustment: 1, reason: Therapy Optimization  Total # of Interventions Recommended: 1  Total # of Interventions Accepted: 1  Time Spent (min): 15

## 2023-05-18 ENCOUNTER — ANTI-COAG VISIT (OUTPATIENT)
Dept: PHARMACY | Age: 83
End: 2023-05-18
Payer: MEDICARE

## 2023-05-18 DIAGNOSIS — I48.20 CHRONIC ATRIAL FIBRILLATION (HCC): Primary | Chronic | ICD-10-CM

## 2023-05-18 LAB — INTERNATIONAL NORMALIZATION RATIO, POC: 2.5

## 2023-05-18 PROCEDURE — 85610 PROTHROMBIN TIME: CPT

## 2023-05-18 PROCEDURE — 99211 OFF/OP EST MAY X REQ PHY/QHP: CPT

## 2023-05-18 NOTE — PROGRESS NOTES
change 2.5 5 2.5 5 2.5 5 2.5 25  5/26 1.9 Below goal, no change 2.5 5 2.5 5 2.5 5 2.5 25  5/10 1.8 Below goal, no change 2.5 5 2.5 5 2.5 5 2.5 25  4/28 4.5 Above goal,holdx+dec 2.5 5 2.5 5 0/2.5 5 2.5 25  4/14 1.4 Below goal, (miss) inc 5 5 2.5 5 5/2.5 5 2.5 27.5  3/17 2.0 At goal, no change 2.5 5 2.5 5 2.5 5 2.5 25  2/17 2.8 At goal, no change 2.5 5 2.5 5 2.5 5 2.5 25  1/20 2.7 At goal, no change 2.5 5 2.5 5 2.5 5 2.5 25  12/30 3.2 At goal, no change 2.5 5 2.5 5 2.5 5 2.5 25  12/2 2.4 At goal, no change 2.5 5 2.5 5 2.5 5 2.5 25    [facility]  3/2/22:   Hgb 12.6  Hct 40.1    Patient History:  Recent hospitalizations/HC visits 10/31/22: PCP for sciatic nerve pain  10/23: ED for wrist sprain/contusion 2/2 mechanical fall  8/13: colonoscopy, held warfarin x5d + Lovenox bridge   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 EGD 4/25 significant for: 2 small antral erosions, moderate hiatal hernia, slightly irregular Z-line, small distal duodenal polyp. Patient d/c off of plavix and warfarin in preparation for outpt EGD with biopsies and colonoscopy. EGD/Colonoscopy 5/2; no bleeding source, resumed plavix and warfarin on 5/3.  -10/29-11/1/17: [facility] for STEMI, s/p [facility] 10/30 with primary stenting to proximal RCA.     Recent medication changes Considering starting Nuvoflex (contains 90 mg vit C, procollagen complex), but will try diclofenac gel for knee pain first.    Medications taken regularly that may interact with warfarin or alter INR MVI (may contain vit K)  ASA (stent placement 10/30/17)  4 APAP/day prn knee pain   Warfarin dose taken as prescribed yes - uses pillbox  Reduced dose 1/23-1/26 as directed for DI with bactrim  held warfarin 1/2-1/6, 8/7-8/12 and bridged with Lovenox    Signs/symptoms of bleeding H/o hematuria- patient states he was told by his urologist that as long as he is taking

## 2023-05-25 ENCOUNTER — OFFICE VISIT (OUTPATIENT)
Dept: CARDIOLOGY CLINIC | Age: 83
End: 2023-05-25
Payer: MEDICARE

## 2023-05-25 VITALS
HEART RATE: 76 BPM | SYSTOLIC BLOOD PRESSURE: 140 MMHG | BODY MASS INDEX: 33.06 KG/M2 | WEIGHT: 217.4 LBS | DIASTOLIC BLOOD PRESSURE: 80 MMHG

## 2023-05-25 DIAGNOSIS — I25.2 MI, OLD: ICD-10-CM

## 2023-05-25 DIAGNOSIS — I10 ESSENTIAL HYPERTENSION: ICD-10-CM

## 2023-05-25 DIAGNOSIS — I25.10 CAD IN NATIVE ARTERY: ICD-10-CM

## 2023-05-25 DIAGNOSIS — Z98.61 HISTORY OF PTCA: ICD-10-CM

## 2023-05-25 DIAGNOSIS — I48.20 CHRONIC ATRIAL FIBRILLATION (HCC): Primary | ICD-10-CM

## 2023-05-25 PROCEDURE — G8427 DOCREV CUR MEDS BY ELIG CLIN: HCPCS | Performed by: INTERNAL MEDICINE

## 2023-05-25 PROCEDURE — 1036F TOBACCO NON-USER: CPT | Performed by: INTERNAL MEDICINE

## 2023-05-25 PROCEDURE — G8417 CALC BMI ABV UP PARAM F/U: HCPCS | Performed by: INTERNAL MEDICINE

## 2023-05-25 PROCEDURE — 1123F ACP DISCUSS/DSCN MKR DOCD: CPT | Performed by: INTERNAL MEDICINE

## 2023-05-25 PROCEDURE — 3077F SYST BP >= 140 MM HG: CPT | Performed by: INTERNAL MEDICINE

## 2023-05-25 PROCEDURE — 3079F DIAST BP 80-89 MM HG: CPT | Performed by: INTERNAL MEDICINE

## 2023-05-25 PROCEDURE — 99214 OFFICE O/P EST MOD 30 MIN: CPT | Performed by: INTERNAL MEDICINE

## 2023-05-25 NOTE — PROGRESS NOTES
Weight:  217 lb 6.4 oz (98.6 kg)      Review of Systems   Constitutional: Negative for activity change and fatigue. Respiratory: Negative for apnea, cough, choking, chest tightness and shortness of breath. Cardiovascular: Negative for chest pain, palpitations and leg swelling. No PND or orthopnea. No tachycardia. Gastrointestinal: Negative for abdominal distention. Musculoskeletal: Negative for myalgias. Neurological: Negative for dizziness, syncope and light-headedness. Psychiatric/Behavioral: Negative for behavioral problems, confusion and agitation. All other systems reviewed negative as done    Objective:   Physical Exam   Constitutional: He is oriented to person, place, and time. He appears well-developed and well-nourished. No distress. HENT:   Head: Normocephalic and atraumatic. Eyes: Conjunctivae and EOM are normal. Right eye exhibits no discharge. Left eye exhibits no discharge. Neck: Normal range of motion. Neck supple. No JVD present. Cardiovascular: Normal rate, S1 normal, S2 normal and normal heart sounds. An irregularly irregular rhythm present. Exam reveals no gallop. No murmur heard. Pulses:       Radial pulses are 2+ on the right side, and 2+ on the left side. Pulmonary/Chest: Effort normal and breath sounds normal. No respiratory distress. He has no wheezes. He has no rales. Abdominal: Soft. Bowel sounds are normal. No tenderness. Musculoskeletal: Normal range of motion. He exhibits no  tenderness. Tr edema  Neurological: He is alert and oriented to person, place, and time. Skin: Skin is warm and dry. Psychiatric: He has a normal mood and affect. His behavior is normal. Thought content normal.       Assessment:       Diagnosis Orders   1. Chronic atrial fibrillation ([address]        2. CAD in native artery        3. History of PTCA        4. Essential hypertension        5. MI, old                  Plan:      CV stable. Knee problem.  Edema

## 2023-06-23 RX ORDER — WARFARIN SODIUM 5 MG/1
TABLET ORAL
Qty: 90 TABLET | Refills: 3 | Status: SHIPPED | OUTPATIENT
Start: 2023-06-23

## 2023-06-23 NOTE — TELEPHONE ENCOUNTER
Requested Prescriptions     Pending Prescriptions Disp Refills    warfarin (COUMADIN) 5 MG tablet [Pharmacy Med Name: WARFARIN SODIUM 5 MG TABLET] 90 tablet 3     Sig: TAKE ONE TABLET BY MOUTH DAILY      Last Office Visit: 5/25/2023     Next Office Visit: 9/5/2023

## 2023-06-30 ENCOUNTER — TELEPHONE (OUTPATIENT)
Dept: PHARMACY | Age: 83
End: 2023-06-30

## 2023-06-30 DIAGNOSIS — I48.20 CHRONIC ATRIAL FIBRILLATION (HCC): Primary | ICD-10-CM

## 2023-07-13 ENCOUNTER — ANTI-COAG VISIT (OUTPATIENT)
Dept: PHARMACY | Age: 83
End: 2023-07-13
Payer: MEDICARE

## 2023-07-13 DIAGNOSIS — I48.20 CHRONIC ATRIAL FIBRILLATION (HCC): Primary | Chronic | ICD-10-CM

## 2023-07-13 LAB — INTERNATIONAL NORMALIZATION RATIO, POC: 1.9

## 2023-07-13 PROCEDURE — 85610 PROTHROMBIN TIME: CPT

## 2023-07-13 PROCEDURE — 99211 OFF/OP EST MAY X REQ PHY/QHP: CPT

## 2023-07-13 NOTE — PROGRESS NOTES
ANTICOAGULATION SERVICE    Woo Valentine" is a 79 y.o. male with PMHx significant for chronic AF (PPB1OS4-VDVl of 4), HTN who presents to clinic 7/13/2023 for anticoagulation monitoring and adjustment. Anticoagulation Indication(s):  Afib    Referring Physician:  Dr. Mi Cortez    Goal INR Range:  2-3  Duration of Anticoagulation Therapy:  Indefinite  Time of day dose taken:  PM  Product patient has at home:  warfarin 5 mg (PEACH color)    [id number] Score for Atrial Fibrillation Stroke Risk   Risk   Factors  Component Value   C CHF No 0   H HTN Yes 1   A2 Age >= 76 Yes,  (79 y.o.) 2   D DM No 0   S2 Prior Stroke/TIA No 0   V Vascular Disease Yes 1   A Age 77-78 No,  (79 y.o.) 0   Sc Sex male 0    [id number]  Score  4   Score last updated 4/28/22 7:94 PM EDT    Click here for a link to the UpToDate guideline \"Atrial Fibrillation: Anticoagulation therapy to prevent embolization    Disclaimer: Risk Score calculation is dependent on accuracy of patient problem list and past encounter diagnosis.     INR Summary                           Warfarin regimen (mg)  Date INR   A/P   Sun Mon Tue Wed Thu Fri Sat Mg/wk  7/10 1.9 Below goal, cont 2.5 5 2.5 5 2.5 5 2.5 25  6/15 2.8 At goal, no change 2.5 5 2.5 5 2.5 5 2.5 25  5/18 2.5 At goal, no change 2.5 5 2.5 5 2.5 5 2.5 25  5/4 1.6 Below goal, bolus 2.5 5 2.5 5 5/2.5 5 2.5 25  4/6 2.2 At goal, continue 2.5 5 2.5 5 2.5 5 2.5 25  3/9 1.8 Below goal, continue 2.5 5 2.5 5 2.5 5 2.5 25  2/9 2.1 At goal, continue 2.5 5 2.5 5 2.5 5 2.5 25  1/26 1.4 Below goal, resume 2.5 5 2.5 5 2.5 5 2.5 25    Started bactrim: 2.5 2.5 2.5 2.5 2.5 5 2.5 20  1/5 2.5 At goal, no change 2.5 5 2.5 5 2.5 5 2.5 25  12/8 2.2 At goal, continue 2.5 5 2.5 5 2.5 5 2.5 25  11/9 2.2 At goal, continue 2.5 5 2.5 5 2.5 5 2.5 25  10/13 1.7 Below goal, continue 2.5 5 2.5 5 2.5 5 2.5 25  9/15 2.3 At goal, no change 2.5 5 2.5 5 2.5 5 2.5 25  8/11 2.3 At goal, no change 2.5 5 2.5 5 2.5 5 2.5 25  7/14 2.7 At

## 2023-07-22 DIAGNOSIS — I50.9 HEART FAILURE, ACC/AHA STAGE B (HCC): ICD-10-CM

## 2023-07-22 DIAGNOSIS — I10 ESSENTIAL HYPERTENSION: ICD-10-CM

## 2023-07-24 RX ORDER — FUROSEMIDE 20 MG/1
TABLET ORAL
Qty: 90 TABLET | Refills: 3 | Status: SHIPPED | OUTPATIENT
Start: 2023-07-24

## 2023-07-24 NOTE — TELEPHONE ENCOUNTER
Requested Prescriptions     Pending Prescriptions Disp Refills    furosemide (LASIX) 20 MG tablet [Pharmacy Med Name: FUROSEMIDE 20 MG TABLET] 90 tablet 3     Sig: TAKE ONE TABLET BY MOUTH DAILY BEFORE DINNER            Last Office Visit: 5/25/2023     Next Office Visit: 9/5/2023

## 2023-08-03 ENCOUNTER — ANTI-COAG VISIT (OUTPATIENT)
Dept: PHARMACY | Age: 83
End: 2023-08-03
Payer: MEDICARE

## 2023-08-03 DIAGNOSIS — I48.20 CHRONIC ATRIAL FIBRILLATION (HCC): Primary | Chronic | ICD-10-CM

## 2023-08-03 LAB — INTERNATIONAL NORMALIZATION RATIO, POC: 2.4

## 2023-08-03 PROCEDURE — 85610 PROTHROMBIN TIME: CPT

## 2023-08-03 PROCEDURE — 99211 OFF/OP EST MAY X REQ PHY/QHP: CPT

## 2023-08-03 NOTE — PROGRESS NOTES
warfarin 1/2-1/6, 8/7-8/12 and bridged with Lovenox    Signs/symptoms of bleeding H/o hematuria- patient states he was told by his urologist that as long as he is taking warfarin, hematuria may continue. H/o epistaxis, occasional hemorrhoids, anemia. Vitamin K intake 3/9: reports increased vitamin K intake this week, big salad for dinner night before INR check  Normally has broccoli, asparagus, kale/gay salads 3-4 per week    10/13/22: increase vit past week   Recent vomiting/diarrhea/fever, changes in weight or activity level Trying to improve diet/exercise and lose weight gradually. Tobacco or alcohol use Patient denies tobacco use  Will have 1 glass of wine per day max   Upcoming surgeries or procedures Getting cardiac clearance for knee replacement - not yet scheduled     Assessment/Plan:   Patient's INR was therapeutic today (2.4). He reports he is still having knee pain and has been getting cortisone shots, but lately they have not been helping. Per Dr. Barbara Frias he may have surgery/knee replacement in the future, but will need to undergo cardiac clearance prior to getting a knee replacement. We discussed letting us know if the procedure is scheduled so we can plan for possibly holding warfarin and bridging with Lovenox. Patient was instructed to continue warfarin dose of 5mg on MWF and 2.5mg all other days. Repeat INR in 5 weeks per patient preference. Patient was reminded to call with any bleeding, medication changes, or fever/vomiting/diarrhea. Patient understands dosing directions and information discussed. Dosing schedule and follow up appointment given to patient. Progress note routed to referring physician's office. Patient acknowledges working in consult agreement with pharmacist as referred by his/her physician. Next Clinic Appointment: 9/7    Please call [facility] [address] at [phone] with any questions. Thanks!   Alyson Mckenzie, PharmD  Medication

## 2023-09-07 ENCOUNTER — ANTI-COAG VISIT (OUTPATIENT)
Dept: PHARMACY | Age: 83
End: 2023-09-07
Payer: MEDICARE

## 2023-09-07 DIAGNOSIS — I48.20 CHRONIC ATRIAL FIBRILLATION (HCC): Primary | Chronic | ICD-10-CM

## 2023-09-07 LAB — INTERNATIONAL NORMALIZATION RATIO, POC: 2

## 2023-09-07 PROCEDURE — 85610 PROTHROMBIN TIME: CPT

## 2023-09-07 PROCEDURE — 99211 OFF/OP EST MAY X REQ PHY/QHP: CPT

## 2023-09-07 NOTE — PROGRESS NOTES
ANTICOAGULATION SERVICE    Sherwin Jaquez" is a 79 y.o. male with PMHx significant for chronic AF (WIX3FT0-WZIr of 4), HTN who presents to clinic 9/7/2023 for anticoagulation monitoring and adjustment. Anticoagulation Indication(s):  Afib    Referring Physician:  Dr. Soren Ruiz    Goal INR Range:  2-3  Duration of Anticoagulation Therapy:  Indefinite  Time of day dose taken:  PM  Product patient has at home:  warfarin 5 mg (PEACH color)    [id number] Score for Atrial Fibrillation Stroke Risk   Risk   Factors  Component Value   C CHF No 0   H HTN Yes 1   A2 Age >= 76 Yes,  (79 y.o.) 2   D DM No 0   S2 Prior Stroke/TIA No 0   V Vascular Disease Yes 1   A Age 77-78 No,  (79 y.o.) 0   Sc Sex male 0    [id number]-SKRk  Score  4   Score last updated 4/28/22 6:36 PM EDT    Click here for a link to the UpToDate guideline \"Atrial Fibrillation: Anticoagulation therapy to prevent embolization    Disclaimer: Risk Score calculation is dependent on accuracy of patient problem list and past encounter diagnosis.     INR Summary                           Warfarin regimen (mg)  Date INR   A/P   Sun Mon Tue Wed Thu Fri Sat Mg/wk  9/7 2.0 At goal, no change 2.5 5 2.5 5 2.5 5 2.5 25  8/3 2.4 At goal, continue 2.5 5 2.5 5 2.5 5 2.5 25  7/10 1.9 Below goal, cont 2.5 5 2.5 5 2.5 5 2.5 25  6/15 2.8 At goal, no change 2.5 5 2.5 5 2.5 5 2.5 25  5/18 2.5 At goal, no change 2.5 5 2.5 5 2.5 5 2.5 25  5/4 1.6 Below goal, bolus 2.5 5 2.5 5 5/2.5 5 2.5 25  4/6 2.2 At goal, continue 2.5 5 2.5 5 2.5 5 2.5 25  3/9 1.8 Below goal, continue 2.5 5 2.5 5 2.5 5 2.5 25  2/9 2.1 At goal, continue 2.5 5 2.5 5 2.5 5 2.5 25  1/26 1.4 Below goal, resume 2.5 5 2.5 5 2.5 5 2.5 25    Started bactrim: 2.5 2.5 2.5 2.5 2.5 5 2.5 20  1/5 2.5 At goal, no change 2.5 5 2.5 5 2.5 5 2.5 25  12/8 2.2 At goal, continue 2.5 5 2.5 5 2.5 5 2.5 25  11/9 2.2 At goal, continue 2.5 5 2.5 5 2.5 5 2.5 25  10/13 1.7 Below goal, continue 2.5 5 2.5 5 2.5 5 2.5 25  9/15 2.3 At goal,

## 2023-09-14 ENCOUNTER — OFFICE VISIT (OUTPATIENT)
Dept: CARDIOLOGY CLINIC | Age: 83
End: 2023-09-14
Payer: MEDICARE

## 2023-09-14 VITALS
SYSTOLIC BLOOD PRESSURE: 148 MMHG | HEART RATE: 68 BPM | BODY MASS INDEX: 32.99 KG/M2 | DIASTOLIC BLOOD PRESSURE: 70 MMHG | WEIGHT: 217 LBS

## 2023-09-14 DIAGNOSIS — I10 ESSENTIAL HYPERTENSION: ICD-10-CM

## 2023-09-14 DIAGNOSIS — I25.2 MI, OLD: ICD-10-CM

## 2023-09-14 DIAGNOSIS — I25.10 CAD IN NATIVE ARTERY: ICD-10-CM

## 2023-09-14 DIAGNOSIS — I48.20 CHRONIC ATRIAL FIBRILLATION (HCC): Primary | ICD-10-CM

## 2023-09-14 DIAGNOSIS — Z98.61 HISTORY OF PTCA: ICD-10-CM

## 2023-09-14 PROCEDURE — 1123F ACP DISCUSS/DSCN MKR DOCD: CPT | Performed by: INTERNAL MEDICINE

## 2023-09-14 PROCEDURE — G8427 DOCREV CUR MEDS BY ELIG CLIN: HCPCS | Performed by: INTERNAL MEDICINE

## 2023-09-14 PROCEDURE — G8417 CALC BMI ABV UP PARAM F/U: HCPCS | Performed by: INTERNAL MEDICINE

## 2023-09-14 PROCEDURE — 3077F SYST BP >= 140 MM HG: CPT | Performed by: INTERNAL MEDICINE

## 2023-09-14 PROCEDURE — 99214 OFFICE O/P EST MOD 30 MIN: CPT | Performed by: INTERNAL MEDICINE

## 2023-09-14 PROCEDURE — 1036F TOBACCO NON-USER: CPT | Performed by: INTERNAL MEDICINE

## 2023-09-14 PROCEDURE — 3078F DIAST BP <80 MM HG: CPT | Performed by: INTERNAL MEDICINE

## 2023-09-14 RX ORDER — NITROGLYCERIN 0.4 MG/1
TABLET SUBLINGUAL
Qty: 25 TABLET | Refills: 3 | Status: SHIPPED | OUTPATIENT
Start: 2023-09-14

## 2023-09-14 NOTE — PROGRESS NOTES
Subjective:      Patient ID: Robbie Ji is a 79 y.o. male. HPI:  Here for follow up afib/HTN/CAD/PTCA/Non st.  Still with some knee issues/arthritis. Considering surgery/knee replacement. Edema stable. Wt stable. No sob. No orthopnea/PND. No complaints. No palp/tachycardia. No syncope. No exertional chest pain/sob. BP good at home.      Past Medical History:   Diagnosis Date    Allergic rhinitis     Anemia     Atrial fibrillation (HCC)     Atrial fibrillation ([address])     Benign non-nodular prostatic hyperplasia with lower urinary tract symptoms 2/9/2017    Benign paroxysmal positional vertigo     BPH (benign prostatic hyperplasia)     BPH (benign prostatic hypertrophy)     Cataract 10/3/2014    Cholelithiasis     Colon cancer ([address]) 4/9/2012    Diverticulosis     Dyslipidemia 7/21/2010    Elevated PSA     Erectile dysfunction     Essential hypertension 11/24/2015    Gastroesophageal reflux disease without esophagitis 11/9/2016    GERD (gastroesophageal reflux disease)     Glaucoma 4/5/2013    Hiatal hernia     Hypertension     Lumbar radiculopathy     Osteoarthritis     Peripheral neuropathy 12/7/2012    Proteinuria 7/22/2010    S/P laparoscopic-assisted sigmoidectomy 4/17/2012    Urinary frequency      Past Surgical History:   Procedure Laterality Date    CATARACT REMOVAL WITH IMPLANT Right October 7, 2014    Dr. Alina Lemus Left October 24, 2014    Dr. Jenny Grubbs  April 4, 2012    Dr. Marilyn Velazquez  May 15, 2013    Dr. Marilyn Velazquez  08/09/2016    Dr. Leyda Ballard    COLONOSCOPY N/A 02/17/2017    COLONOSCOPY N/A 8/13/2020    COLONOSCOPY performed by Brennan Wilson MD at [address], [address] [facility] Right October 7, 2014    Dr. Oscar Connors Left October 24, 2014    Dr. iNcole Curiel -

## 2023-10-05 ENCOUNTER — ANTI-COAG VISIT (OUTPATIENT)
Dept: PHARMACY | Age: 83
End: 2023-10-05
Payer: MEDICARE

## 2023-10-05 DIAGNOSIS — I48.20 CHRONIC ATRIAL FIBRILLATION (HCC): Primary | Chronic | ICD-10-CM

## 2023-10-05 LAB — INTERNATIONAL NORMALIZATION RATIO, POC: 1.7

## 2023-10-05 PROCEDURE — 99212 OFFICE O/P EST SF 10 MIN: CPT

## 2023-10-05 PROCEDURE — 85610 PROTHROMBIN TIME: CPT

## 2023-10-05 NOTE — PROGRESS NOTES
ANTICOAGULATION SERVICE    Mich Rodas" is a 79 y.o. male with PMHx significant for chronic AF (GXL4SC5-AKVh of 4), HTN who presents to clinic 10/5/2023 for anticoagulation monitoring and adjustment. Anticoagulation Indication(s):  Afib    Referring Physician:  Dr. Sukumar Davis    Goal INR Range:  2-3  Duration of Anticoagulation Therapy:  Indefinite  Time of day dose taken:  PM  Product patient has at home:  warfarin 5 mg (PEACH color)    MVD2OF3-FMXz Score for Atrial Fibrillation Stroke Risk   Risk   Factors  Component Value   C CHF No 0   H HTN Yes 1   A2 Age >= 76 Yes,  (79 y.o.) 2   D DM No 0   S2 Prior Stroke/TIA No 0   V Vascular Disease Yes 1   A Age 77-78 No,  (79 y.o.) 0   Sc Sex male 0    EES4CN7-EMVz  Score  4   Score last updated 4/28/22 1:64 PM EDT    Click here for a link to the UpToDate guideline \"Atrial Fibrillation: Anticoagulation therapy to prevent embolization    Disclaimer: Risk Score calculation is dependent on accuracy of patient problem list and past encounter diagnosis.     INR Summary                           Warfarin regimen (mg)  Date INR   A/P   Sun Mon Tue Wed Thu Fri Sat Mg/wk  10/5 1.7 Below goal, 5 mgx1 2.5 5 2.5 5 5/2.5 5 2.5 25  9/7 2.0 At goal, no change 2.5 5 2.5 5 2.5 5 2.5 25  8/3 2.4 At goal, continue 2.5 5 2.5 5 2.5 5 2.5 25  7/10 1.9 Below goal, cont 2.5 5 2.5 5 2.5 5 2.5 25  6/15 2.8 At goal, no change 2.5 5 2.5 5 2.5 5 2.5 25  5/18 2.5 At goal, no change 2.5 5 2.5 5 2.5 5 2.5 25  5/4 1.6 Below goal, bolus 2.5 5 2.5 5 5/2.5 5 2.5 25  4/6 2.2 At goal, continue 2.5 5 2.5 5 2.5 5 2.5 25  3/9 1.8 Below goal, continue 2.5 5 2.5 5 2.5 5 2.5 25  2/9 2.1 At goal, continue 2.5 5 2.5 5 2.5 5 2.5 25  1/26 1.4 Below goal, resume 2.5 5 2.5 5 2.5 5 2.5 25    Started bactrim: 2.5 2.5 2.5 2.5 2.5 5 2.5 20  1/5 2.5 At goal, no change 2.5 5 2.5 5 2.5 5 2.5 25  12/8 2.2 At goal, continue 2.5 5 2.5 5 2.5 5 2.5 25  11/9 2.2 At goal, continue 2.5 5 2.5 5 2.5 5 2.5 25  10/13 1.7 Below

## 2023-10-19 ENCOUNTER — ANTI-COAG VISIT (OUTPATIENT)
Dept: PHARMACY | Age: 83
End: 2023-10-19
Payer: MEDICARE

## 2023-10-19 DIAGNOSIS — I48.20 CHRONIC ATRIAL FIBRILLATION (HCC): Primary | Chronic | ICD-10-CM

## 2023-10-19 LAB — INTERNATIONAL NORMALIZATION RATIO, POC: 2

## 2023-10-19 PROCEDURE — 99211 OFF/OP EST MAY X REQ PHY/QHP: CPT

## 2023-10-19 PROCEDURE — 85610 PROTHROMBIN TIME: CPT

## 2023-10-19 NOTE — PROGRESS NOTES
ANTICOAGULATION SERVICE    Adrianna He" is a 79 y.o. male with PMHx significant for chronic AF (EDF8VY8-QXId of 4), HTN who presents to clinic 10/19/2023 for anticoagulation monitoring and adjustment. Anticoagulation Indication(s):  Afib    Referring Physician:  Dr. Yadira Campo    Goal INR Range:  2-3  Duration of Anticoagulation Therapy:  Indefinite  Time of day dose taken:  PM  Product patient has at home:  warfarin 5 mg (PEACH color)    [id number]-HRYg Score for Atrial Fibrillation Stroke Risk   Risk   Factors  Component Value   C CHF No 0   H HTN Yes 1   A2 Age >= 76 Yes,  (79 y.o.) 2   D DM No 0   S2 Prior Stroke/TIA No 0   V Vascular Disease Yes 1   A Age 77-78 No,  (79 y.o.) 0   Sc Sex male 0    LFQ0EL2-MMKj  Score  4   Score last updated 4/28/22 0:16 PM EDT    Click here for a link to the UpToDate guideline \"Atrial Fibrillation: Anticoagulation therapy to prevent embolization    Disclaimer: Risk Score calculation is dependent on accuracy of patient problem list and past encounter diagnosis.     INR Summary                           Warfarin regimen (mg)  Date INR   A/P   Sun Mon Tue Wed Thu Fri Sat Mg/wk  10/19 2.0 At goal, no change 2.5 5 2.5 5 2.5 5 2.5 25  10/5 1.7 Below goal, 5 mgx1 2.5 5 2.5 5 5/2.5 5 2.5 25  9/7 2.0 At goal, no change 2.5 5 2.5 5 2.5 5 2.5 25  8/3 2.4 At goal, continue 2.5 5 2.5 5 2.5 5 2.5 25  7/10 1.9 Below goal, cont 2.5 5 2.5 5 2.5 5 2.5 25  6/15 2.8 At goal, no change 2.5 5 2.5 5 2.5 5 2.5 25  5/18 2.5 At goal, no change 2.5 5 2.5 5 2.5 5 2.5 25  5/4 1.6 Below goal, bolus 2.5 5 2.5 5 5/2.5 5 2.5 25  4/6 2.2 At goal, continue 2.5 5 2.5 5 2.5 5 2.5 25  3/9 1.8 Below goal, continue 2.5 5 2.5 5 2.5 5 2.5 25  2/9 2.1 At goal, continue 2.5 5 2.5 5 2.5 5 2.5 25  1/26 1.4 Below goal, resume 2.5 5 2.5 5 2.5 5 2.5 25    Started bactrim: 2.5 2.5 2.5 2.5 2.5 5 2.5 20  1/5 2.5 At goal, no change 2.5 5 2.5 5 2.5 5 2.5 25  12/8 2.2 At goal, continue 2.5 5 2.5 5 2.5 5 2.5 25  11/9 2.2 At

## 2023-11-15 ENCOUNTER — TELEPHONE (OUTPATIENT)
Dept: CARDIOLOGY CLINIC | Age: 83
End: 2023-11-15

## 2023-11-15 NOTE — TELEPHONE ENCOUNTER
Judy Rodarte, 1940    Cardiac Risk Assessment    What type of procedure are you having?: Prostate Biopsy     When is your procedure scheduled for?: 11/29/23    What physician is performing your procedure?: Dr. Susan Goff    Phone Number: [phone]    Fax number to send the letter: [phone]    Cardiologist: Dr. Alicia Mccarty     Last Appointment: 09/14/23    Next Appointment: 12/14/23    Are you on any blood thinners?: Warfarin 5 days prior and Aspirin 7 days prior      History of Coronary Artery Disease:    Last stress test:    Last echo: 12/06/18    Device Type and :

## 2023-11-16 ENCOUNTER — ANTI-COAG VISIT (OUTPATIENT)
Dept: PHARMACY | Age: 83
End: 2023-11-16
Payer: MEDICARE

## 2023-11-16 DIAGNOSIS — I48.20 CHRONIC ATRIAL FIBRILLATION (HCC): Primary | Chronic | ICD-10-CM

## 2023-11-16 LAB — INTERNATIONAL NORMALIZATION RATIO, POC: 3

## 2023-11-16 PROCEDURE — 99211 OFF/OP EST MAY X REQ PHY/QHP: CPT

## 2023-11-16 PROCEDURE — 85610 PROTHROMBIN TIME: CPT

## 2023-11-16 NOTE — PROGRESS NOTES
ANTICOAGULATION SERVICE    Gayatri Kulkarni" is a 79 y.o. male with PMHx significant for chronic AF (KQK1WW8-MKQd of 4), HTN who presents to clinic 11/16/2023 for anticoagulation monitoring and adjustment. Anticoagulation Indication(s):  Afib    Referring Physician:  Dr. Dante Cormier    Goal INR Range:  2-3  Duration of Anticoagulation Therapy:  Indefinite  Time of day dose taken:  PM  Product patient has at home:  warfarin 5 mg (PEACH color)    KQS1GP0-NRLi Score for Atrial Fibrillation Stroke Risk   Risk   Factors  Component Value   C CHF No 0   H HTN Yes 1   A2 Age >= 76 Yes,  (79 y.o.) 2   D DM No 0   S2 Prior Stroke/TIA No 0   V Vascular Disease Yes 1   A Age 77-78 No,  (79 y.o.) 0   Sc Sex male 0    [id number]-TILv  Score  4   Score last updated 4/28/22 8:85 PM EDT    Click here for a link to the UpToDate guideline \"Atrial Fibrillation: Anticoagulation therapy to prevent embolization    Disclaimer: Risk Score calculation is dependent on accuracy of patient problem list and past encounter diagnosis.     INR Summary                           Warfarin regimen (mg)  Date INR   A/P   Sun Mon Tue Wed Thu Fri Sat Mg/wk  11/16 3.0 At goal, no change 2.5 5 2.5 5 2.5 5 2.5 25  10/19 2.0 At goal, no change 2.5 5 2.5 5 2.5 5 2.5 25  10/5 1.7 Below goal, 5 mgx1 2.5 5 2.5 5 5/2.5 5 2.5 25  9/7 2.0 At goal, no change 2.5 5 2.5 5 2.5 5 2.5 25  8/3 2.4 At goal, continue 2.5 5 2.5 5 2.5 5 2.5 25  7/10 1.9 Below goal, cont 2.5 5 2.5 5 2.5 5 2.5 25  6/15 2.8 At goal, no change 2.5 5 2.5 5 2.5 5 2.5 25  5/18 2.5 At goal, no change 2.5 5 2.5 5 2.5 5 2.5 25  5/4 1.6 Below goal, bolus 2.5 5 2.5 5 5/2.5 5 2.5 25  4/6 2.2 At goal, continue 2.5 5 2.5 5 2.5 5 2.5 25  3/9 1.8 Below goal, continue 2.5 5 2.5 5 2.5 5 2.5 25  2/9 2.1 At goal, continue 2.5 5 2.5 5 2.5 5 2.5 25  1/26 1.4 Below goal, resume 2.5 5 2.5 5 2.5 5 2.5 25    Started bactrim: 2.5 2.5 2.5 2.5 2.5 5 2.5 20  1/5 2.5 At goal, no change 2.5 5 2.5 5 2.5 5 2.5 25  12/8 2.2 At

## 2023-11-20 NOTE — TELEPHONE ENCOUNTER
Per  ok to proceed but needs to be bridged with Lovenox. Letter completed     11/24/2023 Last coumadin dose    11/25/2023 No Coumadin or Lovenox    11/26/2023 Lovenox injection twice daily (12 hours apart)    11/27/2023 Lovenox injection twice daily (12 hours apart)    11/28/2023 Lovenox injection morning dose only (24 hours prior to procedure)    11/29/2023 Day of Procedure. No Lovenox.  Resume Coumadin in evening     11/30/2023 Lovenox injection twice daily and Coumadin in the evening    12/01/2023 Lovenox injection twice daily and Coumadin in the evening    12/02/2023 Lovenox injection twice daily and Coumadin in the evening    12/03/203 Continue Coumadin only from this day on    12/04/2023 Get PT/INR done

## 2023-11-21 RX ORDER — ENOXAPARIN SODIUM 100 MG/ML
1 INJECTION SUBCUTANEOUS 2 TIMES DAILY
Qty: 12 EACH | Refills: 0 | Status: SHIPPED | OUTPATIENT
Start: 2023-11-21

## 2023-12-05 ENCOUNTER — ANTI-COAG VISIT (OUTPATIENT)
Dept: PHARMACY | Age: 83
End: 2023-12-05
Payer: MEDICARE

## 2023-12-05 DIAGNOSIS — I48.20 CHRONIC ATRIAL FIBRILLATION (HCC): Primary | Chronic | ICD-10-CM

## 2023-12-05 LAB — INTERNATIONAL NORMALIZATION RATIO, POC: 1

## 2023-12-05 PROCEDURE — 99213 OFFICE O/P EST LOW 20 MIN: CPT

## 2023-12-05 PROCEDURE — 85610 PROTHROMBIN TIME: CPT

## 2023-12-05 NOTE — PROGRESS NOTES
ANTICOAGULATION SERVICE    Jojo Campos" is a 79 y.o. male with PMHx significant for chronic AF (ROG8XS5-DABl of 4), HTN who presents to clinic 12/5/2023 for anticoagulation monitoring and adjustment. Anticoagulation Indication(s):  Afib    Referring Physician:  Dr. Patricia Leone    Goal INR Range:  2-3  Duration of Anticoagulation Therapy:  Indefinite  Time of day dose taken:  PM  Product patient has at home:  warfarin 5 mg (PEACH color)    [id number]-RNAj Score for Atrial Fibrillation Stroke Risk   Risk   Factors  Component Value   C CHF No 0   H HTN Yes 1   A2 Age >= 76 Yes,  (79 y.o.) 2   D DM No 0   S2 Prior Stroke/TIA No 0   V Vascular Disease Yes 1   A Age 77-78 No,  (79 y.o.) 0   Sc Sex male 0    CRO8BA0-HEFd  Score  4   Score last updated 4/28/22 8:84 PM EDT    Click here for a link to the UpToDate guideline \"Atrial Fibrillation: Anticoagulation therapy to prevent embolization    Disclaimer: Risk Score calculation is dependent on accuracy of patient problem list and past encounter diagnosis.     INR Summary                           Warfarin regimen (mg)  Date INR   A/P   Sun Mon Tue Wed Thu Fri Sat Mg/wk  11/16 3.0 At goal, no change 2.5 5 2.5 5 2.5 5 2.5 25  10/19 2.0 At goal, no change 2.5 5 2.5 5 2.5 5 2.5 25  10/5 1.7 Below goal, 5 mgx1 2.5 5 2.5 5 5/2.5 5 2.5 25  9/7 2.0 At goal, no change 2.5 5 2.5 5 2.5 5 2.5 25  8/3 2.4 At goal, continue 2.5 5 2.5 5 2.5 5 2.5 25  7/10 1.9 Below goal, cont 2.5 5 2.5 5 2.5 5 2.5 25  6/15 2.8 At goal, no change 2.5 5 2.5 5 2.5 5 2.5 25  5/18 2.5 At goal, no change 2.5 5 2.5 5 2.5 5 2.5 25  5/4 1.6 Below goal, bolus 2.5 5 2.5 5 5/2.5 5 2.5 25  4/6 2.2 At goal, continue 2.5 5 2.5 5 2.5 5 2.5 25  3/9 1.8 Below goal, continue 2.5 5 2.5 5 2.5 5 2.5 25  2/9 2.1 At goal, continue 2.5 5 2.5 5 2.5 5 2.5 25  1/26 1.4 Below goal, resume 2.5 5 2.5 5 2.5 5 2.5 25    Started bactrim: 2.5 2.5 2.5 2.5 2.5 5 2.5 20  1/5 2.5 At goal, no change 2.5 5 2.5 5 2.5 5 2.5 25  12/8 2.2 At

## 2023-12-07 ENCOUNTER — TELEPHONE (OUTPATIENT)
Dept: PHARMACY | Age: 83
End: 2023-12-07

## 2023-12-07 DIAGNOSIS — I48.20 CHRONIC ATRIAL FIBRILLATION (HCC): ICD-10-CM

## 2023-12-07 DIAGNOSIS — I48.20 CHRONIC ATRIAL FIBRILLATION (HCC): Primary | ICD-10-CM

## 2023-12-07 LAB
INR PPP: 1.92 (ref 0.84–1.16)
PROTHROMBIN TIME: 21.9 SEC (ref 11.5–14.8)

## 2023-12-07 NOTE — TELEPHONE ENCOUNTER
Called patient after appointment time missed. Patient answered and states he had physician order lab draw since he couldn't find clinic. Confirmed with patient he did have lab drawn.  Advised we would await that result and call with further dosing recommendations

## 2023-12-11 ENCOUNTER — ANTI-COAG VISIT (OUTPATIENT)
Dept: PHARMACY | Age: 83
End: 2023-12-11
Payer: MEDICARE

## 2023-12-11 DIAGNOSIS — I48.20 CHRONIC ATRIAL FIBRILLATION (HCC): Primary | Chronic | ICD-10-CM

## 2023-12-14 ENCOUNTER — OFFICE VISIT (OUTPATIENT)
Dept: CARDIOLOGY CLINIC | Age: 83
End: 2023-12-14
Payer: MEDICARE

## 2023-12-14 ENCOUNTER — ANTI-COAG VISIT (OUTPATIENT)
Dept: PHARMACY | Age: 83
End: 2023-12-14
Payer: MEDICARE

## 2023-12-14 VITALS
HEART RATE: 60 BPM | DIASTOLIC BLOOD PRESSURE: 70 MMHG | SYSTOLIC BLOOD PRESSURE: 110 MMHG | BODY MASS INDEX: 32.08 KG/M2 | WEIGHT: 211 LBS

## 2023-12-14 DIAGNOSIS — I48.20 CHRONIC ATRIAL FIBRILLATION (HCC): Primary | Chronic | ICD-10-CM

## 2023-12-14 DIAGNOSIS — I25.10 CAD IN NATIVE ARTERY: ICD-10-CM

## 2023-12-14 DIAGNOSIS — I25.2 MI, OLD: ICD-10-CM

## 2023-12-14 DIAGNOSIS — Z98.61 HISTORY OF PTCA: ICD-10-CM

## 2023-12-14 DIAGNOSIS — I48.20 CHRONIC ATRIAL FIBRILLATION (HCC): Primary | ICD-10-CM

## 2023-12-14 DIAGNOSIS — I10 ESSENTIAL HYPERTENSION: ICD-10-CM

## 2023-12-14 LAB — INTERNATIONAL NORMALIZATION RATIO, POC: 2.2

## 2023-12-14 PROCEDURE — 3074F SYST BP LT 130 MM HG: CPT | Performed by: INTERNAL MEDICINE

## 2023-12-14 PROCEDURE — G8427 DOCREV CUR MEDS BY ELIG CLIN: HCPCS | Performed by: INTERNAL MEDICINE

## 2023-12-14 PROCEDURE — 3078F DIAST BP <80 MM HG: CPT | Performed by: INTERNAL MEDICINE

## 2023-12-14 PROCEDURE — 1123F ACP DISCUSS/DSCN MKR DOCD: CPT | Performed by: INTERNAL MEDICINE

## 2023-12-14 PROCEDURE — 85610 PROTHROMBIN TIME: CPT

## 2023-12-14 PROCEDURE — G8484 FLU IMMUNIZE NO ADMIN: HCPCS | Performed by: INTERNAL MEDICINE

## 2023-12-14 PROCEDURE — 99214 OFFICE O/P EST MOD 30 MIN: CPT | Performed by: INTERNAL MEDICINE

## 2023-12-14 PROCEDURE — 1036F TOBACCO NON-USER: CPT | Performed by: INTERNAL MEDICINE

## 2023-12-14 PROCEDURE — 99211 OFF/OP EST MAY X REQ PHY/QHP: CPT

## 2023-12-14 PROCEDURE — G8417 CALC BMI ABV UP PARAM F/U: HCPCS | Performed by: INTERNAL MEDICINE

## 2023-12-14 NOTE — PROGRESS NOTES
ANTICOAGULATION SERVICE    Woo Valentine" is a 79 y.o. male with PMHx significant for chronic AF (BEO0QU6-ROIv of 4), HTN who presents to clinic 12/14/2023 for anticoagulation monitoring and adjustment. Anticoagulation Indication(s):  Afib    Referring Physician:  Dr. Mi Cortez    Goal INR Range:  2-3  Duration of Anticoagulation Therapy:  Indefinite  Time of day dose taken:  PM  Product patient has at home:  warfarin 5 mg (PEACH)    [id number] Score for Atrial Fibrillation Stroke Risk   Risk   Factors  Component Value   C CHF No 0   H HTN Yes 1   A2 Age >= 76 Yes,  (79 y.o.) 2   D DM No 0   S2 Prior Stroke/TIA No 0   V Vascular Disease Yes 1   A Age 77-78 No,  (79 y.o.) 0   Sc Sex male 0    IUQ7MG5-YEUj  Score  4   Score last updated 4/28/22 8:42 PM EDT    Click here for a link to the UpToDate guideline \"Atrial Fibrillation: Anticoagulation therapy to prevent embolization    Disclaimer: Risk Score calculation is dependent on accuracy of patient problem list and past encounter diagnosis.     INR Summary                           Warfarin regimen (mg)  Date INR   A/P   Sun Mon Tue Wed Thu Fri Sat Mg/wk  12/14 2.2 At goal, no change 2.5 5 2.5 5 2.5 5 2.5 25  12/7 1.92 Below goal, resume 2.5 5 2.5 5 2.5 5 2.5 25  12/5 1.0 Below goal, 7.5mgx1 2.5 5 7.5/2.5 5 2.5 5 2.5 25  11/16 3.0 At goal, no change 2.5 5 2.5 5 2.5 5 2.5 25  10/19 2.0 At goal, no change 2.5 5 2.5 5 2.5 5 2.5 25  10/5 1.7 Below goal, 5 mgx1 2.5 5 2.5 5 5/2.5 5 2.5 25  9/7 2.0 At goal, no change 2.5 5 2.5 5 2.5 5 2.5 25  8/3 2.4 At goal, continue 2.5 5 2.5 5 2.5 5 2.5 25  7/10 1.9 Below goal, cont 2.5 5 2.5 5 2.5 5 2.5 25  6/15 2.8 At goal, no change 2.5 5 2.5 5 2.5 5 2.5 25  5/18 2.5 At goal, no change 2.5 5 2.5 5 2.5 5 2.5 25  5/4 1.6 Below goal, bolus 2.5 5 2.5 5 5/2.5 5 2.5 25  4/6 2.2 At goal, continue 2.5 5 2.5 5 2.5 5 2.5 25  3/9 1.8 Below goal, continue 2.5 5 2.5 5 2.5 5 2.5 25  2/9 2.1 At goal,

## 2023-12-14 NOTE — PROGRESS NOTES
brother, brother, brother, mother, sister, and son; Kidney Disease in his brother; Mult Sclerosis in his son. Current Outpatient Medications   Medication Sig Dispense Refill    nitroGLYCERIN (NITROSTAT) 0.4 MG SL tablet up to max of 3 total doses.  If no relief after 1 dose, call 911. 25 tablet 3    furosemide (LASIX) 20 MG tablet TAKE ONE TABLET BY MOUTH DAILY BEFORE DINNER 90 tablet 3    warfarin (COUMADIN) 5 MG tablet TAKE ONE TABLET BY MOUTH DAILY 90 tablet 3    tiZANidine (ZANAFLEX) 2 MG tablet       aspirin 81 MG EC tablet Take 1 tablet by mouth daily      Handicap Placard MISC by Does not apply route Unable to walk more than 250 feet before having to stop and rest.  DX: AFIB  Not to exceed 5 years 1 each 0    Handicap Placard MISC by Does not apply route 1 each 0    Handicap Placard MISC by Does not apply route DX: I48.20 afib  Duration: 5 years 1 each 0    ezetimibe (ZETIA) 10 MG tablet Take 1 tablet by mouth daily      metoprolol succinate (TOPROL XL) 200 MG extended release tablet TAKE ONE TABLET BY MOUTH DAILY 30 tablet 0    irbesartan (AVAPRO) 75 MG tablet TAKE ONE TABLET BY MOUTH DAILY 90 tablet 2    atorvastatin (LIPITOR) 40 MG tablet Take 1 tablet by mouth nightly 90 tablet 3    pantoprazole (PROTONIX) 20 MG tablet Take 1 tablet by mouth daily (Patient taking differently: Take 1 tablet by mouth every other day) 90 tablet 3    Cyanocobalamin (VITAMIN B-12) 500 MCG LOZG Take 500 mcg by mouth daily      finasteride (PROSCAR) 5 MG tablet Take 1 tablet by mouth daily      Cholecalciferol (VITAMIN D) 2000 UNITS CAPS capsule Take 1 capsule by mouth 2 times daily      tamsulosin (FLOMAX) 0.4 MG capsule Take 1 capsule by mouth daily      Multiple Vitamin (MULTIVITAMIN) capsule Take 1 capsule by mouth daily      enoxaparin (LOVENOX) 100 MG/ML Inject 1 mL into the skin 2 times daily (Patient not taking: Reported on 12/14/2023) 12 each 0    triamterene-hydrochlorothiazide (MAXZIDE-25) 37.5-25 MG per tablet

## 2024-01-11 ENCOUNTER — TELEPHONE (OUTPATIENT)
Dept: PHARMACY | Age: 84
End: 2024-01-11

## 2024-01-11 ENCOUNTER — ANTI-COAG VISIT (OUTPATIENT)
Dept: PHARMACY | Age: 84
End: 2024-01-11
Payer: MEDICARE

## 2024-01-11 DIAGNOSIS — I48.20 CHRONIC ATRIAL FIBRILLATION (HCC): Primary | Chronic | ICD-10-CM

## 2024-01-11 LAB — INTERNATIONAL NORMALIZATION RATIO, POC: 2.2

## 2024-01-11 PROCEDURE — 99213 OFFICE O/P EST LOW 20 MIN: CPT

## 2024-01-11 PROCEDURE — 85610 PROTHROMBIN TIME: CPT

## 2024-01-11 RX ORDER — ENOXAPARIN SODIUM 100 MG/ML
100 INJECTION SUBCUTANEOUS 2 TIMES DAILY
Qty: 20 ML | Refills: 0 | Status: SHIPPED | OUTPATIENT
Start: 2024-01-11

## 2024-01-11 NOTE — TELEPHONE ENCOUNTER
Patient scheduled for procedure, discussed bridge plan below with patient. Please call if there are any questions/concerns with this plan.     Periprocedural Anticoagulation Management    Procedure: Prostate Marker Placement  Date of Procedure:  1/18/24  Current warfarin dosage: 5 mg MWF, 2.5 mg all other  Weight (kg): 95.7 kg  Enoxaparin (Lovenox®) dosage: 100 mg twice daily    Date Days to procedure AM PM   Fri  1/12/24 -6 None LAST dose of warfarin   Sat  1/13/24 -5 None None   Sun  1/14/24 -4 None None   Mon  1/15/24 -3 Lovenox 100 mg Lovenox 100 mg   Tues  1/16/24 -2 Lovenox 100 mg Lovenox 100 mg   Wed  1/17/24 -1 Lovenox 100 mg None   Thurs 1/18/24 Procedure None Warfarin 5 mg*    Fri  1/19/24 +1 Lovenox 100 mg Lovenox 100 mg   Warfarin 5 mg    Sat  1/20/24 +2 Lovenox 100 mg  Lovenox 100 mg   Warfarin 2.5 mg   Sun  1/21/24 +3 Lovenox 100 mg  Lovenox 100 mg   Warfarin 2.5 mg   Mon  1/22/24 +4 Lovenox 100 mg  Lovenox 100 mg   Warfarin 5 mg   Tues 1/23/24 +5 Lovenox 100 mg  Lovenox 100 mg   Warfarin 2.5 mg    Wed  1/24/24 +6 Lovenox 100 mg Lovenox 100 mg  Warfarin 5 mg   Thurs  1/25/24 +7 INR check and further warfarin/Lovenox dosing will be decided at that time.     *Resume warfarin and Lovenox when safe at the discretion of surgeon.     Instructions were discussed with patient and patient provided with written copy. Pt expressed understanding of the above information and denied further questions or concerns.  RX for enoxaparin was sent to patient's pharmacy        Alo Collier, PharmD  PGY1 Pharmacy Resident  [facility]  Office: [phone]  Fax: [phone]  1/11/2024 10:21 AM

## 2024-01-11 NOTE — PROGRESS NOTES
asparagus, kale/gay salads 3-4 per week    Recent vomiting/diarrhea/fever, changes in weight or activity level Trying to improve diet/exercise and lose weight gradually.   Tobacco or alcohol use Patient denies tobacco use  Will have 1 glass of wine per day max   Upcoming surgeries or procedures 1/18/24: prostate marker placement, hold x5d w/ bridge  TKR TBD     Assessment/Plan:   Patient's INR was therapeutic (2.2) today. Pt was dx with prostate cancer and will be starting radiation soon. He is undergoing a procedure for placement of markers on 1/18 and will need to hold warfarin for 5 days. Patient was provided with bridging instructions and Rx for Lovenox sent to his pharmacy. He may have surgery/knee replacement in the future, but will need to undergo cardiac clearance prior to getting a knee replacement.     Patient was instructed to follow bridging schedule starting tomorrow and then after surgery will continue warfarin 5 mg MWF and 2.5 mg all other days. Repeat INR in 2 wks for procedure follow-up. Patient was reminded to call with any bleeding, medication changes, or fever/vomiting/diarrhea.     Periprocedural Anticoagulation Management    Procedure: Prostate Marker Placement  Date of Procedure:  1/18/24  Current warfarin dosage: 5 mg MWF, 2.5 mg all other  Weight (kg): 95.7 kg  Enoxaparin (Lovenox®) dosage: 100 mg twice daily    Date Days to procedure AM PM   Fri 1/12/24 -6 None LAST dose of warfarin   Sat  1/13/24 -5 None None   Sun  1/14/24 -4 None None   Mon  1/15/24 -3 Lovenox 100 mg Lovenox 100 mg   Tues 1/16/24 -2 Lovenox 100 mg Lovenox 100 mg   Wed  1/17/24 -1 Lovenox 100 mg None   Thurs 1/18/24 Procedure None Warfarin 5 mg*    Fri  1/19/24 +1 Lovenox 100 mg Lovenox 100 mg   Warfarin 5 mg    Sat  1/20/24 +2 Lovenox 100 mg  Lovenox 100 mg   Warfarin 2.5 mg   Sun  1/21/24 +3 Lovenox 100 mg  Lovenox 100 mg   Warfarin 2.5 mg   Mon  1/22/24 +4 Lovenox 100 mg  Lovenox 100 mg   Warfarin 5 mg   Tues

## 2024-01-12 ENCOUNTER — TELEPHONE (OUTPATIENT)
Dept: PHARMACY | Age: 84
End: 2024-01-12

## 2024-01-12 NOTE — TELEPHONE ENCOUNTER
Patient called to report his prostrate surgery is postponed. Advised patient that he should continue on warfarin and disregard lovenox instructions. He will notify clinic when procedure is rescheduled.     Rescheduled patient for 4 weeks out given stable INR.     Cydney Lerner, PharmD, [facility]  [place]: [phone]  [place]: [phone]  1/12/2024 2:49 PM

## 2024-02-06 ENCOUNTER — TELEPHONE (OUTPATIENT)
Dept: PHARMACY | Age: 84
End: 2024-02-06

## 2024-02-06 NOTE — TELEPHONE ENCOUNTER
Pt called to r/s INR check for 2/22- his son passed away from seizures. Expressed condolences to pt.     Dolores Lopez, PharmD, BCACP  [facility]   [facility] Ph: [phone]  [facility] Ph: [phone]  2/6/2024 8:58 AM

## 2024-02-22 ENCOUNTER — ANTI-COAG VISIT (OUTPATIENT)
Dept: PHARMACY | Age: 84
End: 2024-02-22
Payer: MEDICARE

## 2024-02-22 DIAGNOSIS — I48.20 CHRONIC ATRIAL FIBRILLATION (HCC): Primary | Chronic | ICD-10-CM

## 2024-02-22 LAB — INTERNATIONAL NORMALIZATION RATIO, POC: 1.1

## 2024-02-22 PROCEDURE — 85610 PROTHROMBIN TIME: CPT

## 2024-02-22 PROCEDURE — 99212 OFFICE O/P EST SF 10 MIN: CPT

## 2024-02-22 NOTE — PROGRESS NOTES
ANTICOAGULATION SERVICE    Sai MEREDITH Caicedo \"AL\" is a 84 y.o. male with PMHx significant for chronic AF (WKM2XV2-YQMq of 4), HTN who presents to clinic 2/22/2024 for anticoagulation monitoring and adjustment.    Anticoagulation Indication(s):  Afib    Referring Physician:  Dr. Patel    Goal INR Range:  2-3  Duration of Anticoagulation Therapy:  Indefinite  Time of day dose taken:  PM  Product patient has at home:  warfarin 5 mg (PEACH)    [id number] Score for Atrial Fibrillation Stroke Risk   Risk   Factors  Component Value   C CHF No 0   H HTN Yes 1   A2 Age >= 75 Yes,  (84 y.o.) 2   D DM No 0   S2 Prior Stroke/TIA No 0   V Vascular Disease Yes 1   A Age 65-74 No,  (84 y.o.) 0   Sc Sex male 0    [id number]  Score  4   Score last updated 4/28/22 1:42 PM EDT    Click here for a link to the UpToDate guideline \"Atrial Fibrillation: Anticoagulation therapy to prevent embolization    Disclaimer: Risk Score calculation is dependent on accuracy of patient problem list and past encounter diagnosis.    INR Summary                           Warfarin regimen (mg)  Date INR   A/P   Sun Mon Tue Wed Thu Fri Sat Mg/wk  2/22 1.1 Below goal, 7.5 mg x1   2.5 5 2.5 5 7.5/5 5 5 25  1/11 2.2 At goal, no change 2.5 5 2.5 5 2.5 5 2.5 25  12/14 2.2 At goal, no change 2.5 5 2.5 5 2.5 5 2.5 25  12/7 1.92 Below goal, resume 2.5 5 2.5 5 2.5 5 2.5 25  12/5 1.0 Below goal, 7.5mgx1 2.5 5 7.5/2.5 5 2.5 5 2.5 25  11/16 3.0 At goal, no change 2.5 5 2.5 5 2.5 5 2.5 25  10/19 2.0 At goal, no change 2.5 5 2.5 5 2.5 5 2.5 25  10/5 1.7 Below goal, 5 mgx1 2.5 5 2.5 5 5/2.5 5 2.5 25  9/7 2.0 At goal, no change 2.5 5 2.5 5 2.5 5 2.5 25  8/3 2.4 At goal, continue 2.5 5 2.5 5 2.5 5 2.5 25  7/10 1.9 Below goal, cont 2.5 5 2.5 5 2.5 5 2.5 25  6/15 2.8 At goal, no change 2.5 5 2.5 5 2.5 5 2.5 25  5/18 2.5 At goal, no change 2.5 5 2.5 5 2.5 5 2.5 25  5/4 1.6 Below goal, bolus 2.5 5 2.5 5 5/2.5 5 2.5 25  4/6 2.2 At goal,

## 2024-02-29 ENCOUNTER — ANTI-COAG VISIT (OUTPATIENT)
Dept: PHARMACY | Age: 84
End: 2024-02-29
Payer: MEDICARE

## 2024-02-29 DIAGNOSIS — I48.20 CHRONIC ATRIAL FIBRILLATION (HCC): Primary | Chronic | ICD-10-CM

## 2024-02-29 LAB — INTERNATIONAL NORMALIZATION RATIO, POC: 1.2

## 2024-02-29 PROCEDURE — 85610 PROTHROMBIN TIME: CPT

## 2024-02-29 PROCEDURE — 99212 OFFICE O/P EST SF 10 MIN: CPT

## 2024-02-29 NOTE — PROGRESS NOTES
SUBJECTIVE:                                                    Kina Pond is a 31 year old female who presents to clinic today for the following health issues:          Depression and Anxiety Follow-Up    Status since last visit: No change    Other associated symptoms:None    Complicating factors:     Significant life event: No     Current substance abuse: None    PHQ-9 SCORE 2016   Total Score - - -   Total Score 9 14 14     DANIEL-7 SCORE 2016   Total Score 17 20 15        PHQ-9  English      PHQ-9   Any Language     GAD7       Amount of exercise or physical activity: 4-5 days/week for an average of 30-45 minutes    Problems taking medications regularly: No    Medication side effects: none    Diet: regular (no restrictions)  Fatigue       Duration: 1 month     Description (location/character/radiation):Patient states no energy, memory changes, chills, and fever like feeling, has troubles completing daily tasks, sleeps all the time, states at times head to to heavy to hold up.       Intensity:  moderate    Accompanying signs and symptoms: fatigue     History (similar episodes/previous evaluation): None    Precipitating or alleviating factors: None    Therapies tried and outcome: None              Problem list and histories reviewed & adjusted, as indicated.  Additional history: as documented    Patient Active Problem List   Diagnosis     Chemical dependency (H)     Major depression     ACP (advance care planning)     Cystic acne     Past Surgical History:   Procedure Laterality Date     BREAST SURGERY  2013    right lumpectomy     C ANESTH,VAGINAL DELIVERY  2012    Pregnancy full-term; 4.00 hr labor ; APGAR:9 (1 min) 9 (5 min)  (10 min)  -1st degree perineal laceration - Pitocin     C ANESTH,VAGINAL DELIVERY      Pregnancy; 12 hr labor ; 40 week 6 lb(s) 12 oz Male     C ANESTH,VAGINAL DELIVERY      Pregnancy; , 34 week 4 lb(s) 12  ANTICOAGULATION SERVICE    Sai MEREDITH Caicedo \"AL\" is a 84 y.o. male with PMHx significant for chronic AF (TTQ0FU3-SKPu of 4), HTN who presents to clinic 2/29/2024 for anticoagulation monitoring and adjustment.    Anticoagulation Indication(s):  Afib    Referring Physician:  Dr. Patel    Goal INR Range:  2-3  Duration of Anticoagulation Therapy:  Indefinite  Time of day dose taken:  PM  Product patient has at home:  warfarin 5 mg (PEACH)    [id number]-KOMs Score for Atrial Fibrillation Stroke Risk   Risk   Factors  Component Value   C CHF No 0   H HTN Yes 1   A2 Age >= 75 Yes,  (84 y.o.) 2   D DM No 0   S2 Prior Stroke/TIA No 0   V Vascular Disease Yes 1   A Age 65-74 No,  (84 y.o.) 0   Sc Sex male 0    [id number]  Score  4   Score last updated 4/28/22 1:42 PM EDT    Click here for a link to the UpToDate guideline \"Atrial Fibrillation: Anticoagulation therapy to prevent embolization    Disclaimer: Risk Score calculation is dependent on accuracy of patient problem list and past encounter diagnosis.    INR Summary                           Warfarin regimen (mg)  Date INR   A/P   Sun Mon Tue Wed Thu Fri Sat Mg/wk  2/29 1.2 Below goal, 7.5 mg x1 5 5 5 5 7.5/5 5 5 35  2/22 1.1 Below goal, 7.5 mg x1   2.5 5 2.5 5 7.5/5 5 5 30  1/11 2.2 At goal, no change 2.5 5 2.5 5 2.5 5 2.5 25  12/14 2.2 At goal, no change 2.5 5 2.5 5 2.5 5 2.5 25  12/7 1.92 Below goal, resume 2.5 5 2.5 5 2.5 5 2.5 25  12/5 1.0 Below goal, 7.5mgx1 2.5 5 7.5/2.5 5 2.5 5 2.5 25  11/16 3.0 At goal, no change 2.5 5 2.5 5 2.5 5 2.5 25  10/19 2.0 At goal, no change 2.5 5 2.5 5 2.5 5 2.5 25  10/5 1.7 Below goal, 5 mgx1 2.5 5 2.5 5 5/2.5 5 2.5 25  9/7 2.0 At goal, no change 2.5 5 2.5 5 2.5 5 2.5 25  8/3 2.4 At goal, continue 2.5 5 2.5 5 2.5 5 2.5 25  7/10 1.9 Below goal, cont 2.5 5 2.5 5 2.5 5 2.5 25  6/15 2.8 At goal, no change 2.5 5 2.5 5 2.5 5 2.5 25  5/18 2.5 At goal, no change 2.5 5 2.5 5 2.5 5 2.5 25  5/4 1.6 Below goal,  oz Male; in hospital for 1 week, ruptured membranes, resealed ([place], [place])     C INDUCED ABORTN BY DIL/EVAC      Pregnancy, induced  due to birth defect; unknown sex       CHOLECYSTECTOMY         Social History   Substance Use Topics     Smoking status: Current Every Day Smoker     Packs/day: 0.50     Years: 20.00     Types: Cigarettes     Last attempt to quit: 2016     Smokeless tobacco: Never Used      Comment: cutting down to 4-5 cig daily      Alcohol use Yes      Comment: rare     Family History   Problem Relation Age of Onset     Neurologic Disorder Mother      ALS     Respiratory Father      COPD     C.A.D. Father      Family H/O      Prostate Cancer Father      Psychotic Disorder Brother      Breast Cancer Sister      Asthma Other          Current Outpatient Prescriptions   Medication Sig Dispense Refill     ALPRAZolam (XANAX) 0.5 MG tablet Take 1 tablet (0.5 mg) by mouth 2 times daily as needed for anxiety 15 tablet 0     traZODone (DESYREL) 50 MG tablet TAKE 1 TABLET(50 MG) BY MOUTH EVERY NIGHT AS NEEDED FOR SLEEP 30 tablet 3     escitalopram (LEXAPRO) 10 MG tablet Take 1 tablet (10 mg) by mouth daily 30 tablet 1     buPROPion (WELLBUTRIN SR) 150 MG 12 hr tablet TAKE 1 TABLET BY MOUTH TWICE DAILY 60 tablet 3     meclizine (ANTIVERT) 25 MG tablet Take 1 tablet (25 mg) by mouth every 6 hours as needed for dizziness 30 tablet 1     fluticasone (FLONASE) 50 MCG/ACT spray Spray 1 spray into both nostrils daily 1 Bottle 0     clindamycin (CLINDAMAX) 1 % topical gel Apply topically 2 times daily 60 g 11     cyclobenzaprine (FLEXERIL) 10 MG tablet Take 0.5-1 tablets (5-10 mg) by mouth 3 times daily as needed for muscle spasms 30 tablet 1     etonogestrel (IMPLANON/NEXPLANON) 68 MG IMPL 1 each (68 mg) by Subdermal route continuous       polyethylene glycol (MIRALAX) powder Take 17 g (1 capful) by mouth daily As needed for irritable bowel sx.  If no bm in 2 day take one scoop 510 g 1     No Known  Allergies  BP Readings from Last 3 Encounters:   05/04/17 110/64   04/14/17 110/68   03/26/17 104/82    Wt Readings from Last 3 Encounters:   05/04/17 120 lb (54.4 kg)   04/14/17 123 lb (55.8 kg)   02/15/17 118 lb (53.5 kg)                    Reviewed and updated as needed this visit by clinical staff  Tobacco  Allergies  Meds  Med Hx  Surg Hx  Fam Hx  Soc Hx      Reviewed and updated as needed this visit by Provider         ROS:  Constitutional, neuro, ENT, endocrine, pulmonary, cardiac, gastrointestinal, genitourinary, musculoskeletal, integument and psychiatric systems are negative, except as otherwise noted.    OBJECTIVE:                                                    /64 (BP Location: Left arm, Patient Position: Chair, Cuff Size: Adult Regular)  Pulse 88  Temp 98.1  F (36.7  C) (Tympanic)  Wt 120 lb (54.4 kg)  LMP 04/05/2017  SpO2 96%  Breastfeeding? No  BMI 21.26 kg/m2  Body mass index is 21.26 kg/(m^2).  GENERAL APPEARANCE: healthy, alert and no distress  EYES: Eyes grossly normal to inspection, PERRL and conjunctivae and sclerae normal  HENT: ear canals and TM's normal and nose and mouth without ulcers or lesions  NECK: no adenopathy, no asymmetry, masses, or scars and thyroid normal to palpation  RESP: lungs clear to auscultation - no rales, rhonchi or wheezes  CV: regular rates and rhythm, normal S1 S2, no S3 or S4 and no murmur, click or rub  LYMPHATICS: normal ant/post cervical and supraclavicular nodes  ABDOMEN: soft, nontender, without hepatosplenomegaly or masses and bowel sounds normal  MS: extremities normal- no gross deformities noted  SKIN: no suspicious lesions or rashes  NEURO: Normal strength and tone, mentation intact and speech normal. Complains of paresthesia in arms and legs.  Pins and needles.  Comes and goes.  Also complains in her abdomen of this feeling.    PSYCH: fatigue is significant.   Tired.  Stopped all antidepressants.       Diagnostic test  results:  Diagnostic Test Results:  No results found for this or any previous visit (from the past 24 hour(s)).  Results for orders placed or performed in visit on 04/17/17   Basic metabolic panel   Result Value Ref Range    Sodium 139 133 - 144 mmol/L    Potassium 3.8 3.4 - 5.3 mmol/L    Chloride 105 94 - 109 mmol/L    Carbon Dioxide 24 20 - 32 mmol/L    Anion Gap 10 3 - 14 mmol/L    Glucose 87 70 - 99 mg/dL    Urea Nitrogen 11 7 - 30 mg/dL    Creatinine 0.75 0.52 - 1.04 mg/dL    GFR Estimate 90 >60 mL/min/1.7m2    GFR Estimate If Black >90   GFR Calc   >60 mL/min/1.7m2    Calcium 8.1 (L) 8.5 - 10.1 mg/dL   TSH   Result Value Ref Range    TSH 1.47 0.40 - 4.00 mU/L   CBC with platelets and differential   Result Value Ref Range    WBC 7.0 4.0 - 11.0 10e9/L    RBC Count 4.40 3.8 - 5.2 10e12/L    Hemoglobin 13.8 11.7 - 15.7 g/dL    Hematocrit 39.1 35.0 - 47.0 %    MCV 89 78 - 100 fl    MCH 31.4 26.5 - 33.0 pg    MCHC 35.3 31.5 - 36.5 g/dL    RDW 11.5 10.0 - 15.0 %    Platelet Count 451 (H) 150 - 450 10e9/L    Diff Method Automated Method     % Neutrophils 55.7 %    % Lymphocytes 34.4 %    % Monocytes 6.3 %    % Eosinophils 3.1 %    % Basophils 0.4 %    % Immature Granulocytes 0.1 %    Nucleated RBCs 0 0 /100    Absolute Neutrophil 3.9 1.6 - 8.3 10e9/L    Absolute Lymphocytes 2.4 0.8 - 5.3 10e9/L    Absolute Monocytes 0.4 0.0 - 1.3 10e9/L    Absolute Eosinophils 0.2 0.0 - 0.7 10e9/L    Absolute Basophils 0.0 0.0 - 0.2 10e9/L    Abs Immature Granulocytes 0.0 0 - 0.4 10e9/L    Absolute Nucleated RBC 0.0         ASSESSMENT/PLAN:                                                    1. Paresthesia of upper and lower extremities of both sides  Worsening.  Seems worse off her antidepressants.   She feels like it is spreading.  No energy sleeping all the time.  Has been on going a month.   - MR Brain w/o Contrast; Future  - MR Brain w/o Contrast    2. 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  Has remained clean and sober.  This is difficult due to her low energy and always fatigued.   Can sleep at drop of a hat.  If she did have sleep disorder would not recommend her to use any amphetamine at all. More risk for relapse.  Her  also was addicted. Only thought would be Nu lucas due to not being of the addictive potential.       See Patient Instructions    DAYANNA Cai  [facility]

## 2024-03-07 ENCOUNTER — ANTI-COAG VISIT (OUTPATIENT)
Dept: PHARMACY | Age: 84
End: 2024-03-07
Payer: MEDICARE

## 2024-03-07 DIAGNOSIS — I48.20 CHRONIC ATRIAL FIBRILLATION (HCC): Primary | Chronic | ICD-10-CM

## 2024-03-07 LAB — INTERNATIONAL NORMALIZATION RATIO, POC: 1.4

## 2024-03-07 PROCEDURE — 85610 PROTHROMBIN TIME: CPT

## 2024-03-07 PROCEDURE — 99212 OFFICE O/P EST SF 10 MIN: CPT

## 2024-03-07 NOTE — PROGRESS NOTES
ANTICOAGULATION SERVICE    Sai MEREDITH Caicedo \"AL\" is a 84 y.o. male with PMHx significant for chronic AF (IKL6FB7-GJLy of 4), HTN who presents to clinic 3/7/2024 for anticoagulation monitoring and adjustment.    Anticoagulation Indication(s):  Afib    Referring Physician:  Dr. Patel    Goal INR Range:  2-3  Duration of Anticoagulation Therapy:  Indefinite  Time of day dose taken:  PM  Product patient has at home:  warfarin 5 mg (PEACH)    [id number] Score for Atrial Fibrillation Stroke Risk   Risk   Factors  Component Value   C CHF No 0   H HTN Yes 1   A2 Age >= 75 Yes,  (84 y.o.) 2   D DM No 0   S2 Prior Stroke/TIA No 0   V Vascular Disease Yes 1   A Age 65-74 No,  (84 y.o.) 0   Sc Sex male 0    [id number]  Score  4   Score last updated 4/28/22 1:42 PM EDT    Click here for a link to the UpToDate guideline \"Atrial Fibrillation: Anticoagulation therapy to prevent embolization    Disclaimer: Risk Score calculation is dependent on accuracy of patient problem list and past encounter diagnosis.    INR Summary                           Warfarin regimen (mg)  Date INR   A/P   Sun Mon Tue Wed Thu Fri Sat Mg/wk  3/7 1.4 Below goal, increase 5 7.5 5 7.5 5 7.5 5 42.5  2/29 1.2 Below goal, 7.5 mg x1 5 5 5 5 7.5/5 5 5 35  2/22 1.1 Below goal, 7.5 mg x1   2.5 5 2.5 5 7.5/5 5 5 30  1/11 2.2 At goal, no change 2.5 5 2.5 5 2.5 5 2.5 25  12/14 2.2 At goal, no change 2.5 5 2.5 5 2.5 5 2.5 25  12/7 1.92 Below goal, resume 2.5 5 2.5 5 2.5 5 2.5 25  12/5 1.0 Below goal, 7.5mgx1 2.5 5 7.5/2.5 5 2.5 5 2.5 25  11/16 3.0 At goal, no change 2.5 5 2.5 5 2.5 5 2.5 25  10/19 2.0 At goal, no change 2.5 5 2.5 5 2.5 5 2.5 25  10/5 1.7 Below goal, 5 mgx1 2.5 5 2.5 5 5/2.5 5 2.5 25  9/7 2.0 At goal, no change 2.5 5 2.5 5 2.5 5 2.5 25  8/3 2.4 At goal, continue 2.5 5 2.5 5 2.5 5 2.5 25  7/10 1.9 Below goal, cont 2.5 5 2.5 5 2.5 5 2.5 25  6/15 2.8 At goal, no change 2.5 5 2.5 5 2.5 5 2.5 25  5/18 2.5 At goal, no

## 2024-03-14 ENCOUNTER — ANTI-COAG VISIT (OUTPATIENT)
Dept: PHARMACY | Age: 84
End: 2024-03-14
Payer: MEDICARE

## 2024-03-14 DIAGNOSIS — I48.20 CHRONIC ATRIAL FIBRILLATION (HCC): Primary | Chronic | ICD-10-CM

## 2024-03-14 LAB — INTERNATIONAL NORMALIZATION RATIO, POC: 1.6

## 2024-03-14 PROCEDURE — 99212 OFFICE O/P EST SF 10 MIN: CPT

## 2024-03-14 PROCEDURE — 85610 PROTHROMBIN TIME: CPT

## 2024-03-14 NOTE — PROGRESS NOTES
I have seen and evaluated the patient with the PharmD Candidate, Mulu Moseley, under my direct supervision. I have reviewed the PharmD Candidate's note and agree with the assessment and plan of care as documented.    Dolores Lopez, NahidD, [facility]  Medication Management Clinic   [facility] Ph: [phone]  [facility] Ph: [phone]  3/14/2024 4:55 PM    
[facility] Ph: [phone]  [facility] Schulz Ph: [phone]  3/14/2024 1:23 PM    For Pharmacy Admin Tracking Only  Time Spent (min): 15

## 2024-03-21 ENCOUNTER — ANTI-COAG VISIT (OUTPATIENT)
Dept: PHARMACY | Age: 84
End: 2024-03-21
Payer: MEDICARE

## 2024-03-21 DIAGNOSIS — I48.20 CHRONIC ATRIAL FIBRILLATION (HCC): Primary | Chronic | ICD-10-CM

## 2024-03-21 LAB — INTERNATIONAL NORMALIZATION RATIO, POC: 1.4

## 2024-03-21 PROCEDURE — 99212 OFFICE O/P EST SF 10 MIN: CPT

## 2024-03-21 PROCEDURE — 85610 PROTHROMBIN TIME: CPT

## 2024-03-21 NOTE — PROGRESS NOTES
his/her physician.     Next Clinic Appointment: 3/28    Please call [facility] at [phone] with any questions.    Mulu HAYES PharmD Candidate   Medication Management Clinic   [facility] Ph: [phone]  [facility]OM[facility] Schulz Ph: [phone]  3/21/2024 1:39 PM    For Pharmacy Admin Tracking Only    Intervention Detail: Dose Adjustment: 1, reason: Therapy Optimization  Total # of Interventions Recommended: 1  Total # of Interventions Accepted: 1  Time Spent (min): 20

## 2024-03-28 ENCOUNTER — ANTI-COAG VISIT (OUTPATIENT)
Dept: PHARMACY | Age: 84
End: 2024-03-28
Payer: MEDICARE

## 2024-03-28 DIAGNOSIS — I48.20 CHRONIC ATRIAL FIBRILLATION (HCC): Primary | Chronic | ICD-10-CM

## 2024-03-28 LAB — INR BLD: 1.7

## 2024-03-28 PROCEDURE — 85610 PROTHROMBIN TIME: CPT

## 2024-03-28 PROCEDURE — 99211 OFF/OP EST MAY X REQ PHY/QHP: CPT

## 2024-03-28 NOTE — PROGRESS NOTES
(stent placement 10/30/17)  4-6 APAP/day prn knee pain   Warfarin dose taken as prescribed Missed 2/19 - uses pillbox  held warfarin 11/24-11/28/23 and bridged with Lovenox    Signs/symptoms of bleeding H/o hematuria- patient states he was told by his urologist that as long as he is taking warfarin, hematuria may continue.  H/o epistaxis, occasional hemorrhoids, anemia.   Vitamin K intake 3/28: has 3-4 servings of his leafy greens/salads  3/21: Had ~2 servings of salads this week  3/14: pt reports to not eating as much greens bc of loss in appetite from the side effects of the cancer medication.  Normally has broccoli, asparagus, kale/gay salads 3-4 per week    Recent vomiting/diarrhea/fever, changes in weight or activity level Trying to improve diet/exercise and lose weight gradually.  2/22/24: Son pass recently from seizure/MS   Tobacco or alcohol use Patient denies tobacco use  Will have 1 glass of wine per day max   Upcoming surgeries or procedures 4/5: Dr. Pineda follow up for cancer/med.  prostate marker placement cancelled  TKR TBD     Assessment/Plan:   Patient's INR was subtherapeutic (1.7) today most likely due to being on Erleada. Patient has been back to his normal diet of 3-4 servings of leafy greens. Pt was dx with prostate cancer. His prostate procedure was cancelled and he started Erleada instead on 1/15/24, which can decrease INR.  He has been under a significant amount of stress d/t son's passing and his own health issues.     Because the INR is trending down, still taking Erleada (dec INR), and eating more leafy greens, the patient was instructed to bolus tonight (3/28) with warfarin 10 mg (2 tablets) and then increase (5%) to weekly dose of warfarin to 7.5 mg every day. Repeat INR in 1 wk. Patient was reminded to call with any bleeding, medication changes, or fever/vomiting/diarrhea.     Patient understands dosing directions and information discussed. Dosing schedule and follow up appointment

## 2024-04-04 ENCOUNTER — ANTI-COAG VISIT (OUTPATIENT)
Dept: PHARMACY | Age: 84
End: 2024-04-04
Payer: MEDICARE

## 2024-04-04 DIAGNOSIS — I48.20 CHRONIC ATRIAL FIBRILLATION (HCC): Primary | Chronic | ICD-10-CM

## 2024-04-04 LAB — INTERNATIONAL NORMALIZATION RATIO, POC: 2.9

## 2024-04-04 PROCEDURE — 99211 OFF/OP EST MAY X REQ PHY/QHP: CPT

## 2024-04-04 PROCEDURE — 85610 PROTHROMBIN TIME: CPT

## 2024-04-04 NOTE — PROGRESS NOTES
ANTICOAGULATION SERVICE    Sai Caicedo \"AL\" is a 84 y.o. male with PMHx significant for chronic AF (KBO1FZ3-KKQo of 4), HTN who presents to clinic 4/4/2024 for anticoagulation monitoring and adjustment.    Anticoagulation Indication(s):  Afib    Referring Physician:  Dr. Patel    Goal INR Range:  2-3  Duration of Anticoagulation Therapy:  Indefinite  Time of day dose taken:  PM  Product patient has at home:  warfarin 5 mg (PEACH)    [id number] Score for Atrial Fibrillation Stroke Risk   Risk   Factors  Component Value   C CHF No 0   H HTN Yes 1   A2 Age >= 75 Yes,  (84 y.o.) 2   D DM No 0   S2 Prior Stroke/TIA No 0   V Vascular Disease Yes 1   A Age 65-74 No,  (84 y.o.) 0   Sc Sex male 0    [id number]  Score  4   Score last updated 4/28/22 1:42 PM EDT    Click here for a link to the UpToDate guideline \"Atrial Fibrillation: Anticoagulation therapy to prevent embolization    Disclaimer: Risk Score calculation is dependent on accuracy of patient problem list and past encounter diagnosis.    INR Summary                           Warfarin regimen (mg)  Date INR   A/P   Sun Mon Tue Wed Thu Fri Sat Mg/wk  4/4 2.9 At goal, continue 7.5 7.5 7.5 7.5 7.5 7.5 7.5 52.5  3/28 1.7 Below goal, bolus,Inc 7.5 7.5 7.5 7.5 10/7.5 7.5 7.5 52.5  3/21 1.4 Below goal, increase  7.5 7.5 5 7.5 7.5 7.5 7.5 50  3/14 1.6 Below goal, no change 5 7.5 5 7.5 5 7.5 5 42.5  3/7 1.4 Below goal, increase  5 7.5 5 7.5 5 7.5 5 42.5  2/29 1.2 Below goal, 7.5 mg x1 5 5 5 5 7.5/5 5 5 35  2/22 1.1 Below goal, 7.5 mg x1   2.5 5 2.5 5 7.5/5 5 5 30  1/11 2.2 At goal, no change 2.5 5 2.5 5 2.5 5 2.5 25  12/14 2.2 At goal, no change 2.5 5 2.5 5 2.5 5 2.5 25  12/7 1.92 Below goal, resume 2.5 5 2.5 5 2.5 5 2.5 25  12/5 1.0 Below goal, 7.5mgx1 2.5 5 7.5/2.5 5 2.5 5 2.5 25  11/16 3.0 At goal, no change 2.5 5 2.5 5 2.5 5 2.5 25  10/19 2.0 At goal, no change 2.5 5 2.5 5 2.5 5 2.5 25  10/5 1.7 Below goal, 5 mgx1 2.5 5 2.5 5 5/2.5 5 2.5 25  9/7 2.0 At goal, no

## 2024-04-18 ENCOUNTER — ANTI-COAG VISIT (OUTPATIENT)
Dept: PHARMACY | Age: 84
End: 2024-04-18
Payer: MEDICARE

## 2024-04-18 DIAGNOSIS — I48.20 CHRONIC ATRIAL FIBRILLATION (HCC): Primary | Chronic | ICD-10-CM

## 2024-04-18 LAB — INTERNATIONAL NORMALIZATION RATIO, POC: 3.7

## 2024-04-18 PROCEDURE — 99212 OFFICE O/P EST SF 10 MIN: CPT

## 2024-04-18 PROCEDURE — 85610 PROTHROMBIN TIME: CPT

## 2024-04-18 NOTE — PROGRESS NOTES
ANTICOAGULATION SERVICE    Sai MEREDITH Caicedo \"AL\" is a 84 y.o. male with PMHx significant for chronic AF (ETC9NS2-IFWg of 4), HTN who presents to clinic 4/18/2024 for anticoagulation monitoring and adjustment.    Anticoagulation Indication(s):  Afib    Referring Physician:  Dr. Patel    Goal INR Range:  2-3  Duration of Anticoagulation Therapy:  Indefinite  Time of day dose taken:  PM  Product patient has at home:  warfarin 5 mg (PEACH)    [id number] Score for Atrial Fibrillation Stroke Risk   Risk   Factors  Component Value   C CHF No 0   H HTN Yes 1   A2 Age >= 75 Yes,  (84 y.o.) 2   D DM No 0   S2 Prior Stroke/TIA No 0   V Vascular Disease Yes 1   A Age 65-74 No,  (84 y.o.) 0   Sc Sex male 0    PMJ6PN1-QXDs  Score  4   Score last updated 4/28/22 1:42 PM EDT    Click here for a link to the UpToDate guideline \"Atrial Fibrillation: Anticoagulation therapy to prevent embolization    Disclaimer: Risk Score calculation is dependent on accuracy of patient problem list and past encounter diagnosis.    INR Summary                           Warfarin regimen (mg)  Date INR   A/P   Sun Mon Tue Wed Thu Fri Sat Mg/wk  4/18 3.7 Above goal, hold x1 7.5 7.5 7.5 7.5 0/7.5 7.5 7.5 52.5  4/4 2.9 At goal, continue 7.5 7.5 7.5 7.5 7.5 7.5 7.5 52.5  3/28 1.7 Below goal, bolus,Inc 7.5 7.5 7.5 7.5 10/7.5 7.5 7.5 52.5  3/21 1.4 Below goal, increase  7.5 7.5 5 7.5 7.5 7.5 7.5 50  3/14 1.6 Below goal, no change 5 7.5 5 7.5 5 7.5 5 42.5  3/7 1.4 Below goal, increase  5 7.5 5 7.5 5 7.5 5 42.5  2/29 1.2 Below goal, 7.5 mg x1 5 5 5 5 7.5/5 5 5 35  2/22 1.1 Below goal, 7.5 mg x1   2.5 5 2.5 5 7.5/5 5 5 30  1/11 2.2 At goal, no change 2.5 5 2.5 5 2.5 5 2.5 25  12/14 2.2 At goal, no change 2.5 5 2.5 5 2.5 5 2.5 25  12/7 1.92 Below goal, resume 2.5 5 2.5 5 2.5 5 2.5 25  12/5 1.0 Below goal, 7.5mgx1 2.5 5 7.5/2.5 5 2.5 5 2.5 25  11/16 3.0 At goal, no change 2.5 5 2.5 5 2.5 5 2.5 25  10/19 2.0 At goal, no

## 2024-04-30 ENCOUNTER — OFFICE VISIT (OUTPATIENT)
Dept: CARDIOLOGY CLINIC | Age: 84
End: 2024-04-30
Payer: MEDICARE

## 2024-04-30 VITALS
DIASTOLIC BLOOD PRESSURE: 120 MMHG | SYSTOLIC BLOOD PRESSURE: 180 MMHG | BODY MASS INDEX: 30.87 KG/M2 | HEART RATE: 76 BPM | WEIGHT: 203 LBS

## 2024-04-30 DIAGNOSIS — I25.2 MI, OLD: ICD-10-CM

## 2024-04-30 DIAGNOSIS — I25.10 CAD IN NATIVE ARTERY: ICD-10-CM

## 2024-04-30 DIAGNOSIS — I10 ESSENTIAL HYPERTENSION: ICD-10-CM

## 2024-04-30 DIAGNOSIS — I48.20 CHRONIC ATRIAL FIBRILLATION (HCC): Primary | ICD-10-CM

## 2024-04-30 DIAGNOSIS — Z98.61 HISTORY OF PTCA: ICD-10-CM

## 2024-04-30 PROCEDURE — G8417 CALC BMI ABV UP PARAM F/U: HCPCS | Performed by: INTERNAL MEDICINE

## 2024-04-30 PROCEDURE — 3080F DIAST BP >= 90 MM HG: CPT | Performed by: INTERNAL MEDICINE

## 2024-04-30 PROCEDURE — 1036F TOBACCO NON-USER: CPT | Performed by: INTERNAL MEDICINE

## 2024-04-30 PROCEDURE — 1123F ACP DISCUSS/DSCN MKR DOCD: CPT | Performed by: INTERNAL MEDICINE

## 2024-04-30 PROCEDURE — G8427 DOCREV CUR MEDS BY ELIG CLIN: HCPCS | Performed by: INTERNAL MEDICINE

## 2024-04-30 PROCEDURE — 99214 OFFICE O/P EST MOD 30 MIN: CPT | Performed by: INTERNAL MEDICINE

## 2024-04-30 PROCEDURE — 3077F SYST BP >= 140 MM HG: CPT | Performed by: INTERNAL MEDICINE

## 2024-04-30 NOTE — PROGRESS NOTES
Subjective:      Patient ID: Sai Caicedo is a 84 y.o. male.    HPI:  Here for follow up afib/HTN/CAD/PTCA/Non st.  Still with some knee issues/arthritis.  Just found out about prostate ca 12/23.  On 2 chemos now.  Side effects  some sob.  Increase in swelling in past wk.  Wt stable.  No sob. No orthopnea/PND.   No palp/tachycardia.  No syncope.  No exertional chest pain/sob.  BP down 3 wks ago  Now bp meds and lasix.  BP better.  .     Past Medical History:   Diagnosis Date    Allergic rhinitis     Anemia     Atrial fibrillation (HCC)     Atrial fibrillation (HCC)     Benign non-nodular prostatic hyperplasia with lower urinary tract symptoms 2/9/2017    Benign paroxysmal positional vertigo     BPH (benign prostatic hyperplasia)     BPH (benign prostatic hypertrophy)     Cataract 10/3/2014    Cholelithiasis     Colon cancer (HCC) 4/9/2012    Diverticulosis     Dyslipidemia 7/21/2010    Elevated PSA     Erectile dysfunction     Essential hypertension 11/24/2015    Gastroesophageal reflux disease without esophagitis 11/9/2016    GERD (gastroesophageal reflux disease)     Glaucoma 4/5/2013    Hiatal hernia     Hypertension     Lumbar radiculopathy     Osteoarthritis     Peripheral neuropathy 12/7/2012    Proteinuria 7/22/2010    S/P laparoscopic-assisted sigmoidectomy 4/17/2012    Urinary frequency      Past Surgical History:   Procedure Laterality Date    CATARACT REMOVAL WITH IMPLANT Right October 7, 2014    Dr. Ramon Suazo [facility] [place]    CATARACT REMOVAL WITH IMPLANT Left October 24, 2014    Dr. Ramon Suazo [facility] [place]    COLONOSCOPY  April 4, 2012    Dr. Igor Brunson    COLONOSCOPY  May 15, 2013    Dr. Igor Brunson     COLONOSCOPY  08/09/2016    Dr. Igor Brunson    COLONOSCOPY N/A 02/17/2017    COLONOSCOPY N/A 8/13/2020    COLONOSCOPY performed by Rell Archuleta MD at [facility] ENDOSCOPY    DILATATION, ESOPHAGUS      GLAUCOMA SURGERY Right October 7, 2014    Dr. Ramon Suazo

## 2024-05-02 ENCOUNTER — ANTI-COAG VISIT (OUTPATIENT)
Dept: PHARMACY | Age: 84
End: 2024-05-02
Payer: MEDICARE

## 2024-05-02 DIAGNOSIS — I25.10 CAD IN NATIVE ARTERY: ICD-10-CM

## 2024-05-02 DIAGNOSIS — Z98.61 HISTORY OF PTCA: ICD-10-CM

## 2024-05-02 DIAGNOSIS — I48.20 CHRONIC ATRIAL FIBRILLATION (HCC): Primary | Chronic | ICD-10-CM

## 2024-05-02 DIAGNOSIS — I48.20 CHRONIC ATRIAL FIBRILLATION (HCC): ICD-10-CM

## 2024-05-02 DIAGNOSIS — I25.2 MI, OLD: ICD-10-CM

## 2024-05-02 DIAGNOSIS — I10 ESSENTIAL HYPERTENSION: ICD-10-CM

## 2024-05-02 LAB
ANION GAP SERPL CALCULATED.3IONS-SCNC: 11 MMOL/L (ref 3–16)
BUN SERPL-MCNC: 8 MG/DL (ref 7–20)
CALCIUM SERPL-MCNC: 9.3 MG/DL (ref 8.3–10.6)
CHLORIDE SERPL-SCNC: 107 MMOL/L (ref 99–110)
CO2 SERPL-SCNC: 25 MMOL/L (ref 21–32)
CREAT SERPL-MCNC: 0.8 MG/DL (ref 0.8–1.3)
GFR SERPLBLD CREATININE-BSD FMLA CKD-EPI: 87 ML/MIN/{1.73_M2}
GLUCOSE SERPL-MCNC: 104 MG/DL (ref 70–99)
INR PPP: 3.33 (ref 0.85–1.15)
INTERNATIONAL NORMALIZATION RATIO, POC: 4.4
POTASSIUM SERPL-SCNC: 3.5 MMOL/L (ref 3.5–5.1)
PROTHROMBIN TIME: 33.6 SEC (ref 11.9–14.9)
SODIUM SERPL-SCNC: 143 MMOL/L (ref 136–145)

## 2024-05-02 PROCEDURE — 99211 OFF/OP EST MAY X REQ PHY/QHP: CPT

## 2024-05-02 PROCEDURE — 85610 PROTHROMBIN TIME: CPT

## 2024-05-02 NOTE — PROGRESS NOTES
and then decrease warfarin to 7.5 mg every day except 5 mg on Fridays (4.8% decrease). Proceeding with caution on decrease as patient was subtheraputic for so long on previously reduced dose. Repeat INR in 2 wks. Patient was reminded to call with any bleeding, medication changes, or fever/vomiting/diarrhea.     Patient understands dosing directions and information discussed. Dosing schedule and follow up appointment given to patient. Progress note routed to referring physician's office. Patient acknowledges working in consult agreement with pharmacist as referred by his/her physician.     Next Clinic Appointment: 5/2    Please call [facility] at [phone] with any questions.  Thanks!    Marilyn Ventura, PharmD, [facility]  Medication Management Clinic   [facility] [place] Ph: [phone]  [facility] [place] Ph: [phone]  5/2/2024 10:56 AM      For Pharmacy Admin Tracking Only    Intervention Detail: Dose Adjustment: 1, reason: Therapy De-escalation  Total # of Interventions Recommended: 1  Total # of Interventions Accepted: 1  Time Spent (min): 15

## 2024-05-16 ENCOUNTER — ANTI-COAG VISIT (OUTPATIENT)
Dept: PHARMACY | Age: 84
End: 2024-05-16
Payer: MEDICARE

## 2024-05-16 DIAGNOSIS — I48.20 CHRONIC ATRIAL FIBRILLATION (HCC): Primary | Chronic | ICD-10-CM

## 2024-05-16 LAB — INTERNATIONAL NORMALIZATION RATIO, POC: 2.3

## 2024-05-16 PROCEDURE — 99212 OFFICE O/P EST SF 10 MIN: CPT

## 2024-05-16 RX ORDER — WARFARIN SODIUM 5 MG/1
7.5 TABLET ORAL DAILY
Qty: 135 TABLET | Refills: 3 | Status: SHIPPED | OUTPATIENT
Start: 2024-05-16

## 2024-05-16 RX ORDER — METOPROLOL SUCCINATE 200 MG/1
100 TABLET, EXTENDED RELEASE ORAL DAILY
COMMUNITY

## 2024-05-24 ENCOUNTER — OFFICE VISIT (OUTPATIENT)
Dept: CARDIOLOGY CLINIC | Age: 84
End: 2024-05-24
Payer: MEDICARE

## 2024-05-24 VITALS
WEIGHT: 183.2 LBS | BODY MASS INDEX: 27.86 KG/M2 | SYSTOLIC BLOOD PRESSURE: 118 MMHG | HEART RATE: 82 BPM | DIASTOLIC BLOOD PRESSURE: 64 MMHG

## 2024-05-24 DIAGNOSIS — I25.2 MI, OLD: ICD-10-CM

## 2024-05-24 DIAGNOSIS — I25.10 CAD IN NATIVE ARTERY: ICD-10-CM

## 2024-05-24 DIAGNOSIS — I10 ESSENTIAL HYPERTENSION: ICD-10-CM

## 2024-05-24 DIAGNOSIS — Z98.61 HISTORY OF PTCA: ICD-10-CM

## 2024-05-24 DIAGNOSIS — I48.20 CHRONIC ATRIAL FIBRILLATION (HCC): Primary | ICD-10-CM

## 2024-05-24 PROCEDURE — 1123F ACP DISCUSS/DSCN MKR DOCD: CPT | Performed by: INTERNAL MEDICINE

## 2024-05-24 PROCEDURE — 1036F TOBACCO NON-USER: CPT | Performed by: INTERNAL MEDICINE

## 2024-05-24 PROCEDURE — 99214 OFFICE O/P EST MOD 30 MIN: CPT | Performed by: INTERNAL MEDICINE

## 2024-05-24 PROCEDURE — G8427 DOCREV CUR MEDS BY ELIG CLIN: HCPCS | Performed by: INTERNAL MEDICINE

## 2024-05-24 PROCEDURE — G8417 CALC BMI ABV UP PARAM F/U: HCPCS | Performed by: INTERNAL MEDICINE

## 2024-05-24 PROCEDURE — 3078F DIAST BP <80 MM HG: CPT | Performed by: INTERNAL MEDICINE

## 2024-05-24 PROCEDURE — 3074F SYST BP LT 130 MM HG: CPT | Performed by: INTERNAL MEDICINE

## 2024-05-24 NOTE — PROGRESS NOTES
Subjective:      Patient ID: Sai Caicedo is a 84 y.o. male.    HPI:  Here for follow up afib/HTN/CAD/PTCA/Non st.  Still with some knee issues/arthritis.  Just found out about prostate ca 12/23.  On 2 chemos now.  Swelling much better. Wt down.  No sob. No orthopnea/PND.   No palp/tachycardia.  No syncope.  No exertional chest pain/sob.  BP better.   .     Past Medical History:   Diagnosis Date    Allergic rhinitis     Anemia     Atrial fibrillation (HCC)     Atrial fibrillation (HCC)     Benign non-nodular prostatic hyperplasia with lower urinary tract symptoms 2/9/2017    Benign paroxysmal positional vertigo     BPH (benign prostatic hyperplasia)     BPH (benign prostatic hypertrophy)     Cataract 10/3/2014    Cholelithiasis     Colon cancer (HCC) 4/9/2012    Diverticulosis     Dyslipidemia 7/21/2010    Elevated PSA     Erectile dysfunction     Essential hypertension 11/24/2015    Gastroesophageal reflux disease without esophagitis 11/9/2016    GERD (gastroesophageal reflux disease)     Glaucoma 4/5/2013    Hiatal hernia     Hypertension     Lumbar radiculopathy     Osteoarthritis     Peripheral neuropathy 12/7/2012    Proteinuria 7/22/2010    S/P laparoscopic-assisted sigmoidectomy 4/17/2012    Urinary frequency      Past Surgical History:   Procedure Laterality Date    [facility]    [facility]    COLONOSCOPY  April 4, 2012    Dr. Igor Brunson    COLONOSCOPY  May 15, 2013    Dr. Igor Brunson     COLONOSCOPY  08/09/2016    Dr. Igor Brunson    COLONOSCOPY N/A 02/17/2017    COLONOSCOPY N/A 8/13/2020    COLONOSCOPY performed by Rell Archuleta MD at [facility] ENDOSCOPY    DILATATION, ESOPHAGUS      GLAUCOMA SURGERY Right October 7, 2014    Dr. Ramon Moncada [facility]    GLAUCOMA SURGERY Left October 24, 2014    Dr. Ramon Suazo

## 2024-05-30 ENCOUNTER — ANTI-COAG VISIT (OUTPATIENT)
Dept: PHARMACY | Age: 84
End: 2024-05-30
Payer: MEDICARE

## 2024-05-30 DIAGNOSIS — I48.20 CHRONIC ATRIAL FIBRILLATION (HCC): Primary | Chronic | ICD-10-CM

## 2024-05-30 LAB — INTERNATIONAL NORMALIZATION RATIO, POC: 2.4

## 2024-05-30 PROCEDURE — 99211 OFF/OP EST MAY X REQ PHY/QHP: CPT

## 2024-05-30 PROCEDURE — 85610 PROTHROMBIN TIME: CPT

## 2024-05-30 NOTE — PROGRESS NOTES
ANTICOAGULATION SERVICE    Sai Caicedo \"AL\" is a 84 y.o. male with PMHx significant for chronic AF (YVY8CJ3-QJCp of 4), HTN who presents to clinic 5/30/2024 for anticoagulation monitoring and adjustment.    Anticoagulation Indication(s):  Afib    Referring Physician:  Dr. Patel    Goal INR Range:  2-3  Duration of Anticoagulation Therapy:  Indefinite  Time of day dose taken:  PM  Product patient has at home:  warfarin 5 mg (PEACH)    CFT4KS1-XCQz Score for Atrial Fibrillation Stroke Risk   Risk   Factors  Component Value   C CHF No 0   H HTN Yes 1   A2 Age >= 75 Yes,  (84 y.o.) 2   D DM No 0   S2 Prior Stroke/TIA No 0   V Vascular Disease Yes 1   A Age 65-74 No,  (84 y.o.) 0   Sc Sex male 0    IWW5KF2-SEZq  Score  4   Score last updated 4/28/22 1:42 PM EDT    Click here for a link to the UpToDate guideline \"Atrial Fibrillation: Anticoagulation therapy to prevent embolization    Disclaimer: Risk Score calculation is dependent on accuracy of patient problem list and past encounter diagnosis.    INR Summary                           Warfarin regimen (mg)  Date INR   A/P   Sun Mon Tue Wed Thu Fri Sat Mg/wk  5/30 2.4 At goal, continue 7.5 7.5 7.5 7.5 7.5 7.5 7.5 52.5  5/16 2.3 At goal, continue 7.5 7.5 7.5 7.5 7.5 7.5 7.5 52.5    *has been taking 7.5 mg daily  5/2 4.4 Above goal, hold,dec 7.5 7.5 7.5 7.5 0/7.5 5 7.5 50  4/18 3.7 Above goal, hold x1 7.5 7.5 7.5 7.5 0/7.5 7.5 7.5 52.5  4/4 2.9 At goal, continue 7.5 7.5 7.5 7.5 7.5 7.5 7.5 52.5  3/28 1.7 Below goal, bolus,Inc 7.5 7.5 7.5 7.5 10/7.5 7.5 7.5 52.5  3/21 1.4 Below goal, increase  7.5 7.5 5 7.5 7.5 7.5 7.5 50  3/14 1.6 Below goal, no change 5 7.5 5 7.5 5 7.5 5 42.5  3/7 1.4 Below goal, increase  5 7.5 5 7.5 5 7.5 5 42.5  2/29 1.2 Below goal, 7.5 mg x1 5 5 5 5 7.5/5 5 5 35  2/22 1.1 Below goal, 7.5 mg x1   2.5 5 2.5 5 7.5/5 5 5 30  1/11 2.2 At goal, no change 2.5 5 2.5 5 2.5 5 2.5 25  12/14 2.2 At goal, no change 2.5 5 2.5 5 2.5 5 2.5 25  12/7 1.92 Below

## 2024-06-27 ENCOUNTER — ANTI-COAG VISIT (OUTPATIENT)
Dept: PHARMACY | Age: 84
End: 2024-06-27
Payer: MEDICARE

## 2024-06-27 DIAGNOSIS — I48.20 CHRONIC ATRIAL FIBRILLATION (HCC): Primary | Chronic | ICD-10-CM

## 2024-06-27 LAB
INTERNATIONAL NORMALIZATION RATIO, POC: 2.9
PROTHROMBIN TIME, POC: NORMAL

## 2024-06-27 PROCEDURE — 99211 OFF/OP EST MAY X REQ PHY/QHP: CPT

## 2024-06-27 PROCEDURE — 85610 PROTHROMBIN TIME: CPT

## 2024-06-27 NOTE — PROGRESS NOTES
ANTICOAGULATION SERVICE    Sai Caicedo \"AL\" is a 84 y.o. male with PMHx significant for chronic AF (VNB0SX5-TEFi of 4), HTN who presents to clinic 6/27/2024 for anticoagulation monitoring and adjustment.    Anticoagulation Indication(s):  Afib    Referring Physician:  Dr. Patel    Goal INR Range:  2-3  Duration of Anticoagulation Therapy:  Indefinite  Time of day dose taken:  PM  Product patient has at home:  warfarin 5 mg (PEACH)    TFK6GS1-YQAm Score for Atrial Fibrillation Stroke Risk   Risk   Factors  Component Value   C CHF No 0   H HTN Yes 1   A2 Age >= 75 Yes,  (84 y.o.) 2   D DM No 0   S2 Prior Stroke/TIA No 0   V Vascular Disease Yes 1   A Age 65-74 No,  (84 y.o.) 0   Sc Sex male 0    YCQ5KP9-[id number]  Score  4   Score last updated 4/28/22 1:42 PM EDT    Click here for a link to the UpToDate guideline \"Atrial Fibrillation: Anticoagulation therapy to prevent embolization    Disclaimer: Risk Score calculation is dependent on accuracy of patient problem list and past encounter diagnosis.    INR Summary                           Warfarin regimen (mg)  Date INR   A/P   Sun Mon Tue Wed Thu Fri Sat Mg/wk  6/27 2.9 At goal, continue 7.5 7.5 7.5 7.5 7.5 7.5 7.5 52.5  5/30 2.4 At goal, continue 7.5 7.5 7.5 7.5 7.5 7.5 7.5 52.5  5/16 2.3 At goal, continue 7.5 7.5 7.5 7.5 7.5 7.5 7.5 52.5    *has been taking 7.5 mg daily  5/2 4.4 Above goal, hold,dec 7.5 7.5 7.5 7.5 0/7.5 5 7.5 50  4/18 3.7 Above goal, hold x1 7.5 7.5 7.5 7.5 0/7.5 7.5 7.5 52.5  4/4 2.9 At goal, continue 7.5 7.5 7.5 7.5 7.5 7.5 7.5 52.5  3/28 1.7 Below goal, bolus,Inc 7.5 7.5 7.5 7.5 10/7.5 7.5 7.5 52.5  3/21 1.4 Below goal, increase  7.5 7.5 5 7.5 7.5 7.5 7.5 50  3/14 1.6 Below goal, no change 5 7.5 5 7.5 5 7.5 5 42.5  3/7 1.4 Below goal, increase  5 7.5 5 7.5 5 7.5 5 42.5  2/29 1.2 Below goal, 7.5 mg x1 5 5 5 5 7.5/5 5 5 35  2/22 1.1 Below goal, 7.5 mg x1   2.5 5 2.5 5 7.5/5 5 5 30  1/11 2.2 At goal, no

## 2024-07-05 DIAGNOSIS — I50.9 HEART FAILURE, ACC/AHA STAGE B (HCC): ICD-10-CM

## 2024-07-05 DIAGNOSIS — I10 ESSENTIAL HYPERTENSION: ICD-10-CM

## 2024-07-05 RX ORDER — FUROSEMIDE 20 MG/1
20 TABLET ORAL DAILY
Qty: 90 TABLET | Refills: 3 | Status: SHIPPED | OUTPATIENT
Start: 2024-07-05

## 2024-07-05 NOTE — TELEPHONE ENCOUNTER
Requested Prescriptions     Pending Prescriptions Disp Refills    furosemide (LASIX) 20 MG tablet [Pharmacy Med Name: FUROSEMIDE 20 MG TABLET] 90 tablet 3     Sig: TAKE 1 TABLET BY MOUTH DAILY BEFORE DINNER            Checked Correct Pharmacy: Yes    Any changes since last refill? No     Number: 90    Refills: 3    Last Office Visit: 5/24/2024     Next Office Visit: 9/4/2024     Last Refill: 07/24/2023    Last Labs: 05/02/2024

## 2024-07-25 ENCOUNTER — ANTI-COAG VISIT (OUTPATIENT)
Dept: PHARMACY | Age: 84
End: 2024-07-25
Payer: MEDICARE

## 2024-07-25 DIAGNOSIS — I48.20 CHRONIC ATRIAL FIBRILLATION (HCC): Primary | Chronic | ICD-10-CM

## 2024-07-25 LAB
INTERNATIONAL NORMALIZATION RATIO, POC: 1.3
PROTHROMBIN TIME, POC: NORMAL

## 2024-07-25 PROCEDURE — 85610 PROTHROMBIN TIME: CPT | Performed by: PHARMACIST

## 2024-07-25 PROCEDURE — 99211 OFF/OP EST MAY X REQ PHY/QHP: CPT | Performed by: PHARMACIST

## 2024-07-25 NOTE — PROGRESS NOTES
goal, no change 2.5 5 2.5 5 2.5 5 2.5 25  12/7 1.92 Below goal, resume 2.5 5 2.5 5 2.5 5 2.5 25  12/5 1.0 Below goal, 7.5mgx1 2.5 5 7.5/2.5 5 2.5 5 2.5 25  11/16 3.0 At goal, no change 2.5 5 2.5 5 2.5 5 2.5 25  10/19 2.0 At goal, no change 2.5 5 2.5 5 2.5 5 2.5 25  10/5 1.7 Below goal, 5 mgx1 2.5 5 2.5 5 5/2.5 5 2.5 25  9/7 2.0 At goal, no change 2.5 5 2.5 5 2.5 5 2.5 25  8/3 2.4 At goal, continue 2.5 5 2.5 5 2.5 5 2.5 25  7/10 1.9 Below goal, cont 2.5 5 2.5 5 2.5 5 2.5 25  6/15 2.8 At goal, no change 2.5 5 2.5 5 2.5 5 2.5 25  5/18 2.5 At goal, no change 2.5 5 2.5 5 2.5 5 2.5 25  5/4 1.6 Below goal, bolus 2.5 5 2.5 5 5/2.5 5 2.5 25  4/6 2.2 At goal, continue 2.5 5 2.5 5 2.5 5 2.5 25  3/9 1.8 Below goal, continue 2.5 5 2.5 5 2.5 5 2.5 25  2/9 2.1 At goal, continue 2.5 5 2.5 5 2.5 5 2.5 25  1/26 1.4 Below goal, resume 2.5 5 2.5 5 2.5 5 2.5 25    Started bactrim: 2.5 2.5 2.5 2.5 2.5 5 2.5 20  1/5 2.5 At goal, no change 2.5 5 2.5 5 2.5 5 2.5 25  12/8 2.2 At goal, continue 2.5 5 2.5 5 2.5 5 2.5 25  11/9 2.2 At goal, continue 2.5 5 2.5 5 2.5 5 2.5 25  10/13 1.7 Below goal, continue 2.5 5 2.5 5 2.5 5 2.5 25  9/15 2.3 At goal, no change 2.5 5 2.5 5 2.5 5 2.5 25  8/11 2.3 At goal, no change 2.5 5 2.5 5 2.5 5 2.5 25    [facility]  11/10/23   Hgb 13  Hct 39.8    Patient History:  Recent hospitalizations/HC visits 11/29/23 prostate biopsy- dx with prostate CA  10/31/22: PCP for sciatic nerve pain  10/23: ED for wrist sprain/contusion 2/2 mechanical fall  8/13: colonoscopy, held warfarin x5d + Lovenox bridge   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 EGD 4/25 significant for: 2 small antral erosions, moderate hiatal hernia, slightly irregular Z-line, small distal duodenal polyp. Patient d/c off of plavix and warfarin in preparation for outpt EGD with biopsies and colonoscopy. EGD/Colonoscopy 5/2; no

## 2024-08-01 ENCOUNTER — ANTI-COAG VISIT (OUTPATIENT)
Dept: PHARMACY | Age: 84
End: 2024-08-01
Payer: MEDICARE

## 2024-08-01 DIAGNOSIS — I48.20 CHRONIC ATRIAL FIBRILLATION (HCC): Primary | Chronic | ICD-10-CM

## 2024-08-01 LAB
INTERNATIONAL NORMALIZATION RATIO, POC: 2.4
PROTHROMBIN TIME, POC: NORMAL

## 2024-08-01 PROCEDURE — 99211 OFF/OP EST MAY X REQ PHY/QHP: CPT | Performed by: PHARMACIST

## 2024-08-01 PROCEDURE — 85610 PROTHROMBIN TIME: CPT | Performed by: PHARMACIST

## 2024-08-01 NOTE — PROGRESS NOTES
warfarin in preparation for outpt EGD with biopsies and colonoscopy. EGD/Colonoscopy 5/2; no bleeding source, resumed plavix and warfarin on 5/3.  -10/29-11/1/17: [facility] for STEMI, s/p C 10/30 with primary stenting to proximal RCA.    Recent medication changes 1/15/24: started Erleada (decr INR). Decreased 240 mg to 180 mg (3/14)  2/16/24: started colchicine and MDP (incr INR)  Lupron inj q3 months, last in April 7/25: started megestrol x 1 week   Medications taken regularly that may interact with warfarin or alter INR MVI (may contain vit K)  ASA (stent placement 10/30/17)  4-6 APAP/day prn knee pain  megestrol   Warfarin dose taken as prescribed 5/16/24: Has been taking 7.5 mg daily; uses pillbox  held warfarin 11/24-11/28/23 and bridged with Lovenox    Signs/symptoms of bleeding H/o hematuria- patient states he was told by his urologist that as long as he is taking warfarin, hematuria may continue.  H/o epistaxis, occasional hemorrhoids, anemia.   Vitamin K intake 3/28: has 3-4 servings of his leafy greens/salads  3/21: Had ~2 servings of salads this week  3/14: pt reports to not eating as much greens bc of loss in appetite from the side effects of the cancer medication.  Normally has broccoli, asparagus, kale/gay salads 3-4 per week    Recent vomiting/diarrhea/fever, changes in weight or activity level Trying to improve diet/exercise and lose weight gradually.  2/22/24: Son pass recently from seizure/MS   Tobacco or alcohol use Patient denies tobacco use  Will have 1 glass of wine per day max   Upcoming surgeries or procedures 4/5: Dr. Pineda follow up for cancer/med.  prostate marker placement cancelled  TKR TBD     Assessment/Plan:   Patient's INR was therapeutic (2.4) today.  Pt was dx with prostate cancer. His prostate procedure was cancelled and he started Erleada instead on 1/15/24, which can decrease INR. Patient denies changes to Erleada dose, diet changes. Confirms warfarin dose. Patient reports

## 2024-08-22 ENCOUNTER — ANTI-COAG VISIT (OUTPATIENT)
Dept: PHARMACY | Age: 84
End: 2024-08-22
Payer: MEDICARE

## 2024-08-22 DIAGNOSIS — I48.20 CHRONIC ATRIAL FIBRILLATION (HCC): Primary | Chronic | ICD-10-CM

## 2024-08-22 LAB
INTERNATIONAL NORMALIZATION RATIO, POC: 1.4
PROTHROMBIN TIME, POC: 0

## 2024-08-22 PROCEDURE — 85610 PROTHROMBIN TIME: CPT | Performed by: PHARMACIST

## 2024-08-22 PROCEDURE — 99211 OFF/OP EST MAY X REQ PHY/QHP: CPT | Performed by: PHARMACIST

## 2024-08-22 NOTE — PROGRESS NOTES
ANTICOAGULATION SERVICE    Sai Caicedo \"AL\" is a 84 y.o. male with PMHx significant for chronic AF (LIS1PG8-PMZp of 4), HTN who presents to clinic 8/22/2024 for anticoagulation monitoring and adjustment.    Anticoagulation Indication(s):  Afib    Referring Physician:  Dr. Patel    Goal INR Range:  2-3  Duration of Anticoagulation Therapy:  Indefinite  Time of day dose taken:  PM  Product patient has at home:  warfarin 5 mg (PEACH)    [id number] Score for Atrial Fibrillation Stroke Risk   Risk   Factors  Component Value   C CHF No 0   H HTN Yes 1   A2 Age >= 75 Yes,  (84 y.o.) 2   D DM No 0   S2 Prior Stroke/TIA No 0   V Vascular Disease Yes 1   A Age 65-74 No,  (84 y.o.) 0   Sc Sex male 0    [id number]-TSIq  Score  4   Score last updated 4/28/22 1:42 PM EDT    Click here for a link to the UpToDate guideline \"Atrial Fibrillation: Anticoagulation therapy to prevent embolization    Disclaimer: Risk Score calculation is dependent on accuracy of patient problem list and past encounter diagnosis.    INR Summary                           Warfarin regimen (mg)  Date INR   A/P   Sun Mon Tue Wed Thu Fri Sat Mg/wk  8/22 1.4  Below, missed  7.5 7.5 7.5 7.5 10/7.5 7.5 7.5 52.5  8/1 2.4 At goal, continue  7.5 7.5 7.5 7.5 7.5 7.5 7.5 52.5  7/25 1.3 Below, bolus  7.5 7.5 7.5 7.5 7.5 7.5 7.5 52.5  5/30 2.4 At goal, continue 7.5 7.5 7.5 7.5 7.5 7.5 7.5 52.5  5/16 2.3 At goal, continue 7.5 7.5 7.5 7.5 7.5 7.5 7.5 52.5    *has been taking 7.5 mg daily  5/2 4.4 Above goal, hold,dec 7.5 7.5 7.5 7.5 0/7.5 5 7.5 50  4/18 3.7 Above goal, hold x1 7.5 7.5 7.5 7.5 0/7.5 7.5 7.5 52.5  4/4 2.9 At goal, continue 7.5 7.5 7.5 7.5 7.5 7.5 7.5 52.5  3/28 1.7 Below goal, bolus,Inc 7.5 7.5 7.5 7.5 10/7.5 7.5 7.5 52.5  3/21 1.4 Below goal, increase  7.5 7.5 5 7.5 7.5 7.5 7.5 50  3/14 1.6 Below goal, no change 5 7.5 5 7.5 5 7.5 5 42.5  3/7 1.4 Below goal, increase  5 7.5 5 7.5 5 7.5 5 42.5  2/29 1.2 Below goal, 7.5 mg x1

## 2024-09-19 ENCOUNTER — TELEPHONE (OUTPATIENT)
Dept: PHARMACY | Age: 84
End: 2024-09-19

## 2024-09-19 ENCOUNTER — ANTI-COAG VISIT (OUTPATIENT)
Dept: PHARMACY | Age: 84
End: 2024-09-19
Payer: MEDICARE

## 2024-09-19 DIAGNOSIS — I48.20 CHRONIC ATRIAL FIBRILLATION (HCC): Primary | Chronic | ICD-10-CM

## 2024-09-19 DIAGNOSIS — I48.20 CHRONIC ATRIAL FIBRILLATION (HCC): Primary | ICD-10-CM

## 2024-09-19 LAB
INTERNATIONAL NORMALIZATION RATIO, POC: 2.8
PROTHROMBIN TIME, POC: 0

## 2024-09-19 PROCEDURE — 85610 PROTHROMBIN TIME: CPT

## 2024-09-19 PROCEDURE — 99211 OFF/OP EST MAY X REQ PHY/QHP: CPT

## 2024-09-26 ENCOUNTER — OFFICE VISIT (OUTPATIENT)
Dept: CARDIOLOGY CLINIC | Age: 84
End: 2024-09-26
Payer: MEDICARE

## 2024-09-26 VITALS
DIASTOLIC BLOOD PRESSURE: 78 MMHG | WEIGHT: 186 LBS | HEIGHT: 68 IN | HEART RATE: 106 BPM | SYSTOLIC BLOOD PRESSURE: 118 MMHG | BODY MASS INDEX: 28.19 KG/M2

## 2024-09-26 DIAGNOSIS — I25.119 CORONARY ARTERY DISEASE INVOLVING NATIVE CORONARY ARTERY OF NATIVE HEART WITH ANGINA PECTORIS (HCC): ICD-10-CM

## 2024-09-26 DIAGNOSIS — I48.20 CHRONIC ATRIAL FIBRILLATION (HCC): Primary | ICD-10-CM

## 2024-09-26 DIAGNOSIS — E78.5 DYSLIPIDEMIA: Chronic | ICD-10-CM

## 2024-09-26 DIAGNOSIS — I10 ESSENTIAL HYPERTENSION: Chronic | ICD-10-CM

## 2024-09-26 PROCEDURE — 1123F ACP DISCUSS/DSCN MKR DOCD: CPT | Performed by: INTERNAL MEDICINE

## 2024-09-26 PROCEDURE — 99214 OFFICE O/P EST MOD 30 MIN: CPT | Performed by: INTERNAL MEDICINE

## 2024-09-26 PROCEDURE — G8417 CALC BMI ABV UP PARAM F/U: HCPCS | Performed by: INTERNAL MEDICINE

## 2024-09-26 PROCEDURE — 3074F SYST BP LT 130 MM HG: CPT | Performed by: INTERNAL MEDICINE

## 2024-09-26 PROCEDURE — 3078F DIAST BP <80 MM HG: CPT | Performed by: INTERNAL MEDICINE

## 2024-09-26 PROCEDURE — G8427 DOCREV CUR MEDS BY ELIG CLIN: HCPCS | Performed by: INTERNAL MEDICINE

## 2024-09-26 PROCEDURE — 93000 ELECTROCARDIOGRAM COMPLETE: CPT | Performed by: INTERNAL MEDICINE

## 2024-09-26 PROCEDURE — 1036F TOBACCO NON-USER: CPT | Performed by: INTERNAL MEDICINE

## 2024-09-26 RX ORDER — VITAMIN E 268 MG
400 CAPSULE ORAL DAILY
COMMUNITY

## 2024-09-26 RX ORDER — MEGESTROL ACETATE 40 MG/1
40 TABLET ORAL DAILY
COMMUNITY
Start: 2024-07-10

## 2024-09-26 RX ORDER — METOPROLOL SUCCINATE 100 MG/1
100 TABLET, EXTENDED RELEASE ORAL DAILY
Qty: 90 TABLET | Refills: 3 | Status: SHIPPED | OUTPATIENT
Start: 2024-09-26

## 2024-09-26 ASSESSMENT — ENCOUNTER SYMPTOMS
EYE DISCHARGE: 0
FACIAL SWELLING: 0
BACK PAIN: 0
WHEEZING: 0
COUGH: 0
COLOR CHANGE: 0
CHEST TIGHTNESS: 0
SHORTNESS OF BREATH: 0
VOMITING: 0
ABDOMINAL DISTENTION: 0
ABDOMINAL PAIN: 0
BLOOD IN STOOL: 0

## 2024-10-17 ENCOUNTER — ANTI-COAG VISIT (OUTPATIENT)
Dept: PHARMACY | Age: 84
End: 2024-10-17
Payer: MEDICARE

## 2024-10-17 DIAGNOSIS — I48.20 CHRONIC ATRIAL FIBRILLATION (HCC): Primary | Chronic | ICD-10-CM

## 2024-10-17 LAB
INTERNATIONAL NORMALIZATION RATIO, POC: 2.1
PROTHROMBIN TIME, POC: 0

## 2024-10-17 PROCEDURE — 99211 OFF/OP EST MAY X REQ PHY/QHP: CPT

## 2024-10-17 PROCEDURE — 85610 PROTHROMBIN TIME: CPT

## 2024-10-17 NOTE — PROGRESS NOTES
5 7.5 5 7.5 5 7.5 5 42.5  3/7 1.4 Below goal, increase  5 7.5 5 7.5 5 7.5 5 42.5  2/29 1.2 Below goal, 7.5 mg x1 5 5 5 5 7.5/5 5 5 35  2/22 1.1 Below goal, 7.5 mg x1   2.5 5 2.5 5 7.5/5 5 5 30  1/11 2.2 At goal, no change 2.5 5 2.5 5 2.5 5 2.5 25  12/14 2.2 At goal, no change 2.5 5 2.5 5 2.5 5 2.5 25  12/7 1.92 Below goal, resume 2.5 5 2.5 5 2.5 5 2.5 25  12/5 1.0 Below goal, 7.5mgx1 2.5 5 7.5/2.5 5 2.5 5 2.5 25  11/16 3.0 At goal, no change 2.5 5 2.5 5 2.5 5 2.5 25  10/19 2.0 At goal, no change 2.5 5 2.5 5 2.5 5 2.5 25  10/5 1.7 Below goal, 5 mgx1 2.5 5 2.5 5 5/2.5 5 2.5 25  9/7 2.0 At goal, no change 2.5 5 2.5 5 2.5 5 2.5 25  8/3 2.4 At goal, continue 2.5 5 2.5 5 2.5 5 2.5 25  7/10 1.9 Below goal, cont 2.5 5 2.5 5 2.5 5 2.5 25  6/15 2.8 At goal, no change 2.5 5 2.5 5 2.5 5 2.5 25  5/18 2.5 At goal, no change 2.5 5 2.5 5 2.5 5 2.5 25  5/4 1.6 Below goal, bolus 2.5 5 2.5 5 5/2.5 5 2.5 25  4/6 2.2 At goal, continue 2.5 5 2.5 5 2.5 5 2.5 25  3/9 1.8 Below goal, continue 2.5 5 2.5 5 2.5 5 2.5 25  2/9 2.1 At goal, continue 2.5 5 2.5 5 2.5 5 2.5 25  1/26 1.4 Below goal, resume 2.5 5 2.5 5 2.5 5 2.5 25    Started bactrim: 2.5 2.5 2.5 2.5 2.5 5 2.5 20  1/5 2.5 At goal, no change 2.5 5 2.5 5 2.5 5 2.5 25  12/8 2.2 At goal, continue 2.5 5 2.5 5 2.5 5 2.5 25  11/9 2.2 At goal, continue 2.5 5 2.5 5 2.5 5 2.5 25  10/13 1.7 Below goal, continue 2.5 5 2.5 5 2.5 5 2.5 25  9/15 2.3 At goal, no change 2.5 5 2.5 5 2.5 5 2.5 25  8/11 2.3 At goal, no change 2.5 5 2.5 5 2.5 5 2.5 25    [facility]  11/10/23   Hgb 13  Hct 39.8    Patient History:  Recent hospitalizations/HC visits 10/23: ED for mechanical fall  8/13: colonoscopy, held warfarin x5d + Lovenox bridge   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 EGD 4/25 significant for: 2 small antral erosions, moderate hiatal hernia, slightly irregular Z-line, small

## 2024-11-14 ENCOUNTER — ANTI-COAG VISIT (OUTPATIENT)
Dept: PHARMACY | Age: 84
End: 2024-11-14
Payer: MEDICARE

## 2024-11-14 DIAGNOSIS — I48.20 CHRONIC ATRIAL FIBRILLATION (HCC): Primary | Chronic | ICD-10-CM

## 2024-11-14 LAB
INR BLD: 2
PROTIME: NORMAL

## 2024-11-14 PROCEDURE — 99211 OFF/OP EST MAY X REQ PHY/QHP: CPT | Performed by: PHARMACIST

## 2024-11-14 PROCEDURE — 85610 PROTHROMBIN TIME: CPT | Performed by: PHARMACIST

## 2024-11-14 NOTE — PROGRESS NOTES
ANTICOAGULATION SERVICE    Sai MEREDITH Caicedo \"AL\" is a 84 y.o. male with PMHx significant for chronic AF (ZOG3GC8-XYGg of 4), HTN who presents to clinic 11/14/2024 for anticoagulation monitoring and adjustment.    Anticoagulation Indication(s):  Afib    Referring Physician:  Dr Mercedes  Goal INR Range:  2-3  Duration of Anticoagulation Therapy:  Indefinite  Time of day dose taken:  PM  Product patient has at home:  warfarin 5 mg (PEACH)    LRY5QE1-EREx Score for Atrial Fibrillation Stroke Risk   Risk   Factors  Component Value   C CHF No 0   H HTN Yes 1   A2 Age >= 75 Yes,  (84 y.o.) 2   D DM No 0   S2 Prior Stroke/TIA No 0   V Vascular Disease Yes 1   A Age 65-74 No,  (84 y.o.) 0   Sc Sex male 0    [id number]  Score  4   Score last updated 4/28/22 1:42 PM EDT    Click here for a link to the UpToDate guideline \"Atrial Fibrillation: Anticoagulation therapy to prevent embolization    Disclaimer: Risk Score calculation is dependent on accuracy of patient problem list and past encounter diagnosis.    INR Summary                           Warfarin regimen (mg)  Date INR   A/P   Sun Mon Tue Wed Thu Fri Sat Mg/wk  11/14 2.0 At goal, no change 7.5 7.5 7.5 7.5 7.5 7.5 7.5 52.5  10/17 2.1 At goal, no change 7.5 7.5 7.5 7.5 7.5 7.5 7.5 52.5  9/19 2.8 At goal, no change 7.5 7.5 7.5 7.5 7.5 7.5 7.5 52.5  8/22 1.4  Below, missed  7.5 7.5 7.5 7.5 10/7.5 7.5 7.5 52.5  8/1 2.4 At goal, continue  7.5 7.5 7.5 7.5 7.5 7.5 7.5 52.5  7/25 1.3 Below, bolus  7.5 7.5 7.5 7.5 7.5 7.5 7.5 52.5  5/30 2.4 At goal, continue 7.5 7.5 7.5 7.5 7.5 7.5 7.5 52.5  5/16 2.3 At goal, continue 7.5 7.5 7.5 7.5 7.5 7.5 7.5 52.5    *has been taking 7.5 mg daily  5/2 4.4 Above goal, hold,dec 7.5 7.5 7.5 7.5 0/7.5 5 7.5 50  4/18 3.7 Above goal, hold x1 7.5 7.5 7.5 7.5 0/7.5 7.5 7.5 52.5  4/4 2.9 At goal, continue 7.5 7.5 7.5 7.5 7.5 7.5 7.5 52.5  3/28 1.7 Below goal, bolus,Inc 7.5 7.5 7.5 7.5 10/7.5 7.5 7.5 52.5  3/21 1.4 Below goal, increase

## 2024-12-12 ENCOUNTER — ANTI-COAG VISIT (OUTPATIENT)
Dept: PHARMACY | Age: 84
End: 2024-12-12
Payer: MEDICARE

## 2024-12-12 DIAGNOSIS — I48.20 CHRONIC ATRIAL FIBRILLATION (HCC): Primary | Chronic | ICD-10-CM

## 2024-12-12 LAB
INR BLD: 1.9 (ref 2–3)
PROTIME: ABNORMAL

## 2024-12-12 PROCEDURE — 99211 OFF/OP EST MAY X REQ PHY/QHP: CPT | Performed by: PHARMACIST

## 2024-12-12 PROCEDURE — 85610 PROTHROMBIN TIME: CPT | Performed by: PHARMACIST

## 2025-01-16 ENCOUNTER — ANTI-COAG VISIT (OUTPATIENT)
Dept: PHARMACY | Age: 85
End: 2025-01-16
Payer: MEDICARE

## 2025-01-16 DIAGNOSIS — I48.20 CHRONIC ATRIAL FIBRILLATION (HCC): Primary | Chronic | ICD-10-CM

## 2025-01-16 LAB
INR BLD: 2
PROTIME: NORMAL

## 2025-01-16 PROCEDURE — 85610 PROTHROMBIN TIME: CPT

## 2025-01-16 PROCEDURE — 99211 OFF/OP EST MAY X REQ PHY/QHP: CPT

## 2025-01-16 NOTE — PROGRESS NOTES
which is a improvement. Patient' physician has mentioned to the patient that they might decrease the medication \"later on,\" but hasn't given patient a date yet. Patient will notify clinic of any changes in therapy, as it caused a significant drop in INR. Patient also reports eating more broccoli and brussel sprouts within the past week, accounting for lower INR.    The patient was instructed to continue current warfarin dose of 7.5 mg every day. Repeat INR in 4 weeks.     Thanks!  Juany Parra   PharmD Candidate 2025  [facility]  [place]mer: [phone]  Jazz: [phone]  1/16/2025 10:19 AM    For Pharmacy Admin Tracking Only  Time Spent (min): 10      I have seen and evaluated the patient with the PharmD Candidate, Juany Parra, under my direct supervision. I have reviewed the PharmD Candidate's note and agree with the assessment and plan of care as documented.    Dolores Lopez, NahidD, BCACP  Medication Management Clinic   [facility] Ph: [phone]  [facility] Ph: [phone]  1/16/2025 10:35 AM

## 2025-02-13 ENCOUNTER — ANTI-COAG VISIT (OUTPATIENT)
Dept: PHARMACY | Age: 85
End: 2025-02-13
Payer: MEDICARE

## 2025-02-13 DIAGNOSIS — I48.20 CHRONIC ATRIAL FIBRILLATION (HCC): Primary | Chronic | ICD-10-CM

## 2025-02-13 LAB
INTERNATIONAL NORMALIZATION RATIO, POC: 4.9
PROTHROMBIN TIME, POC: 0

## 2025-02-13 PROCEDURE — 85610 PROTHROMBIN TIME: CPT

## 2025-02-13 PROCEDURE — 99212 OFFICE O/P EST SF 10 MIN: CPT

## 2025-02-13 NOTE — PROGRESS NOTES
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 EGD 4/25 significant for: 2 small antral erosions, moderate hiatal hernia, slightly irregular Z-line, small distal duodenal polyp. Patient d/c off of plavix and warfarin in preparation for outpt EGD with biopsies and colonoscopy. EGD/Colonoscopy 5/2; no bleeding source, resumed plavix and warfarin on 5/3.  -10/29-11/1/17: [facility] for STEMI, s/p LHC 10/30 with primary stenting to proximal RCA.    Recent medication changes 1/15/24: started Erleada (decr INR). Decreased 240 mg to 180 mg (3/14)  2/16/24: started colchicine and MDP (incr INR)  Lupron inj q3 months, last in April 7/25: started megestrol   2/13/25: incr APAP from 1 BID to 2 tabs BID   Medications taken regularly that may interact with warfarin or alter INR MVI (may contain vit K)  ASA (stent placement 10/30/17)  4-6 APAP/day prn knee pain  Megestrol, Erleada    Warfarin dose taken as prescribed Yes; uses pillbox  held warfarin 11/24-11/28/23 and bridged with Lovenox    Signs/symptoms of bleeding H/o hematuria- patient states he was told by his urologist that as long as he is taking warfarin, hematuria may continue.  H/o epistaxis, occasional hemorrhoids, anemia.   Vitamin K intake Normally has broccoli, asparagus, kale/gay salads 3-4 per week   2/13/25: no greens in over a week, plans to resume   Recent vomiting/diarrhea/fever, changes in weight or activity level 2/22/24: Son passed from seizure/MS   Tobacco or alcohol use Patient denies tobacco use  Will have 1 glass of wine per day max   Upcoming surgeries or procedures TKR TBD     Assessment/Plan:   Patient's INR was supratherapeutic today due to incr in tylenol use and decrease in vit k.  He plans to resume vit k, but will likely continue Tylenol use for now. Also counseled pt re: max daily dose of tylenol and voltaren gel for joint pain.     He is

## 2025-02-20 ENCOUNTER — ANTI-COAG VISIT (OUTPATIENT)
Dept: PHARMACY | Age: 85
End: 2025-02-20
Payer: MEDICARE

## 2025-02-20 DIAGNOSIS — I48.20 CHRONIC ATRIAL FIBRILLATION (HCC): Primary | Chronic | ICD-10-CM

## 2025-02-20 LAB
INR BLD: 1.8
PROTIME: NORMAL

## 2025-02-20 PROCEDURE — 85610 PROTHROMBIN TIME: CPT

## 2025-02-20 PROCEDURE — 99211 OFF/OP EST MAY X REQ PHY/QHP: CPT

## 2025-02-20 NOTE — PROGRESS NOTES
and will continue Tylenol use for now.     He is continuing the same dose of Erleada for prostate CA on 1/15/24, which can decrease INR, PSA is now 0.03 which is a improvement. Patient' physician has mentioned to the patient that they might decrease the medication \"later on,\" but hasn't given patient a date yet. Patient will notify clinic of any changes in therapy, as it caused a significant drop in INR. Patient also reports eating more broccoli and brussel sprouts within the past week, accounting for lower INR.    The patient was instructed to continue warfarin 7.5 mg every day, 5 mg on Mondays. Repeat INR in 1 week.     Thanks!  Janae Rea  PharmD Candidate 2025  [facility]   [facility] Ph: [phone]  [facility] Ph: [phone]  2/20/2025 10:28 AM    For Pharmacy Admin Tracking Only  Time Spent (min): 15

## 2025-02-27 ENCOUNTER — ANTI-COAG VISIT (OUTPATIENT)
Dept: PHARMACY | Age: 85
End: 2025-02-27
Payer: MEDICARE

## 2025-02-27 DIAGNOSIS — I48.20 CHRONIC ATRIAL FIBRILLATION (HCC): Primary | Chronic | ICD-10-CM

## 2025-02-27 LAB
INR BLD: 3.4
PROTIME: NORMAL

## 2025-02-27 PROCEDURE — 99212 OFFICE O/P EST SF 10 MIN: CPT | Performed by: PHARMACIST

## 2025-02-27 PROCEDURE — 85610 PROTHROMBIN TIME: CPT | Performed by: PHARMACIST

## 2025-02-27 NOTE — PROGRESS NOTES
ANTICOAGULATION SERVICE    Sai HARPER Sascha \"AL\" is a 85 y.o. male with PMHx significant for chronic AF (GII9LG0-YDTy of 4), HTN who presents to clinic 2/27/2025 for anticoagulation monitoring and adjustment.    Anticoagulation Indication(s):  Afib    Referring Physician:  Dr Mercedes  Goal INR Range:  2-3  Duration of Anticoagulation Therapy:  Indefinite  Time of day dose taken:  PM  Product patient has at home:  warfarin 5 mg (PEACH)    [id number] Score for Atrial Fibrillation Stroke Risk   Risk   Factors  Component Value   C CHF No 0   H HTN Yes 1   A2 Age >= 75 Yes,  (85 y.o.) 2   D DM No 0   S2 Prior Stroke/TIA No 0   V Vascular Disease Yes 1   A Age 65-74 No,  (85 y.o.) 0   Sc Sex male 0    [id number]  Score  4   Score last updated 4/28/22 1:42 PM EDT    Click here for a link to the UpToDate guideline \"Atrial Fibrillation: Anticoagulation therapy to prevent embolization    Disclaimer: Risk Score calculation is dependent on accuracy of patient problem list and past encounter diagnosis.    INR Summary                           Warfarin regimen (mg)  Date INR   A/P   Sun Mon Tue Wed Thu Fri Sat Mg/wk  2/27 3.4 Above goal, hold+dec 7.5 5 7.5 7.5 0/7.5 5 7.5 47.5  2/20 1.8 Below goal, continue 7.5 5 7.5 7.5 7.5 7.5 7.5 50  2/13 4.9 Above goal, holdx2 7.5 5 7.5 7.5 7.5 7.5 7.5 50  1/16 2.0 At goal, continue 7.5 7.5 7.5 7.5 7.5 7.5 7.5 52.5  12/12 1.9 Below goal, continue 7.5 7.5 7.5 7.5 7.5 7.5 7.5 52.5  11/14 2.0 At goal, no change 7.5 7.5 7.5 7.5 7.5 7.5 7.5 52.5  10/17 2.1 At goal, no change 7.5 7.5 7.5 7.5 7.5 7.5 7.5 52.5  9/19 2.8 At goal, no change 7.5 7.5 7.5 7.5 7.5 7.5 7.5 52.5  8/22 1.4  Below, missed  7.5 7.5 7.5 7.5 10/7.5 7.5 7.5 52.5  8/1 2.4 At goal, continue  7.5 7.5 7.5 7.5 7.5 7.5 7.5 52.5  7/25 1.3 Below, bolus  7.5 7.5 7.5 7.5 7.5 7.5 7.5 52.5  5/30 2.4 At goal, continue 7.5 7.5 7.5 7.5 7.5 7.5 7.5 52.5  5/16 2.3 At goal, continue 7.5 7.5 7.5 7.5 7.5 7.5 7.5 52.5    *has been taking 7.5 mg

## 2025-03-13 ENCOUNTER — ANTI-COAG VISIT (OUTPATIENT)
Dept: PHARMACY | Age: 85
End: 2025-03-13
Payer: MEDICARE

## 2025-03-13 ENCOUNTER — OFFICE VISIT (OUTPATIENT)
Dept: CARDIOLOGY CLINIC | Age: 85
End: 2025-03-13
Payer: MEDICARE

## 2025-03-13 VITALS
OXYGEN SATURATION: 97 % | BODY MASS INDEX: 28.68 KG/M2 | DIASTOLIC BLOOD PRESSURE: 72 MMHG | HEART RATE: 95 BPM | WEIGHT: 188.6 LBS | SYSTOLIC BLOOD PRESSURE: 114 MMHG

## 2025-03-13 DIAGNOSIS — I50.9 HEART FAILURE, ACC/AHA STAGE B (HCC): ICD-10-CM

## 2025-03-13 DIAGNOSIS — I48.20 CHRONIC ATRIAL FIBRILLATION (HCC): Primary | Chronic | ICD-10-CM

## 2025-03-13 DIAGNOSIS — I10 ESSENTIAL HYPERTENSION: Chronic | ICD-10-CM

## 2025-03-13 DIAGNOSIS — I25.119 CORONARY ARTERY DISEASE INVOLVING NATIVE CORONARY ARTERY OF NATIVE HEART WITH ANGINA PECTORIS: ICD-10-CM

## 2025-03-13 DIAGNOSIS — E78.5 DYSLIPIDEMIA: Chronic | ICD-10-CM

## 2025-03-13 LAB
INTERNATIONAL NORMALIZATION RATIO, POC: 3.1
PROTHROMBIN TIME, POC: 0

## 2025-03-13 PROCEDURE — 99214 OFFICE O/P EST MOD 30 MIN: CPT | Performed by: INTERNAL MEDICINE

## 2025-03-13 PROCEDURE — 1159F MED LIST DOCD IN RCRD: CPT | Performed by: INTERNAL MEDICINE

## 2025-03-13 PROCEDURE — 99212 OFFICE O/P EST SF 10 MIN: CPT

## 2025-03-13 PROCEDURE — G8427 DOCREV CUR MEDS BY ELIG CLIN: HCPCS | Performed by: INTERNAL MEDICINE

## 2025-03-13 PROCEDURE — G2211 COMPLEX E/M VISIT ADD ON: HCPCS | Performed by: INTERNAL MEDICINE

## 2025-03-13 PROCEDURE — 1036F TOBACCO NON-USER: CPT | Performed by: INTERNAL MEDICINE

## 2025-03-13 PROCEDURE — 1123F ACP DISCUSS/DSCN MKR DOCD: CPT | Performed by: INTERNAL MEDICINE

## 2025-03-13 PROCEDURE — G8417 CALC BMI ABV UP PARAM F/U: HCPCS | Performed by: INTERNAL MEDICINE

## 2025-03-13 PROCEDURE — 85610 PROTHROMBIN TIME: CPT

## 2025-03-13 ASSESSMENT — ENCOUNTER SYMPTOMS
COLOR CHANGE: 0
CHEST TIGHTNESS: 0
BLOOD IN STOOL: 0
WHEEZING: 0
FACIAL SWELLING: 0
BACK PAIN: 0
SHORTNESS OF BREATH: 0
COUGH: 0
ABDOMINAL DISTENTION: 0
EYE DISCHARGE: 0
ABDOMINAL PAIN: 0
VOMITING: 0

## 2025-03-13 NOTE — PROGRESS NOTES
[facility]     Outpatient Cardiology         Patient Name:  Sai Caicedo  Requesting Physician: No admitting provider for patient encounter.  Primary Care Physician: Parish Vallejo MD    Reason for Consultation/Chief Complaint:   Chief Complaint   Patient presents with    Follow-up     No cc     CAD, A-fib    History of Present Illness:    HPI     Sai Caicedois a 85 y.o. male with PMH of Afib, CAD, HLD and HTN.   Here for follow up for afib, HTN, HLD, and CAD.   A-fib, overall rate controlled, currently on metoprolol and warfarin.  No issues, no bleeding  Hypertension, controlled, on metoprolol  Hyperlipidemia, Last LDL 65 (8/15/24) on Lipitor, no side effects  CAD, on aspirin and Lipitor, no angina, tolerating well current medications.  Overall doing fairly well from a cardiac perspective    PMH  Past Medical History:   Diagnosis Date    Allergic rhinitis     Anemia     Atrial fibrillation (HCC)     Atrial fibrillation (HCC)     Benign non-nodular prostatic hyperplasia with lower urinary tract symptoms 2/9/2017    Benign paroxysmal positional vertigo     BPH (benign prostatic hyperplasia)     BPH (benign prostatic hypertrophy)     Cataract 10/3/2014    Cholelithiasis     Colon cancer (HCC) 4/9/2012    Diverticulosis     Dyslipidemia 7/21/2010    Elevated PSA     Erectile dysfunction     Essential hypertension 11/24/2015    Gastroesophageal reflux disease without esophagitis 11/9/2016    GERD (gastroesophageal reflux disease)     Glaucoma 4/5/2013    Hiatal hernia     Hypertension     Lumbar radiculopathy     Osteoarthritis     Peripheral neuropathy 12/7/2012    Proteinuria 7/22/2010    S/P laparoscopic-assisted sigmoidectomy 4/17/2012    Urinary frequency        PSH  Past Surgical History:   Procedure Laterality Date    CATARACT REMOVAL WITH IMPLANT Right October 7, 2014    Dr. Ramon Hawkins - [facility] [place]    CATARACT REMOVAL WITH IMPLANT Left October 24, 2014

## 2025-03-13 NOTE — PROGRESS NOTES
ANTICOAGULATION SERVICE    Sai HARPER Sascha \"AL\" is a 85 y.o. male with PMHx significant for chronic AF (NJN8SW9-NHTj of 4), HTN who presents to clinic 3/13/2025 for anticoagulation monitoring and adjustment.    Anticoagulation Indication(s):  Afib    Referring Physician:  Dr Mercedes  Goal INR Range:  2-3  Duration of Anticoagulation Therapy:  Indefinite  Time of day dose taken:  PM  Product patient has at home:  warfarin 5 mg (PEACH)    PFA5HG4-YBLz Score for Atrial Fibrillation Stroke Risk   Risk   Factors  Component Value   C CHF No 0   H HTN Yes 1   A2 Age >= 75 Yes,  (85 y.o.) 2   D DM No 0   S2 Prior Stroke/TIA No 0   V Vascular Disease Yes 1   A Age 65-74 No,  (85 y.o.) 0   Sc Sex male 0    KJQ8MA2-QZEq  Score  4   Score last updated 4/28/22 1:42 PM EDT    Click here for a link to the UpToDate guideline \"Atrial Fibrillation: Anticoagulation therapy to prevent embolization    Disclaimer: Risk Score calculation is dependent on accuracy of patient problem list and past encounter diagnosis.    INR Summary                           Warfarin regimen (mg)  Date INR   A/P   Sun Mon Tue Wed Thu Fri Sat Mg/wk  3/13 3.1 Above goal, dec/cont 7.5 5 7.5 7.5 5/7.5 5 7.5 47.5  2/27 3.4 Above goal, hold+dec 7.5 5 7.5 7.5 0/7.5 5 7.5 47.5  2/20 1.8 Below goal, continue 7.5 5 7.5 7.5 7.5 7.5 7.5 50  2/13 4.9 Above goal, holdx2 7.5 5 7.5 7.5 7.5 7.5 7.5 50  1/16 2.0 At goal, continue 7.5 7.5 7.5 7.5 7.5 7.5 7.5 52.5  12/12 1.9 Below goal, continue 7.5 7.5 7.5 7.5 7.5 7.5 7.5 52.5  11/14 2.0 At goal, no change 7.5 7.5 7.5 7.5 7.5 7.5 7.5 52.5  10/17 2.1 At goal, no change 7.5 7.5 7.5 7.5 7.5 7.5 7.5 52.5  9/19 2.8 At goal, no change 7.5 7.5 7.5 7.5 7.5 7.5 7.5 52.5  8/22 1.4  Below, missed  7.5 7.5 7.5 7.5 10/7.5 7.5 7.5 52.5  8/1 2.4 At goal, continue  7.5 7.5 7.5 7.5 7.5 7.5 7.5 52.5  7/25 1.3 Below, bolus  7.5 7.5 7.5 7.5 7.5 7.5 7.5 52.5  5/30 2.4 At goal, continue 7.5 7.5 7.5 7.5 7.5 7.5 7.5 52.5  5/16 2.3 At goal,

## 2025-03-27 ENCOUNTER — ANTI-COAG VISIT (OUTPATIENT)
Dept: PHARMACY | Age: 85
End: 2025-03-27
Payer: MEDICARE

## 2025-03-27 DIAGNOSIS — I48.20 CHRONIC ATRIAL FIBRILLATION (HCC): Primary | Chronic | ICD-10-CM

## 2025-03-27 LAB
INTERNATIONAL NORMALIZATION RATIO, POC: 2.4
PROTHROMBIN TIME, POC: 0

## 2025-03-27 PROCEDURE — 85610 PROTHROMBIN TIME: CPT

## 2025-03-27 PROCEDURE — 99211 OFF/OP EST MAY X REQ PHY/QHP: CPT

## 2025-03-27 NOTE — PROGRESS NOTES
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 EGD 4/25 significant for: 2 small antral erosions, moderate hiatal hernia, slightly irregular Z-line, small distal duodenal polyp. Patient d/c off of plavix and warfarin in preparation for outpt EGD with biopsies and colonoscopy. EGD/Colonoscopy 5/2; no bleeding source, resumed plavix and warfarin on 5/3.  -10/29-11/1/17: [facility] for STEMI, s/p LHC 10/30 with primary stenting to proximal RCA.    Recent medication changes 1/15/24: started Erleada (decr INR). Decreased 240 mg to 180 mg (3/14)  2/16/24: started colchicine and MDP (incr INR)  Lupron inj q3 months, last in April 7/25: started megestrol   2/13/25: incr APAP from 1 BID to 2 tabs BID   Medications taken regularly that may interact with warfarin or alter INR MVI (may contain vit K)  ASA (stent placement 10/30/17)  4-6 APAP/day prn knee pain  Megestrol, Erleada    Warfarin dose taken as prescribed Yes; uses pillbox  held warfarin 11/24-11/28/23 and bridged with Lovenox    Signs/symptoms of bleeding H/o hematuria- patient states he was told by his urologist that as long as he is taking warfarin, hematuria may continue.  H/o epistaxis, occasional hemorrhoids, anemia.   Vitamin K intake Normally has broccoli, asparagus, kale/gay salads 3-4 per week   2/13/25: no greens in over a week, plans to resume  2/20/25: resumed daily greens (more than normal)   Recent vomiting/diarrhea/fever, changes in weight or activity level 2/22/24: Son passed from seizure/MS   Tobacco or alcohol use Patient denies tobacco use  Will have 1 glass of wine per day max   Upcoming surgeries or procedures TKR TBD

## 2025-04-24 ENCOUNTER — ANTI-COAG VISIT (OUTPATIENT)
Dept: PHARMACY | Age: 85
End: 2025-04-24
Payer: MEDICARE

## 2025-04-24 DIAGNOSIS — I50.9 HEART FAILURE, ACC/AHA STAGE B (HCC): ICD-10-CM

## 2025-04-24 DIAGNOSIS — I48.20 CHRONIC ATRIAL FIBRILLATION (HCC): Primary | Chronic | ICD-10-CM

## 2025-04-24 DIAGNOSIS — I10 ESSENTIAL HYPERTENSION: ICD-10-CM

## 2025-04-24 LAB
INTERNATIONAL NORMALIZATION RATIO, POC: 2.2
PROTHROMBIN TIME, POC: 0

## 2025-04-24 PROCEDURE — 85610 PROTHROMBIN TIME: CPT

## 2025-04-24 PROCEDURE — 99211 OFF/OP EST MAY X REQ PHY/QHP: CPT

## 2025-04-24 RX ORDER — NITROGLYCERIN 0.4 MG/1
TABLET SUBLINGUAL
Qty: 25 TABLET | Refills: 3 | Status: SHIPPED | OUTPATIENT
Start: 2025-04-24

## 2025-04-24 NOTE — PROGRESS NOTES
ANTICOAGULATION SERVICE    Sai MEREDITH Caicedo \"AL\" is a 85 y.o. male with PMHx significant for chronic AF (DAD6JB4-AVLj of 4), HTN who presents to clinic 4/24/2025 for anticoagulation monitoring and adjustment.    Anticoagulation Indication(s):  Afib    Referring Physician:  Dr Mercedes  Goal INR Range:  2-3  Duration of Anticoagulation Therapy:  Indefinite  Time of day dose taken:  PM  Product patient has at home:  warfarin 5 mg (PEACH)    ZNZ1QA1-NNZg Score for Atrial Fibrillation Stroke Risk   Risk   Factors  Component Value   C CHF No 0   H HTN Yes 1   A2 Age >= 75 Yes,  (85 y.o.) 2   D DM No 0   S2 Prior Stroke/TIA No 0   V Vascular Disease Yes 1   A Age 65-74 No,  (85 y.o.) 0   Sc Sex male 0    NYO9QN9-ADHj  Score  4   Score last updated 4/28/22 1:42 PM EDT    Click here for a link to the UpToDate guideline \"Atrial Fibrillation: Anticoagulation therapy to prevent embolization    Disclaimer: Risk Score calculation is dependent on accuracy of patient problem list and past encounter diagnosis.    INR Summary                           Warfarin regimen (mg)  Date INR   A/P   Sun Mon Tue Wed Thu Fri Sat Mg/wk  4/24 2.2 At goal, cont  7.5 5 7.5 7.5 7.5 5 7.5 47.5  3/27 2.4 At goal, cont  7.5 5 7.5 7.5 7.5 5 7.5 47.5  3/13 3.1 Above goal, dec/cont 7.5 5 7.5 7.5 5/7.5 5 7.5 47.5  2/27 3.4 Above goal, hold+dec 7.5 5 7.5 7.5 0/7.5 5 7.5 47.5  2/20 1.8 Below goal, continue 7.5 5 7.5 7.5 7.5 7.5 7.5 50  2/13 4.9 Above goal, holdx2 7.5 5 7.5 7.5 7.5 7.5 7.5 50  1/16 2.0 At goal, continue 7.5 7.5 7.5 7.5 7.5 7.5 7.5 52.5  12/12 1.9 Below goal, continue 7.5 7.5 7.5 7.5 7.5 7.5 7.5 52.5  11/14 2.0 At goal, no change 7.5 7.5 7.5 7.5 7.5 7.5 7.5 52.5  10/17 2.1 At goal, no change 7.5 7.5 7.5 7.5 7.5 7.5 7.5 52.5  9/19 2.8 At goal, no change 7.5 7.5 7.5 7.5 7.5 7.5 7.5 52.5  8/22 1.4  Below, missed  7.5 7.5 7.5 7.5 10/7.5 7.5 7.5 52.5  8/1 2.4 At goal, continue  7.5 7.5 7.5 7.5 7.5 7.5 7.5 52.5  7/25 1.3 Below,

## 2025-04-24 NOTE — TELEPHONE ENCOUNTER
[facility] Medication Refills:    Medication  nitroGLYCERIN (NITROSTAT) 0.4 MG SL tablet [[id number]]  nitroGLYCERIN (NITROSTAT) 0.4 MG SL tablet [[id number]]    Order Details  Dose, Route, Frequency: As Directed   Dispense Quantity: 25 tablet Refills: 3          Sig: up to max of 3 total doses. If no relief after 1 dose, call 911.         Start Date: 09/14/23 End Date: --   Written Date: 09/14/23 Expiration Date: 09/13/24       Pharmacy    [facility] [id number] [place], [place] - [address] - P [phone] - F [phone]  [address] [place], [place] [address] [place] [address]  Phone: [phone]  Fax: [phone]       Last Office Visit: 03/13/25    Next Office Visit: 04/30/25    Last Refill: 09/14/23    Last Labs: 04/24/25

## 2025-04-28 ASSESSMENT — ENCOUNTER SYMPTOMS
FACIAL SWELLING: 0
SHORTNESS OF BREATH: 0
CHEST TIGHTNESS: 0
VOMITING: 0
ABDOMINAL DISTENTION: 0
COUGH: 0
ABDOMINAL PAIN: 0
WHEEZING: 0
COLOR CHANGE: 0
BACK PAIN: 0
EYE DISCHARGE: 0
BLOOD IN STOOL: 0

## 2025-04-28 NOTE — PROGRESS NOTES
numbness and headaches.   Hematological:  Negative for adenopathy. Does not bruise/bleed easily.   Psychiatric/Behavioral:  Negative for behavioral problems, confusion, decreased concentration and suicidal ideas. The patient is not nervous/anxious.        Vitals:    04/30/25 1407   BP: (!) 108/52   Pulse: 85        Weight - Scale: 84.1 kg (185 lb 6.4 oz)       Vitals:    04/30/25 1407   BP: (!) 108/52   BP Site: Left Upper Arm   Patient Position: Sitting   BP Cuff Size: Medium Adult   Pulse: 85   Weight: 84.1 kg (185 lb 6.4 oz)           BP Readings from Last 3 Encounters:   04/30/25 (!) 108/52   03/13/25 114/72   09/26/24 118/78       Wt Readings from Last 3 Encounters:   04/30/25 84.1 kg (185 lb 6.4 oz)   03/13/25 85.5 kg (188 lb 9.6 oz)   09/26/24 84.4 kg (186 lb)       Physical Exam  Constitutional:       General: He is not in acute distress.     Appearance: He is well-developed. He is not diaphoretic.   HENT:      Head: Normocephalic and atraumatic.   Eyes:      Pupils: Pupils are equal, round, and reactive to light.   Neck:      Thyroid: No thyromegaly.      Vascular: No JVD.   Cardiovascular:      Rate and Rhythm: Normal rate and regular rhythm.      Chest Wall: PMI is not displaced.      Heart sounds: Normal heart sounds, S1 normal and S2 normal. No murmur heard.     No friction rub. No gallop.   Pulmonary:      Effort: Pulmonary effort is normal. No respiratory distress.      Breath sounds: Normal breath sounds. No stridor. No wheezing or rales.   Chest:      Chest wall: No tenderness.   Abdominal:      General: Bowel sounds are normal. There is no distension.      Palpations: Abdomen is soft.      Tenderness: There is no abdominal tenderness. There is no guarding or rebound.   Musculoskeletal:         General: No tenderness. Normal range of motion.      Cervical back: Normal range of motion.   Lymphadenopathy:      Cervical: No cervical adenopathy.   Skin:     General: Skin is warm and dry.      Findings:

## 2025-04-30 ENCOUNTER — OFFICE VISIT (OUTPATIENT)
Dept: CARDIOLOGY CLINIC | Age: 85
End: 2025-04-30
Payer: MEDICARE

## 2025-04-30 VITALS
BODY MASS INDEX: 28.19 KG/M2 | HEART RATE: 85 BPM | SYSTOLIC BLOOD PRESSURE: 108 MMHG | DIASTOLIC BLOOD PRESSURE: 52 MMHG | WEIGHT: 185.4 LBS

## 2025-04-30 DIAGNOSIS — I25.119 CORONARY ARTERY DISEASE INVOLVING NATIVE CORONARY ARTERY OF NATIVE HEART WITH ANGINA PECTORIS: ICD-10-CM

## 2025-04-30 DIAGNOSIS — I10 ESSENTIAL HYPERTENSION: Primary | Chronic | ICD-10-CM

## 2025-04-30 DIAGNOSIS — I48.20 CHRONIC ATRIAL FIBRILLATION (HCC): Chronic | ICD-10-CM

## 2025-04-30 PROCEDURE — G8417 CALC BMI ABV UP PARAM F/U: HCPCS | Performed by: INTERNAL MEDICINE

## 2025-04-30 PROCEDURE — 1036F TOBACCO NON-USER: CPT | Performed by: INTERNAL MEDICINE

## 2025-04-30 PROCEDURE — 99214 OFFICE O/P EST MOD 30 MIN: CPT | Performed by: INTERNAL MEDICINE

## 2025-04-30 PROCEDURE — 1123F ACP DISCUSS/DSCN MKR DOCD: CPT | Performed by: INTERNAL MEDICINE

## 2025-04-30 PROCEDURE — 1159F MED LIST DOCD IN RCRD: CPT | Performed by: INTERNAL MEDICINE

## 2025-04-30 PROCEDURE — G2211 COMPLEX E/M VISIT ADD ON: HCPCS | Performed by: INTERNAL MEDICINE

## 2025-04-30 PROCEDURE — G8427 DOCREV CUR MEDS BY ELIG CLIN: HCPCS | Performed by: INTERNAL MEDICINE

## 2025-04-30 RX ORDER — IRBESARTAN 75 MG/1
TABLET ORAL
COMMUNITY
Start: 2025-03-30 | End: 2025-04-30 | Stop reason: ALTCHOICE

## 2025-04-30 RX ORDER — TIZANIDINE 2 MG/1
2 TABLET ORAL EVERY 6 HOURS PRN
COMMUNITY

## 2025-04-30 RX ORDER — TRIAMTERENE AND HYDROCHLOROTHIAZIDE 37.5; 25 MG/1; MG/1
1 TABLET ORAL DAILY
COMMUNITY

## 2025-04-30 NOTE — ASSESSMENT & PLAN NOTE
Low BP reads.  Discontinue Avapro  Continue metoprolol and HCTZ.  If BP goes up start low-dose losartan

## 2025-05-22 ENCOUNTER — ANTI-COAG VISIT (OUTPATIENT)
Dept: PHARMACY | Age: 85
End: 2025-05-22
Payer: MEDICARE

## 2025-05-22 DIAGNOSIS — I48.20 CHRONIC ATRIAL FIBRILLATION (HCC): Primary | Chronic | ICD-10-CM

## 2025-05-22 LAB
INTERNATIONAL NORMALIZATION RATIO, POC: 2.8
PROTHROMBIN TIME, POC: 0

## 2025-05-22 PROCEDURE — 85610 PROTHROMBIN TIME: CPT

## 2025-05-22 PROCEDURE — 99211 OFF/OP EST MAY X REQ PHY/QHP: CPT

## 2025-05-22 NOTE — PROGRESS NOTES
ANTICOAGULATION SERVICE    Sai HARPER Sascha \"AL\" is a 85 y.o. male with PMHx significant for chronic AF (HQS3UZ2-OZCq of 4), HTN who presents to clinic 5/22/2025 for anticoagulation monitoring and adjustment.    Anticoagulation Indication(s):  Afib    Referring Physician:  Dr Mercedes  Goal INR Range:  2-3  Duration of Anticoagulation Therapy:  Indefinite  Time of day dose taken:  PM  Product patient has at home:  warfarin 5 mg (PEACH)    VNU5GA5-JXNx Score for Atrial Fibrillation Stroke Risk   Risk   Factors  Component Value   C CHF No 0   H HTN Yes 1   A2 Age >= 75 Yes,  (85 y.o.) 2   D DM No 0   S2 Prior Stroke/TIA No 0   V Vascular Disease Yes 1   A Age 65-74 No,  (85 y.o.) 0   Sc Sex male 0    JNI5GR1-ZBRf  Score  4   Score last updated 4/28/22 1:42 PM EDT    Click here for a link to the UpToDate guideline \"Atrial Fibrillation: Anticoagulation therapy to prevent embolization    Disclaimer: Risk Score calculation is dependent on accuracy of patient problem list and past encounter diagnosis.    INR Summary                           Warfarin regimen (mg)  Date INR   A/P   Sun Mon Tue Wed Thu Fri Sat Mg/wk  5/22 2.8 At goal, cont  7.5 5 7.5 7.5 7.5 5 7.5 47.5  4/24 2.2 At goal, cont  7.5 5 7.5 7.5 7.5 5 7.5 47.5  3/27 2.4 At goal, cont  7.5 5 7.5 7.5 7.5 5 7.5 47.5  3/13 3.1 Above goal, dec/cont 7.5 5 7.5 7.5 5/7.5 5 7.5 47.5  2/27 3.4 Above goal, hold+dec 7.5 5 7.5 7.5 0/7.5 5 7.5 47.5  2/20 1.8 Below goal, continue 7.5 5 7.5 7.5 7.5 7.5 7.5 50  2/13 4.9 Above goal, holdx2 7.5 5 7.5 7.5 7.5 7.5 7.5 50  1/16 2.0 At goal, continue 7.5 7.5 7.5 7.5 7.5 7.5 7.5 52.5  12/12 1.9 Below goal, continue 7.5 7.5 7.5 7.5 7.5 7.5 7.5 52.5  11/14 2.0 At goal, no change 7.5 7.5 7.5 7.5 7.5 7.5 7.5 52.5  10/17 2.1 At goal, no change 7.5 7.5 7.5 7.5 7.5 7.5 7.5 52.5  9/19 2.8 At goal, no change 7.5 7.5 7.5 7.5 7.5 7.5 7.5 52.5  8/22 1.4  Below, missed  7.5 7.5 7.5 7.5 10/7.5 7.5 7.5 52.5  8/1 2.4 At goal, continue

## 2025-06-19 ENCOUNTER — ANTI-COAG VISIT (OUTPATIENT)
Dept: PHARMACY | Age: 85
End: 2025-06-19
Payer: MEDICARE

## 2025-06-19 DIAGNOSIS — I48.20 CHRONIC ATRIAL FIBRILLATION (HCC): Primary | Chronic | ICD-10-CM

## 2025-06-19 LAB
INTERNATIONAL NORMALIZATION RATIO, POC: 2.5
PROTHROMBIN TIME, POC: 0

## 2025-06-19 PROCEDURE — 99211 OFF/OP EST MAY X REQ PHY/QHP: CPT

## 2025-06-19 PROCEDURE — 85610 PROTHROMBIN TIME: CPT

## 2025-06-19 NOTE — PROGRESS NOTES
ANTICOAGULATION SERVICE    Sai HARPER Sascha \"AL\" is a 85 y.o. male with PMHx significant for chronic AF (ZWR8FM1-UNGx of 4), HTN who presents to clinic 6/19/2025 for anticoagulation monitoring and adjustment.    Anticoagulation Indication(s):  Afib    Referring Physician:  Dr Mercedes  Goal INR Range:  2-3  Duration of Anticoagulation Therapy:  Indefinite  Time of day dose taken:  PM  Product patient has at home:  warfarin 5 mg (PEACH)    ADW2RV2-LODz Score for Atrial Fibrillation Stroke Risk   Risk   Factors  Component Value   C CHF No 0   H HTN Yes 1   A2 Age >= 75 Yes,  (85 y.o.) 2   D DM No 0   S2 Prior Stroke/TIA No 0   V Vascular Disease Yes 1   A Age 65-74 No,  (85 y.o.) 0   Sc Sex male 0    WUD8ZS9-VRDy  Score  4   Score last updated 4/28/22 1:42 PM EDT    Click here for a link to the UpToDate guideline \"Atrial Fibrillation: Anticoagulation therapy to prevent embolization    Disclaimer: Risk Score calculation is dependent on accuracy of patient problem list and past encounter diagnosis.    INR Summary                           Warfarin regimen (mg)  Date INR   A/P   Sun Mon Tue Wed Thu Fri Sat Mg/wk  6/19 2.5 At goal, cont  7.5 5 7.5 7.5 7.5 5 7.5 47.5  5/22 2.8 At goal, cont  7.5 5 7.5 7.5 7.5 5 7.5 47.5  4/24 2.2 At goal, cont  7.5 5 7.5 7.5 7.5 5 7.5 47.5  3/27 2.4 At goal, cont  7.5 5 7.5 7.5 7.5 5 7.5 47.5  3/13 3.1 Above goal, dec/cont 7.5 5 7.5 7.5 5/7.5 5 7.5 47.5  2/27 3.4 Above goal, hold+dec 7.5 5 7.5 7.5 0/7.5 5 7.5 47.5  2/20 1.8 Below goal, continue 7.5 5 7.5 7.5 7.5 7.5 7.5 50  2/13 4.9 Above goal, holdx2 7.5 5 7.5 7.5 7.5 7.5 7.5 50  1/16 2.0 At goal, continue 7.5 7.5 7.5 7.5 7.5 7.5 7.5 52.5  12/12 1.9 Below goal, continue 7.5 7.5 7.5 7.5 7.5 7.5 7.5 52.5  11/14 2.0 At goal, no change 7.5 7.5 7.5 7.5 7.5 7.5 7.5 52.5  10/17 2.1 At goal, no change 7.5 7.5 7.5 7.5 7.5 7.5 7.5 52.5  9/19 2.8 At goal, no change 7.5 7.5 7.5 7.5 7.5 7.5 7.5 52.5  8/22 1.4  Below,

## 2025-07-07 DIAGNOSIS — I10 ESSENTIAL HYPERTENSION: ICD-10-CM

## 2025-07-07 DIAGNOSIS — I50.9 HEART FAILURE, ACC/AHA STAGE B (HCC): ICD-10-CM

## 2025-07-07 NOTE — TELEPHONE ENCOUNTER
[facility] Medication Refills:      furosemide (LASIX) 20 MG tablet [[id number]]    Order Details  Dose: 20 mg Route: Oral Frequency: DAILY   Dispense Quantity: 90 tablet Refills: 3          Sig: Take 1 tablet by mouth daily         Start Date: 07/05/24 End Date: --   Written Date: 07/05/24 Expiration Date: 07/05/25       Associated Diagnoses: Essential hypertension [I10]; Heart failure, ACC/AHA stage B (HCC) [I50.9]   Original Order: furosemide (LASIX) 20 MG tablet [[id number]]   Providers    Authorizing Provider: Patricia Epstein APRN - CNP NPI: [id number]   Ordering User: Patricia Epstein APRN - CNP          Pharmacy    [facility] [id number] [place] [address] - P [phone] - F [phone]  [address] [facility] [address]  Phone: [phone]  Fax: [phone]       Last Office Visit: 04-30-25    Next Office Visit: 07-31-25

## 2025-07-08 RX ORDER — FUROSEMIDE 20 MG/1
20 TABLET ORAL DAILY
Qty: 90 TABLET | Refills: 3 | Status: SHIPPED | OUTPATIENT
Start: 2025-07-08

## 2025-07-08 NOTE — TELEPHONE ENCOUNTER
Requested Prescriptions     Pending Prescriptions Disp Refills    furosemide (LASIX) 20 MG tablet 90 tablet 3     Sig: Take 1 tablet by mouth daily            Checked Correct Pharmacy: Yes    Any changes since last refill? No     Number: 90  Refills: 3    Last OV: 4/30/2025 Provider: SAVAGE    Next OV: 7/31/2025 Provider: SAVAGE    Last Labs:   BMP:  Lab Results   Component Value Date/Time     05/02/2024 11:04 AM    K 3.5 05/02/2024 11:04 AM     05/02/2024 11:04 AM    CO2 25 05/02/2024 11:04 AM    BUN 8 05/02/2024 11:04 AM    CREATININE 0.8 05/02/2024 11:04 AM    GLUCOSE 104 05/02/2024 11:04 AM    GLUCOSE 84 08/03/2015 10:03 AM    CALCIUM 9.3 05/02/2024 11:04 AM    LABGLOM 87 05/02/2024 11:04 AM    LABGLOM 68 06/12/2012 10:40 AM

## 2025-07-23 NOTE — PROGRESS NOTES
[facility]     Outpatient Cardiology         Patient Name:  Sai Caicedo  Requesting Physician: No admitting provider for patient encounter.  Primary Care Physician: Parish Vallejo MD    Reason for Consultation/Chief Complaint:   Chief Complaint   Patient presents with    Coronary Artery Disease     CAD, A-fib    History of Present Illness:    HPI     Sai Caicedois a 85 y.o. male with PMH of Afib, CAD, HLD and HTN.   Here for follow up for afib, HTN, HLD, and CAD.   A-fib, rate controlled, on warfarin and metoprolol.  Feels good overall, just took the stairs, heart rate came down after.  Hypertension, on toprol  mg daily,, no side effects, BP at goal  Hyperlipidemia, Last LDL 61 (5/7/25) on Lipitor 40 mg daily, zetia 10 mg daily no myalgias  CAD, on aspirin and Lipitor, no angina, tolerating well current medications.  Overall feeling good from a cardiac perspective      PMH  Past Medical History:   Diagnosis Date    Allergic rhinitis     Anemia     Atrial fibrillation (HCC)     Atrial fibrillation (HCC)     Benign non-nodular prostatic hyperplasia with lower urinary tract symptoms 2/9/2017    Benign paroxysmal positional vertigo     BPH (benign prostatic hyperplasia)     BPH (benign prostatic hypertrophy)     Cataract 10/3/2014    Cholelithiasis     Colon cancer (HCC) 4/9/2012    Diverticulosis     Dyslipidemia 7/21/2010    Elevated PSA     Erectile dysfunction     Essential hypertension 11/24/2015    Gastroesophageal reflux disease without esophagitis 11/9/2016    GERD (gastroesophageal reflux disease)     Glaucoma 4/5/2013    Hiatal hernia     Hypertension     Lumbar radiculopathy     Osteoarthritis     Peripheral neuropathy 12/7/2012    Proteinuria 7/22/2010    S/P laparoscopic-assisted sigmoidectomy 4/17/2012    Urinary frequency        PSH  Past Surgical History:   Procedure Laterality Date    CATARACT REMOVAL WITH IMPLANT Right October 7, 2014    Dr. Ramon Hawkins -

## 2025-07-24 ENCOUNTER — ANTI-COAG VISIT (OUTPATIENT)
Dept: PHARMACY | Age: 85
End: 2025-07-24
Payer: MEDICARE

## 2025-07-24 ENCOUNTER — TELEPHONE (OUTPATIENT)
Dept: CARDIOLOGY CLINIC | Age: 85
End: 2025-07-24

## 2025-07-24 DIAGNOSIS — I48.20 CHRONIC ATRIAL FIBRILLATION (HCC): Primary | Chronic | ICD-10-CM

## 2025-07-24 DIAGNOSIS — I48.20 CHRONIC ATRIAL FIBRILLATION (HCC): Chronic | ICD-10-CM

## 2025-07-24 LAB
INTERNATIONAL NORMALIZATION RATIO, POC: 2.1
PROTHROMBIN TIME, POC: 0

## 2025-07-24 PROCEDURE — 99211 OFF/OP EST MAY X REQ PHY/QHP: CPT

## 2025-07-24 PROCEDURE — 85610 PROTHROMBIN TIME: CPT

## 2025-07-24 RX ORDER — WARFARIN SODIUM 5 MG/1
7.5 TABLET ORAL DAILY
Qty: 135 TABLET | Refills: 3 | Status: SHIPPED | OUTPATIENT
Start: 2025-07-24

## 2025-07-24 NOTE — PROGRESS NOTES
ANTICOAGULATION SERVICE    Sai MEREDITH Caicedo \"AL\" is a 85 y.o. male with PMHx significant for chronic AF (NFM4FS5-EKNg of 4), HTN who presents to clinic 7/24/2025 for anticoagulation monitoring and adjustment.    Anticoagulation Indication(s):  Afib    Referring Physician:  Dr Mercedes  Goal INR Range:  2-3  Duration of Anticoagulation Therapy:  Indefinite  Time of day dose taken:  PM  Product patient has at home:  warfarin 5 mg (PEACH)    QAU2ZT0-PVPa Score for Atrial Fibrillation Stroke Risk   Risk   Factors  Component Value   C CHF No 0   H HTN Yes 1   A2 Age >= 75 Yes,  (85 y.o.) 2   D DM No 0   S2 Prior Stroke/TIA No 0   V Vascular Disease Yes 1   A Age 65-74 No,  (85 y.o.) 0   Sc Sex male 0    WNQ2PL4-UFEe  Score  4   Score last updated 4/28/22 1:42 PM EDT    Click here for a link to the UpToDate guideline \"Atrial Fibrillation: Anticoagulation therapy to prevent embolization    Disclaimer: Risk Score calculation is dependent on accuracy of patient problem list and past encounter diagnosis.    INR Summary                           Warfarin regimen (mg)  Date INR   A/P   Sun Mon Tue Wed Thu Fri Sat Mg/wk  7/24 2.1 At goal, cont  7.5 5 7.5 7.5 7.5 5 7.5 47.5  6/19 2.5 At goal, cont  7.5 5 7.5 7.5 7.5 5 7.5 47.5  5/22 2.8 At goal, cont  7.5 5 7.5 7.5 7.5 5 7.5 47.5  4/24 2.2 At goal, cont  7.5 5 7.5 7.5 7.5 5 7.5 47.5  3/27 2.4 At goal, cont  7.5 5 7.5 7.5 7.5 5 7.5 47.5  3/13 3.1 Above goal, dec/cont 7.5 5 7.5 7.5 5/7.5 5 7.5 47.5  2/27 3.4 Above goal, hold+dec 7.5 5 7.5 7.5 0/7.5 5 7.5 47.5  2/20 1.8 Below goal, continue 7.5 5 7.5 7.5 7.5 7.5 7.5 50  2/13 4.9 Above goal, holdx2 7.5 5 7.5 7.5 7.5 7.5 7.5 50  1/16 2.0 At goal, continue 7.5 7.5 7.5 7.5 7.5 7.5 7.5 52.5  12/12 1.9 Below goal, continue 7.5 7.5 7.5 7.5 7.5 7.5 7.5 52.5  11/14 2.0 At goal, no change 7.5 7.5 7.5 7.5 7.5 7.5 7.5 52.5  10/17 2.1 At goal, no change 7.5 7.5 7.5 7.5 7.5 7.5 7.5 52.5  9/19 2.8 At goal, no

## 2025-07-31 ENCOUNTER — OFFICE VISIT (OUTPATIENT)
Dept: CARDIOLOGY CLINIC | Age: 85
End: 2025-07-31
Payer: MEDICARE

## 2025-07-31 VITALS
WEIGHT: 189.8 LBS | SYSTOLIC BLOOD PRESSURE: 136 MMHG | HEART RATE: 119 BPM | DIASTOLIC BLOOD PRESSURE: 72 MMHG | BODY MASS INDEX: 28.86 KG/M2 | OXYGEN SATURATION: 98 %

## 2025-07-31 DIAGNOSIS — I25.119 CORONARY ARTERY DISEASE INVOLVING NATIVE CORONARY ARTERY OF NATIVE HEART WITH ANGINA PECTORIS: Primary | ICD-10-CM

## 2025-07-31 DIAGNOSIS — E78.5 DYSLIPIDEMIA: Chronic | ICD-10-CM

## 2025-07-31 DIAGNOSIS — I10 ESSENTIAL HYPERTENSION: Chronic | ICD-10-CM

## 2025-07-31 DIAGNOSIS — I48.20 CHRONIC ATRIAL FIBRILLATION (HCC): Chronic | ICD-10-CM

## 2025-07-31 PROCEDURE — G8417 CALC BMI ABV UP PARAM F/U: HCPCS | Performed by: INTERNAL MEDICINE

## 2025-07-31 PROCEDURE — 99214 OFFICE O/P EST MOD 30 MIN: CPT | Performed by: INTERNAL MEDICINE

## 2025-07-31 PROCEDURE — G2211 COMPLEX E/M VISIT ADD ON: HCPCS | Performed by: INTERNAL MEDICINE

## 2025-07-31 PROCEDURE — G8427 DOCREV CUR MEDS BY ELIG CLIN: HCPCS | Performed by: INTERNAL MEDICINE

## 2025-07-31 PROCEDURE — 1159F MED LIST DOCD IN RCRD: CPT | Performed by: INTERNAL MEDICINE

## 2025-07-31 PROCEDURE — 1123F ACP DISCUSS/DSCN MKR DOCD: CPT | Performed by: INTERNAL MEDICINE

## 2025-07-31 PROCEDURE — 1036F TOBACCO NON-USER: CPT | Performed by: INTERNAL MEDICINE

## 2025-07-31 RX ORDER — TRAMADOL HYDROCHLORIDE 50 MG/1
1 TABLET ORAL EVERY 6 HOURS PRN
COMMUNITY
Start: 2025-07-15

## 2025-08-21 ENCOUNTER — ANTI-COAG VISIT (OUTPATIENT)
Dept: PHARMACY | Age: 85
End: 2025-08-21
Payer: MEDICARE

## 2025-08-21 DIAGNOSIS — I48.20 CHRONIC ATRIAL FIBRILLATION (HCC): Primary | Chronic | ICD-10-CM

## 2025-08-21 LAB
INTERNATIONAL NORMALIZATION RATIO, POC: 2.4
PROTHROMBIN TIME, POC: NORMAL

## 2025-08-21 PROCEDURE — 85610 PROTHROMBIN TIME: CPT

## 2025-08-21 PROCEDURE — 99211 OFF/OP EST MAY X REQ PHY/QHP: CPT

## 2025-08-29 ENCOUNTER — TELEPHONE (OUTPATIENT)
Dept: CARDIOLOGY CLINIC | Age: 85
End: 2025-08-29